# Patient Record
Sex: FEMALE | Race: WHITE | Employment: OTHER | URBAN - NONMETROPOLITAN AREA
[De-identification: names, ages, dates, MRNs, and addresses within clinical notes are randomized per-mention and may not be internally consistent; named-entity substitution may affect disease eponyms.]

---

## 2017-01-02 PROBLEM — G45.1 HEMISPHERIC CAROTID ARTERY SYNDROME: Status: ACTIVE | Noted: 2017-01-02

## 2017-01-02 PROBLEM — R47.01 APHASIA: Status: ACTIVE | Noted: 2017-01-02

## 2017-01-02 PROBLEM — G45.1 HEMISPHERIC CAROTID ARTERY SYNDROME: Status: RESOLVED | Noted: 2017-01-02 | Resolved: 2017-01-02

## 2017-01-03 PROBLEM — G45.9 TIA (TRANSIENT ISCHEMIC ATTACK): Status: ACTIVE | Noted: 2017-01-03

## 2017-01-03 PROBLEM — E11.9 TYPE 2 DIABETES MELLITUS (HCC): Status: ACTIVE | Noted: 2017-01-03

## 2017-01-09 RX ORDER — TEMAZEPAM 15 MG/1
15 CAPSULE ORAL NIGHTLY PRN
Qty: 30 CAPSULE | Refills: 0 | Status: ON HOLD | OUTPATIENT
Start: 2017-01-09 | End: 2017-02-06 | Stop reason: HOSPADM

## 2017-01-09 RX ORDER — BUSPIRONE HYDROCHLORIDE 15 MG/1
30 TABLET ORAL 2 TIMES DAILY
Qty: 120 TABLET | Refills: 0 | Status: SHIPPED | OUTPATIENT
Start: 2017-01-09 | End: 2017-02-28 | Stop reason: SDUPTHER

## 2017-01-24 ENCOUNTER — OFFICE VISIT (OUTPATIENT)
Dept: PSYCHIATRY | Age: 82
End: 2017-01-24

## 2017-01-24 DIAGNOSIS — F41.1 GAD (GENERALIZED ANXIETY DISORDER): ICD-10-CM

## 2017-01-24 DIAGNOSIS — F33.1 MODERATE EPISODE OF RECURRENT MAJOR DEPRESSIVE DISORDER (HCC): Primary | ICD-10-CM

## 2017-01-24 PROCEDURE — G8400 PT W/DXA NO RESULTS DOC: HCPCS | Performed by: NURSE PRACTITIONER

## 2017-01-24 PROCEDURE — 99215 OFFICE O/P EST HI 40 MIN: CPT | Performed by: NURSE PRACTITIONER

## 2017-01-24 PROCEDURE — G8420 CALC BMI NORM PARAMETERS: HCPCS | Performed by: NURSE PRACTITIONER

## 2017-01-24 PROCEDURE — 1036F TOBACCO NON-USER: CPT | Performed by: NURSE PRACTITIONER

## 2017-01-24 PROCEDURE — 1111F DSCHRG MED/CURRENT MED MERGE: CPT | Performed by: NURSE PRACTITIONER

## 2017-01-24 PROCEDURE — G8427 DOCREV CUR MEDS BY ELIG CLIN: HCPCS | Performed by: NURSE PRACTITIONER

## 2017-01-24 PROCEDURE — 1123F ACP DISCUSS/DSCN MKR DOCD: CPT | Performed by: NURSE PRACTITIONER

## 2017-01-24 PROCEDURE — 1090F PRES/ABSN URINE INCON ASSESS: CPT | Performed by: NURSE PRACTITIONER

## 2017-01-24 PROCEDURE — 4040F PNEUMOC VAC/ADMIN/RCVD: CPT | Performed by: NURSE PRACTITIONER

## 2017-01-24 PROCEDURE — G8484 FLU IMMUNIZE NO ADMIN: HCPCS | Performed by: NURSE PRACTITIONER

## 2017-01-24 RX ORDER — SERTRALINE HYDROCHLORIDE 25 MG/1
50 TABLET, FILM COATED ORAL DAILY
Qty: 30 TABLET | Refills: 0 | Status: SHIPPED | OUTPATIENT
Start: 2017-01-24 | End: 2017-01-24 | Stop reason: SDUPTHER

## 2017-01-24 RX ORDER — SERTRALINE HYDROCHLORIDE 25 MG/1
50 TABLET, FILM COATED ORAL DAILY
Qty: 30 TABLET | Refills: 0 | Status: ON HOLD | OUTPATIENT
Start: 2017-01-24 | End: 2017-02-06 | Stop reason: HOSPADM

## 2017-01-30 ENCOUNTER — TELEPHONE (OUTPATIENT)
Dept: PSYCHIATRY | Age: 82
End: 2017-01-30

## 2017-01-31 ENCOUNTER — OFFICE VISIT (OUTPATIENT)
Dept: PSYCHIATRY | Age: 82
End: 2017-01-31

## 2017-01-31 DIAGNOSIS — F33.2 SEVERE EPISODE OF RECURRENT MAJOR DEPRESSIVE DISORDER, WITHOUT PSYCHOTIC FEATURES (HCC): Primary | ICD-10-CM

## 2017-01-31 DIAGNOSIS — F41.1 GAD (GENERALIZED ANXIETY DISORDER): ICD-10-CM

## 2017-01-31 PROCEDURE — G8400 PT W/DXA NO RESULTS DOC: HCPCS | Performed by: NURSE PRACTITIONER

## 2017-01-31 PROCEDURE — 1036F TOBACCO NON-USER: CPT | Performed by: NURSE PRACTITIONER

## 2017-01-31 PROCEDURE — G8428 CUR MEDS NOT DOCUMENT: HCPCS | Performed by: NURSE PRACTITIONER

## 2017-01-31 PROCEDURE — G8420 CALC BMI NORM PARAMETERS: HCPCS | Performed by: NURSE PRACTITIONER

## 2017-01-31 PROCEDURE — 4040F PNEUMOC VAC/ADMIN/RCVD: CPT | Performed by: NURSE PRACTITIONER

## 2017-01-31 PROCEDURE — 1090F PRES/ABSN URINE INCON ASSESS: CPT | Performed by: NURSE PRACTITIONER

## 2017-01-31 PROCEDURE — 1111F DSCHRG MED/CURRENT MED MERGE: CPT | Performed by: NURSE PRACTITIONER

## 2017-01-31 PROCEDURE — G8484 FLU IMMUNIZE NO ADMIN: HCPCS | Performed by: NURSE PRACTITIONER

## 2017-01-31 PROCEDURE — 1123F ACP DISCUSS/DSCN MKR DOCD: CPT | Performed by: NURSE PRACTITIONER

## 2017-01-31 PROCEDURE — 99215 OFFICE O/P EST HI 40 MIN: CPT | Performed by: NURSE PRACTITIONER

## 2017-02-28 RX ORDER — MIRTAZAPINE 15 MG/1
15 TABLET, FILM COATED ORAL NIGHTLY
Qty: 90 TABLET | Refills: 0 | Status: SHIPPED | OUTPATIENT
Start: 2017-02-28 | End: 2017-04-18 | Stop reason: SDUPTHER

## 2017-02-28 RX ORDER — BUSPIRONE HYDROCHLORIDE 15 MG/1
30 TABLET ORAL 2 TIMES DAILY
Qty: 360 TABLET | Refills: 0 | Status: SHIPPED | OUTPATIENT
Start: 2017-02-28 | End: 2017-03-20 | Stop reason: SDUPTHER

## 2017-02-28 RX ORDER — LORAZEPAM 0.5 MG/1
0.5 TABLET ORAL NIGHTLY PRN
Qty: 90 TABLET | Refills: 0 | Status: SHIPPED | OUTPATIENT
Start: 2017-02-28 | End: 2017-04-18 | Stop reason: SDUPTHER

## 2017-02-28 RX ORDER — ARIPIPRAZOLE 5 MG/1
5 TABLET ORAL DAILY
Qty: 90 TABLET | Refills: 0 | Status: SHIPPED | OUTPATIENT
Start: 2017-02-28 | End: 2017-03-09 | Stop reason: SDUPTHER

## 2017-03-09 ENCOUNTER — OFFICE VISIT (OUTPATIENT)
Dept: PSYCHIATRY | Age: 82
End: 2017-03-09

## 2017-03-09 DIAGNOSIS — F33.1 MODERATE EPISODE OF RECURRENT MAJOR DEPRESSIVE DISORDER (HCC): Primary | ICD-10-CM

## 2017-03-09 PROCEDURE — 1123F ACP DISCUSS/DSCN MKR DOCD: CPT | Performed by: NURSE PRACTITIONER

## 2017-03-09 PROCEDURE — G8427 DOCREV CUR MEDS BY ELIG CLIN: HCPCS | Performed by: NURSE PRACTITIONER

## 2017-03-09 PROCEDURE — G8484 FLU IMMUNIZE NO ADMIN: HCPCS | Performed by: NURSE PRACTITIONER

## 2017-03-09 PROCEDURE — 99215 OFFICE O/P EST HI 40 MIN: CPT | Performed by: NURSE PRACTITIONER

## 2017-03-09 PROCEDURE — 1090F PRES/ABSN URINE INCON ASSESS: CPT | Performed by: NURSE PRACTITIONER

## 2017-03-09 PROCEDURE — 4040F PNEUMOC VAC/ADMIN/RCVD: CPT | Performed by: NURSE PRACTITIONER

## 2017-03-09 PROCEDURE — G8400 PT W/DXA NO RESULTS DOC: HCPCS | Performed by: NURSE PRACTITIONER

## 2017-03-09 PROCEDURE — G8420 CALC BMI NORM PARAMETERS: HCPCS | Performed by: NURSE PRACTITIONER

## 2017-03-09 PROCEDURE — 1036F TOBACCO NON-USER: CPT | Performed by: NURSE PRACTITIONER

## 2017-03-09 RX ORDER — ARIPIPRAZOLE 5 MG/1
10 TABLET ORAL DAILY
Qty: 60 TABLET | Refills: 0 | Status: SHIPPED | OUTPATIENT
Start: 2017-03-09 | End: 2017-04-18 | Stop reason: SDUPTHER

## 2017-03-09 RX ORDER — ARIPIPRAZOLE 5 MG/1
10 TABLET ORAL DAILY
Qty: 60 TABLET | Refills: 0 | Status: SHIPPED | OUTPATIENT
Start: 2017-03-09 | End: 2017-03-09 | Stop reason: SDUPTHER

## 2017-03-09 RX ORDER — DULOXETIN HYDROCHLORIDE 30 MG/1
30 CAPSULE, DELAYED RELEASE ORAL DAILY
Qty: 30 CAPSULE | Refills: 2 | Status: SHIPPED | OUTPATIENT
Start: 2017-03-09 | End: 2017-03-20 | Stop reason: DRUGHIGH

## 2017-03-09 RX ORDER — DULOXETIN HYDROCHLORIDE 30 MG/1
30 CAPSULE, DELAYED RELEASE ORAL DAILY
Qty: 30 CAPSULE | Refills: 3 | Status: SHIPPED | OUTPATIENT
Start: 2017-03-09 | End: 2017-03-09 | Stop reason: SDUPTHER

## 2017-03-14 ENCOUNTER — TELEPHONE (OUTPATIENT)
Dept: PSYCHIATRY | Age: 82
End: 2017-03-14

## 2017-03-20 RX ORDER — BUSPIRONE HYDROCHLORIDE 15 MG/1
15 TABLET ORAL 3 TIMES DAILY
Qty: 90 TABLET | Refills: 2 | Status: SHIPPED | OUTPATIENT
Start: 2017-03-20 | End: 2017-05-22 | Stop reason: SDUPTHER

## 2017-03-20 RX ORDER — DULOXETIN HYDROCHLORIDE 60 MG/1
60 CAPSULE, DELAYED RELEASE ORAL DAILY
Qty: 30 CAPSULE | Refills: 2 | Status: SHIPPED | OUTPATIENT
Start: 2017-03-20 | End: 2017-06-15 | Stop reason: SDUPTHER

## 2017-04-07 ENCOUNTER — TELEPHONE (OUTPATIENT)
Dept: PSYCHIATRY | Age: 82
End: 2017-04-07

## 2017-04-18 ENCOUNTER — OFFICE VISIT (OUTPATIENT)
Dept: PSYCHIATRY | Age: 82
End: 2017-04-18

## 2017-04-18 DIAGNOSIS — F41.1 GAD (GENERALIZED ANXIETY DISORDER): ICD-10-CM

## 2017-04-18 DIAGNOSIS — F33.1 MODERATE EPISODE OF RECURRENT MAJOR DEPRESSIVE DISORDER (HCC): Primary | ICD-10-CM

## 2017-04-18 PROCEDURE — 4040F PNEUMOC VAC/ADMIN/RCVD: CPT | Performed by: NURSE PRACTITIONER

## 2017-04-18 PROCEDURE — 1123F ACP DISCUSS/DSCN MKR DOCD: CPT | Performed by: NURSE PRACTITIONER

## 2017-04-18 PROCEDURE — 99214 OFFICE O/P EST MOD 30 MIN: CPT | Performed by: NURSE PRACTITIONER

## 2017-04-18 PROCEDURE — G8420 CALC BMI NORM PARAMETERS: HCPCS | Performed by: NURSE PRACTITIONER

## 2017-04-18 PROCEDURE — G8400 PT W/DXA NO RESULTS DOC: HCPCS | Performed by: NURSE PRACTITIONER

## 2017-04-18 PROCEDURE — G8427 DOCREV CUR MEDS BY ELIG CLIN: HCPCS | Performed by: NURSE PRACTITIONER

## 2017-04-18 PROCEDURE — 1036F TOBACCO NON-USER: CPT | Performed by: NURSE PRACTITIONER

## 2017-04-18 PROCEDURE — 1090F PRES/ABSN URINE INCON ASSESS: CPT | Performed by: NURSE PRACTITIONER

## 2017-04-18 RX ORDER — MIRTAZAPINE 15 MG/1
15 TABLET, FILM COATED ORAL NIGHTLY
Qty: 90 TABLET | Refills: 2 | Status: SHIPPED | OUTPATIENT
Start: 2017-04-18 | End: 2018-07-12

## 2017-04-18 RX ORDER — TRIHEXYPHENIDYL HYDROCHLORIDE 2 MG/1
2 TABLET ORAL 2 TIMES DAILY
Qty: 60 TABLET | Refills: 2 | Status: SHIPPED | OUTPATIENT
Start: 2017-04-18 | End: 2017-07-14 | Stop reason: SDUPTHER

## 2017-04-18 RX ORDER — ARIPIPRAZOLE 5 MG/1
10 TABLET ORAL DAILY
Qty: 60 TABLET | Refills: 3 | Status: SHIPPED | OUTPATIENT
Start: 2017-04-18 | End: 2017-07-14 | Stop reason: SDUPTHER

## 2017-04-18 RX ORDER — LORAZEPAM 0.5 MG/1
0.25 TABLET ORAL NIGHTLY PRN
Qty: 45 TABLET | Refills: 0 | Status: SHIPPED | OUTPATIENT
Start: 2017-04-18 | End: 2017-07-17

## 2017-05-22 RX ORDER — BUSPIRONE HYDROCHLORIDE 15 MG/1
15 TABLET ORAL 3 TIMES DAILY
Qty: 270 TABLET | Refills: 2 | Status: SHIPPED | OUTPATIENT
Start: 2017-05-22 | End: 2017-08-20

## 2017-06-15 ENCOUNTER — OFFICE VISIT (OUTPATIENT)
Dept: PSYCHIATRY | Age: 82
End: 2017-06-15

## 2017-06-15 VITALS — BODY MASS INDEX: 25.16 KG/M2 | WEIGHT: 151 LBS | HEIGHT: 65 IN

## 2017-06-15 DIAGNOSIS — F33.9 RECURRENT MAJOR DEPRESSIVE DISORDER, REMISSION STATUS UNSPECIFIED (HCC): Primary | ICD-10-CM

## 2017-06-15 DIAGNOSIS — F41.1 GAD (GENERALIZED ANXIETY DISORDER): ICD-10-CM

## 2017-06-15 PROCEDURE — 1036F TOBACCO NON-USER: CPT | Performed by: NURSE PRACTITIONER

## 2017-06-15 PROCEDURE — G8427 DOCREV CUR MEDS BY ELIG CLIN: HCPCS | Performed by: NURSE PRACTITIONER

## 2017-06-15 PROCEDURE — G8400 PT W/DXA NO RESULTS DOC: HCPCS | Performed by: NURSE PRACTITIONER

## 2017-06-15 PROCEDURE — 99215 OFFICE O/P EST HI 40 MIN: CPT | Performed by: NURSE PRACTITIONER

## 2017-06-15 PROCEDURE — 4040F PNEUMOC VAC/ADMIN/RCVD: CPT | Performed by: NURSE PRACTITIONER

## 2017-06-15 PROCEDURE — 1123F ACP DISCUSS/DSCN MKR DOCD: CPT | Performed by: NURSE PRACTITIONER

## 2017-06-15 PROCEDURE — G8419 CALC BMI OUT NRM PARAM NOF/U: HCPCS | Performed by: NURSE PRACTITIONER

## 2017-06-15 PROCEDURE — 1090F PRES/ABSN URINE INCON ASSESS: CPT | Performed by: NURSE PRACTITIONER

## 2017-06-15 RX ORDER — DULOXETIN HYDROCHLORIDE 60 MG/1
60 CAPSULE, DELAYED RELEASE ORAL DAILY
Qty: 30 CAPSULE | Refills: 3 | Status: SHIPPED | OUTPATIENT
Start: 2017-06-15 | End: 2017-10-10 | Stop reason: DRUGHIGH

## 2017-07-17 RX ORDER — TRIHEXYPHENIDYL HYDROCHLORIDE 2 MG/1
2 TABLET ORAL 2 TIMES DAILY
Qty: 60 TABLET | Refills: 2 | Status: SHIPPED | OUTPATIENT
Start: 2017-07-17 | End: 2017-10-02 | Stop reason: SDUPTHER

## 2017-07-17 RX ORDER — ARIPIPRAZOLE 5 MG/1
10 TABLET ORAL DAILY
Qty: 60 TABLET | Refills: 3 | Status: ON HOLD | OUTPATIENT
Start: 2017-07-17 | End: 2019-03-28 | Stop reason: HOSPADM

## 2017-08-15 ENCOUNTER — OFFICE VISIT (OUTPATIENT)
Dept: PSYCHIATRY | Age: 82
End: 2017-08-15
Payer: MEDICARE

## 2017-08-15 VITALS — WEIGHT: 156 LBS | BODY MASS INDEX: 25.96 KG/M2

## 2017-08-15 DIAGNOSIS — F33.9 RECURRENT MAJOR DEPRESSIVE DISORDER, REMISSION STATUS UNSPECIFIED (HCC): Primary | ICD-10-CM

## 2017-08-15 DIAGNOSIS — F41.1 GAD (GENERALIZED ANXIETY DISORDER): ICD-10-CM

## 2017-08-15 PROCEDURE — G8427 DOCREV CUR MEDS BY ELIG CLIN: HCPCS | Performed by: NURSE PRACTITIONER

## 2017-08-15 PROCEDURE — 1123F ACP DISCUSS/DSCN MKR DOCD: CPT | Performed by: NURSE PRACTITIONER

## 2017-08-15 PROCEDURE — 99213 OFFICE O/P EST LOW 20 MIN: CPT | Performed by: NURSE PRACTITIONER

## 2017-08-15 PROCEDURE — G8400 PT W/DXA NO RESULTS DOC: HCPCS | Performed by: NURSE PRACTITIONER

## 2017-08-15 PROCEDURE — 4040F PNEUMOC VAC/ADMIN/RCVD: CPT | Performed by: NURSE PRACTITIONER

## 2017-08-15 PROCEDURE — 1036F TOBACCO NON-USER: CPT | Performed by: NURSE PRACTITIONER

## 2017-08-15 PROCEDURE — 1090F PRES/ABSN URINE INCON ASSESS: CPT | Performed by: NURSE PRACTITIONER

## 2017-08-15 PROCEDURE — G8419 CALC BMI OUT NRM PARAM NOF/U: HCPCS | Performed by: NURSE PRACTITIONER

## 2017-09-19 ENCOUNTER — HOSPITAL ENCOUNTER (OUTPATIENT)
Age: 82
Discharge: HOME | End: 2017-09-19
Payer: MEDICARE

## 2017-09-19 ENCOUNTER — OFFICE VISIT (OUTPATIENT)
Dept: PSYCHIATRY | Age: 82
End: 2017-09-19
Payer: MEDICARE

## 2017-09-19 VITALS — WEIGHT: 150 LBS | BODY MASS INDEX: 24.96 KG/M2

## 2017-09-19 DIAGNOSIS — F33.9 RECURRENT MAJOR DEPRESSIVE DISORDER, REMISSION STATUS UNSPECIFIED (HCC): Primary | ICD-10-CM

## 2017-09-19 DIAGNOSIS — F41.1 GAD (GENERALIZED ANXIETY DISORDER): ICD-10-CM

## 2017-09-19 PROCEDURE — G8420 CALC BMI NORM PARAMETERS: HCPCS | Performed by: NURSE PRACTITIONER

## 2017-09-19 PROCEDURE — 36415 COLL VENOUS BLD VENIPUNCTURE: CPT

## 2017-09-19 PROCEDURE — G8427 DOCREV CUR MEDS BY ELIG CLIN: HCPCS | Performed by: NURSE PRACTITIONER

## 2017-09-19 PROCEDURE — 86038 ANTINUCLEAR ANTIBODIES: CPT

## 2017-09-19 PROCEDURE — 1123F ACP DISCUSS/DSCN MKR DOCD: CPT | Performed by: NURSE PRACTITIONER

## 2017-09-19 PROCEDURE — 1036F TOBACCO NON-USER: CPT | Performed by: NURSE PRACTITIONER

## 2017-09-19 PROCEDURE — 4040F PNEUMOC VAC/ADMIN/RCVD: CPT | Performed by: NURSE PRACTITIONER

## 2017-09-19 PROCEDURE — G8400 PT W/DXA NO RESULTS DOC: HCPCS | Performed by: NURSE PRACTITIONER

## 2017-09-19 PROCEDURE — 1090F PRES/ABSN URINE INCON ASSESS: CPT | Performed by: NURSE PRACTITIONER

## 2017-09-19 PROCEDURE — 99215 OFFICE O/P EST HI 40 MIN: CPT | Performed by: NURSE PRACTITIONER

## 2017-09-19 RX ORDER — DULOXETIN HYDROCHLORIDE 30 MG/1
30 CAPSULE, DELAYED RELEASE ORAL DAILY
Qty: 30 CAPSULE | Refills: 2 | Status: SHIPPED | OUTPATIENT
Start: 2017-09-19 | End: 2017-10-10 | Stop reason: SDUPTHER

## 2017-09-22 LAB — ANA SCREEN: NORMAL

## 2017-10-02 RX ORDER — TRIHEXYPHENIDYL HYDROCHLORIDE 2 MG/1
2 TABLET ORAL 2 TIMES DAILY
Qty: 60 TABLET | Refills: 2 | Status: SHIPPED | OUTPATIENT
Start: 2017-10-02 | End: 2019-04-29 | Stop reason: ALTCHOICE

## 2017-10-10 ENCOUNTER — OFFICE VISIT (OUTPATIENT)
Dept: PSYCHIATRY | Age: 82
End: 2017-10-10
Payer: MEDICARE

## 2017-10-10 VITALS — BODY MASS INDEX: 24.46 KG/M2 | WEIGHT: 147 LBS

## 2017-10-10 DIAGNOSIS — F33.1 MODERATE EPISODE OF RECURRENT MAJOR DEPRESSIVE DISORDER (HCC): ICD-10-CM

## 2017-10-10 DIAGNOSIS — F41.1 GAD (GENERALIZED ANXIETY DISORDER): ICD-10-CM

## 2017-10-10 DIAGNOSIS — F41.0 PANIC DISORDER WITHOUT AGORAPHOBIA: Primary | ICD-10-CM

## 2017-10-10 PROCEDURE — G8400 PT W/DXA NO RESULTS DOC: HCPCS | Performed by: NURSE PRACTITIONER

## 2017-10-10 PROCEDURE — G8420 CALC BMI NORM PARAMETERS: HCPCS | Performed by: NURSE PRACTITIONER

## 2017-10-10 PROCEDURE — 99215 OFFICE O/P EST HI 40 MIN: CPT | Performed by: NURSE PRACTITIONER

## 2017-10-10 PROCEDURE — 1123F ACP DISCUSS/DSCN MKR DOCD: CPT | Performed by: NURSE PRACTITIONER

## 2017-10-10 PROCEDURE — G8427 DOCREV CUR MEDS BY ELIG CLIN: HCPCS | Performed by: NURSE PRACTITIONER

## 2017-10-10 PROCEDURE — 4040F PNEUMOC VAC/ADMIN/RCVD: CPT | Performed by: NURSE PRACTITIONER

## 2017-10-10 PROCEDURE — G8484 FLU IMMUNIZE NO ADMIN: HCPCS | Performed by: NURSE PRACTITIONER

## 2017-10-10 PROCEDURE — 1036F TOBACCO NON-USER: CPT | Performed by: NURSE PRACTITIONER

## 2017-10-10 PROCEDURE — 1090F PRES/ABSN URINE INCON ASSESS: CPT | Performed by: NURSE PRACTITIONER

## 2017-10-10 RX ORDER — HYDROXYZINE PAMOATE 25 MG/1
25 CAPSULE ORAL 3 TIMES DAILY PRN
Qty: 90 CAPSULE | Refills: 0 | Status: SHIPPED | OUTPATIENT
Start: 2017-10-10 | End: 2017-10-31 | Stop reason: SDUPTHER

## 2017-10-10 RX ORDER — DULOXETIN HYDROCHLORIDE 60 MG/1
120 CAPSULE, DELAYED RELEASE ORAL DAILY
Qty: 60 CAPSULE | Refills: 0 | Status: SHIPPED | OUTPATIENT
Start: 2017-10-10

## 2017-10-10 NOTE — PROGRESS NOTES
[facility] PROFESSIONAL SERVS  [facility]  [address] [address] [address]  Dept: [phone]  Dept Fax: [phone]: [phone]      Visit Date: 10/10/2017      Pertinent History & Psychiatric Examination      Carrillo Camacho is a 80 y.o.  female returns today after 3 weeks since her last visit for follow up and medication management. Chief Complaint   Patient presents with    Depression    Anxiety         She reports with mild improvement in her mood. Says she is more depressed and very anxious recently. Says she is not able to drive any more because she is anxious and thinking that she will have an accident and will hurt self or other. Patient in with daughter who reports that she wants her to stop driving because it makes her very anxious. Wants her to give it up--one less thing to worry about. Patient's daughter also reports that she has problems at home with everything. Thinks and is worried that appliances at home are not working, even when they are working--thermostat and washing machine. She lives alone at home and is worried and stresses about every mundane things. Reports that recently she gets into a panic mode over the slightest things. Patient reports that when she is anxious she is not able to breath well, shakes  and has feeling of doom/something bad will happen. She is not able to process any thing when she is anxious. She says nothing in particular causes her anxiety to go up. She reports and says \" things weighs in on me when I am lonely and would want to cry but will not be able to cry\". Daughter says she stays alone in her condo like a \"hermit\". Not associating with any one. Her  lives in a nursing home but patient wants him to be returned home. He has dementia and she cannot take care of him by her self. She lived at [place] and she did well while she was there.  She is eating and sleeping well and denies any side effects to her medications. Patient has tremors--mild but is on Artane. She denies any thoughts to hurt self. Patient agrees to stop driving at this time. She denies any thoughts to hurt self. Will consider returning to [facility]. Past Surgical History:   Procedure Laterality Date    BREAST SURGERY      right lumpectomy    CHOLECYSTECTOMY  4-15-16    cholangiogram    COLONOSCOPY      EYE SURGERY      macular hole left eye    JOINT REPLACEMENT      left knee & right hip    SKIN BIOPSY      right arm     Family History   Problem Relation Age of Onset    Cancer Mother      pancreatic    Heart Disease Father 80    Other Sister      infant death    High Cholesterol Brother      Social History   Substance Use Topics    Smoking status: Never Smoker    Smokeless tobacco: Never Used    Alcohol use No     Current Outpatient Prescriptions   Medication Sig Dispense Refill    DULoxetine (CYMBALTA) 60 MG extended release capsule Take 2 capsules by mouth daily 60 capsule 0    hydrOXYzine (VISTARIL) 25 MG capsule Take 1 capsule by mouth 3 times daily as needed for Itching 90 capsule 0    trihexyphenidyl (ARTANE) 2 MG tablet Take 1 tablet by mouth 2 times daily For Tremors 60 tablet 2    ARIPiprazole (ABILIFY) 5 MG tablet Take 2 tablets by mouth daily 60 tablet 3    Multiple Vitamins-Minerals (SENIOR MULTIVITAMIN PLUS) TABS Take 1 tablet by mouth daily      metFORMIN (GLUCOPHAGE-XR) 750 MG extended release tablet Take 750 mg by mouth Daily with supper      glucose blood VI test strips (ONE TOUCH ULTRA TEST) strip Test daily or AD. Dx. 250.00 50 each 11    acetaminophen (TYLENOL) 325 MG tablet Take 650 mg by mouth every 6 hours as needed for Pain      metoprolol (LOPRESSOR) 25 MG tablet Take 1 tablet by mouth 2 times daily 60 tablet 3    Multiple Vitamins-Minerals (PRESERVISION AREDS 2) CAPS Take 2 capsules by mouth 2 times daily       losartan (COZAAR) 50 MG tablet Take 50 mg by mouth daily.       atorvastatin (LIPITOR) 20 MG tablet 20 mg 1 Tab Oral DAILY         aspirin 81 MG EC tablet 1 Tab Oral DAILY       mirtazapine (REMERON) 15 MG tablet Take 1 tablet by mouth nightly 90 tablet 2     No current facility-administered medications for this visit. Allergies   Allergen Reactions    Penicillins Swelling and Rash       Patient Vitals for the past 8 hrs:   Weight   10/10/17 1330 147 lb (66.7 kg)     Mental Status Evaluation:  Level of consciousness:  Within normal limits  Appearance: Street clothes, seated on couch, with good grooming  Behavior/Motor: No abnormalities noted  Attitude toward examiner:  Cooperative, attentive, good eye contact  Speech:  Minimal, normal volume and well articulated  Mood:  Looks sad  Affect:  Flat but brightens. Thought processes:  linear, goal directed and coherent  Thought content:  Homicidal ideation denies  Suicidal Ideation:  denies suicidal ideation  Delusions:  no evidence of delusions  Perceptual Disturbance:  denies any perceptual disturbance  Cognition:  In tact  Memory: age appropriate  Insight & Judgement: fair  Medication side effects:  See allergies     Clinical Assessment Medical Decision  Major depressive disorder, mild-moderate    FATOUMATA  Panic Disorder     Past Medical History:   Diagnosis Date    Anxiety     nervous breakdown 9/2016    [facility]     Diverticulitis     D[facility]) [facility]     Hyperlipidemia     Hypertension     Osteoarthritis     Pancreatic cancer [facility]     Family    Pulmonary embol[facility]     S/P knee replacement     Type 2 diabetes mellitus ([facility] 2009     Precautions with Justification:   None    Medication Review/Mgmt: begin Vistaril 25 mg PRN TID and increase Cymbalta to 120 mg daily     Medical Issues: See above    Assessment of Risk for Harm to Self/Others:  None indicated  PLAN  Counseling recommended  D/C Buspar  Start Vistaril 25 mg TID PRN   Increase Cymbalta to 120 mg daily.    Contiune with other medications as ordered  Considering returning [facility] to [facility]. Risks/SE's/benefits/alternate treatments discussed, patient stated understanding and is agreeable to treatment plan. Patient's Response to Treatment: Limited good for now    I spent a total of 57 minutes with the patient.      Electronically signed by Aurelio Jones CNP on 10/10/2017 at 1:44 PM

## 2017-10-26 ENCOUNTER — TELEPHONE (OUTPATIENT)
Dept: PSYCHIATRY | Age: 82
End: 2017-10-26

## 2017-10-26 ENCOUNTER — HOSPITAL ENCOUNTER (OUTPATIENT)
Dept: AUDIOLOGY | Age: 82
Discharge: HOME | End: 2017-10-26

## 2017-10-26 PROCEDURE — 9990000010 HC NO CHARGE VISIT: Performed by: AUDIOLOGIST

## 2017-11-01 RX ORDER — HYDROXYZINE PAMOATE 25 MG/1
25 CAPSULE ORAL 3 TIMES DAILY PRN
Qty: 90 CAPSULE | Refills: 2 | Status: SHIPPED | OUTPATIENT
Start: 2017-11-01 | End: 2017-11-15

## 2017-11-09 ENCOUNTER — HOSPITAL ENCOUNTER (OUTPATIENT)
Dept: AUDIOLOGY | Age: 82
Discharge: HOME | End: 2017-11-09
Payer: MEDICARE

## 2017-11-09 PROCEDURE — V5014 HEARING AID REPAIR/MODIFYING: HCPCS | Performed by: AUDIOLOGIST

## 2017-11-30 ENCOUNTER — OFFICE VISIT (OUTPATIENT)
Dept: PSYCHIATRY | Age: 82
End: 2017-11-30
Payer: MEDICARE

## 2017-11-30 VITALS — BODY MASS INDEX: 24.79 KG/M2 | WEIGHT: 149 LBS

## 2017-11-30 DIAGNOSIS — F33.9 RECURRENT MAJOR DEPRESSIVE DISORDER, REMISSION STATUS UNSPECIFIED (HCC): Primary | ICD-10-CM

## 2017-11-30 DIAGNOSIS — F41.0 PANIC DISORDER WITHOUT AGORAPHOBIA: ICD-10-CM

## 2017-11-30 PROCEDURE — G8484 FLU IMMUNIZE NO ADMIN: HCPCS | Performed by: NURSE PRACTITIONER

## 2017-11-30 PROCEDURE — 1090F PRES/ABSN URINE INCON ASSESS: CPT | Performed by: NURSE PRACTITIONER

## 2017-11-30 PROCEDURE — 99214 OFFICE O/P EST MOD 30 MIN: CPT | Performed by: NURSE PRACTITIONER

## 2017-11-30 PROCEDURE — G8400 PT W/DXA NO RESULTS DOC: HCPCS | Performed by: NURSE PRACTITIONER

## 2017-11-30 PROCEDURE — G8420 CALC BMI NORM PARAMETERS: HCPCS | Performed by: NURSE PRACTITIONER

## 2017-11-30 PROCEDURE — 4040F PNEUMOC VAC/ADMIN/RCVD: CPT | Performed by: NURSE PRACTITIONER

## 2017-11-30 PROCEDURE — 1123F ACP DISCUSS/DSCN MKR DOCD: CPT | Performed by: NURSE PRACTITIONER

## 2017-11-30 PROCEDURE — 1036F TOBACCO NON-USER: CPT | Performed by: NURSE PRACTITIONER

## 2017-11-30 PROCEDURE — G8427 DOCREV CUR MEDS BY ELIG CLIN: HCPCS | Performed by: NURSE PRACTITIONER

## 2017-11-30 NOTE — PROGRESS NOTES
times daily 60 tablet 3    Multiple Vitamins-Minerals (PRESERVISION AREDS 2) CAPS Take 2 capsules by mouth 2 times daily       losartan (COZAAR) 50 MG tablet Take 50 mg by mouth daily.  atorvastatin (LIPITOR) 20 MG tablet 20 mg 1 Tab Oral DAILY         aspirin 81 MG EC tablet 1 Tab Oral DAILY        No current facility-administered medications for this visit.       Allergies   Allergen Reactions    Penicillins Swelling and Rash       Patient Vitals for the past 8 hrs:   Weight   11/30/17 1150 149 lb (67.6 kg)       Mental Status Evaluation:  Level of consciousness:  Within normal limits  Appearance: Street clothes, seated on couch, with good grooming  Behavior/Motor: No abnormalities noted  Attitude toward examiner:  Cooperative, attentive, good eye contact  Speech:  spontaneous, normal rate, normal volume and well articulated  Mood:  euthymic  Affect:  mood congruent  Thought processes:  linear, goal directed and coherent  Thought content:  Homicidal ideation denies  Suicidal Ideation:  denies suicidal ideation  Delusions:  no evidence of delusions  Perceptual Disturbance:  denies any perceptual disturbance  Cognition:  In tact  Memory: age appropriate  Insight & Judgement: fair  Medication side effects:  See above      Clinical Assessment Medical Decision    Major depressive disorder, recurrent and currently in partial remission    FATOUMATA  Rule out panic disorder      Past Medical History:   Diagnosis Date    Anxiety     nervous breakdown 9/2016    [facility]     Diverticulitis     DVT [facility]deep venous thrombosis) [facility]     Hyperlipidemia     Hypertension     Osteoarthritis     Pancreatic cancer ([facility]     Family    Pulmonary embolism [facility]     S/P knee replacement     Type 2 diabetes mellitus ([facility] [facility] 2009       Precautions with Justification:  None    Medication Review/Mgmt: no change     Medical Issues: see above    Assessment of Risk for Harm to Self/Others:  none    Plan  Discontinue Vistaril  No further medication changes for now      Risks/SE's/benefits/alternate treatments discussed, patient stated understanding and is agreeable to treatment plan. Patient's Response to Treatment: positive    I spent a total of 25 minutes with the patient.      Electronically signed by Thomas Mak CNP on 11/30/2017 at 12:02 PM

## 2018-03-06 ENCOUNTER — OFFICE VISIT (OUTPATIENT)
Dept: PSYCHIATRY | Age: 83
End: 2018-03-06
Payer: MEDICARE

## 2018-03-06 VITALS
DIASTOLIC BLOOD PRESSURE: 66 MMHG | WEIGHT: 156 LBS | SYSTOLIC BLOOD PRESSURE: 117 MMHG | BODY MASS INDEX: 25.96 KG/M2 | HEART RATE: 97 BPM

## 2018-03-06 DIAGNOSIS — F33.9 RECURRENT MAJOR DEPRESSIVE DISORDER, REMISSION STATUS UNSPECIFIED (HCC): Primary | ICD-10-CM

## 2018-03-06 DIAGNOSIS — F41.1 GAD (GENERALIZED ANXIETY DISORDER): ICD-10-CM

## 2018-03-06 PROCEDURE — 99213 OFFICE O/P EST LOW 20 MIN: CPT | Performed by: NURSE PRACTITIONER

## 2018-05-30 ENCOUNTER — HOSPITAL ENCOUNTER (OUTPATIENT)
Dept: AUDIOLOGY | Age: 83
Discharge: HOME | End: 2018-05-30

## 2018-05-30 PROCEDURE — 9990000010 HC NO CHARGE VISIT: Performed by: AUDIOLOGIST

## 2018-06-29 ENCOUNTER — HOSPITAL ENCOUNTER (OUTPATIENT)
Age: 83
Discharge: HOME | End: 2018-06-29
Payer: MEDICARE

## 2018-06-29 LAB
BACTERIA: ABNORMAL /HPF
BILIRUBIN URINE: NEGATIVE
BLOOD, URINE: ABNORMAL
CASTS UA: ABNORMAL /LPF
CHARACTER, URINE: CLEAR
COLOR: YELLOW
CRYSTALS, UA: ABNORMAL
EPITHELIAL CELLS, UA: ABNORMAL /HPF
GLUCOSE URINE: NEGATIVE MG/DL
KETONES, URINE: NEGATIVE
LEUKOCYTE ESTERASE, URINE: ABNORMAL
NITRITE, URINE: POSITIVE
PH UA: 7
PROTEIN UA: ABNORMAL
RBC URINE: ABNORMAL /HPF
RENAL EPITHELIAL, UA: ABNORMAL
SPECIFIC GRAVITY, URINE: 1.01 (ref 1–1.03)
UROBILINOGEN, URINE: 0.2 EU/DL
WBC UA: ABNORMAL /HPF

## 2018-06-29 PROCEDURE — 87186 SC STD MICRODIL/AGAR DIL: CPT

## 2018-06-29 PROCEDURE — 87086 URINE CULTURE/COLONY COUNT: CPT

## 2018-06-29 PROCEDURE — 81001 URINALYSIS AUTO W/SCOPE: CPT

## 2018-06-29 PROCEDURE — 87077 CULTURE AEROBIC IDENTIFY: CPT

## 2018-06-29 PROCEDURE — 87184 SC STD DISK METHOD PER PLATE: CPT

## 2018-07-01 LAB
ORGANISM: ABNORMAL
URINE CULTURE REFLEX: ABNORMAL

## 2018-07-12 ENCOUNTER — OFFICE VISIT (OUTPATIENT)
Dept: PSYCHIATRY | Age: 83
End: 2018-07-12
Payer: MEDICARE

## 2018-07-12 VITALS — BODY MASS INDEX: 27.46 KG/M2 | HEART RATE: 95 BPM | WEIGHT: 165 LBS

## 2018-07-12 DIAGNOSIS — F33.9 RECURRENT MAJOR DEPRESSIVE DISORDER, REMISSION STATUS UNSPECIFIED (HCC): Primary | ICD-10-CM

## 2018-07-12 PROCEDURE — 99214 OFFICE O/P EST MOD 30 MIN: CPT | Performed by: NURSE PRACTITIONER

## 2018-07-12 NOTE — PROGRESS NOTES
[facility] PROFESSIONAL SERVS  [facility]  [address] [address] [address]  Dept: [phone]  Dept Fax: [phone]: [phone]      Visit Date: 7/12/2018      Pertinent History & Psychiatric Examination      Ernie Mijares is a 80 y.o.  female returns today after 4 months since her last visit for follow up and medication management. She reports with moderate improvement and says she is okay. Says she feels her age now because she is slower and takes a while to get up from the chair. Have been unsteady and have fallen in the past. Says she gets forgetful, not remembering things during conversation with her children. Says she is noted to be dreaming and sleep walking lately. Was found to be with a scissor when she was sleep walking. She also would think family is around when she is having episodes of sleep walking. Worried about that. Says she has been up and found her lamp moved from where it normally stayed and does not recall  when she moved it. Says it happens often--weekly sometimes. Reports diminished anxiety. She is eating and sleeping well. She denies any thoughts to hurt self. She is more pleasant today than in the past. Came in with her younger daughter. Requested that Remeron be discontinued because she thinks it might be causing the sleep walking. Less tremor noted.        Past Surgical History:   Procedure Laterality Date    BREAST SURGERY      right lumpectomy    CHOLECYSTECTOMY  4-15-16    cholangiogram    COLONOSCOPY      EYE SURGERY      macular hole left eye    JOINT REPLACEMENT      left knee & right hip    SKIN BIOPSY      right arm     Family History   Problem Relation Age of Onset    Cancer Mother         pancreatic    Heart Disease Father 80    Other Sister         infant death   Deirdre Rosales High Cholesterol Brother      Social History   Substance Use Topics    Smoking status: Never Smoker    Smokeless tobacco: Never Used   Deirdre Rosales

## 2018-12-18 ENCOUNTER — OFFICE VISIT (OUTPATIENT)
Dept: PSYCHIATRY | Age: 83
End: 2018-12-18
Payer: MEDICARE

## 2018-12-18 VITALS — WEIGHT: 172 LBS | HEART RATE: 106 BPM | BODY MASS INDEX: 28.62 KG/M2

## 2018-12-18 DIAGNOSIS — F33.9 RECURRENT MAJOR DEPRESSIVE DISORDER, REMISSION STATUS UNSPECIFIED (HCC): Primary | ICD-10-CM

## 2018-12-18 DIAGNOSIS — F41.1 GAD (GENERALIZED ANXIETY DISORDER): ICD-10-CM

## 2018-12-18 PROCEDURE — 99215 OFFICE O/P EST HI 40 MIN: CPT | Performed by: NURSE PRACTITIONER

## 2018-12-18 PROCEDURE — 4040F PNEUMOC VAC/ADMIN/RCVD: CPT | Performed by: NURSE PRACTITIONER

## 2018-12-18 PROCEDURE — 1036F TOBACCO NON-USER: CPT | Performed by: NURSE PRACTITIONER

## 2018-12-18 PROCEDURE — G8419 CALC BMI OUT NRM PARAM NOF/U: HCPCS | Performed by: NURSE PRACTITIONER

## 2018-12-18 PROCEDURE — G8427 DOCREV CUR MEDS BY ELIG CLIN: HCPCS | Performed by: NURSE PRACTITIONER

## 2018-12-18 PROCEDURE — G8484 FLU IMMUNIZE NO ADMIN: HCPCS | Performed by: NURSE PRACTITIONER

## 2018-12-18 PROCEDURE — 1123F ACP DISCUSS/DSCN MKR DOCD: CPT | Performed by: NURSE PRACTITIONER

## 2018-12-18 PROCEDURE — 1101F PT FALLS ASSESS-DOCD LE1/YR: CPT | Performed by: NURSE PRACTITIONER

## 2018-12-18 PROCEDURE — 1090F PRES/ABSN URINE INCON ASSESS: CPT | Performed by: NURSE PRACTITIONER

## 2018-12-18 PROCEDURE — G8400 PT W/DXA NO RESULTS DOC: HCPCS | Performed by: NURSE PRACTITIONER

## 2019-02-15 ENCOUNTER — INITIAL CONSULT (OUTPATIENT)
Dept: NEUROLOGY | Age: 84
End: 2019-02-15
Payer: MEDICARE

## 2019-02-15 VITALS
HEIGHT: 64 IN | HEART RATE: 68 BPM | WEIGHT: 177 LBS | BODY MASS INDEX: 30.22 KG/M2 | DIASTOLIC BLOOD PRESSURE: 70 MMHG | SYSTOLIC BLOOD PRESSURE: 132 MMHG

## 2019-02-15 DIAGNOSIS — R41.3 MEMORY PROBLEM: Primary | ICD-10-CM

## 2019-02-15 DIAGNOSIS — M83.2 ADULT OSTEOMALACIA DUE TO MALABSORPTION: ICD-10-CM

## 2019-02-15 PROCEDURE — 99204 OFFICE O/P NEW MOD 45 MIN: CPT | Performed by: PSYCHIATRY & NEUROLOGY

## 2019-03-15 ENCOUNTER — TELEPHONE (OUTPATIENT)
Dept: NEUROLOGY | Age: 84
End: 2019-03-15

## 2019-03-22 ENCOUNTER — APPOINTMENT (OUTPATIENT)
Dept: CT IMAGING | Age: 84
DRG: 291 | End: 2019-03-22
Payer: MEDICARE

## 2019-03-22 ENCOUNTER — HOSPITAL ENCOUNTER (INPATIENT)
Age: 84
LOS: 6 days | Discharge: NURSING, REHAB OR LONG-TERM CARE | DRG: 291 | End: 2019-03-28
Attending: INTERNAL MEDICINE | Admitting: INTERNAL MEDICINE
Payer: MEDICARE

## 2019-03-22 ENCOUNTER — APPOINTMENT (OUTPATIENT)
Dept: GENERAL RADIOLOGY | Age: 84
DRG: 291 | End: 2019-03-22
Payer: MEDICARE

## 2019-03-22 DIAGNOSIS — R06.02 SOB (SHORTNESS OF BREATH): Primary | ICD-10-CM

## 2019-03-22 PROBLEM — I50.40 CHF (CONGESTIVE HEART FAILURE), NYHA CLASS II, UNSPECIFIED FAILURE CHRONICITY, COMBINED (HCC): Status: ACTIVE | Noted: 2019-03-22

## 2019-03-22 PROBLEM — L30.9 DERMATITIS: Status: ACTIVE | Noted: 2019-03-22

## 2019-03-22 LAB
ALBUMIN SERPL-MCNC: 3.4 G/DL (ref 3.5–5.1)
ALP BLD-CCNC: 64 U/L (ref 38–126)
ALT SERPL-CCNC: 12 U/L (ref 11–66)
ANION GAP SERPL CALCULATED.3IONS-SCNC: 12 MEQ/L (ref 8–16)
AST SERPL-CCNC: 14 U/L (ref 5–40)
AVERAGE GLUCOSE: 117 MG/DL (ref 70–126)
BASOPHILS # BLD: 0.3 %
BASOPHILS ABSOLUTE: 0 THOU/MM3 (ref 0–0.1)
BILIRUB SERPL-MCNC: 0.2 MG/DL (ref 0.3–1.2)
BUN BLDV-MCNC: 23 MG/DL (ref 7–22)
CALCIUM SERPL-MCNC: 9.1 MG/DL (ref 8.5–10.5)
CHLORIDE BLD-SCNC: 103 MEQ/L (ref 98–111)
CO2: 26 MEQ/L (ref 23–33)
CREAT SERPL-MCNC: 1 MG/DL (ref 0.4–1.2)
EKG ATRIAL RATE: 87 BPM
EKG P AXIS: 60 DEGREES
EKG P-R INTERVAL: 146 MS
EKG Q-T INTERVAL: 358 MS
EKG QRS DURATION: 92 MS
EKG QTC CALCULATION (BAZETT): 430 MS
EKG R AXIS: 42 DEGREES
EKG T AXIS: 47 DEGREES
EKG VENTRICULAR RATE: 87 BPM
EOSINOPHIL # BLD: 4.2 %
EOSINOPHILS ABSOLUTE: 0.3 THOU/MM3 (ref 0–0.4)
ERYTHROCYTE [DISTWIDTH] IN BLOOD BY AUTOMATED COUNT: 14.2 % (ref 11.5–14.5)
ERYTHROCYTE [DISTWIDTH] IN BLOOD BY AUTOMATED COUNT: 52 FL (ref 35–45)
GFR SERPL CREATININE-BSD FRML MDRD: 53 ML/MIN/1.73M2
GLUCOSE BLD-MCNC: 104 MG/DL (ref 70–108)
HBA1C MFR BLD: 5.9 % (ref 4.4–6.4)
HCT VFR BLD CALC: 35.5 % (ref 37–47)
HEMOGLOBIN: 11.1 GM/DL (ref 12–16)
IMMATURE GRANS (ABS): 0.01 THOU/MM3 (ref 0–0.07)
IMMATURE GRANULOCYTES: 0.2 %
LYMPHOCYTES # BLD: 45.1 %
LYMPHOCYTES ABSOLUTE: 2.9 THOU/MM3 (ref 1–4.8)
MCH RBC QN AUTO: 31.4 PG (ref 26–33)
MCHC RBC AUTO-ENTMCNC: 31.3 GM/DL (ref 32.2–35.5)
MCV RBC AUTO: 100.3 FL (ref 81–99)
MONOCYTES # BLD: 11.5 %
MONOCYTES ABSOLUTE: 0.7 THOU/MM3 (ref 0.4–1.3)
NUCLEATED RED BLOOD CELLS: 0 /100 WBC
OSMOLALITY CALCULATION: 285.3 MOSMOL/KG (ref 275–300)
PLATELET # BLD: 214 THOU/MM3 (ref 130–400)
PMV BLD AUTO: 8.6 FL (ref 9.4–12.4)
POTASSIUM SERPL-SCNC: 4.7 MEQ/L (ref 3.5–5.2)
PRO-BNP: 939.3 PG/ML (ref 0–1800)
RBC # BLD: 3.54 MILL/MM3 (ref 4.2–5.4)
SEG NEUTROPHILS: 38.7 %
SEGMENTED NEUTROPHILS ABSOLUTE COUNT: 2.5 THOU/MM3 (ref 1.8–7.7)
SODIUM BLD-SCNC: 141 MEQ/L (ref 135–145)
TOTAL PROTEIN: 6.6 G/DL (ref 6.1–8)
TROPONIN T: < 0.01 NG/ML
TSH SERPL DL<=0.05 MIU/L-ACNC: 2.38 UIU/ML (ref 0.4–4.2)
WBC # BLD: 6.4 THOU/MM3 (ref 4.8–10.8)

## 2019-03-22 PROCEDURE — 71275 CT ANGIOGRAPHY CHEST: CPT

## 2019-03-22 PROCEDURE — 2580000003 HC RX 258: Performed by: PHYSICIAN ASSISTANT

## 2019-03-22 PROCEDURE — 93005 ELECTROCARDIOGRAM TRACING: CPT | Performed by: NURSE PRACTITIONER

## 2019-03-22 PROCEDURE — 99223 1ST HOSP IP/OBS HIGH 75: CPT | Performed by: PHYSICIAN ASSISTANT

## 2019-03-22 PROCEDURE — 84484 ASSAY OF TROPONIN QUANT: CPT

## 2019-03-22 PROCEDURE — 83880 ASSAY OF NATRIURETIC PEPTIDE: CPT

## 2019-03-22 PROCEDURE — 36415 COLL VENOUS BLD VENIPUNCTURE: CPT

## 2019-03-22 PROCEDURE — 83036 HEMOGLOBIN GLYCOSYLATED A1C: CPT

## 2019-03-22 PROCEDURE — 1200000003 HC TELEMETRY R&B

## 2019-03-22 PROCEDURE — 6360000004 HC RX CONTRAST MEDICATION: Performed by: NURSE PRACTITIONER

## 2019-03-22 PROCEDURE — 71046 X-RAY EXAM CHEST 2 VIEWS: CPT

## 2019-03-22 PROCEDURE — 6370000000 HC RX 637 (ALT 250 FOR IP): Performed by: PHYSICIAN ASSISTANT

## 2019-03-22 PROCEDURE — 84443 ASSAY THYROID STIM HORMONE: CPT

## 2019-03-22 PROCEDURE — 80053 COMPREHEN METABOLIC PANEL: CPT

## 2019-03-22 PROCEDURE — 99285 EMERGENCY DEPT VISIT HI MDM: CPT

## 2019-03-22 PROCEDURE — 85025 COMPLETE CBC W/AUTO DIFF WBC: CPT

## 2019-03-22 PROCEDURE — 93010 ELECTROCARDIOGRAM REPORT: CPT | Performed by: INTERNAL MEDICINE

## 2019-03-22 PROCEDURE — 6360000002 HC RX W HCPCS: Performed by: PHYSICIAN ASSISTANT

## 2019-03-22 RX ORDER — DEXTROSE MONOHYDRATE 50 MG/ML
100 INJECTION, SOLUTION INTRAVENOUS PRN
Status: DISCONTINUED | OUTPATIENT
Start: 2019-03-22 | End: 2019-03-28 | Stop reason: HOSPADM

## 2019-03-22 RX ORDER — FUROSEMIDE 10 MG/ML
40 INJECTION INTRAMUSCULAR; INTRAVENOUS 2 TIMES DAILY
Status: DISCONTINUED | OUTPATIENT
Start: 2019-03-22 | End: 2019-03-24

## 2019-03-22 RX ORDER — ANTIOX #8/OM3/DHA/EPA/LUT/ZEAX 250-2.5 MG
2 CAPSULE ORAL 2 TIMES DAILY
Status: DISCONTINUED | OUTPATIENT
Start: 2019-03-22 | End: 2019-03-22 | Stop reason: SDUPTHER

## 2019-03-22 RX ORDER — ONDANSETRON 2 MG/ML
4 INJECTION INTRAMUSCULAR; INTRAVENOUS EVERY 6 HOURS PRN
Status: DISCONTINUED | OUTPATIENT
Start: 2019-03-22 | End: 2019-03-28 | Stop reason: HOSPADM

## 2019-03-22 RX ORDER — ATORVASTATIN CALCIUM 20 MG/1
20 TABLET, FILM COATED ORAL DAILY
Status: DISCONTINUED | OUTPATIENT
Start: 2019-03-23 | End: 2019-03-28 | Stop reason: HOSPADM

## 2019-03-22 RX ORDER — DULOXETIN HYDROCHLORIDE 60 MG/1
120 CAPSULE, DELAYED RELEASE ORAL DAILY
Status: DISCONTINUED | OUTPATIENT
Start: 2019-03-23 | End: 2019-03-28 | Stop reason: HOSPADM

## 2019-03-22 RX ORDER — SODIUM CHLORIDE 0.9 % (FLUSH) 0.9 %
10 SYRINGE (ML) INJECTION PRN
Status: DISCONTINUED | OUTPATIENT
Start: 2019-03-22 | End: 2019-03-28 | Stop reason: HOSPADM

## 2019-03-22 RX ORDER — NICOTINE POLACRILEX 4 MG
15 LOZENGE BUCCAL PRN
Status: DISCONTINUED | OUTPATIENT
Start: 2019-03-22 | End: 2019-03-28 | Stop reason: HOSPADM

## 2019-03-22 RX ORDER — CETIRIZINE HYDROCHLORIDE 10 MG/1
5 TABLET ORAL DAILY
Status: DISCONTINUED | OUTPATIENT
Start: 2019-03-23 | End: 2019-03-28 | Stop reason: HOSPADM

## 2019-03-22 RX ORDER — LOSARTAN POTASSIUM 50 MG/1
50 TABLET ORAL DAILY
Status: DISCONTINUED | OUTPATIENT
Start: 2019-03-23 | End: 2019-03-28 | Stop reason: HOSPADM

## 2019-03-22 RX ORDER — DEXTROSE MONOHYDRATE 25 G/50ML
12.5 INJECTION, SOLUTION INTRAVENOUS PRN
Status: DISCONTINUED | OUTPATIENT
Start: 2019-03-22 | End: 2019-03-28 | Stop reason: HOSPADM

## 2019-03-22 RX ORDER — TRIHEXYPHENIDYL HYDROCHLORIDE 2 MG/1
2 TABLET ORAL 2 TIMES DAILY
Status: DISCONTINUED | OUTPATIENT
Start: 2019-03-22 | End: 2019-03-28 | Stop reason: HOSPADM

## 2019-03-22 RX ORDER — POTASSIUM CHLORIDE 7.45 MG/ML
10 INJECTION INTRAVENOUS PRN
Status: DISCONTINUED | OUTPATIENT
Start: 2019-03-22 | End: 2019-03-28 | Stop reason: HOSPADM

## 2019-03-22 RX ORDER — ACETAMINOPHEN 325 MG/1
650 TABLET ORAL EVERY 4 HOURS PRN
Status: DISCONTINUED | OUTPATIENT
Start: 2019-03-22 | End: 2019-03-28 | Stop reason: HOSPADM

## 2019-03-22 RX ORDER — ASPIRIN 81 MG/1
81 TABLET ORAL DAILY
Status: DISCONTINUED | OUTPATIENT
Start: 2019-03-23 | End: 2019-03-28 | Stop reason: HOSPADM

## 2019-03-22 RX ORDER — SODIUM CHLORIDE 0.9 % (FLUSH) 0.9 %
10 SYRINGE (ML) INJECTION EVERY 12 HOURS SCHEDULED
Status: DISCONTINUED | OUTPATIENT
Start: 2019-03-22 | End: 2019-03-28 | Stop reason: HOSPADM

## 2019-03-22 RX ORDER — M-VIT,TX,IRON,MINS/CALC/FOLIC 27MG-0.4MG
1 TABLET ORAL DAILY
Status: DISCONTINUED | OUTPATIENT
Start: 2019-03-23 | End: 2019-03-28 | Stop reason: HOSPADM

## 2019-03-22 RX ADMIN — METOPROLOL TARTRATE 25 MG: 25 TABLET ORAL at 22:56

## 2019-03-22 RX ADMIN — FUROSEMIDE 40 MG: 10 INJECTION, SOLUTION INTRAMUSCULAR; INTRAVENOUS at 22:57

## 2019-03-22 RX ADMIN — IOPAMIDOL 80 ML: 755 INJECTION, SOLUTION INTRAVENOUS at 18:32

## 2019-03-22 RX ADMIN — Medication 10 ML: at 22:56

## 2019-03-22 RX ADMIN — TRIHEXYPHENIDYL HYDROCHLORIDE 2 MG: 2 TABLET ORAL at 22:57

## 2019-03-22 ASSESSMENT — ENCOUNTER SYMPTOMS
SORE THROAT: 0
RHINORRHEA: 0
NAUSEA: 0
SINUS PRESSURE: 0
ABDOMINAL PAIN: 0
COUGH: 0
VOMITING: 0
CONSTIPATION: 0
CHEST TIGHTNESS: 0
WHEEZING: 0
ABDOMINAL DISTENTION: 0
EYE REDNESS: 0
VOICE CHANGE: 0
DIARRHEA: 0
SHORTNESS OF BREATH: 1
BACK PAIN: 0
PHOTOPHOBIA: 0
COLOR CHANGE: 0
BLOOD IN STOOL: 0

## 2019-03-22 ASSESSMENT — PAIN DESCRIPTION - LOCATION
LOCATION: LEG

## 2019-03-22 ASSESSMENT — PAIN SCALES - GENERAL
PAINLEVEL_OUTOF10: 3
PAINLEVEL_OUTOF10: 3
PAINLEVEL_OUTOF10: 2
PAINLEVEL_OUTOF10: 0
PAINLEVEL_OUTOF10: 0
PAINLEVEL_OUTOF10: 3
PAINLEVEL_OUTOF10: 3

## 2019-03-22 ASSESSMENT — PAIN DESCRIPTION - PAIN TYPE
TYPE: ACUTE PAIN

## 2019-03-22 ASSESSMENT — PAIN DESCRIPTION - FREQUENCY
FREQUENCY: CONTINUOUS

## 2019-03-22 ASSESSMENT — PAIN DESCRIPTION - ORIENTATION
ORIENTATION: RIGHT;LEFT

## 2019-03-22 ASSESSMENT — PAIN DESCRIPTION - DESCRIPTORS
DESCRIPTORS: ACHING

## 2019-03-22 NOTE — ED PROVIDER NOTES
[facility] Emergency Department    CHIEF COMPLAINT       Chief Complaint   Patient presents with    Leg Swelling       Nurses Notes reviewed and I agree except as noted in the HPI. HISTORY OF PRESENT ILLNESS    Reji Kong josué 80 y.o. female who presents to the ED for evaluation of bilateral leg swelling. She has pain in both legs with ambulation or pressure and notes that the pain in her left leg is worse than the right. Patient is also complaining of shortness of breath with exertion. She is also complaining of a rash on her chest and arms for 1 week. Patient notes that she has changed body wash recently and thinks this may be the cause of her rash. She has recently gained about 25lbs in a few months. Patient is currently in assisted living after her  passed away. She denies ever having an echocardiogram.  Patient denies a history of heart issues and denies having a pacemaker. She has a history of IBS, hypertension, breast cancer, PE, type 2 diabetes mellitus, and DVT. Patient denies further questions or complaints at this time. HPI was provided by the patient. REVIEW OF SYSTEMS     Review of Systems   Constitutional: Negative for appetite change, chills, diaphoresis, fatigue, fever and unexpected weight change. HENT: Negative for congestion, hearing loss, postnasal drip, rhinorrhea, sinus pressure, sore throat and voice change. Eyes: Negative for photophobia, redness and visual disturbance. Respiratory: Positive for shortness of breath (with exertion). Negative for cough, chest tightness and wheezing. Cardiovascular: Positive for leg swelling (bilateral). Negative for chest pain and palpitations. Gastrointestinal: Negative for abdominal distention, abdominal pain, blood in stool, constipation, diarrhea, nausea and vomiting. Endocrine: Negative for cold intolerance, heat intolerance, polydipsia, polyphagia and polyuria.    Genitourinary: Negative for difficulty urinating, dysuria, flank pain, frequency and vaginal pain. Musculoskeletal: Negative for arthralgias, back pain, gait problem, joint swelling, neck pain and neck stiffness. Skin: Positive for rash (chest and arms). Negative for color change. Allergic/Immunologic: Negative for immunocompromised state. Neurological: Negative for dizziness, tremors, weakness, light-headedness, numbness and headaches. Hematological: Does not bruise/bleed easily. Psychiatric/Behavioral: Negative for behavioral problems, confusion, decreased concentration, hallucinations, self-injury and suicidal ideas. The patient is not nervous/anxious. PAST MEDICAL HISTORY     Past Medical History:   Diagnosis Date    Anxiety     nervous breakdown 9/2016    [facility] [facility]     Diverticulitis     [facility]     Hyperlipidemia     Hypertension     Osteoarthritis     Pancreatic cancer ([facility] [address] [facility]     Family    [facility]     S/P knee replacement     Type 2 diabetes mellitus ([facility] [facility] 2009       SURGICALHISTORY      has a past surgical history that includes Breast surgery; Colonoscopy; eye surgery; skin biopsy; Cholecystectomy (4-15-16); and joint replacement. CURRENT MEDICATIONS       Previous Medications    ACETAMINOPHEN (TYLENOL) 325 MG TABLET    Take 650 mg by mouth every 6 hours as needed for Pain    ARIPIPRAZOLE (ABILIFY) 5 MG TABLET    Take 2 tablets by mouth daily    ASPIRIN 81 MG EC TABLET    1 Tab Oral DAILY     ATORVASTATIN (LIPITOR) 20 MG TABLET    20 mg 1 Tab Oral DAILY       DULOXETINE (CYMBALTA) 60 MG EXTENDED RELEASE CAPSULE    Take 2 capsules by mouth daily    GLUCOSE BLOOD VI TEST STRIPS (ONE TOUCH ULTRA TEST) STRIP    Test daily or AD. Dx. 250.00    LOSARTAN (COZAAR) 50 MG TABLET    Take 50 mg by mouth daily.     METFORMIN (GLUCOPHAGE-XR) 750 MG EXTENDED RELEASE TABLET    Take 750 mg by mouth Daily with supper    METOPROLOL (LOPRESSOR) 25 MG TABLET    Take 1 tablet by Not on file   Other Topics Concern    Not on file   Social History Narrative    Not on file       PHYSICAL EXAM     INITIAL VITALS:  weight is 150 lb (68 kg). Her oral temperature is 98.1 °F (36.7 °C). Her blood pressure is 155/76 (abnormal) and her pulse is 94. Her respiration is 18 and oxygen saturation is 95%. Physical Exam   Constitutional: She is oriented to person, place, and time. She appears well-developed and well-nourished. HENT:   Head: Normocephalic. Mouth/Throat: Uvula is midline. Eyes: Conjunctivae and EOM are normal.   Neck: Normal range of motion. Neck supple. JVD (right) present. Cardiovascular: Normal rate, regular rhythm, S1 normal, S2 normal, normal heart sounds and intact distal pulses. Pulmonary/Chest: Effort normal. No respiratory distress. She has rales in the right lower field and the left lower field. She exhibits no tenderness. Abdominal: Soft. Normal appearance and bowel sounds are normal. She exhibits no distension. There is no tenderness. Musculoskeletal: Normal range of motion. Right lower leg: She exhibits edema (pitting +3 ). Left lower leg: She exhibits edema (pitting +3). Lymphadenopathy:     She has no cervical adenopathy. Neurological: She is alert and oriented to person, place, and time. Skin: Skin is warm, dry and intact. There is erythema. Scattered macules to chest and arms   Psychiatric: She has a normal mood and affect. Her speech is normal and behavior is normal. Thought content normal.   Nursing note and vitals reviewed.       DIFFERENTIAL DIAGNOSIS:   CHF, aortic stenosis, COPD, PE, Dependant edema, ACS    DIAGNOSTIC RESULTS     EKG: All EKG's are interpreted by the Emergency Department Physician who eithersigns or Co-signs this chart in the absence of a cardiologist.    EKG read and interpreted by myself with comparison to January 2nd 2017 gives impression of normal sinus rhythm with heart rate of 87; interval 146; QRS 92;QTc 430; 20 17 18   Temp:       TempSrc:       SpO2: 98% 97% 95% 95%   Weight:           MDM    Patient was seen and evaluated in the emergency department, patient appear to be in no acute distress. Vital signs were reviewed, slight tachycardia was noted upon initial evaluation. Minimal tachypnea noted. Pulse ox when I was in the room was reading 89% with good pleth. Minimal crackles in the lower bases. 3+ pitting edema to lower extremities. Labs are obtained, no leukocytosis, no elevation proBNP her troponin, EKG shows no significant abnormality. X-ray of the chest shows no congestion of the pleural vasculature. CT of the chest was obtained and shows no PE, does show cardiomegaly. I discussed my findings my plan of care with the patient and her daughter, they don't feel comfortable with discharge with oral diuretics, they would like to be admitted for further evaluation and treatment. I do not think this is unreasonable as the patient lives in assisted living facility and has limited assistance. Will not be available to help the patient at this time. I discussed the case with Dr. Alicia Paris who graciously accepted the patient for admission. While here in the emergency per the patient making a stable course is appropriate for admission. Medications   iopamidol (ISOVUE-370) 76 % injection 80 mL (80 mLs Intravenous Given 3/22/19 1832)       Patient was seenindependently by myself. The patient's final impression and disposition and plan was determined by myself.      CRITICAL CARE:   None    CONSULTS:  Dr. Amelia Damian:  None    FINAL IMPRESSION     1. SOB (shortness of breath)          DISPOSITION/PLAN   Patient admitted to hospitalist service    PATIENT REFERREDTO:  Princess Hunt MD  [address]  [phone]            DISCHARGE MEDICATIONS:  New Prescriptions    No medications on file       (Please note that portions of this note were completed with a voice recognition program.  Efforts were made to edit the dictations but occasionally words are mis-transcribed.)    Scribe:  Kristi Hatch 3/22/19 2:57 PM Scribing for and in the presence of Denny New CNP. Signed by: Maikel Marcos, 03/22/19 7:34 PM    Provider:  I personally performed the services described in the documentation,reviewed and edited the documentation which was dictated to the scribe in my presence, and it accurately records my words and actions.     Denny New CNP 03/22/19 7:34 PM    [address] [place] [address] BAILEY New CNP  03/22/19 1946

## 2019-03-22 NOTE — ED NOTES
Talking with patient, she realized that she did change a body wash recently and believes this is the cause of her rash. Daughter at bedside.       Kirti Beatty RN  03/22/19 2560

## 2019-03-22 NOTE — ED TRIAGE NOTES
Pt to Ed with c/o bilateral leg swelling. PT stated legs feel achy. Pt stated she has increased SOB and fatigue with ambulation.

## 2019-03-22 NOTE — H&P
History & Physical        Patient:  Ailyn Liu  YOB: 1935    MRN: [id number]     Acct: [de-identified]    PCP: Gela Leon MD    Date of Admission: 3/22/2019    Date of Service: Pt seen/examined on 03/22/19  and Admitted to Inpatient with expected LOS greater than two midnights due to medical therapy. Chief Complaint:  CHF with unknown EF      History Of Present Illness:      80 y.o. female with PMH of DM II, HTN, and IBS who presented to [address] [place] [address] with complaints of bilateral leg swelling and SOB. Pt's daughter at bedside states that her mother's ankles have been swollen for at least a year, but she has never received tx for it. Pt states that just over the past week she has been noticing an increase in the swelling of her ankles and gradually worsening SOB. She also endorses a red, patchy rash that is present on her chest, the crooks of her arms, and down her legs that is itchy and flaking. She notes recently changing her body wash. Daughter notes that while speaking with pt she has noticed that she will have to stop and catch her breath mid-sentence and pt has had weight gain of 25lbs within the past few months. Denies fevers/chills/headaches/dizziness/CP/palpitations/n/v/d/dysuria/numbness/tingling.     Past Medical History:          Diagnosis Date    Anxiety     nervous breakdown 9/2016    Breast cancer ([address]     Diverticulitis     DVT (deep venous thrombosis) (HCC)     Hyperlipidemia     Hypertension     Osteoarthritis     Pancreatic cancer ([address]     Family    Pulmonary embolism ([address]     S/P knee replacement     Type 2 diabetes mellitus ([address] 2009       Past Surgical History:          Procedure Laterality Date    BREAST SURGERY      right lumpectomy    CHOLECYSTECTOMY  4-15-16    cholangiogram    COLONOSCOPY      EYE SURGERY      macular hole left eye    JOINT REPLACEMENT      left knee & right hip    SKIN BIOPSY      right arm       Medications Prior to Admission:      Prior to Admission medications    Medication Sig Start Date End Date Taking? Authorizing Provider   DULoxetine (CYMBALTA) 60 MG extended release capsule Take 2 capsules by mouth daily 10/10/17  Yes BAILEY Way CNP   trihexyphenidyl (ARTANE) 2 MG tablet Take 1 tablet by mouth 2 times daily For Tremors 10/2/17  Yes BAILEY Way CNP   Multiple Vitamins-Minerals (SENIOR MULTIVITAMIN PLUS) TABS Take 1 tablet by mouth daily   Yes Historical Provider, MD   metFORMIN (GLUCOPHAGE-XR) 750 MG extended release tablet Take 750 mg by mouth Daily with supper   Yes Historical Provider, MD   glucose blood VI test strips (ONE TOUCH ULTRA TEST) strip Test daily or AD. Dx. 250.00 12/6/16  Yes BAILEY Pablo CNP   metoprolol (LOPRESSOR) 25 MG tablet Take 1 tablet by mouth 2 times daily 4/17/16  Yes Sally Goodrich MD   Multiple Vitamins-Minerals (PRESERVISION AREDS 2) CAPS Take 2 capsules by mouth 2 times daily    Yes Historical Provider, MD   losartan (COZAAR) 50 MG tablet Take 50 mg by mouth daily. Yes Historical Provider, MD   atorvastatin (LIPITOR) 20 MG tablet 20 mg 1 Tab Oral DAILY      Yes Historical Provider, MD   aspirin 81 MG EC tablet 1 Tab Oral DAILY    Yes Historical Provider, MD   ARIPiprazole (ABILIFY) 5 MG tablet Take 2 tablets by mouth daily 7/17/17 12/18/18  BAILEY Way CNP   acetaminophen (TYLENOL) 325 MG tablet Take 650 mg by mouth every 6 hours as needed for Pain    Historical Provider, MD       Allergies:  Penicillins    Social History:      The patient currently lives at an assisted living home. TOBACCO:   reports that she has never smoked. She has never used smokeless tobacco.  ETOH:   reports that she does not drink alcohol. Family History:      Reviewed in detail and negative for DM, CAD, Cancer, CVA.  Positive as follows:        Problem Relation Age of Onset    Cancer Mother         pancreatic    Heart Disease Father 80  Other Sister         infant death    High Cholesterol Brother        Diet:  No diet orders on file    REVIEW OF SYSTEMS:   Pertinent positives as noted in the HPI. All other systems reviewed and negative. PHYSICAL EXAM:    BP (!) 155/76   Pulse 94   Temp 98.1 °F (36.7 °C) (Oral)   Resp 18   Wt 150 lb (68 kg)   SpO2 95%   BMI 25.75 kg/m²     General appearance:  No apparent distress, appears stated age and cooperative. HEENT:  Normal cephalic, atraumatic without obvious deformity. Pupils equal, round, and reactive to light. Extra ocular muscles intact. Conjunctivae/corneas clear. Neck: Supple, with full range of motion. JVD present. Trachea midline. Respiratory:  Normal respiratory effort. Bilateral Rales in bases, no rhonchi. Cardiovascular:  Regular rate and rhythm with normal S1/S2 without murmurs, rubs or gallops. Abdomen: Soft, non-tender, non-distended with normal bowel sounds. Musculoskeletal:  No clubbing, cyanosis. Bilateral 3+ pitting edema of LEs. Full range of motion without deformity. Skin: Skin color, texture, turgor normal.  Red, macular, itchy rash present on chest, arms, and legs. Neurologic:  Neurovascularly intact without any focal sensory/motor deficits. Cranial nerves: II-XII intact, grossly non-focal.  Psychiatric:  Alert and oriented, thought content appropriate, normal insight  Capillary Refill: Brisk,< 3 seconds   Peripheral Pulses: +2 palpable, equal bilaterally       Labs:     Recent Labs     03/22/19  1558   WBC 6.4   HGB 11.1*   HCT 35.5*        Recent Labs     03/22/19  1558      K 4.7      CO2 26   BUN 23*   CREATININE 1.0   CALCIUM 9.1     Recent Labs     03/22/19  1558   AST 14   ALT 12   BILITOT 0.2*   ALKPHOS 64     No results for input(s): INR in the last 72 hours. No results for input(s): Clarance Payne in the last 72 hours.     Urinalysis:      Lab Results   Component Value Date    NITRU POSITIVE 06/29/2018    WBCUA 25-50W/CLUMPS

## 2019-03-22 NOTE — ED NOTES
Patient ambulated to restroom by ED staff member, gait steady. Patient back to bed with no concerns.       Tyrone Beatty, CHAPARRO  03/22/19 0384

## 2019-03-23 LAB
ANION GAP SERPL CALCULATED.3IONS-SCNC: 13 MEQ/L (ref 8–16)
BUN BLDV-MCNC: 23 MG/DL (ref 7–22)
CALCIUM SERPL-MCNC: 9 MG/DL (ref 8.5–10.5)
CHLORIDE BLD-SCNC: 103 MEQ/L (ref 98–111)
CHOLESTEROL, TOTAL: 145 MG/DL (ref 100–199)
CO2: 25 MEQ/L (ref 23–33)
CREAT SERPL-MCNC: 0.9 MG/DL (ref 0.4–1.2)
ERYTHROCYTE [DISTWIDTH] IN BLOOD BY AUTOMATED COUNT: 14.4 % (ref 11.5–14.5)
ERYTHROCYTE [DISTWIDTH] IN BLOOD BY AUTOMATED COUNT: 51.2 FL (ref 35–45)
GFR SERPL CREATININE-BSD FRML MDRD: 60 ML/MIN/1.73M2
GLUCOSE BLD-MCNC: 105 MG/DL (ref 70–108)
GLUCOSE BLD-MCNC: 107 MG/DL (ref 70–108)
GLUCOSE BLD-MCNC: 166 MG/DL (ref 70–108)
GLUCOSE BLD-MCNC: 191 MG/DL (ref 70–108)
GLUCOSE BLD-MCNC: 272 MG/DL (ref 70–108)
HCT VFR BLD CALC: 35.6 % (ref 37–47)
HDLC SERPL-MCNC: 44 MG/DL
HEMOGLOBIN: 11.4 GM/DL (ref 12–16)
LDL CHOLESTEROL CALCULATED: 54 MG/DL
LV EF: 58 %
LVEF MODALITY: NORMAL
MAGNESIUM: 1.6 MG/DL (ref 1.6–2.4)
MCH RBC QN AUTO: 31 PG (ref 26–33)
MCHC RBC AUTO-ENTMCNC: 32 GM/DL (ref 32.2–35.5)
MCV RBC AUTO: 96.7 FL (ref 81–99)
PLATELET # BLD: 207 THOU/MM3 (ref 130–400)
PMV BLD AUTO: 8.5 FL (ref 9.4–12.4)
POTASSIUM SERPL-SCNC: 4 MEQ/L (ref 3.5–5.2)
RBC # BLD: 3.68 MILL/MM3 (ref 4.2–5.4)
SODIUM BLD-SCNC: 141 MEQ/L (ref 135–145)
TRIGL SERPL-MCNC: 236 MG/DL (ref 0–199)
WBC # BLD: 6.6 THOU/MM3 (ref 4.8–10.8)

## 2019-03-23 PROCEDURE — 36415 COLL VENOUS BLD VENIPUNCTURE: CPT

## 2019-03-23 PROCEDURE — 6360000002 HC RX W HCPCS: Performed by: PHYSICIAN ASSISTANT

## 2019-03-23 PROCEDURE — 93306 TTE W/DOPPLER COMPLETE: CPT

## 2019-03-23 PROCEDURE — 2580000003 HC RX 258: Performed by: PHYSICIAN ASSISTANT

## 2019-03-23 PROCEDURE — 80061 LIPID PANEL: CPT

## 2019-03-23 PROCEDURE — 82948 REAGENT STRIP/BLOOD GLUCOSE: CPT

## 2019-03-23 PROCEDURE — 1200000003 HC TELEMETRY R&B

## 2019-03-23 PROCEDURE — 83735 ASSAY OF MAGNESIUM: CPT

## 2019-03-23 PROCEDURE — 2709999900 HC NON-CHARGEABLE SUPPLY

## 2019-03-23 PROCEDURE — 80048 BASIC METABOLIC PNL TOTAL CA: CPT

## 2019-03-23 PROCEDURE — 99232 SBSQ HOSP IP/OBS MODERATE 35: CPT | Performed by: INTERNAL MEDICINE

## 2019-03-23 PROCEDURE — 6360000002 HC RX W HCPCS: Performed by: INTERNAL MEDICINE

## 2019-03-23 PROCEDURE — 6370000000 HC RX 637 (ALT 250 FOR IP): Performed by: PHYSICIAN ASSISTANT

## 2019-03-23 PROCEDURE — 85027 COMPLETE CBC AUTOMATED: CPT

## 2019-03-23 RX ORDER — METHYLPREDNISOLONE SODIUM SUCCINATE 125 MG/2ML
125 INJECTION, POWDER, LYOPHILIZED, FOR SOLUTION INTRAMUSCULAR; INTRAVENOUS DAILY
Status: DISCONTINUED | OUTPATIENT
Start: 2019-03-23 | End: 2019-03-24

## 2019-03-23 RX ADMIN — METOPROLOL TARTRATE 25 MG: 25 TABLET ORAL at 21:55

## 2019-03-23 RX ADMIN — INSULIN LISPRO 1 UNITS: 100 INJECTION, SOLUTION INTRAVENOUS; SUBCUTANEOUS at 14:30

## 2019-03-23 RX ADMIN — TRIHEXYPHENIDYL HYDROCHLORIDE 2 MG: 2 TABLET ORAL at 23:40

## 2019-03-23 RX ADMIN — Medication 10 ML: at 22:07

## 2019-03-23 RX ADMIN — FUROSEMIDE 40 MG: 10 INJECTION, SOLUTION INTRAMUSCULAR; INTRAVENOUS at 08:19

## 2019-03-23 RX ADMIN — ATORVASTATIN CALCIUM 20 MG: 20 TABLET, FILM COATED ORAL at 08:15

## 2019-03-23 RX ADMIN — TRIHEXYPHENIDYL HYDROCHLORIDE 2 MG: 2 TABLET ORAL at 10:26

## 2019-03-23 RX ADMIN — ASPIRIN 81 MG: 81 TABLET, COATED ORAL at 08:19

## 2019-03-23 RX ADMIN — CETIRIZINE HYDROCHLORIDE 5 MG: 10 TABLET, FILM COATED ORAL at 08:16

## 2019-03-23 RX ADMIN — MULTIPLE VITAMINS W/ MINERALS TAB 1 TABLET: TAB at 08:16

## 2019-03-23 RX ADMIN — INSULIN LISPRO 2 UNITS: 100 INJECTION, SOLUTION INTRAVENOUS; SUBCUTANEOUS at 21:57

## 2019-03-23 RX ADMIN — LOSARTAN POTASSIUM 50 MG: 50 TABLET, FILM COATED ORAL at 08:16

## 2019-03-23 RX ADMIN — METOPROLOL TARTRATE 25 MG: 25 TABLET ORAL at 08:16

## 2019-03-23 RX ADMIN — DULOXETINE HYDROCHLORIDE 120 MG: 60 CAPSULE, DELAYED RELEASE ORAL at 08:16

## 2019-03-23 RX ADMIN — METHYLPREDNISOLONE SODIUM SUCCINATE 125 MG: 125 INJECTION, POWDER, FOR SOLUTION INTRAMUSCULAR; INTRAVENOUS at 12:50

## 2019-03-23 RX ADMIN — FUROSEMIDE 40 MG: 10 INJECTION, SOLUTION INTRAMUSCULAR; INTRAVENOUS at 21:55

## 2019-03-23 RX ADMIN — INSULIN LISPRO 1 UNITS: 100 INJECTION, SOLUTION INTRAVENOUS; SUBCUTANEOUS at 18:18

## 2019-03-23 RX ADMIN — ENOXAPARIN SODIUM 40 MG: 40 INJECTION SUBCUTANEOUS at 08:19

## 2019-03-23 RX ADMIN — Medication 10 ML: at 08:16

## 2019-03-23 ASSESSMENT — PAIN SCALES - GENERAL
PAINLEVEL_OUTOF10: 0

## 2019-03-23 NOTE — PROGRESS NOTES
Nutrition Education    Type and Reason for Visit: Initial, Patient Education, Consult(heart failure education)    Patient stated that she doesn't cook like dietitians tell you to. Patient says that she makes her own breakfast and lunch but dinner is provided to her. Patient says that she tries to not add salt but sometimes you need a little flavor. Patient admits that Ms. Eliseo Funes is available for use but she has not used it yet. · Verbally reviewed following information with Patient: Slashing Sodium To Help Your Heart along with fluid restriction  · Written educational materials provided. · Contact name and number provided. · Refer to Patient Education activity for more details.     Electronically signed by Camille Yoo RD, LD on 3/23/19 at 9:42 AM    Contact Number: [phone]

## 2019-03-23 NOTE — PROGRESS NOTES
Hospitalist Progress Note    Patient:  Pamela Mckeon      Unit/Bed:8B-28/028-A    YOB: 1935    MRN: [id number]       Acct: [de-identified]     PCP: Adilia Mcclure MD    Date of Admission: 3/22/2019    Chief Complaint: leg swelling and sob     Hospital Course: 80 y.o. female with PMH of DM II, HTN, and IBS who presents from assisted living with leg swelling and increased sob. Over the past week pt has been having increased difficulty with catching her breath when talking at times. Pt has also had increase in 25 pounds over the past few months. No dizziness, lightheadedness, chest pain, falls, blood in stools, fevers, chills or burning with urination. While in the ED, pt placed on 3L NC and admitted for new onset CHF exacerbation. Subjective: no acute events overnight. Pt great improvement in breathing. No chest pain or sob. Medications:  Reviewed    Infusion Medications    dextrose       Scheduled Medications    methylPREDNISolone  125 mg Intravenous Daily    insulin lispro  0-6 Units Subcutaneous TID WC    insulin lispro  0-3 Units Subcutaneous Nightly    sodium chloride flush  10 mL Intravenous 2 times per day    enoxaparin  40 mg Subcutaneous Daily    furosemide  40 mg Intravenous BID    aspirin  81 mg Oral Daily    atorvastatin  20 mg Oral Daily    DULoxetine  120 mg Oral Daily    losartan  50 mg Oral Daily    metoprolol tartrate  25 mg Oral BID    therapeutic multivitamin-minerals  1 tablet Oral Daily    trihexyphenidyl  2 mg Oral BID    cetirizine  5 mg Oral Daily     PRN Meds: glucose, dextrose, glucagon (rDNA), dextrose, sodium chloride flush, magnesium hydroxide, ondansetron, magnesium sulfate, potassium chloride, acetaminophen      Intake/Output Summary (Last 24 hours) at 3/23/2019 1134  Last data filed at 3/23/2019 1027  Gross per 24 hour   Intake 120 ml   Output 550 ml   Net -430 ml       Diet:  DIET CARB CONTROL; Low Sodium (2 GM);  Daily Fluid Restriction: 1500 ml    Exam:  /62   Pulse 92   Temp 98.5 °F (36.9 °C) (Oral)   Resp 16   Ht 5' 5\" (1.651 m)   Wt 176 lb 6.4 oz (80 kg)   SpO2 94%   BMI 29.35 kg/m²     General appearance: No apparent distress, appears stated age and cooperative. HEENT: Pupils equal, round, and reactive to light. Conjunctivae/corneas clear. Neck: Supple, with full range of motion. No jugular venous distention. Trachea midline. Respiratory:  Normal respiratory effort. Clear to auscultation, bilaterally without Rales/Wheezes/Rhonchi. Cardiovascular: Regular rate and rhythm with normal S1/S2 without murmurs, rubs or gallops. Abdomen: Soft, non-tender, non-distended with normal bowel sounds. Musculoskeletal: +1 pitting edema   Skin: Skin color, texture, turgor normal.  No rashes or lesions. Neurologic:  Neurovascularly intact without any focal sensory/motor deficits. Cranial nerves: II-XII intact, grossly non-focal.  Psychiatric: Alert and oriented, thought content appropriate, normal insight  Capillary Refill: Brisk,< 3 seconds   Peripheral Pulses: +2 palpable, equal bilaterally       Labs:   Recent Labs     03/22/19  1558 03/23/19  0449   WBC 6.4 6.6   HGB 11.1* 11.4*   HCT 35.5* 35.6*    207     Recent Labs     03/22/19  1558 03/23/19  0449    141   K 4.7 4.0    103   CO2 26 25   BUN 23* 23*   CREATININE 1.0 0.9   CALCIUM 9.1 9.0     Recent Labs     03/22/19  1558   AST 14   ALT 12   BILITOT 0.2*   ALKPHOS 64     No results for input(s): INR in the last 72 hours. No results for input(s): Juancarlos Gini in the last 72 hours. Urinalysis:      Lab Results   Component Value Date    NITRU POSITIVE 06/29/2018    WBCUA 25-50W/CLUMPS 06/29/2018    BACTERIA MODERATE 06/29/2018    RBCUA 3-5 06/29/2018    BLOODU TRACE 06/29/2018    SPECGRAV <=1.005 01/02/2017    GLUCOSEU NEGATIVE 06/29/2018       Radiology:  CTA Chest W WO Contrast   Final Result   1.. Negative exam for acute pulmonary embolus. 2. Cardiomegaly. 3 early changes of COPD. 4. Small hiatal hernia         **This report has been created using voice recognition software. It may contain minor errors which are inherent in voice recognition technology. **      Final report electronically signed by Dr. Afai Monterroso on 3/22/2019 6:59 PM      XR CHEST STANDARD (2 VW)   Final Result   Stable radiographic appearance of the chest. No evidence of an acute process. **This report has been created using voice recognition software. It may contain minor errors which are inherent in voice recognition technology. **      Final report electronically signed by Dr. Doreen Couch MD on 3/22/2019 3:54 PM          Diet: DIET CARB CONTROL; Low Sodium (2 GM); Daily Fluid Restriction: 1500 ml    DVT prophylaxis: [x] Lovenox                                 [] SCDs                                 [] SQ Heparin                                 [] Encourage ambulation           [] Already on Anticoagulation     Disposition:    [] Home       [] TCU       [] Rehab       [] Psych       [] SNF       [x] [place]       [] Other-    Code Status: Full Code    PT/OT Eval Status: back to assisted living     Assessment/Plan:    Anticipated Discharge in : Monday     ROSARIO/Radha Butler 1106 Problems    Diagnosis Date Noted    CHF (congestive heart failure), NYHA class II, unspecified failure chronicity, combined ([facility] [I50.40] 03/22/2019    Dermatitis [L30.9] 03/22/2019    Hypertension [I10] 04/14/2016    Type 2 diabetes mellitus without complication ([facility] [S41.1] 12/17/2015       Assessment/Plan:    1. Acute Hypoxic Resp. Failure- secondary to new onset CHF exacerbation. Placed on 3L NC. Weaned back to RA with IV diuresis. Pending 2D Echo. Strict I/Os. Daily weights. 2. New onset CHF Exacerbation- symptoms are concerning for heart failure however normal BNP and CXR. Sob improving with IV diuresis. Pending 2D Echo. Strict I/Os. Daily weights.    3. Macular Pruritic Rash of chest and Extremities- likely medication induced. Give one dose IV Solumedrol. 4. Type 2 DM 2- insulin sliding scale. Glucose checks. Diabetic Diet. 5. Essential HTN- resume Cozaar. Monitor BP daily .         Electronically signed by Valentine Mcclelland MD on 3/23/2019 at 11:34 AM

## 2019-03-23 NOTE — PLAN OF CARE
Problem: Falls - Risk of:  Goal: Will remain free from falls  Description  Will remain free from falls  Outcome: Ongoing  Note:   Call light within reach, bed in lowest position, non skid footwear on, room door open, bed alarm on . Pt not using call light appropriately, so frequent monitoring and chair alarm in place. Problem: OXYGENATION/RESPIRATORY FUNCTION  Goal: Patient will maintain patent airway  Outcome: Ongoing  Note:   Patient on room air, denies shortness of breath. Lung sounds clear     Problem: HEMODYNAMIC STATUS  Goal: Patient has stable vital signs and fluid balance  Outcome: Ongoing  Note:   Vitals stable. Denies chest pain. NSR on telemetry. Problem: FLUID AND ELECTROLYTE IMBALANCE  Goal: Fluid and electrolyte balance are achieved/maintained  Outcome: Ongoing  Note:   Monitoring I&O. Edema noted in lower legs. Daily weights. Problem: Discharge Planning  Goal: Knowledge of discharge instructions  Description  Knowledge of discharge instructions     Outcome: Ongoing  Note:   Patient plans to return to [facility], denies home needs at this times    Care plan reviewed with patient. Patient verbalize understanding of the plan of care and contribute to goal setting.

## 2019-03-24 LAB
ANION GAP SERPL CALCULATED.3IONS-SCNC: 16 MEQ/L (ref 8–16)
BUN BLDV-MCNC: 34 MG/DL (ref 7–22)
CALCIUM SERPL-MCNC: 9.2 MG/DL (ref 8.5–10.5)
CHLORIDE BLD-SCNC: 94 MEQ/L (ref 98–111)
CO2: 25 MEQ/L (ref 23–33)
CREAT SERPL-MCNC: 1 MG/DL (ref 0.4–1.2)
GFR SERPL CREATININE-BSD FRML MDRD: 53 ML/MIN/1.73M2
GLUCOSE BLD-MCNC: 158 MG/DL (ref 70–108)
GLUCOSE BLD-MCNC: 180 MG/DL (ref 70–108)
GLUCOSE BLD-MCNC: 184 MG/DL (ref 70–108)
GLUCOSE BLD-MCNC: 212 MG/DL (ref 70–108)
GLUCOSE BLD-MCNC: 235 MG/DL (ref 70–108)
MAGNESIUM: 1.7 MG/DL (ref 1.6–2.4)
POTASSIUM SERPL-SCNC: 4 MEQ/L (ref 3.5–5.2)
SODIUM BLD-SCNC: 135 MEQ/L (ref 135–145)

## 2019-03-24 PROCEDURE — 83735 ASSAY OF MAGNESIUM: CPT

## 2019-03-24 PROCEDURE — 80048 BASIC METABOLIC PNL TOTAL CA: CPT

## 2019-03-24 PROCEDURE — 82948 REAGENT STRIP/BLOOD GLUCOSE: CPT

## 2019-03-24 PROCEDURE — 99232 SBSQ HOSP IP/OBS MODERATE 35: CPT | Performed by: INTERNAL MEDICINE

## 2019-03-24 PROCEDURE — 36415 COLL VENOUS BLD VENIPUNCTURE: CPT

## 2019-03-24 PROCEDURE — 97162 PT EVAL MOD COMPLEX 30 MIN: CPT

## 2019-03-24 PROCEDURE — 1200000003 HC TELEMETRY R&B

## 2019-03-24 PROCEDURE — 6360000002 HC RX W HCPCS: Performed by: PHYSICIAN ASSISTANT

## 2019-03-24 PROCEDURE — 6360000002 HC RX W HCPCS: Performed by: INTERNAL MEDICINE

## 2019-03-24 PROCEDURE — 2580000003 HC RX 258: Performed by: PHYSICIAN ASSISTANT

## 2019-03-24 PROCEDURE — 97110 THERAPEUTIC EXERCISES: CPT

## 2019-03-24 PROCEDURE — 2709999900 HC NON-CHARGEABLE SUPPLY

## 2019-03-24 PROCEDURE — 6370000000 HC RX 637 (ALT 250 FOR IP): Performed by: PHYSICIAN ASSISTANT

## 2019-03-24 RX ORDER — FUROSEMIDE 40 MG/1
40 TABLET ORAL DAILY
Status: DISCONTINUED | OUTPATIENT
Start: 2019-03-25 | End: 2019-03-28 | Stop reason: HOSPADM

## 2019-03-24 RX ADMIN — ASPIRIN 81 MG: 81 TABLET, COATED ORAL at 09:44

## 2019-03-24 RX ADMIN — METOPROLOL TARTRATE 25 MG: 25 TABLET ORAL at 09:44

## 2019-03-24 RX ADMIN — TRIHEXYPHENIDYL HYDROCHLORIDE 2 MG: 2 TABLET ORAL at 09:44

## 2019-03-24 RX ADMIN — INSULIN LISPRO 1 UNITS: 100 INJECTION, SOLUTION INTRAVENOUS; SUBCUTANEOUS at 20:17

## 2019-03-24 RX ADMIN — CETIRIZINE HYDROCHLORIDE 5 MG: 10 TABLET, FILM COATED ORAL at 09:44

## 2019-03-24 RX ADMIN — Medication 10 ML: at 20:08

## 2019-03-24 RX ADMIN — INSULIN LISPRO 1 UNITS: 100 INJECTION, SOLUTION INTRAVENOUS; SUBCUTANEOUS at 18:05

## 2019-03-24 RX ADMIN — FUROSEMIDE 40 MG: 10 INJECTION, SOLUTION INTRAMUSCULAR; INTRAVENOUS at 09:45

## 2019-03-24 RX ADMIN — ENOXAPARIN SODIUM 40 MG: 40 INJECTION SUBCUTANEOUS at 10:56

## 2019-03-24 RX ADMIN — ATORVASTATIN CALCIUM 20 MG: 20 TABLET, FILM COATED ORAL at 09:44

## 2019-03-24 RX ADMIN — MULTIPLE VITAMINS W/ MINERALS TAB 1 TABLET: TAB at 09:44

## 2019-03-24 RX ADMIN — METOPROLOL TARTRATE 25 MG: 25 TABLET ORAL at 20:07

## 2019-03-24 RX ADMIN — Medication 10 ML: at 09:43

## 2019-03-24 RX ADMIN — INSULIN LISPRO 2 UNITS: 100 INJECTION, SOLUTION INTRAVENOUS; SUBCUTANEOUS at 14:01

## 2019-03-24 RX ADMIN — METHYLPREDNISOLONE SODIUM SUCCINATE 125 MG: 125 INJECTION, POWDER, FOR SOLUTION INTRAMUSCULAR; INTRAVENOUS at 09:45

## 2019-03-24 RX ADMIN — LOSARTAN POTASSIUM 50 MG: 50 TABLET, FILM COATED ORAL at 09:44

## 2019-03-24 RX ADMIN — DULOXETINE HYDROCHLORIDE 120 MG: 60 CAPSULE, DELAYED RELEASE ORAL at 09:44

## 2019-03-24 RX ADMIN — TRIHEXYPHENIDYL HYDROCHLORIDE 2 MG: 2 TABLET ORAL at 20:07

## 2019-03-24 RX ADMIN — INSULIN LISPRO 1 UNITS: 100 INJECTION, SOLUTION INTRAVENOUS; SUBCUTANEOUS at 09:39

## 2019-03-24 ASSESSMENT — PAIN SCALES - GENERAL
PAINLEVEL_OUTOF10: 0

## 2019-03-24 NOTE — PROGRESS NOTES
[facility]  INPATIENT PHYSICAL THERAPY  EVALUATION  STR MED SURG 8B - 8B-28/028-A    Time In: 9235  Time Out: 1127  Minutes: 24          Date: 3/24/2019  Patient Name: Lizette Soulier,  Gender:  female        MRN: [id number]  : 1935  (80 y.o.)  Referral Date : 19   Referring Practitioner: Ranjana Downs PA-C  Diagnosis: CHF  Additional Pertinent Hx: 80 y.o. female with PMH of DM II, HTN, and IBS who presents from assisted living with leg swelling and increased sob. Over the past week pt has been having increased difficulty with catching her breath when talking at times. Pt has also had increase in 25 pounds over the past few months. No dizziness, lightheadedness, chest pain, falls, blood in stools, fevers, chills or burning with urination     Past Medical History:   Diagnosis Date    Anxiety     nervous breakdown 2016    Breast [facility] ([facility]     Diverticulitis     DVT (deep venous thrombosis) (HCC)     Hyperlipidemia     Hypertension     Osteoarthritis     Pancreatic cancer ([facility]     Family    Pulmonary embolism ([facility]     S/P knee replacement     Type 2 diabetes mellitus ([facility]      Past Surgical History:   Procedure Laterality Date    BREAST SURGERY      right lumpectomy    CHOLECYSTECTOMY  4-15-16    cholangiogram    COLONOSCOPY      EYE SURGERY      macular hole left eye    JOINT REPLACEMENT      left knee & right hip    SKIN BIOPSY      right arm       Restrictions/Precautions:  Fall Risk, Up as Tolerated                            Subjective:  Chart Reviewed: Yes  Patient assessed for rehabilitation services?: Yes  Subjective: RN approved PT session. Patient sitting up in a chair upon PT arrival, pleasant and agreeable to therapy.     General:  Overall Orientation Status: Within Normal Limits  Follows Commands: Within Functional Limits    Vision: Impaired  Vision Exceptions: Wears glasses at all times    Hearing: Within functional limits to ambulate. Short term goal 3: Patient will ambulate >80' with a cane and supervision for community distances. Short term goal 4: Patient will score at least a 24/28 on the Tinnetti, which indicates a low falls risk. Long term goals  Time Frame for Long term goals : no LTGs due to short ELOS    Evaluation Complexity: Based on the findings of patient history, examination, clinical presentation, and decision making during this evaluation, the evaluation of Hank Small  is of medium complexity.             AM-PAC Inpatient Mobility without Stair Climbing Raw Score : 15  AM-PAC Inpatient without Stair Climbing T-Scale Score : 43.03  Mobility Inpatient CMS 0-100% Score: 47.43  Mobility Inpatient without Stair CMS G-Code Modifier : RIGOBERTO Stone PT, DPT

## 2019-03-24 NOTE — PROGRESS NOTES
Hospitalist Progress Note    Patient:  Reji Kong      Unit/Bed:8B-28/028-A    YOB: 1935    MRN: [id number]       Acct: [de-identified]     PCP: Racquel Damon MD    Date of Admission: 3/22/2019    Chief Complaint: leg swelling and sob     Hospital Course:  80 y. o. female with PMH of DM II, HTN, and IBS who presents from assisted living with leg swelling and increased sob. Over the past week pt has been having increased difficulty with catching her breath when talking at times. Pt has also had increase in 25 pounds over the past few months. No dizziness, lightheadedness, chest pain, falls, blood in stools, fevers, chills or burning with urination.      While in the ED, pt placed on 3L NC and admitted for new onset CHF exacerbation. Subjective: no acute events overnight. Pt great improvement in breathing. No chest pain or sob. Medications:  Reviewed    Infusion Medications    dextrose       Scheduled Medications    furosemide  40 mg Oral Daily    insulin lispro  0-6 Units Subcutaneous TID WC    insulin lispro  0-3 Units Subcutaneous Nightly    sodium chloride flush  10 mL Intravenous 2 times per day    enoxaparin  40 mg Subcutaneous Daily    aspirin  81 mg Oral Daily    atorvastatin  20 mg Oral Daily    DULoxetine  120 mg Oral Daily    losartan  50 mg Oral Daily    metoprolol tartrate  25 mg Oral BID    therapeutic multivitamin-minerals  1 tablet Oral Daily    trihexyphenidyl  2 mg Oral BID    cetirizine  5 mg Oral Daily     PRN Meds: glucose, dextrose, glucagon (rDNA), dextrose, sodium chloride flush, magnesium hydroxide, ondansetron, magnesium sulfate, potassium chloride, acetaminophen      Intake/Output Summary (Last 24 hours) at 3/24/2019 1134  Last data filed at 3/23/2019 2027  Gross per 24 hour   Intake 690 ml   Output 400 ml   Net 290 ml       Diet:  DIET CARB CONTROL; Low Sodium (2 GM);  Daily Fluid Restriction: 1500 ml    Exam:  /67   Pulse 92   Temp 98

## 2019-03-24 NOTE — PLAN OF CARE
Problem: Falls - Risk of:  Goal: Will remain free from falls  Description  Will remain free from falls  Outcome: Ongoing  Note:   Call light within reach, bed in lowest position, non skid footwear on, room door open, bed alarm on . Pt re-educated frequently to utilize call-light, but pt not using appropriately, hourly rounding and monitoring and bed alarm in place     Problem: OXYGENATION/RESPIRATORY FUNCTION  Goal: Patient will maintain patent airway  Outcome: Ongoing  Note:   Patient on room air this shift. No complaints of shortness of breath at this time, however pt has some dyspnea with exertion at times. Lung sounds clear, but diminished. Problem: HEMODYNAMIC STATUS  Goal: Patient has stable vital signs and fluid balance  Outcome: Ongoing  Note:   Vitals stable. Denies chest pain at this time. NSR on telemetry with occasional ectopy. .      Problem: FLUID AND ELECTROLYTE IMBALANCE  Goal: Fluid and electrolyte balance are achieved/maintained  Outcome: Ongoing  Note:   Monitoring pt daily weights and strict I&O. Edema noted in bilateral lower legs. Problem: Discharge Planning  Goal: Knowledge of discharge instructions  Description  Knowledge of discharge instructions     Outcome: Ongoing  Note:   Patient plans to return to [facility] living and, denies home needs at this time. Care plan reviewed with patient. Pt verbalized understanding and acknowledgment of plan of care and contributed to goal setting this shift.

## 2019-03-25 LAB
BACTERIA: ABNORMAL
BILIRUBIN URINE: NEGATIVE
BLOOD, URINE: NEGATIVE
CASTS: ABNORMAL /LPF
CASTS: ABNORMAL /LPF
CHARACTER, URINE: ABNORMAL
COLOR: YELLOW
CRYSTALS: ABNORMAL
EPITHELIAL CELLS, UA: ABNORMAL /HPF
GLUCOSE BLD-MCNC: 100 MG/DL (ref 70–108)
GLUCOSE BLD-MCNC: 129 MG/DL (ref 70–108)
GLUCOSE BLD-MCNC: 138 MG/DL (ref 70–108)
GLUCOSE BLD-MCNC: 246 MG/DL (ref 70–108)
GLUCOSE, URINE: NEGATIVE MG/DL
KETONES, URINE: NEGATIVE
LEUKOCYTE EST, POC: ABNORMAL
MISCELLANEOUS LAB TEST RESULT: ABNORMAL
NITRITE, URINE: NEGATIVE
PH UA: 5.5 (ref 5–9)
PROTEIN UA: NEGATIVE MG/DL
RBC URINE: ABNORMAL /HPF
RENAL EPITHELIAL, UA: ABNORMAL
SPECIFIC GRAVITY UA: 1.01 (ref 1–1.03)
UROBILINOGEN, URINE: 0.2 EU/DL (ref 0–1)
WBC UA: > 100 /HPF
YEAST: ABNORMAL

## 2019-03-25 PROCEDURE — 2580000003 HC RX 258: Performed by: PHYSICIAN ASSISTANT

## 2019-03-25 PROCEDURE — 81001 URINALYSIS AUTO W/SCOPE: CPT

## 2019-03-25 PROCEDURE — 97110 THERAPEUTIC EXERCISES: CPT

## 2019-03-25 PROCEDURE — 6370000000 HC RX 637 (ALT 250 FOR IP): Performed by: PHYSICIAN ASSISTANT

## 2019-03-25 PROCEDURE — 6360000002 HC RX W HCPCS: Performed by: PHYSICIAN ASSISTANT

## 2019-03-25 PROCEDURE — 6370000000 HC RX 637 (ALT 250 FOR IP): Performed by: INTERNAL MEDICINE

## 2019-03-25 PROCEDURE — 2709999900 HC NON-CHARGEABLE SUPPLY

## 2019-03-25 PROCEDURE — 97165 OT EVAL LOW COMPLEX 30 MIN: CPT

## 2019-03-25 PROCEDURE — 99232 SBSQ HOSP IP/OBS MODERATE 35: CPT | Performed by: INTERNAL MEDICINE

## 2019-03-25 PROCEDURE — 82948 REAGENT STRIP/BLOOD GLUCOSE: CPT

## 2019-03-25 PROCEDURE — 97535 SELF CARE MNGMENT TRAINING: CPT

## 2019-03-25 PROCEDURE — 97116 GAIT TRAINING THERAPY: CPT

## 2019-03-25 PROCEDURE — 1200000000 HC SEMI PRIVATE

## 2019-03-25 RX ADMIN — TRIHEXYPHENIDYL HYDROCHLORIDE 2 MG: 2 TABLET ORAL at 08:11

## 2019-03-25 RX ADMIN — ASPIRIN 81 MG: 81 TABLET, COATED ORAL at 08:11

## 2019-03-25 RX ADMIN — DULOXETINE HYDROCHLORIDE 120 MG: 60 CAPSULE, DELAYED RELEASE ORAL at 08:11

## 2019-03-25 RX ADMIN — INSULIN LISPRO 2 UNITS: 100 INJECTION, SOLUTION INTRAVENOUS; SUBCUTANEOUS at 14:15

## 2019-03-25 RX ADMIN — ATORVASTATIN CALCIUM 20 MG: 20 TABLET, FILM COATED ORAL at 08:11

## 2019-03-25 RX ADMIN — LOSARTAN POTASSIUM 50 MG: 50 TABLET, FILM COATED ORAL at 08:11

## 2019-03-25 RX ADMIN — METOPROLOL TARTRATE 25 MG: 25 TABLET ORAL at 08:11

## 2019-03-25 RX ADMIN — Medication 10 ML: at 08:13

## 2019-03-25 RX ADMIN — ENOXAPARIN SODIUM 40 MG: 40 INJECTION SUBCUTANEOUS at 08:11

## 2019-03-25 RX ADMIN — FUROSEMIDE 40 MG: 40 TABLET ORAL at 08:11

## 2019-03-25 RX ADMIN — Medication 10 ML: at 20:30

## 2019-03-25 RX ADMIN — TRIHEXYPHENIDYL HYDROCHLORIDE 2 MG: 2 TABLET ORAL at 20:29

## 2019-03-25 RX ADMIN — MULTIPLE VITAMINS W/ MINERALS TAB 1 TABLET: TAB at 08:11

## 2019-03-25 RX ADMIN — CETIRIZINE HYDROCHLORIDE 5 MG: 10 TABLET, FILM COATED ORAL at 08:11

## 2019-03-25 ASSESSMENT — PAIN SCALES - GENERAL
PAINLEVEL_OUTOF10: 0

## 2019-03-25 NOTE — PROGRESS NOTES
97.6 °F (36.4 °C) (Oral)   Resp 16   Ht 5' 5\" (1.651 m)   Wt 167 lb 1.6 oz (75.8 kg)   SpO2 95%   BMI 27.81 kg/m²     General appearance: No apparent distress, appears stated age and cooperative. HEENT: Pupils equal, round, and reactive to light. Conjunctivae/corneas clear. Neck: Supple, with full range of motion. No jugular venous distention. Trachea midline. Respiratory:  Normal respiratory effort. Clear to auscultation, bilaterally without Rales/Wheezes/Rhonchi. Cardiovascular: Regular rate and rhythm with normal S1/S2 without murmurs, rubs or gallops. Abdomen: Soft, non-tender, non-distended with normal bowel sounds. Musculoskeletal: passive and active ROM x 4 extremities. Skin: Skin color, texture, turgor normal.  No rashes or lesions. Neurologic:  Neurovascularly intact without any focal sensory/motor deficits. Cranial nerves: II-XII intact, grossly non-focal.  Psychiatric: Alert and oriented, thought content appropriate, normal insight  Capillary Refill: Brisk,< 3 seconds   Peripheral Pulses: +2 palpable, equal bilaterally       Labs:   Recent Labs     03/22/19  1558 03/23/19  0449   WBC 6.4 6.6   HGB 11.1* 11.4*   HCT 35.5* 35.6*    207     Recent Labs     03/22/19  1558 03/23/19  0449 03/24/19  0447    141 135   K 4.7 4.0 4.0    103 94*   CO2 26 25 25   BUN 23* 23* 34*   CREATININE 1.0 0.9 1.0   CALCIUM 9.1 9.0 9.2     Recent Labs     03/22/19  1558   AST 14   ALT 12   BILITOT 0.2*   ALKPHOS 64     No results for input(s): INR in the last 72 hours. No results for input(s): Jayshree Halon in the last 72 hours.     Urinalysis:      Lab Results   Component Value Date    NITRU POSITIVE 06/29/2018    WBCUA 25-50W/CLUMPS 06/29/2018    BACTERIA MODERATE 06/29/2018    RBCUA 3-5 06/29/2018    BLOODU TRACE 06/29/2018    SPECGRAV <=1.005 01/02/2017    GLUCOSEU NEGATIVE 06/29/2018       Radiology:  CTA Chest W WO Contrast   Final Result   1.. Negative exam for acute pulmonary embolus. 2. Cardiomegaly. 3 early changes of COPD. 4. Small hiatal hernia         **This report has been created using voice recognition software. It may contain minor errors which are inherent in voice recognition technology. **      Final report electronically signed by Dr. Sue Light on 3/22/2019 6:59 PM      XR CHEST STANDARD (2 VW)   Final Result   Stable radiographic appearance of the chest. No evidence of an acute process. **This report has been created using voice recognition software. It may contain minor errors which are inherent in voice recognition technology. **      Final report electronically signed by Dr. Ramsey Ames MD on 3/22/2019 3:54 PM          Diet: DIET CARB CONTROL; Low Sodium (2 GM); Daily Fluid Restriction: 1500 ml    DVT prophylaxis: [x] Lovenox                                 [] SCDs                                 [] SQ Heparin                                 [] Encourage ambulation           [] Already on Anticoagulation     Disposition:    [] Home       [] TCU       [] Rehab       [] Psych       [x] SNF       [] [place]       [] Other-    Code Status: Full Code    PT/OT Eval Status: ECF     Assessment/Plan:    Anticipated Discharge in : pending pre-cert    Active Hospital Problems    Diagnosis Date Noted    CHF (congestive heart failure), NYHA class II, unspecified failure chronicity, combined ([facility] [I50.40] 03/22/2019    Dermatitis [L30.9] 03/22/2019    Hypertension [I10] 04/14/2016    Type 2 diabetes mellitus without complication ([facility] [P79.9] 12/17/2015          Assessment/Plan:     1. Acute Hypoxic Resp. Failure- secondary to new onset CHF exacerbation. Placed on 3L NC. Weaned back to RA with IV diuresis. 2D Echo showing normal EF. Strict I/Os. Daily weights. 2. New onset CHF Exacerbation- symptoms are concerning for heart failure however normal BNP and CXR. Sob improving with IV diuresis. 2D Echo showing normal EF. Transitioned to PO Lasix 40. Strict I/Os. Daily weights. 3. Macular Pruritic Rash of chest and Extremities- likely medication induced. Give one dose IV Solumedrol. Resolving. 4. Type 2 DM 2- insulin sliding scale. Glucose checks. Diabetic Diet. 5. Essential HTN- resume Cozaar. Monitor BP daily .         Dispo: pending pre-cert to [facility].              Electronically signed by Ludin Herrera MD on 3/25/2019 at 10:45 AM

## 2019-03-25 NOTE — PROGRESS NOTES
[address] [address]  INPATIENT OCCUPATIONAL THERAPY  [facility] SURGICAL 5E  EVALUATION    Time:  Time In: 1107  Time Out: 1133  Timed Code Treatment Minutes: 18 Minutes  Minutes: 26          Date: 3/25/2019  Patient Name: Gertrudis Thompson,   Gender: female      MRN: [id number]  : 1935  (80 y.o.)  Referring Practitioner: [facility], PANAIMA  Diagnosis: CHF  Additional Pertinent Hx: 80 y.o. female with PMH of DM II, HTN, and IBS who presents from assisted living with leg swelling and increased sob. Over the past week pt has been having increased difficulty with catching her breath when talking at times. Pt has also had increase in 25 pounds over the past few months. No dizziness, lightheadedness, chest pain, falls, blood in stools, fevers, chills or burning with urination     Restrictions/Precautions:  Fall Risk, Up as Tolerated                            Past Medical History:   Diagnosis Date    Anxiety     nervous breakdown 2016    Breast cancer ([address]     Diverticulitis     DVT (deep venous thrombosis) (HCC)     Hyperlipidemia     Hypertension     Osteoarthritis     Pancreatic cancer ([address]     Family    Pulmonary embolism ([address]     S/P knee replacement     Type 2 diabetes mellitus ([address]      Past Surgical History:   Procedure Laterality Date    BREAST SURGERY      right lumpectomy    CHOLECYSTECTOMY  4-15-16    cholangiogram    COLONOSCOPY      EYE SURGERY      macular hole left eye    JOINT REPLACEMENT      left knee & right hip    SKIN BIOPSY      right arm           Subjective  Chart Reviewed: Yes  Patient assessed for rehabilitation services?: Yes    Subjective: RN okayed session.  Pt in chair and agreeable to OT    General:  Overall Orientation Status: Within Functional Limits    Vision: Impaired  Vision Exceptions: Wears glasses at all times    Hearing: Within functional limits         Pain:  Pain Assessment  Patient Currently in Pain: Denies       Social/Functional History:  Lives With: Alone  Type of Home: Assisted living  Home Layout: One level  Home Equipment: Cane(no AD PTA< cane for community mobility )     Bathroom Shower/Tub: Walk-in shower    Receives Help From: (AL staff)  ADL Assistance: Independent  Homemaking Assistance: Needs assistance       Ambulation Assistance: Independent(cane outside)  Transfer Assistance: Independent    Active : No  Leisure & Hobbies: watch and attend sporting events  Additional Comments: walk to dining herrera    Objective        Cognition Comment: min decreased problem solving and safety awareness                                      LUE AROM (degrees)  LUE AROM : WFL          RUE AROM (degrees)  RUE AROM : WFL       LUE Strength  LUE Strength Comment: BUE general deconditioning noted                                       RUE Tone: Normotonic  LUE Tone: Normotonic    Movements Are Fluid And Coordinated: Yes               ADL  Grooming: Contact guard assistance  Toileting: Contact guard assistance  Additional Comments: very SOB once in bathroom, cues for breathing technique           Transfers  Sit to stand: Contact guard assistance  Stand to sit: Contact guard assistance  Toilet Transfers  Equipment Used: Grab bars  Toilet Transfer: Contact guard assistance    Balance  Sitting Balance: Supervision  Standing Balance: Contact guard assistance     Time: 5 minutes, 3 minutes  Activity: ADLS and talking with therapist      Functional Mobility  Functional - Mobility Device: No device  Activity: Other; To/from bathroom  Assist Level: Contact guard assistance  Functional Mobility Comments: into hallway, pt with 2 small LOB needing CGA to correct. encouraged use of cane for longer distances.  1 standing rest break, seated rest break needed after             Activity Tolerance:  Activity Tolerance: Patient Tolerated treatment well  Activity Tolerance: min SOB    Treatment Initiated:  OT jorial completed, see above for more details with ADLs, transfers, balance and mobility    Assessment:  Assessment: Pt with above deficits and will continue to benefit from OT services toa ddress deficits and incresae safety and indep wtih self care tasks for return to [facility] PLOF  Performance deficits / Impairments: Decreased functional mobility , Decreased ADL status, Decreased endurance, Decreased balance    Clinical Decision Making: Clinical Decision making was of Low Complexity as the result of analysis of data from a problem focused assessment, a consideration of a limited number of treatment options, no significant comorbidities affecting the plan of care and no modification or assistance required to complete the evaluation. Discharge Recommendations:  Discharge Recommendations: Continue to assess pending progress, Patient would benefit from continued therapy after discharge    Patient Education:  Patient Education: OT POC, role of OT,     Equipment Recommendations:  Equipment Needed: No    Safety:  Safety Devices in place: Yes  Type of devices:  All fall risk precautions in place, Left in chair, Call light within reach, Chair alarm in place, Nurse notified, Gait belt    Plan:  Times per week: 3-5X  Current Treatment Recommendations: Strengthening, Balance Training, Functional Mobility Training, Endurance Training, Safety Education & Training, Patient/Caregiver Education & Training, Self-Care / ADL    Goals:  Patient goals : to go [place]    Short term goals  Time Frame for Short term goals: by discharge  Short term goal 1: Pt will complete BADL routine with S and no cues for safety for improved ease with bathing/dressing   Short term goal 2: Pt will compelte dynamic standing task with S for > 3 minutes with 2 hand release and no LOB for improved ease with simple IADLs  Short term goal 3: Pt will complete functional mobility with S and least restrictive device with no cues for breathing technique to increase ease accessing home environment   Short term goal 4: Pt

## 2019-03-25 NOTE — PLAN OF CARE
Problem: Falls - Risk of:  Goal: Will remain free from falls  Description  Will remain free from falls  Outcome: Ongoing     Problem: OXYGENATION/RESPIRATORY FUNCTION  Goal: Patient will maintain patent airway  Outcome: Ongoing  Note:   Pt states shortness of breath is \"better\". On room air. Problem: FLUID AND ELECTROLYTE IMBALANCE  Goal: Fluid and electrolyte balance are achieved/maintained  Outcome: Ongoing  Note:   Pt had weight loss since admission, swelling in lower legs \"better\" per patient. Patient denies shortness of breath. Problem: Discharge Planning  Goal: Knowledge of discharge instructions  Description  Knowledge of discharge instructions     Outcome: Ongoing  Note:   Patient plans to return to [facility] at discharge, family would like some rehab before returning. S.W consult put in. Care plan reviewed with patient. Patient verbalize understanding of the plan of care and contribute to goal setting.

## 2019-03-25 NOTE — PLAN OF CARE
Problem: Falls - Risk of:  Goal: Will remain free from falls  Description  Will remain free from falls  Outcome: Ongoing  Note:   Pt free from falls. Pt up with 1 assist and cane. Bed /chair alarm in use . Tele sitter in use. Problem: Discharge Planning  Goal: Knowledge of discharge instructions  Description  Knowledge of discharge instructions     Outcome: Ongoing  Note:   Pt plans on going lock [facility]. Problem: Musculor/Skeletal Functional Status  Goal: Highest potential functional level  Outcome: Ongoing  Note:   Pt working with pt/ot     Care plan reviewed with patient. Patient verbalize understanding of the plan of care and contribute to goal setting.

## 2019-03-25 NOTE — PROGRESS NOTES
[address] [address]  INPATIENT PHYSICAL THERAPY  DAILY NOTE  STR[facility] MED SURG 8B - 8B-28/028-A    Time In: 2760  Time Out: 2237  Timed Code Treatment Minutes: 28 Minutes  Minutes: 32          Date: 3/25/2019  Patient Name: Ailyn Liu,  Gender:  female        MRN: [id number]  : 1935  (80 y.o.)  Referral Date : 19  Referring Practitioner: Florence Arreaga PA-C  Diagnosis: CHF  Additional Pertinent Hx: 80 y.o. female with PMH of DM II, HTN, and IBS who presents from assisted living with leg swelling and increased sob. Over the past week pt has been having increased difficulty with catching her breath when talking at times. Pt has also had increase in 25 pounds over the past few months. No dizziness, lightheadedness, chest pain, falls, blood in stools, fevers, chills or burning with urination     Past Medical History:   Diagnosis Date    Anxiety     nervous breakdown 2016    [facility]     Diverticulitis     DVT (deep venous thrombosis) (HCC)     Hyperlipidemia     Hypertension     Osteoarthritis     Pancreatic cancer ([facility]     Family    Pulmonary embolism ([facility]     S/P knee replacement     Type 2 diabetes mellitus ([facility]      Past Surgical History:   Procedure Laterality Date    BREAST SURGERY      right lumpectomy    CHOLECYSTECTOMY  4-15-16    cholangiogram    COLONOSCOPY      EYE SURGERY      macular hole left eye    JOINT REPLACEMENT      left knee & right hip    SKIN BIOPSY      right arm       Restrictions/Precautions:  Fall Risk, Up as Tolerated          Prior Level of Function:  ADL Assistance: Independent  Homemaking Assistance: Needs assistance  Ambulation Assistance: Independent(cane outside)  Transfer Assistance: Independent  Additional Comments: walk to dining herrera    Subjective:     Subjective: RN approved session. Patient sitting in bedside chair upon arrival finished with breakfast. Patient agreeable to therapy.  Patient requested to go to bathroom at beginning of session. Patient pleasantly confused at times. Pain:  Denies. Social/Functional:  Lives With: Alone  Type of Home: Assisted living  Home Layout: One level  Home Equipment: Cane(no AD PTA< cane for community mobility )     Objective:       Transfers  Sit to Stand: Stand by assistance(good hand placement, took x3 attempts to stand from chair)  Stand to sit: Stand by assistance       Ambulation 1  Surface: level tile  Device: Single point cane  Assistance: Contact guard assistance;Stand by assistance(light CGA to close SBA)  Quality of Gait: unsteady gait, decreased step length, decreased eric, mild path deviations, distracted easily, cues for path finding  Distance: 120ft x1, 250ft x1      Exercises:  Exercises  Comments: Performed BLE exercises seated heel/toe raises, marches, long arc quads, hip add pillow squeezes x10 reps to increase strength. Activity Tolerance:  Activity Tolerance: Patient Tolerated treatment well;Patient limited by endurance; Patient limited by fatigue    Assessment: Body structures, Functions, Activity limitations: Decreased functional mobility , Decreased endurance, Decreased strength, Decreased balance  Assessment: Patient tolerated session fairly well. Patient easily distracted during ambulation, unsteady gait. Patient would benefit from continued skilled physical therapy to improve functional mobility. Prognosis: Good     REQUIRES PT FOLLOW UP: Yes    Discharge Recommendations:  Discharge Recommendations: Continue to assess pending progress, Patient would benefit from continued therapy after discharge    Patient Education:  Patient Education: transfers, ambulation, therex, safety    Equipment Recommendations:  Equipment Needed: No    Safety:  Type of devices:  All fall risk precautions in place, Call light within reach, Chair alarm in place, Gait belt, Patient at risk for falls, Left in chair, Nurse notified    Plan:  Times per week: 3-5x GM  Times per day: Daily  Current Treatment Recommendations: Strengthening, Gait Training, Patient/Caregiver Education & Training, Equipment Evaluation, Education, & procurement, Balance Training, Endurance Training, Functional Mobility Training, Transfer Training, Safety Education & Training    Goals:  Patient goals : Return to [facility] term goals  Time Frame for Short term goals: 2 weeks  Short term goal 1: Patient will transfer supine <--> sit with mod I in order to get into/out of bed. Short term goal 2: Patient will transfer sit <--> stand with mod I in order to get up to ambulate. Short term goal 3: Patient will ambulate >80' with a cane and supervision for community distances. Short term goal 4: Patient will score at least a 24/28 on the Tinnetti, which indicates a low falls risk. Long term goals  Time Frame for Long term goals : no LTGs due to short ELOS  If patient is discharged prior to progress note completion, this note is to serve as the discharge note with all goals being unmet unless indicated otherwise.             AM-PAC Inpatient Mobility without Stair Climbing Raw Score : 15  AM-PAC Inpatient without Stair Climbing T-Scale Score : 43.03  Mobility Inpatient CMS 0-100% Score: 47.43  Mobility Inpatient without Stair CMS G-Code Modifier : CK

## 2019-03-26 LAB
GLUCOSE BLD-MCNC: 112 MG/DL (ref 70–108)
GLUCOSE BLD-MCNC: 155 MG/DL (ref 70–108)
GLUCOSE BLD-MCNC: 96 MG/DL (ref 70–108)

## 2019-03-26 PROCEDURE — 97110 THERAPEUTIC EXERCISES: CPT

## 2019-03-26 PROCEDURE — 97116 GAIT TRAINING THERAPY: CPT

## 2019-03-26 PROCEDURE — 99232 SBSQ HOSP IP/OBS MODERATE 35: CPT | Performed by: INTERNAL MEDICINE

## 2019-03-26 PROCEDURE — 6370000000 HC RX 637 (ALT 250 FOR IP): Performed by: INTERNAL MEDICINE

## 2019-03-26 PROCEDURE — 82948 REAGENT STRIP/BLOOD GLUCOSE: CPT

## 2019-03-26 PROCEDURE — 1200000000 HC SEMI PRIVATE

## 2019-03-26 PROCEDURE — 6360000002 HC RX W HCPCS: Performed by: PHYSICIAN ASSISTANT

## 2019-03-26 PROCEDURE — 2580000003 HC RX 258: Performed by: PHYSICIAN ASSISTANT

## 2019-03-26 PROCEDURE — 6370000000 HC RX 637 (ALT 250 FOR IP): Performed by: PHYSICIAN ASSISTANT

## 2019-03-26 RX ADMIN — TRIHEXYPHENIDYL HYDROCHLORIDE 2 MG: 2 TABLET ORAL at 20:54

## 2019-03-26 RX ADMIN — TRIHEXYPHENIDYL HYDROCHLORIDE 2 MG: 2 TABLET ORAL at 09:02

## 2019-03-26 RX ADMIN — FUROSEMIDE 40 MG: 40 TABLET ORAL at 09:02

## 2019-03-26 RX ADMIN — ASPIRIN 81 MG: 81 TABLET, COATED ORAL at 09:02

## 2019-03-26 RX ADMIN — INSULIN LISPRO 1 UNITS: 100 INJECTION, SOLUTION INTRAVENOUS; SUBCUTANEOUS at 20:55

## 2019-03-26 RX ADMIN — MULTIPLE VITAMINS W/ MINERALS TAB 1 TABLET: TAB at 09:01

## 2019-03-26 RX ADMIN — LOSARTAN POTASSIUM 50 MG: 50 TABLET, FILM COATED ORAL at 09:02

## 2019-03-26 RX ADMIN — CETIRIZINE HYDROCHLORIDE 5 MG: 10 TABLET, FILM COATED ORAL at 09:02

## 2019-03-26 RX ADMIN — DULOXETINE HYDROCHLORIDE 120 MG: 60 CAPSULE, DELAYED RELEASE ORAL at 09:02

## 2019-03-26 RX ADMIN — Medication 10 ML: at 20:54

## 2019-03-26 RX ADMIN — METOPROLOL TARTRATE 25 MG: 25 TABLET ORAL at 20:54

## 2019-03-26 RX ADMIN — Medication 10 ML: at 09:00

## 2019-03-26 RX ADMIN — METOPROLOL TARTRATE 25 MG: 25 TABLET ORAL at 09:01

## 2019-03-26 RX ADMIN — ATORVASTATIN CALCIUM 20 MG: 20 TABLET, FILM COATED ORAL at 09:02

## 2019-03-26 RX ADMIN — ENOXAPARIN SODIUM 40 MG: 40 INJECTION SUBCUTANEOUS at 09:03

## 2019-03-26 ASSESSMENT — PAIN SCALES - GENERAL
PAINLEVEL_OUTOF10: 0

## 2019-03-26 NOTE — DISCHARGE INSTR - COC
Continuity of Care Form    Patient Name: Burton Hahn   :  1935  MRN:  [id number]    [facility] date:  3/22/2019  Discharge date:  2019    Code Status Order: Full Code   Advance Directives:   [facility] Documentation     Date/Time Healthcare Directive Type of Healthcare Directive Copy in [address] Agent's Name Healthcare Agent's Phone Number    19  Yes, patient has an advance directive for healthcare treatment  Durable power of  for health care;Living will  Yes, copy in chart  Healthcare power of   Cat Perez   [phone]          Admitting Physician:  Ramona Camacho DO  PCP: Ammy Madrigal MD    Discharging Nurse: [facility] Unit/Room#: [address]  Discharging Unit Phone Number: [phone]    Emergency Contact:   Extended Emergency Contact Information  Primary Emergency Contact: Colette Bass of [address] [place] [address] Phone: [phone]  Relation: Child  Secondary Emergency Contact: Edward Barraza of [facility] Phone: [phone]  Relation: Child    Past Surgical History:  Past Surgical History:   Procedure Laterality Date    BREAST SURGERY      right lumpectomy    CHOLECYSTECTOMY  4-15-16    cholangiogram    COLONOSCOPY      EYE SURGERY      macular hole left eye    JOINT REPLACEMENT      left knee & right hip    SKIN BIOPSY      right arm       Immunization History:   Immunization History   Administered Date(s) Administered    Influenza Virus Vaccine 10/02/2013, 10/13/2015, 10/22/2018    Influenza, High Dose (Fluzone 65 yrs and older) 10/01/2014    Influenza, Dimas Pretty, 3 Years and older, IM (Fluzone 3 yrs and older or Afluria 5 yrs and older) 2016    Pneumococcal Conjugate 7-valent 10/01/2007    Pneumococcal Polysaccharide ([id number]) 2015    Zoster Live (Zostavax) 10/21/2012       Active Problems:  Patient Active Problem List   Diagnosis Code    Diabetes type 2, uncontrolled E11.65    Type 2 diabetes mellitus without complication ([facility]) S52.5    Acute cholecystitis K81.0    Lactic acidosis E87.2    Type 2 diabetes mellitus with hyperosmolarity without coma, without long-term current use of insulin ([facility]) E11.00    Hypertension I10    Choledocholithiasis K80.50    Essential hypertension I10    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    Encounter for fitting or adjustment of hearing aid Z46.1    FATOUMATA (generalized anxiety disorder) F41.1    Transient cerebral ischemia G45.9    Left hip pain M25.552    Aphasia R47.01    TIA (transient ischemic attack) G45.9    Type 2 diabetes mellitus ([facility]) E11.9    Major depression, recurrent, chronic ([facility]) F33.9    CHF (congestive heart failure), NYHA class II, unspecified failure chronicity, combined ([facility]) I50.40    Dermatitis L30.9    SOB (shortness of breath) R06.02       Isolation/Infection:   Isolation          No Isolation            Nurse Assessment:  Last Vital Signs: /60   Pulse 93   Temp 97.8 °F (36.6 °C) (Oral)   Resp 16   Ht 5' 5\" (1.651 m)   Wt 167 lb 1.6 oz (75.8 kg)   SpO2 91%   BMI 27.81 kg/m²     Last documented pain score (0-10 scale): Pain Level: 0  Last Weight:   Wt Readings from Last 1 Encounters:   03/24/19 167 lb 1.6 oz (75.8 kg)     Mental Status:  disoriented and alert    IV Access:  - None    Nursing Mobility/ADLs:  Walking   Assisted  Transfer  Assisted  Bathing  Assisted  Dressing  Assisted  Toileting  Assisted  Feeding  Independent  Med Admin  Assisted  Med Delivery   whole    Wound Care Documentation and Therapy:        Elimination:  Continence:   · Bowel: Yes  · Bladder: Yes  Urinary Catheter: None   Colostomy/Ileostomy/Ileal Conduit: No       Date of Last BM: 03/27/2019    Intake/Output Summary (Last 24 hours) at 3/26/2019 0718  Last data filed at 3/26/2019 0319  Gross per 24 hour   Intake 510 ml   Output 700 ml   Net -190 ml     I/O last 3 completed shifts:   In: 18 [P.O.:500; H&P    PHYSICIAN SIGNATURE:  Electronically signed by Chase Rivero MD on 3/28/19 at 1:27 PM

## 2019-03-26 NOTE — CARE COORDINATION
3/26/19, 2:14 PM      Po Granado day: 4  Location: [address] [place] [address]- Reason for admit: CHF (congestive heart failure), NYHA class II, unspecified failure chronicity, combined ([facility] [I50.40]   Procedure:   3/22 Echo 55-60%  Treatment Plan of Care: Hospitalist following. PT/OT. Dietitian. PCP: Kerry Brooks MD  Readmission Risk Score: 15%  Discharge Plan: Awaiting precert for Aria Marshall, started 3/25.

## 2019-03-26 NOTE — PROGRESS NOTES
Patient alert and oriented x3. Speech clear and appropriate. Upper extremities are tan, warm, and dry, sensation present, capillary refills <3sec. Skin turgor <3 sec. Hand grasp strong and equil bilaterally. Chest movement is symmetrical, lung sound are present and clear throughout. Heart sounds are regular. Abdomen is flat nondistended. Bowel sounds present x4, soft without tenderness or masses. Lower extremities tan warm and dry, no edema present. Pedal pulses present bilaterally. Pedal push and pull strong and equal bilaterally. Skin is warm and intact. Moves freely in bed.

## 2019-03-26 NOTE — PROGRESS NOTES
Hospitalist Progress Note    Patient:  Burton Hahn      Unit/Bed:5E-69/069-A    YOB: 1935    MRN: [id number]       Acct: [de-identified]     PCP: Ammy Madrigal MD    Date of Admission: 3/22/2019    Chief Complaint: leg swelling and sob    Hospital Course: 80 y. o. female with PMH of DM II, HTN, and IBS who presents from assisted living with leg swelling and increased sob. Over the past week pt has been having increased difficulty with catching her breath when talking at times. Pt has also had increase in 25 pounds over the past few months. No dizziness, lightheadedness, chest pain, falls, blood in stools, fevers, chills or burning with urination.      While in the ED, pt placed on 3L NC and admitted for new onset CHF exacerbation.     Subjective: no acute events overnight. Pt great improvement in breathing. No chest pain or sob.       Medications:  Reviewed    Infusion Medications    dextrose       Scheduled Medications    furosemide  40 mg Oral Daily    insulin lispro  0-6 Units Subcutaneous TID WC    insulin lispro  0-3 Units Subcutaneous Nightly    sodium chloride flush  10 mL Intravenous 2 times per day    enoxaparin  40 mg Subcutaneous Daily    aspirin  81 mg Oral Daily    atorvastatin  20 mg Oral Daily    DULoxetine  120 mg Oral Daily    losartan  50 mg Oral Daily    metoprolol tartrate  25 mg Oral BID    therapeutic multivitamin-minerals  1 tablet Oral Daily    trihexyphenidyl  2 mg Oral BID    cetirizine  5 mg Oral Daily     PRN Meds: glucose, dextrose, glucagon (rDNA), dextrose, sodium chloride flush, magnesium hydroxide, ondansetron, magnesium sulfate, potassium chloride, acetaminophen      Intake/Output Summary (Last 24 hours) at 3/26/2019 1407  Last data filed at 3/26/2019 0856  Gross per 24 hour   Intake 860 ml   Output 700 ml   Net 160 ml       Diet:  DIET CARB CONTROL; Low Sodium (2 GM);  Daily Fluid Restriction: 1500 ml    Exam:  BP (!) 114/59   Pulse 71   Temp 97.7 °F (36.5 °C) (Axillary)   Resp 16   Ht 5' 5\" (1.651 m)   Wt 167 lb 1.6 oz (75.8 kg)   SpO2 97%   BMI 27.81 kg/m²     General appearance: No apparent distress, appears stated age and cooperative. HEENT: Pupils equal, round, and reactive to light. Conjunctivae/corneas clear. Neck: Supple, with full range of motion. No jugular venous distention. Trachea midline. Respiratory:  Normal respiratory effort. Clear to auscultation, bilaterally without Rales/Wheezes/Rhonchi. Cardiovascular: Regular rate and rhythm with normal S1/S2 without murmurs, rubs or gallops. Abdomen: Soft, non-tender, non-distended with normal bowel sounds. Musculoskeletal: passive and active ROM x 4 extremities. Skin: Skin color, texture, turgor normal.  No rashes or lesions. Neurologic:  Neurovascularly intact without any focal sensory/motor deficits. Cranial nerves: II-XII intact, grossly non-focal.  Psychiatric: Alert and oriented, thought content appropriate, normal insight  Capillary Refill: Brisk,< 3 seconds   Peripheral Pulses: +2 palpable, equal bilaterally       Labs:   No results for input(s): WBC, HGB, HCT, PLT in the last 72 hours. Recent Labs     03/24/19  0447      K 4.0   CL 94*   CO2 25   BUN 34*   CREATININE 1.0   CALCIUM 9.2     No results for input(s): AST, ALT, BILIDIR, BILITOT, ALKPHOS in the last 72 hours. No results for input(s): INR in the last 72 hours. No results for input(s): Marc Aver in the last 72 hours. Urinalysis:      Lab Results   Component Value Date    NITRU NEGATIVE 03/25/2019    WBCUA > 100 03/25/2019    BACTERIA MODERATE 03/25/2019    RBCUA 0-2 03/25/2019    BLOODU NEGATIVE 03/25/2019    SPECGRAV 1.013 03/25/2019    GLUCOSEU NEGATIVE 06/29/2018       Radiology:  CTA Chest W WO Contrast   Final Result   1.. Negative exam for acute pulmonary embolus. 2. Cardiomegaly. 3 early changes of COPD.    4. Small hiatal hernia         **This report has been created using voice recognition software. It may contain minor errors which are inherent in voice recognition technology. **      Final report electronically signed by Dr. Levi Alexander on 3/22/2019 6:59 PM      XR CHEST STANDARD (2 VW)   Final Result   Stable radiographic appearance of the chest. No evidence of an acute process. **This report has been created using voice recognition software. It may contain minor errors which are inherent in voice recognition technology. **      Final report electronically signed by Dr. Luis Kramer MD on 3/22/2019 3:54 PM          Diet: DIET CARB CONTROL; Low Sodium (2 GM); Daily Fluid Restriction: 1500 ml    DVT prophylaxis: [x] Lovenox                                 [] SCDs                                 [] SQ Heparin                                 [] Encourage ambulation           [] Already on Anticoagulation     Disposition:    [] Home       [] TCU       [] Rehab       [] Psych       [x] SNF       [] [place]       [] Other-    Code Status: Full Code    PT/OT Eval Status: ECF     Assessment/Plan:    Anticipated Discharge in : pending pre-cert     Active Hospital Problems    Diagnosis Date Noted    CHF (congestive heart failure), NYHA class II, unspecified failure chronicity, combined ([facility] [I50.40] 03/22/2019    Dermatitis [L30.9] 03/22/2019    Hypertension [I10] 04/14/2016    Type 2 diabetes mellitus without complication ([facility] [K90.8] 12/17/2015                Assessment/Plan:     1. Acute Hypoxic Resp. Failure- secondary to new onset CHF exacerbation. Placed on 3L NC. Weaned back to RA with IV diuresis. 2D Echo showing normal EF. Strict I/Os. Daily weights. 2. Acute Diastolic CHF-  symptoms are concerning for heart failure however normal BNP and CXR. Sob improving with IV diuresis. 2D Echo showing normal EF. Transitioned to PO Lasix 40. Strict I/Os. Daily weights. 3. Macular Pruritic Rash of chest and Extremities- likely medication induced.  Give one dose IV Solumedrol. Resolving. 4. Type 2 DM 2- insulin sliding scale. Glucose checks. Diabetic Diet. 5. Essential HTN- resume Cozaar. Monitor BP daily .         Dispo: pending pre-cert to F.               Electronically signed by Azar Zazueta MD on 3/26/2019 at 2:07 PM

## 2019-03-26 NOTE — PROGRESS NOTES
[address] [address]  INPATIENT PHYSICAL THERAPY  DAILY NOTE  [facility] 5E - 5E-69/069-A    Time In: 4482  Time Out: 7272  Timed Code Treatment Minutes: 24 Minutes  Minutes: 24          Date: 3/26/2019  Patient Name: Kvng Clemente,  Gender:  female        MRN: [id number]  : 1935  (80 y.o.)  Referral Date : 19  Referring Practitioner: [facility]  Diagnosis: CHF  Additional Pertinent Hx: 80 y.o. female with PMH of DM II, HTN, and IBS who presents from assisted living with leg swelling and increased sob. Over the past week pt has been having increased difficulty with catching her breath when talking at times. Pt has also had increase in 25 pounds over the past few months. No dizziness, lightheadedness, chest pain, falls, blood in stools, fevers, chills or burning with urination     Past Medical History:   Diagnosis Date    Anxiety     nervous breakdown 2016    Breast cancer ([address]     Diverticulitis     DVT (deep venous thrombosis) (HCC)     Hyperlipidemia     Hypertension     Osteoarthritis     Pancreatic cancer ([address]     Family    Pulmonary embolism ([facility]     S/P knee replacement     Type 2 diabetes mellitus ([facility]      Past Surgical History:   Procedure Laterality Date    BREAST SURGERY      right lumpectomy    CHOLECYSTECTOMY  4-15-16    cholangiogram    COLONOSCOPY      EYE SURGERY      macular hole left eye    JOINT REPLACEMENT      left knee & right hip    SKIN BIOPSY      right arm       Restrictions/Precautions:  Fall Risk, Up as Tolerated                            Prior Level of Function:  ADL Assistance: Independent  Homemaking Assistance: Needs assistance  Ambulation Assistance: Independent(cane outside)  Transfer Assistance: Independent  Additional Comments: walk to dining herrera    Subjective:     Subjective: RN approved session.  pt in bed upon arrival and agrees to therapy with min enouragement, pt somewhat confused/agitated but cooperative    Pain:  Denies. Social/Functional:  Lives With: Alone  Type of Home: Assisted living  Home Layout: One level  Home Equipment: Cane(no AD PTA< cane for community mobility )     Objective:  Supine to Sit: Stand by assistance  Sit to Supine: Stand by assistance  Scooting: Stand by assistance(forward and back in seated position)    Transfers  Sit to Stand: Stand by assistance(cue for hand placement)  Stand to sit: Stand by assistance(ccue for hand placement)       Ambulation 1  Surface: level tile  Device: Single point cane  Assistance: Contact guard assistance  Quality of Gait: slowed cadecne and velocity, short step lengths, min trunk sway B, min path deviations, short shuffled steps, cues to place cane further in front of herself  Distance: 150'x1     Exercises:  Exercises  Comments: supine BLE AROM to improve strength and independence with functional mobility x10 reps ankle pumps, quad sets, glute sets, heel slides, hip ab/add, SLR, cues for tech and full range     Balance Score: 10  Gait Score: 9  Tinetti Total Score: 19     Activity Tolerance:  Activity Tolerance: Patient Tolerated treatment well;Patient limited by endurance; Patient limited by fatigue    Assessment: Body structures, Functions, Activity limitations: Decreased functional mobility , Decreased endurance, Decreased strength, Decreased balance  Assessment: pt tolerated session well. pt demos decreased strength, balance, edurance and independence with mobility. pt will benefit from cont PT at this time  Prognosis: Good     REQUIRES PT FOLLOW UP: Yes    Discharge Recommendations:  Discharge Recommendations: 24 hour supervision or assist, Patient would benefit from continued therapy after discharge    Patient Education:  Patient Education: POC therex gait, importance of mobility/PT    Equipment Recommendations:  Equipment Needed: No    Safety:  Type of devices:  All fall risk precautions in place, Call light within reach, Chair alarm in place, Gait belt, Patient at risk for falls, Left in chair, Nurse notified    Plan:  Times per week: 3-5x GM  Times per day: Daily  Current Treatment Recommendations: Strengthening, Gait Training, Patient/Caregiver Education & Training, Equipment Evaluation, Education, & procurement, Balance Training, Endurance Training, Functional Mobility Training, Transfer Training, Safety Education & Training    Goals:  Patient goals : Return to [facility] term goals  Time Frame for Short term goals: 2 weeks  Short term goal 1: Patient will transfer supine <--> sit with mod I in order to get into/out of bed. Short term goal 2: Patient will transfer sit <--> stand with mod I in order to get up to ambulate. Short term goal 3: Patient will ambulate >80' with a cane and supervision for community distances. Short term goal 4: Patient will score at least a 24/28 on the Tinnetti, which indicates a low falls risk. Long term goals  Time Frame for Long term goals : no LTGs due to short ELOS  If patient is discharged prior to progress note completion, this note is to serve as the discharge note with all goals being unmet unless indicated otherwise.

## 2019-03-26 NOTE — PLAN OF CARE
Problem: Falls - Risk of:  Goal: Will remain free from falls  Description  Will remain free from falls  3/25/2019 2306 by Radha Woo RN  Outcome: Ongoing  Note:   Patient free from falls this shift. Gait steady with 1 assist. Alert and oriented at times, disoriented to situation at times. Telesitter and bed alarm in use. Problem: Falls - Risk of:  Goal: Absence of physical injury  Description  Absence of physical injury  Outcome: Ongoing     Problem: OXYGENATION/RESPIRATORY FUNCTION  Goal: Patient will maintain patent airway  Outcome: Ongoing     Problem: HEMODYNAMIC STATUS  Goal: Patient has stable vital signs and fluid balance  Outcome: Ongoing     Problem: FLUID AND ELECTROLYTE IMBALANCE  Goal: Fluid and electrolyte balance are achieved/maintained  Outcome: Ongoing  Note:   Lungs clear/diminished, dyspnea with exertion. 02 sats 92% on room air. Problem: Discharge Planning  Goal: Knowledge of discharge instructions  Description  Knowledge of discharge instructions     3/25/2019 2306 by Radha Woo RN  Outcome: Ongoing  Note:   Patient going to [address] [place] [address] instead of back to assisted living, social work seeing patient. Problem: Musculor/Skeletal Functional Status  Goal: Highest potential functional level  3/25/2019 2306 by Radha Woo RN  Outcome: Ongoing     Problem: DISCHARGE BARRIERS  Goal: Patient's continuum of care needs are met  3/25/2019 2306 by Radha Woo RN  Outcome: Ongoing   Care plan reviewed with patient. Patient verbalized understanding of the plan of care and contribute to goal setting.

## 2019-03-27 LAB
GLUCOSE BLD-MCNC: 106 MG/DL (ref 70–108)
GLUCOSE BLD-MCNC: 125 MG/DL (ref 70–108)
GLUCOSE BLD-MCNC: 135 MG/DL (ref 70–108)
GLUCOSE BLD-MCNC: 169 MG/DL (ref 70–108)

## 2019-03-27 PROCEDURE — 2580000003 HC RX 258: Performed by: PHYSICIAN ASSISTANT

## 2019-03-27 PROCEDURE — 1200000000 HC SEMI PRIVATE

## 2019-03-27 PROCEDURE — 6370000000 HC RX 637 (ALT 250 FOR IP): Performed by: INTERNAL MEDICINE

## 2019-03-27 PROCEDURE — 6360000002 HC RX W HCPCS: Performed by: PHYSICIAN ASSISTANT

## 2019-03-27 PROCEDURE — 99232 SBSQ HOSP IP/OBS MODERATE 35: CPT | Performed by: INTERNAL MEDICINE

## 2019-03-27 PROCEDURE — 82948 REAGENT STRIP/BLOOD GLUCOSE: CPT

## 2019-03-27 PROCEDURE — 6370000000 HC RX 637 (ALT 250 FOR IP): Performed by: PHYSICIAN ASSISTANT

## 2019-03-27 RX ADMIN — ATORVASTATIN CALCIUM 20 MG: 20 TABLET, FILM COATED ORAL at 07:52

## 2019-03-27 RX ADMIN — INSULIN LISPRO 1 UNITS: 100 INJECTION, SOLUTION INTRAVENOUS; SUBCUTANEOUS at 12:13

## 2019-03-27 RX ADMIN — CETIRIZINE HYDROCHLORIDE 5 MG: 10 TABLET, FILM COATED ORAL at 07:52

## 2019-03-27 RX ADMIN — MULTIPLE VITAMINS W/ MINERALS TAB 1 TABLET: TAB at 07:52

## 2019-03-27 RX ADMIN — ENOXAPARIN SODIUM 40 MG: 40 INJECTION SUBCUTANEOUS at 07:52

## 2019-03-27 RX ADMIN — LOSARTAN POTASSIUM 50 MG: 50 TABLET, FILM COATED ORAL at 07:52

## 2019-03-27 RX ADMIN — TRIHEXYPHENIDYL HYDROCHLORIDE 2 MG: 2 TABLET ORAL at 20:05

## 2019-03-27 RX ADMIN — Medication 10 ML: at 20:06

## 2019-03-27 RX ADMIN — ASPIRIN 81 MG: 81 TABLET, COATED ORAL at 07:52

## 2019-03-27 RX ADMIN — DULOXETINE HYDROCHLORIDE 120 MG: 60 CAPSULE, DELAYED RELEASE ORAL at 07:52

## 2019-03-27 RX ADMIN — Medication 10 ML: at 07:56

## 2019-03-27 RX ADMIN — FUROSEMIDE 40 MG: 40 TABLET ORAL at 07:52

## 2019-03-27 RX ADMIN — TRIHEXYPHENIDYL HYDROCHLORIDE 2 MG: 2 TABLET ORAL at 07:51

## 2019-03-27 RX ADMIN — METOPROLOL TARTRATE 25 MG: 25 TABLET ORAL at 07:52

## 2019-03-27 RX ADMIN — METOPROLOL TARTRATE 25 MG: 25 TABLET ORAL at 20:05

## 2019-03-27 ASSESSMENT — PAIN SCALES - GENERAL
PAINLEVEL_OUTOF10: 0

## 2019-03-27 NOTE — PLAN OF CARE
Care plan reviewed with patient and daughter who verbalize understanding of the plan of care and contribute to goal setting.

## 2019-03-27 NOTE — PROGRESS NOTES
Pt alert and oriented x4 with slight confusion. Able to follow commands with clear language. Hand grasp is moderately strong and equal bilaterally. Capillary refill less than 3 seconds, skin turgor less than 3 seconds. No numbness or tingling present in upper extremities, moves bilateral upper extremities freely. Rash noted on chest and scattered bruising present. Heart sounds normal with a regular rhythm of 92. Lung sounds clear and diminished on right side, left side diminished and crackles noted. Respirations are 16, unlabored, with normal depth. Bowel sounds active x4. Pedal push and pull moderately strong and equal bilaterally. No numbness or tingling present in bilateral lower extremities, able to move bilateral lower extremities freely. Trace edema noted in bilateral lower extremities. Pedal pulses strong and equal bilaterally. Pt resting in bed comfortably with call light in reach.   Vladimir TEJADA/SN

## 2019-03-27 NOTE — PLAN OF CARE
Problem: Falls - Risk of:  Goal: Will remain free from falls  Description  Will remain free from falls  3/27/2019 0101 by John Robledo RN  Outcome: Ongoing  Note:   Patient free from falls this shift. Gait steady with 1 assist. Alert to name, situation at times. Impulsive-telesitter and bed alarm in use. Problem: Falls - Risk of:  Goal: Absence of physical injury  Description  Absence of physical injury  3/27/2019 0101 by John Robledo RN  Outcome: Ongoing     Problem: OXYGENATION/RESPIRATORY FUNCTION  Goal: Patient will maintain patent airway  3/27/2019 0101 by John Robledo RN  Outcome: Ongoing  Note:   Lungs clear/crackles at times. 02 sats mid 90's on room air. Problem: OXYGENATION/RESPIRATORY FUNCTION  Goal: Patient will maintain patent airway  3/27/2019 0101 by John Robledo RN  Outcome: Ongoing  Note:   Lungs clear/crackles at times. 02 sats mid 90's on room air. Problem: HEMODYNAMIC STATUS  Goal: Patient has stable vital signs and fluid balance  3/27/2019 0101 by John Robledo RN  Outcome: Ongoing  Note:   Vitals stable, patient voiding at least 30 ml/hr     Problem: FLUID AND ELECTROLYTE IMBALANCE  Goal: Fluid and electrolyte balance are achieved/maintained  3/27/2019 0101 by John Robledo RN  Outcome: Ongoing     Problem: Discharge Planning  Goal: Knowledge of discharge instructions  Description  Knowledge of discharge instructions     3/27/2019 0101 by John Robledo RN  Outcome: Ongoing  Note:   Patient from [facility] to [place] but in rehab side. Social work seeing patient. Care plan reviewed with patient. Patient verbalized understanding of the plan of care and contribute to goal setting.

## 2019-03-27 NOTE — PLAN OF CARE
Patient is alert to name and time. Telesitter is being used and utilizing bed and chair alarms. No falls noted.  Awaiting precert to go to [facility] to skilled nursing facility for rehab patient normally resides at [facility] assisted living

## 2019-03-28 ENCOUNTER — APPOINTMENT (OUTPATIENT)
Dept: CT IMAGING | Age: 84
End: 2019-03-28
Payer: MEDICARE

## 2019-03-28 ENCOUNTER — APPOINTMENT (OUTPATIENT)
Dept: GENERAL RADIOLOGY | Age: 84
End: 2019-03-28
Payer: MEDICARE

## 2019-03-28 ENCOUNTER — HOSPITAL ENCOUNTER (EMERGENCY)
Age: 84
Discharge: NURSING, REHAB OR LONG-TERM CARE | End: 2019-03-29
Attending: EMERGENCY MEDICINE
Payer: MEDICARE

## 2019-03-28 VITALS
HEIGHT: 65 IN | SYSTOLIC BLOOD PRESSURE: 110 MMHG | TEMPERATURE: 98 F | WEIGHT: 166 LBS | OXYGEN SATURATION: 95 % | BODY MASS INDEX: 27.66 KG/M2 | RESPIRATION RATE: 22 BRPM | HEART RATE: 101 BPM | DIASTOLIC BLOOD PRESSURE: 47 MMHG

## 2019-03-28 VITALS
SYSTOLIC BLOOD PRESSURE: 108 MMHG | TEMPERATURE: 95.2 F | OXYGEN SATURATION: 96 % | RESPIRATION RATE: 18 BRPM | BODY MASS INDEX: 27.72 KG/M2 | HEIGHT: 65 IN | HEART RATE: 96 BPM | DIASTOLIC BLOOD PRESSURE: 60 MMHG | WEIGHT: 166.4 LBS
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
## 2019-03-28 DIAGNOSIS — S70.01XA CONTUSION OF RIGHT HIP, INITIAL ENCOUNTER: ICD-10-CM

## 2019-03-28 DIAGNOSIS — S01.01XA LACERATION OF SCALP, INITIAL ENCOUNTER: ICD-10-CM

## 2019-03-28 DIAGNOSIS — S00.03XA HEMATOMA OF SCALP, INITIAL ENCOUNTER: ICD-10-CM

## 2019-03-28 LAB
ALBUMIN SERPL-MCNC: 3.6 G/DL (ref 3.5–5.1)
ALP BLD-CCNC: 71 U/L (ref 38–126)
ALT SERPL-CCNC: 21 U/L (ref 11–66)
ANION GAP SERPL CALCULATED.3IONS-SCNC: 17 MEQ/L (ref 8–16)
APTT: 25.6 SECONDS (ref 22–38)
AST SERPL-CCNC: 17 U/L (ref 5–40)
BASOPHILS # BLD: 0.3 %
BASOPHILS ABSOLUTE: 0 THOU/MM3 (ref 0–0.1)
BILIRUB SERPL-MCNC: 0.2 MG/DL (ref 0.3–1.2)
BUN BLDV-MCNC: 46 MG/DL (ref 7–22)
CALCIUM SERPL-MCNC: 9.1 MG/DL (ref 8.5–10.5)
CHLORIDE BLD-SCNC: 95 MEQ/L (ref 98–111)
CO2: 24 MEQ/L (ref 23–33)
CREAT SERPL-MCNC: 1.2 MG/DL (ref 0.4–1.2)
EKG ATRIAL RATE: 100 BPM
EKG P AXIS: 58 DEGREES
EKG P-R INTERVAL: 144 MS
EKG Q-T INTERVAL: 332 MS
EKG QRS DURATION: 92 MS
EKG QTC CALCULATION (BAZETT): 428 MS
EKG R AXIS: 34 DEGREES
EKG T AXIS: 49 DEGREES
EKG VENTRICULAR RATE: 100 BPM
EOSINOPHIL # BLD: 1.6 %
EOSINOPHILS ABSOLUTE: 0.2 THOU/MM3 (ref 0–0.4)
ERYTHROCYTE [DISTWIDTH] IN BLOOD BY AUTOMATED COUNT: 14.3 % (ref 11.5–14.5)
ERYTHROCYTE [DISTWIDTH] IN BLOOD BY AUTOMATED COUNT: 51 FL (ref 35–45)
GFR SERPL CREATININE-BSD FRML MDRD: 43 ML/MIN/1.73M2
GLUCOSE BLD-MCNC: 119 MG/DL (ref 70–108)
GLUCOSE BLD-MCNC: 136 MG/DL (ref 70–108)
GLUCOSE BLD-MCNC: 139 MG/DL (ref 70–108)
HCT VFR BLD CALC: 40.7 % (ref 37–47)
HEMOGLOBIN: 13 GM/DL (ref 12–16)
IMMATURE GRANS (ABS): 0.06 THOU/MM3 (ref 0–0.07)
IMMATURE GRANULOCYTES: 0.5 %
INR BLD: 0.94 (ref 0.85–1.13)
LYMPHOCYTES # BLD: 34.4 %
LYMPHOCYTES ABSOLUTE: 3.7 THOU/MM3 (ref 1–4.8)
MCH RBC QN AUTO: 31.2 PG (ref 26–33)
MCHC RBC AUTO-ENTMCNC: 31.9 GM/DL (ref 32.2–35.5)
MCV RBC AUTO: 97.6 FL (ref 81–99)
MONOCYTES # BLD: 11.4 %
MONOCYTES ABSOLUTE: 1.2 THOU/MM3 (ref 0.4–1.3)
NUCLEATED RED BLOOD CELLS: 0 /100 WBC
OSMOLALITY CALCULATION: 286.1 MOSMOL/KG (ref 275–300)
PLATELET # BLD: 278 THOU/MM3 (ref 130–400)
PMV BLD AUTO: 8.7 FL (ref 9.4–12.4)
POTASSIUM SERPL-SCNC: 4.5 MEQ/L (ref 3.5–5.2)
RBC # BLD: 4.17 MILL/MM3 (ref 4.2–5.4)
SEG NEUTROPHILS: 51.8 %
SEGMENTED NEUTROPHILS ABSOLUTE COUNT: 5.6 THOU/MM3 (ref 1.8–7.7)
SODIUM BLD-SCNC: 136 MEQ/L (ref 135–145)
TOTAL PROTEIN: 7.2 G/DL (ref 6.1–8)
WBC # BLD: 10.9 THOU/MM3 (ref 4.8–10.8)

## 2019-03-28 PROCEDURE — 6360000002 HC RX W HCPCS: Performed by: PHYSICIAN ASSISTANT

## 2019-03-28 PROCEDURE — 71045 X-RAY EXAM CHEST 1 VIEW: CPT

## 2019-03-28 PROCEDURE — 99285 EMERGENCY DEPT VISIT HI MDM: CPT

## 2019-03-28 PROCEDURE — 70450 CT HEAD/BRAIN W/O DYE: CPT

## 2019-03-28 PROCEDURE — 85610 PROTHROMBIN TIME: CPT

## 2019-03-28 PROCEDURE — 85730 THROMBOPLASTIN TIME PARTIAL: CPT

## 2019-03-28 PROCEDURE — 6370000000 HC RX 637 (ALT 250 FOR IP): Performed by: INTERNAL MEDICINE

## 2019-03-28 PROCEDURE — 72170 X-RAY EXAM OF PELVIS: CPT

## 2019-03-28 PROCEDURE — 80053 COMPREHEN METABOLIC PANEL: CPT

## 2019-03-28 PROCEDURE — 2580000003 HC RX 258: Performed by: PHYSICIAN ASSISTANT

## 2019-03-28 PROCEDURE — 99283 EMERGENCY DEPT VISIT LOW MDM: CPT | Performed by: SURGERY

## 2019-03-28 PROCEDURE — 85025 COMPLETE CBC W/AUTO DIFF WBC: CPT

## 2019-03-28 PROCEDURE — 82948 REAGENT STRIP/BLOOD GLUCOSE: CPT

## 2019-03-28 PROCEDURE — 99238 HOSP IP/OBS DSCHRG MGMT 30/<: CPT | Performed by: INTERNAL MEDICINE

## 2019-03-28 PROCEDURE — 97116 GAIT TRAINING THERAPY: CPT

## 2019-03-28 PROCEDURE — APPSS180 APP SPLIT SHARED TIME > 60 MINUTES: Performed by: PHYSICIAN ASSISTANT

## 2019-03-28 PROCEDURE — 6370000000 HC RX 637 (ALT 250 FOR IP): Performed by: PHYSICIAN ASSISTANT

## 2019-03-28 PROCEDURE — 6820000002 HC L2 INJURY CALL ACTIVATION

## 2019-03-28 PROCEDURE — 12001 RPR S/N/AX/GEN/TRNK 2.5CM/<: CPT

## 2019-03-28 PROCEDURE — 72125 CT NECK SPINE W/O DYE: CPT

## 2019-03-28 PROCEDURE — 36415 COLL VENOUS BLD VENIPUNCTURE: CPT

## 2019-03-28 PROCEDURE — 93005 ELECTROCARDIOGRAM TRACING: CPT | Performed by: EMERGENCY MEDICINE

## 2019-03-28 RX ORDER — FUROSEMIDE 40 MG/1
40 TABLET ORAL DAILY
Qty: 60 TABLET | Refills: 3 | DISCHARGE
Start: 2019-03-29 | End: 2019-04-29 | Stop reason: ALTCHOICE

## 2019-03-28 RX ADMIN — METOPROLOL TARTRATE 25 MG: 25 TABLET ORAL at 08:49

## 2019-03-28 RX ADMIN — TRIHEXYPHENIDYL HYDROCHLORIDE 2 MG: 2 TABLET ORAL at 08:49

## 2019-03-28 RX ADMIN — DULOXETINE HYDROCHLORIDE 120 MG: 60 CAPSULE, DELAYED RELEASE ORAL at 08:49

## 2019-03-28 RX ADMIN — FUROSEMIDE 40 MG: 40 TABLET ORAL at 08:49

## 2019-03-28 RX ADMIN — Medication 10 ML: at 08:51

## 2019-03-28 RX ADMIN — MULTIPLE VITAMINS W/ MINERALS TAB 1 TABLET: TAB at 08:49

## 2019-03-28 RX ADMIN — ASPIRIN 81 MG: 81 TABLET, COATED ORAL at 08:49

## 2019-03-28 RX ADMIN — ATORVASTATIN CALCIUM 20 MG: 20 TABLET, FILM COATED ORAL at 08:49

## 2019-03-28 RX ADMIN — ENOXAPARIN SODIUM 40 MG: 40 INJECTION SUBCUTANEOUS at 08:51

## 2019-03-28 RX ADMIN — LOSARTAN POTASSIUM 50 MG: 50 TABLET, FILM COATED ORAL at 08:49

## 2019-03-28 RX ADMIN — CETIRIZINE HYDROCHLORIDE 5 MG: 10 TABLET, FILM COATED ORAL at 08:49

## 2019-03-28 ASSESSMENT — ENCOUNTER SYMPTOMS
ABDOMINAL PAIN: 0
VOMITING: 0
COLOR CHANGE: 0
SHORTNESS OF BREATH: 0
WHEEZING: 0
CHEST TIGHTNESS: 0
COUGH: 0
SINUS PAIN: 0
NAUSEA: 0

## 2019-03-28 NOTE — PROGRESS NOTES
[address] [address]  INPATIENT PHYSICAL THERAPY  DAILYNOTE  [facility] SUR[facility] 5E - 7N-14/845-L    Time In: 1430  Time Out: 5620  Timed Code Treatment Minutes: 15 Minutes  Minutes: 15          Date: 3/28/2019  Patient Name: Janet Wills,  Gender:  female        MRN: [id number]  : 1935  (80 y.o.)  Referral Date : 19  Referring Practitioner: Artem Richardson PA-C  Diagnosis: CHF  Additional Pertinent Hx: 80 y.o. female with PMH of DM II, HTN, and IBS who presents from assisted living with leg swelling and increased sob. Over the past week pt has been having increased difficulty with catching her breath when talking at times. Pt has also had increase in 25 pounds over the past few months. No dizziness, lightheadedness, chest pain, falls, blood in stools, fevers, chills or burning with urination     Past Medical History:   Diagnosis Date    Anxiety     nervous breakdown 2016    [facility]     Diverticulitis     DVT (deep venous thrombosis) (HCC)     Hyperlipidemia     Hypertension     Osteoarthritis     Pancreatic cancer ([facility]     Family    Pulmonary embolism ([facility]     S/P knee replacement     Type 2 diabetes mellitus ([facility]      Past Surgical History:   Procedure Laterality Date    BREAST SURGERY      right lumpectomy    CHOLECYSTECTOMY  4-15-16    cholangiogram    COLONOSCOPY      EYE SURGERY      macular hole left eye    JOINT REPLACEMENT      left knee & right hip    SKIN BIOPSY      right arm       Restrictions/Precautions:  Fall Risk, Up as Tolerated                            Prior Level of Function:  ADL Assistance: Independent  Homemaking Assistance: Needs assistance  Ambulation Assistance: Independent(cane outside)  Transfer Assistance: Independent  Additional Comments: walk to dining herrera    Subjective:     Subjective: Pt Pleasantly confused but follows simple commands well and easilly.   Pt was unsafe with first few steps displayed with SPC were very unsafe with pt groping for second handhold , therefore PTA switched pt to RW for safety. Pt did very well with RW     Pain:   .      none displayed     Social/Functional:  Lives With: Alone  Type of Home: Assisted living  Home Layout: One level  Home Equipment: Cane(no AD PTA< cane for community mobility )     Objective:       Transfers  Sit to Stand: Contact guard assistance  Stand to sit: Contact guard assistance       Ambulation 1  Surface: level tile  Device: Rolling Walker  Assistance: Contact guard assistance  Quality of Gait: pt easilly distracted ; needs frequent directional cues for pathfinding; stops and starts with distractions   Distance: approx 250 feet x 1   Comments: pt took about 4-5 steps with SPC in room and was Jermaine with pt reaching out with left hand for wall; therefore PTA changed pt to RW                                                                Activity Tolerance:  Activity Tolerance: Patient Tolerated treatment well    Assessment: Body structures, Functions, Activity limitations: Decreased functional mobility , Decreased endurance, Decreased strength, Decreased balance  Assessment: rec cont skilled therapy for imrpoved functional mobility strength and endurance   Prognosis: Good     REQUIRES PT FOLLOW UP: Yes    Discharge Recommendations:  Discharge Recommendations: 24 hour supervision or assist, Patient would benefit from continued therapy after discharge    Patient Education:  Patient Education: posture     Equipment Recommendations:  Equipment Needed: No    Safety:  Type of devices:  All fall risk precautions in place, Call light within reach, Chair alarm in place, Nurse notified, Left in chair    Plan:  Times per week: 3-5x GM  Times per day: Daily  Current Treatment Recommendations: Strengthening, Gait Training, Patient/Caregiver Education & Training, Equipment Evaluation, Education, & procurement, Balance Training, Endurance Training, Functional Mobility Training, Transfer Training,

## 2019-03-28 NOTE — PROGRESS NOTES
Report called to [facility] at [facility] PATRICK AMADOR II.[facility]

## 2019-03-28 NOTE — PROGRESS NOTES
Patient discharged to [facility] with her daughter Neha Coyle per wheelchair by private car.

## 2019-03-28 NOTE — PLAN OF CARE
Problem: Falls - Risk of:  Goal: Will remain free from falls  Description  Will remain free from falls  3/27/2019 2302 by Lavonne Esparza RN  Outcome: Met This Shift  Note:   Patient free from falls this shift. Patient fall risk r/t. Continues on falling star program, siderails upx2-3, bed alarm on, call light in reach, bed in low and locked position. Hourly checks preformed, potty, position, pain, pathway and possessions assessed. Continuing to monitor. 3/27/2019 1933 by Yohana Davison RN  Outcome: Met This Shift     Problem: OXYGENATION/RESPIRATORY FUNCTION  Goal: Patient will maintain patent airway  Outcome: Met This Shift  Note:   Pt is off oxygen and has 02 sats above 94% on room air     Problem: HEMODYNAMIC STATUS  Goal: Patient has stable vital signs and fluid balance  Outcome: Met This Shift  Note:   Pt's vitals are wnl     Problem: Discharge Planning  Goal: Knowledge of discharge instructions  Description  Knowledge of discharge instructions     3/27/2019 2302 by Lavonne Esparza RN  Outcome: Met This Shift  Note:   Pt will be discharged to prior assisted living center before admission  3/27/2019 1933 by Yohana Davison RN  Outcome: Ongoing     Problem: Musculor/Skeletal Functional Status  Goal: Highest potential functional level  Outcome: Met This Shift  Note:   Pt is up and around room with use of cane     Problem: DISCHARGE BARRIERS  Goal: Patient's continuum of care needs are met  Outcome: Met This Shift  Note:   Pt is returning to skilled unit at her prior [facility]   Care plan reviewed with patient . Patient does not verbalize understanding of the plan of care and contribute to goal setting.

## 2019-03-28 NOTE — PROGRESS NOTES
Hospitalist Progress Note    Patient:  Michael Price      Unit/Bed:5E-69/069-A    YOB: 1935    MRN: [id number]       Acct: [de-identified]     PCP: Kathie Huffman MD    Date of Admission: 3/22/2019    Chief Complaint: leg swelling and sob    Hospital Course:  80 y. o. female with PMH of DM II, HTN, and IBS who presents from assisted living with leg swelling and increased sob. Over the past week pt has been having increased difficulty with catching her breath when talking at times. Pt has also had increase in 25 pounds over the past few months. No dizziness, lightheadedness, chest pain, falls, blood in stools, fevers, chills or burning with urination.      While in the ED, pt placed on 3L NC and admitted for new onset CHF exacerbation.        Subjective: no acute events overnight. No sob, chest pain or dizziness. Tolerating PO intake. Medications:  Reviewed    Infusion Medications    dextrose       Scheduled Medications    furosemide  40 mg Oral Daily    insulin lispro  0-6 Units Subcutaneous TID WC    insulin lispro  0-3 Units Subcutaneous Nightly    sodium chloride flush  10 mL Intravenous 2 times per day    enoxaparin  40 mg Subcutaneous Daily    aspirin  81 mg Oral Daily    atorvastatin  20 mg Oral Daily    DULoxetine  120 mg Oral Daily    losartan  50 mg Oral Daily    metoprolol tartrate  25 mg Oral BID    therapeutic multivitamin-minerals  1 tablet Oral Daily    trihexyphenidyl  2 mg Oral BID    cetirizine  5 mg Oral Daily     PRN Meds: glucose, dextrose, glucagon (rDNA), dextrose, sodium chloride flush, magnesium hydroxide, ondansetron, magnesium sulfate, potassium chloride, acetaminophen      Intake/Output Summary (Last 24 hours) at 3/27/2019 2024  Last data filed at 3/27/2019 1841  Gross per 24 hour   Intake 1590 ml   Output 500 ml   Net 1090 ml       Diet:  DIET CARB CONTROL; Low Sodium (2 GM);  Daily Fluid Restriction: 1500 ml    Exam:  BP (!) 108/52   Pulse 95 Temp 96.6 °F (35.9 °C) (Oral)   Resp 18   Ht 5' 5\" (1.651 m)   Wt 166 lb 6.4 oz (75.5 kg)   SpO2 94%   BMI 27.69 kg/m²     General appearance: No apparent distress, appears stated age and cooperative. HEENT: Pupils equal, round, and reactive to light. Conjunctivae/corneas clear. Neck: Supple, with full range of motion. No jugular venous distention. Trachea midline. Respiratory:  Normal respiratory effort. Clear to auscultation, bilaterally without Rales/Wheezes/Rhonchi. Cardiovascular: Regular rate and rhythm with normal S1/S2 without murmurs, rubs or gallops. Abdomen: Soft, non-tender, non-distended with normal bowel sounds. Musculoskeletal: passive and active ROM x 4 extremities. Skin: Skin color, texture, turgor normal.  No rashes or lesions. Neurologic:  Neurovascularly intact without any focal sensory/motor deficits. Cranial nerves: II-XII intact, grossly non-focal.  Psychiatric: Alert and oriented, thought content appropriate, normal insight  Capillary Refill: Brisk,< 3 seconds   Peripheral Pulses: +2 palpable, equal bilaterally       Labs:   No results for input(s): WBC, HGB, HCT, PLT in the last 72 hours. No results for input(s): NA, K, CL, CO2, BUN, CREATININE, CALCIUM, PHOS in the last 72 hours. Invalid input(s): MAGNES  No results for input(s): AST, ALT, BILIDIR, BILITOT, ALKPHOS in the last 72 hours. No results for input(s): INR in the last 72 hours. No results for input(s): William Relic in the last 72 hours. Urinalysis:      Lab Results   Component Value Date    NITRU NEGATIVE 03/25/2019    WBCUA > 100 03/25/2019    BACTERIA MODERATE 03/25/2019    RBCUA 0-2 03/25/2019    BLOODU NEGATIVE 03/25/2019    SPECGRAV 1.013 03/25/2019    GLUCOSEU NEGATIVE 06/29/2018       Radiology:  CTA Chest W WO Contrast   Final Result   1.. Negative exam for acute pulmonary embolus. 2. Cardiomegaly. 3 early changes of COPD.    4. Small hiatal hernia         **This report has been created likely medication induced. Give one dose IV Solumedrol. Resolved. 4. Type 2 DM 2- insulin sliding scale. Glucose checks. Diabetic Diet. 5. Essential HTN- resume Cozaar.  Monitor BP daily .         Dispo: pending pre-cert to [facility]            Electronically signed by Amira Alexis MD on 3/27/2019 at 8:24 PM

## 2019-03-28 NOTE — CARE COORDINATION
3/28/19, 2:20 PM    Discharge plan discussed by  and . Discharge plan reviewed with patient/ family. Patient/ family verbalize understanding of discharge plan and are in agreement with plan. Understanding was demonstrated using the teach back method. IMM Letter  IMM Letter given to Patient/Family/Significant other/Guardian/POA/by[de-identified] CM  IMM Letter date given[de-identified] 03/28/19  IMM Letter time given[de-identified] 0009     [facility]. Discharge instructions are on the chart.

## 2019-03-28 NOTE — DISCHARGE SUMMARY
Hospital Medicine Discharge Summary      Patient Identification:   Tomasz Alex   : 1935  MRN: [id number]   Account: [de-identified]      Patient's PCP: Jason Capone MD    Admit Date: 3/22/2019     Discharge Date:   19    Admitting Physician: Gustavo Rodrigues DO     Discharge Physician: Yessica Muñoz MD     Discharge Diagnoses: Acute Diastolic CHF     Active Hospital Problems    Diagnosis Date Noted    CHF (congestive heart failure), NYHA class II, unspecified failure chronicity, combined ([facility] [I50.40] 2019    Dermatitis [L30.9] 2019    Hypertension [I10] 2016    Type 2 diabetes mellitus without complication ([facility] [B06.4] 2015       The patient was seen and examined on day of discharge and this discharge summary is in conjunction with any daily progress note from day of discharge. Hospital Course:   80 y. o. female with PMH of DM II, HTN, and IBS who presents from assisted living with leg swelling and increased sob. Over the past week pt has been having increased difficulty with catching her breath when talking at times. Pt has also had increase in 25 pounds over the past few months. Pt diuresed and weaned off of supplemental O2 to RA. Transitioned to PO Lasix 40 qd. Pt tolerating PO intake. Working with physical therapy. Discharge to ECF.        Assessment/Plan:     1. Acute Hypoxic Resp. Failure- secondary to new onset CHF exacerbation. Placed on 3L NC. Weaned back to RA with IV diuresis. 2D Echo showing normal EF. Strict I/Os. Daily weights. Resolved.     2. Acute Diastolic CHF-  symptoms are concerning for heart failure however normal BNP and CXR. Sob improving with IV diuresis. 2D Echo showing normal EF. Transitioned to PO Lasix 40. Resolved. 3. Macular Pruritic Rash of chest and Extremities- likely medication induced. Give one dose IV Solumedrol. Resolved.     4. Type 2 DM 2- insulin sliding scale. Glucose checks.  Diabetic Diet.     5. Essential HTN- resume Cozaar. Monitor BP daily . Exam:     Vitals:  Vitals:    03/27/19 1954 03/27/19 2323 03/28/19 0828 03/28/19 1221   BP: (!) 108/52 (!) 124/58 134/61 (!) 99/54   Pulse: 95 89 86 96   Resp: 18 18 16 18   Temp: 96.6 °F (35.9 °C) 97.4 °F (36.3 °C) 96.4 °F (35.8 °C) 95.2 °F (35.1 °C)   TempSrc: Oral Oral Oral Axillary   SpO2: 94% 94% 94% 96%   Weight:       Height:         Weight: Weight: 166 lb 6.4 oz (75.5 kg)     24 hour intake/output:    Intake/Output Summary (Last 24 hours) at 3/28/2019 1327  Last data filed at 3/28/2019 1029  Gross per 24 hour   Intake 1140 ml   Output 1800 ml   Net -660 ml         General appearance:  No apparent distress, appears stated age and cooperative. HEENT:  Normal cephalic, atraumatic without obvious deformity. Pupils equal, round, and reactive to light. Extra ocular muscles intact. Conjunctivae/corneas clear. Neck: Supple, with full range of motion. No jugular venous distention. Trachea midline. Respiratory:  Normal respiratory effort. Clear to auscultation, bilaterally without Rales/Wheezes/Rhonchi. Cardiovascular:  Regular rate and rhythm with normal S1/S2 without murmurs, rubs or gallops. Abdomen: Soft, non-tender, non-distended with normal bowel sounds. Musculoskeletal:  No clubbing, cyanosis or edema bilaterally. Full range of motion without deformity. Skin: Skin color, texture, turgor normal.  No rashes or lesions. Neurologic:  Neurovascularly intact without any focal sensory/motor deficits. Cranial nerves: II-XII intact, grossly non-focal.  Psychiatric:  Alert and oriented, thought content appropriate, normal insight  Capillary Refill: Brisk,< 3 seconds   Peripheral Pulses: +2 palpable, equal bilaterally       Labs:  For convenience and continuity at follow-up the following most recent labs are provided:      CBC:    Lab Results   Component Value Date    WBC 6.6 03/23/2019    HGB 11.4 03/23/2019    HCT 35.6 03/23/2019     03/23/2019       Renal: aspirin 81 MG EC tablet  1 Tab Oral DAILY              atorvastatin (LIPITOR) 20 MG tablet  20 mg 1 Tab Oral DAILY                DULoxetine (CYMBALTA) 60 MG extended release capsule  Take 2 capsules by mouth daily             furosemide (LASIX) 40 MG tablet  Take 1 tablet by mouth daily             glucose blood VI test strips (ONE TOUCH ULTRA TEST) strip  Test daily or AD. Dx. 250.00             losartan (COZAAR) 50 MG tablet  Take 50 mg by mouth daily. metFORMIN (GLUCOPHAGE-XR) 750 MG extended release tablet  Take 750 mg by mouth Daily with supper             metoprolol (LOPRESSOR) 25 MG tablet  Take 1 tablet by mouth 2 times daily             Multiple Vitamins-Minerals (SENIOR MULTIVITAMIN PLUS) TABS  Take 1 tablet by mouth daily             trihexyphenidyl (ARTANE) 2 MG tablet  Take 1 tablet by mouth 2 times daily For Tremors                 Time Spent on discharge is more than 20 minutes in the examination, evaluation, counseling and review of medications and discharge plan. Signed: Thank you Carina Poole MD for the opportunity to be involved in this patient's care.     Electronically signed by Smitha Grewal MD on 3/28/2019 at 1:27 PM

## 2019-03-29 NOTE — ED PROVIDER NOTES
[facility] COMPLAINT       Chief Complaint   Patient presents with    Fall       Nurses Notes reviewed and I agree except as noted in the HPI. HISTORY OF PRESENT ILLNESS    Sohail Anthony is a 80 y.o. female with a history of CHF who presents to the Emergency Department for evaluation following a fall. The patient was reportedly discharged from the hospital earlier today to the skilled nursing facility at [facility]. Per caregivers the patient got up because she heard an alarm and then fell. Per nursing facility the patient struck her head but did not loose consciousness. The patient does not remember the incident well but has underlying dementia and her daughter states that the patient appears to be at her baseline. The patient admits a headache. She denies any loss of consciousness, neck pain, chest pain, or any other signs or symptoms at this time. The patient has been treated with Lovenox in the past but per her daughter she has not had Lovenox for the past several days. Nursing home states that the patient was last treated with in the hospital at 0800 this morning. The HPI was provided by the patient's caregivers and her daughter at bedside. REVIEW OF SYSTEMS     Review of Systems   Unable to perform ROS: Dementia   Cardiovascular: Negative for chest pain. Musculoskeletal: Negative for neck pain. Neurological: Positive for headaches. Negative for syncope. PAST MEDICAL HISTORY    has a past medical history of Anxiety, Breast cancer ([address] Diverticulitis, DVT (deep venous thrombosis) ([address] Hyperlipidemia, Hypertension, Osteoarthritis, Pancreatic cancer ([address] Pulmonary embolism ([address] S/P knee replacement, and Type 2 diabetes mellitus ([address] SURGICAL HISTORY      has a past surgical history that includes Breast surgery; Colonoscopy; eye surgery; skin biopsy;  Cholecystectomy (4-15-16); and meniscus and no MCL laxity. No tenderness found. Thoracic back: She exhibits normal range of motion, no tenderness, no bony tenderness, no swelling, no edema, no deformity, no laceration, no pain and no spasm. Lumbar back: She exhibits normal range of motion, no tenderness, no bony tenderness, no swelling, no edema, no deformity, no laceration, no pain and no spasm. Neurological: She is alert and oriented to person, place, and time. She exhibits normal muscle tone. Coordination normal.   Skin: Skin is warm and dry. No rash noted. She is not diaphoretic. Nursing note and vitals reviewed. DIFFERENTIALDIAGNOSIS:   Including but not limited to: closed head injury, intracranial bleeding, skull fracture, rib fracture, hip fracture, pelvis fracture, contusion    DIAGNOSTIC RESULTS     EKG: All EKG's are interpreted by the Emergency Department Physician who either signs or Co-signs this chart in the absence of a cardiologist.  EKG interpreted by Andi De Santiago MD:    Vent. Rate: 100 bpm  KS interval: 144 ms  QRS duration: 92 ms  QTc: 428 ms  P-R-T axes: 58, 34, 49  Normal sinus rhythm  Possible left atrial enlargement  Cannot rule out anterior infarct, age undetermined  Compared to old EKG on 03/22/2019    RADIOLOGY: non-plain film images(s) such as CT, Ultrasound and MRI are read by the radiologist.    CT HEAD WO CONTRAST   Final Result   1. Posterior scalp soft tissue changes correlate with local trauma. 2. Atrophy related changes of the brain. Negative study for acute pathology. **This report has been created using voice recognition software. It may contain minor errors which are inherent in voice recognition technology. **      Final report electronically signed by Dr. Timoteo Hicks on 3/28/2019 11:11 PM      CT Cervical Spine WO Contrast   Final Result   1. Negative exam for acute cervical fracture. 2. Degenerative changes discussed above at individual levels. 3 atherosclerotic changes. **This report has been created using voice recognition software. It may contain minor errors which are inherent in voice recognition technology. **      Final report electronically signed by Dr. Michael Murphy on 3/28/2019 11:16 PM      XR CHEST PORTABLE   Final Result   1. Stable chest findings when compared to prior study. . No other active pathology of the chest suspected. **This report has been created using voice recognition software. It may contain minor errors which are inherent in voice recognition technology. **      Final report electronically signed by Dr. Michael Murphy on 3/28/2019 10:24 PM      XR PELVIS (1-2 VIEWS)   Final Result   1. Stable postsurgical changes of right hip arthroplasty. 2. Degenerative skeletal spondylosis and arthropathy visualized similar to prior study      **This report has been created using voice recognition software. It may contain minor errors which are inherent in voice recognition technology. **      Final report electronically signed by Dr. Michael Murphy on 3/28/2019 10:23 PM          LABS:   Results for orders placed or performed during the hospital encounter of 03/28/19   CBC Auto Differential   Result Value Ref Range    WBC 10.9 (H) 4.8 - 10.8 thou/mm3    RBC 4.17 (L) 4.20 - 5.40 mill/mm3    Hemoglobin 13.0 12.0 - 16.0 gm/dl    Hematocrit 40.7 37.0 - 47.0 %    MCV 97.6 81.0 - 99.0 fL    MCH 31.2 26.0 - 33.0 pg    MCHC 31.9 (L) 32.2 - 35.5 gm/dl    RDW-CV 14.3 11.5 - 14.5 %    RDW-SD 51.0 (H) 35.0 - 45.0 fL    Platelets 882 516 - 533 thou/mm3    MPV 8.7 (L) 9.4 - 12.4 fL    Seg Neutrophils 51.8 %    Lymphocytes 34.4 %    Monocytes 11.4 %    Eosinophils 1.6 %    Basophils 0.3 %    Immature Granulocytes 0.5 %    Segs Absolute 5.6 1.8 - 7.7 thou/mm3    Lymphocytes # 3.7 1.0 - 4.8 thou/mm3    Monocytes # 1.2 0.4 - 1.3 thou/mm3    Eosinophils # 0.2 0.0 - 0.4 thou/mm3    Basophils # 0.0 0.0 - 0.1 thou/mm3    Immature Grans (Abs) 0.06 0.00 - 0.07 thou/mm3 nRBC 0 /100 wbc   Comprehensive Metabolic Panel   Result Value Ref Range    Glucose 139 (H) 70 - 108 mg/dL    CREATININE 1.2 0.4 - 1.2 mg/dL    BUN 46 (H) 7 - 22 mg/dL    Sodium 136 135 - 145 meq/L    Potassium 4.5 3.5 - 5.2 meq/L    Chloride 95 (L) 98 - 111 meq/L    CO2 24 23 - 33 meq/L    Calcium 9.1 8.5 - 10.5 mg/dL    AST 17 5 - 40 U/L    Alkaline Phosphatase 71 38 - 126 U/L    Total Protein 7.2 6.1 - 8.0 g/dL    Alb 3.6 3.5 - 5.1 g/dL    Total Bilirubin 0.2 (L) 0.3 - 1.2 mg/dL    ALT 21 11 - 66 U/L   APTT   Result Value Ref Range    aPTT 25.6 22.0 - 38.0 seconds   Protime-INR   Result Value Ref Range    INR 0.94 0.85 - 1.13   Anion Gap   Result Value Ref Range    Anion Gap 17.0 (H) 8.0 - 16.0 meq/L   Glomerular Filtration Rate, Estimated   Result Value Ref Range    Est, Glom Filt Rate 43 (A) ml/min/1.73m2   Osmolality   Result Value Ref Range    Osmolality Calc 286.1 275.0 - 300 mOsmol/kg        EMERGENCY DEPARTMENT COURSE:   Vitals:    Vitals:    03/28/19 2152 03/28/19 2211 03/28/19 2238 03/28/19 2347   BP: (!) 126/59 (!) 111/45 118/62 (!) 110/47   Pulse: 105 102 97 101   Resp: 18 16 21 22   Temp: 98 °F (36.7 °C)      TempSrc: Oral      SpO2: 96% 94% 95% 95%   Weight: 166 lb (75.3 kg)      Height: 5' 5\" (1.651 m)          10:03 PM: The patient was seen and evaluated. MDM:     Given that the patient's age and with Lovenox this morning, patient arrived at  level II trauma. Primary survey is unremarkable. Portable chest and powder x-ray did not show acute osseous abnormality. No pneumothorax. ED FAST is not indicated based on injury mechanism, the patient does not have any chest or abdominal pain. Secondary survey: see above physical exam.     CTs and cervical spine have no acute findings. Labs are reassuring. Scalp laceration is repaired by me by using staples. Her tetanus is up-to-date per family and patient. Discharged back to SNF. Staples to be removed in five days.        CRITICAL CARE:   None CONSULTS:  Wade MORFIN and Dr. Digna Diaz:  Laceration Repair Procedure Note    Indication: Laceration    Procedure: The patient was placed in the appropriate position and anesthesia around the laceration was not performed at the patient's request. The area was then cleansed with Shur-Clens and draped in a sterile fashion. The laceration was closed with staples. There were no additional lacerations requiring repair. The wound area was then dressed with a sterile dressing. Total repaired wound length: 2 cm. Other Items: None    The patient tolerated the procedure well. Complications: None         FINAL IMPRESSION      1. Fall from ground level    2. Laceration of scalp, initial encounter    3. Hematoma of scalp, initial encounter    4. Contusion of right hip, initial encounter          DISPOSITION/PLAN   Back to [facility]    PATIENT REFERRED TO:  Joseph Hendrickson MD  [address] [place] [address]  [phone]    In 5 days        DISCHARGE MEDICATIONS:  New Prescriptions    No medications on file       (Please note that portions of this note were completed with a voice recognition program.  Efforts weremade to edit the dictations but occasionally words are mis-transcribed.)    Scribe:  Mary Beth Flood 3/28/19 10:03 PM Scribing for and in thepresence of Marcos Hernandes MD.    Signed by: Maikel Carbajal, 03/28/19 11:56 PM    Provider:  I personally performed the services described in the documentation, reviewed and edited the documentation which was dictated to the scribe in my presence, and it accurately records my words andactions.     Marcos Hernandes MD 3/28/19 11:56 PM             Marcos Hernandes MD  03/28/19 8662

## 2019-03-29 NOTE — ED NOTES
Pt. Discharged from [facility] today and admitted to [facility]. Per nursing home, pt. Got up because she heard an alarm and fell. Pt. Jonathan Ridges and hit her head but did not loose consciousness. Pt. Has a hematoma and small laceration on the back of her head. Pt. Is alert and oriented to person, place but not time. Miguel Angel Garza trauma PA and Dr. Feliciano Joseph at bedside for evaluation.      Octavio Bowden RN  03/28/19 5977

## 2019-03-29 NOTE — H&P
Trauma H&P   Dr. Corrine Handy    Patient:  Efraín Basurto  Admit date: 3/28/2019   YOB: 1935 Date of Evaluation: 3/28/2019  MRN: [id number]  Acct: [de-identified]    Injury Date: 03/28/19  Injury time: ~21:00  PCP: Romain Brenner MD   Referring physician: Cayetano Peacock MD, Phd    Time of Trauma Surgeon Notification:  21:48 March 28, 2019  Time of JORDIN Arrival: 21:55 March 28, 2019  Time of Trauma Surgeon Arrival: 23:25 March 28, 2019    Assessment:    Fall, initial encounter  Scalp laceration  Plan:      CT imaging of the head and cervical spine were obtained and found to be negative for any acute processes. Plain films of the chest and pelvis were also obtained, also negative for any acute processes. Scalp laceration was closed with staple application with hemostasis achieved. Patient remained hemodynamically stable throughout and can be discharged per ED provider.     Activation: []Level I (Trauma Alert) [x]Level II (Injury Call) []Level III (Trauma Consult)  Mode of Arrival: EMS transportation  Referring Facility:   Loss of Consciousness [x]No []Yes[]Unknown  Duration(min)  Mechanism of Injury:  []Motor Vehicle crash   []Single Vehicle [] []Passenger []Scene Fatality []Front Seat  []Restrained   []Air Bag Deployed   []Ejected []Rollover []Pedestrian []Trapped   Type of vehicle:   Protective Devices:   []Motorcycle  Wearing Helmet []Yes []No  []Bicycle  Wearing Helmet []Yes []No  [x]Fall   Distance -Ground level, from standing    []Assault    Abuse Reported []Yes []No  []Gunshot  []Stabbing  []Work Related  []Burn: []Flame []Scald []Electrical []Chemical []Contact []Inhalation []House Fire  []Other:   Patient Active Problem List   Diagnosis    Diabetes type 2, uncontrolled    Type 2 diabetes mellitus without complication (HCC)    Acute cholecystitis    Lactic acidosis    Type 2 diabetes mellitus with hyperosmolarity without coma, without long-term current use of insulin ([address]    Hypertension    Choledocholithiasis    Essential hypertension    Mirizzi's syndrome    Encounter for fitting or adjustment of hearing aid    FATOUMATA (generalized anxiety disorder)    Transient cerebral ischemia    Left hip pain    Aphasia    TIA (transient ischemic attack)    Type 2 diabetes mellitus (HCC)    Major depression, recurrent, chronic (HCC)    CHF (congestive heart failure), NYHA class II, unspecified failure chronicity, combined (HCC)    Dermatitis    SOB (shortness of breath)     Subjective   Chief Complaint: No complaints offered    History of Present Illness:      80year-old femalepresents to the emergency department as a level II trauma activation for evaluation status post mechanical fall. Was just discharged to [facility] today after admission for increased shortness of breath/CHF. Patient currently on Lovenox for DVT prophylaxis, with last dose this morning. Per patient she states that she heard the alarm and attempted to get up when she incidentally lost her balance and fell backwards striking the right posterior head. Patient denies loss of consciousness. On exam she is alert and oriented, speaking fluently, with no focal neurological deficits appreciated. She is able to move all extremities and denies any complaints on exam.  There is a small laceration and associated hematoma of the right occiput. Minimal amount of bleeding noted. Portable chest x-ray and pelvis films were obtained. Negative for any acute processes. CT imaging of the head and cervical spine was also obtained, noted to be negative for any acute finding. Patient remained hemodynamically stable throughout ED course without need for any pain medication. She'll be discharged back to [facility] per ED provider and was advised to withhold Lovenox administration for 1 day. Review of Systems:   Review of Systems   Constitutional: Negative for diaphoresis and fatigue.    HENT: Negative for congestion, dental problem, nosebleeds and sinus pain. Eyes: Negative for visual disturbance. Respiratory: Negative for cough, chest tightness, shortness of breath and wheezing. Cardiovascular: Negative for chest pain, palpitations and leg swelling. Gastrointestinal: Negative for abdominal pain, nausea and vomiting. Genitourinary: Negative for dysuria, flank pain and pelvic pain. Musculoskeletal: Negative for arthralgias, gait problem, myalgias, neck pain and neck stiffness. Skin: Positive for wound (Scalp laceration, right occiput). Negative for color change. Neurological: Negative for dizziness, syncope, weakness, light-headedness, numbness and headaches. Psychiatric/Behavioral: Negative for confusion and decreased concentration. The patient is not nervous/anxious.       P[place]illins  Past Surgical History:   Procedure Laterality Date    BREAST SURGERY      right lumpectomy    CHOLECYSTECTOMY  4-15-16    cholangiogram    COLONOSCOPY      EYE SURGERY      macular hole left eye    JOINT REPLACEMENT      left knee & right hip    SKIN BIOPSY      right arm     Past Medical History:   Diagnosis Date    Anxiety     nervous breakdown 9/2016    Breast cancer ([address]     Diverticulitis     DVT (deep venous thrombosis) (HCC)     Hyperlipidemia     Hypertension     Osteoarthritis     Pancreatic cancer ([address]     Family    Pulmonary embolism ([address]     S/P knee replacement     Type 2 diabetes mellitus ([address] 2009     Past Surgical History:   Procedure Laterality Date    BREAST SURGERY      right lumpectomy    CHOLECYSTECTOMY  4-15-16    cholangiogram    COLONOSCOPY      EYE SURGERY      macular hole left eye    JOINT REPLACEMENT      left knee & right hip    SKIN BIOPSY      right arm     Social History     Socioeconomic History    Marital status:      Spouse name: Yuri Dumont Number of children: 5    Years of education: 12    Highest education level: Not on file   Occupational History    Occupation: retired   Social Needs    Financial resource strain: Not on file    Food insecurity:     Worry: Not on file     Inability: Not on file   Meet[employer]oi needs:     Medical: Not on file     Non-medical: Not on file   Tobacco Use    Smoking status: Never Smoker    Smokeless tobacco: Never Used   Substance and Sexual Activity    Alcohol use: No    Drug use: No    Sexual activity: Not Currently   Lifestyle    Physical activity:     Days per week: Not on file     Minutes per session: Not on file    Stress: Not on file   Relationships    Social connections:     Talks on phone: Not on file     Gets together: Not on file     Attends Spiritism service: Not on file     Active member of club or organization: Not on file     Attends meetings of clubs or organizations: Not on file     Relationship status: Not on file    Intimate partner violence:     Fear of current or ex partner: Not on file     Emotionally abused: Not on file     Physically abused: Not on file     Forced sexual activity: Not on file   Other Topics Concern    Not on file   Social History Narrative    Not on file     Family History   Problem Relation Age of Onset    Cancer Mother         pancreatic    Heart Disease Father 80    Other Sister         infant death    High Cholesterol Brother        Home medications:    Previous Medications    ACETAMINOPHEN (TYLENOL) 325 MG TABLET    Take 650 mg by mouth every 6 hours as needed for Pain    ASPIRIN 81 MG EC TABLET    1 Tab Oral DAILY     ATORVASTATIN (LIPITOR) 20 MG TABLET    20 mg 1 Tab Oral DAILY       DULOXETINE (CYMBALTA) 60 MG EXTENDED RELEASE CAPSULE    Take 2 capsules by mouth daily    FUROSEMIDE (LASIX) 40 MG TABLET    Take 1 tablet by mouth daily    GLUCOSE BLOOD VI TEST STRIPS (ONE TOUCH ULTRA TEST) STRIP    Test daily or AD. Dx. 250.00    LOSARTAN (COZAAR) 50 MG TABLET    Take 50 mg by mouth daily.     METFORMIN (GLUCOPHAGE-XR) 750 MG EXTENDED RELEASE TABLET Take 750 mg by mouth Daily with supper    METOPROLOL (LOPRESSOR) 25 MG TABLET    Take 1 tablet by mouth 2 times daily    MULTIPLE VITAMINS-MINERALS (SENIOR MULTIVITAMIN PLUS) TABS    Take 1 tablet by mouth daily    TRIHEXYPHENIDYL (ARTANE) 2 MG TABLET    Take 1 tablet by mouth 2 times daily For Tremors       Hospital medications:  Scheduled Meds:  Continuous Infusions:  PRN Meds:  Objective   ED TRIAGE VITALS  BP: (!) 126/59, Temp: 98 °F (36.7 °C), Pulse: 105, Resp: 18, SpO2: 96 %  [place] Coma Scale  Eye Opening: Spontaneous  Best Verbal Response: Oriented  Best Motor Response: Obeys commands  [place] Coma Scale Score: 15  No results found for this visit on 03/28/19. Physical Exam:  Patient Vitals for the past 24 hrs:   BP Temp Temp src Pulse Resp SpO2 Height Weight   03/28/19 2152 (!) 126/59 98 °F (36.7 °C) Oral 105 18 96 % 5' 5\" (1.651 m) 166 lb (75.3 kg)   03/28/19 2152 -- 98 °F (36.7 °C) -- -- -- -- -- --     Primary Assessment:  Airway: Patent, trachea midline  Breathing: Breath sounds present and equal bilaterally, spontaneous, and unlabored  Circulation: Hemodynamically stable, 2+ central and peripheral pulses. Disability: TELLO x 4, following commands. GCS =15    Secondary Assessment:  General: Alert, NAD. Head: Small linear laceration and associated hematoma of the right occiput, mid face stable, Tympanic membranes intact, Nares patent bilaterally, no epistaxis. Mouth clear of foreign bodies, no lacerations or abrasions. Eyes: PERRLA, EOMI, Nontraumatic  Neurologic: A & O x3. Following commands. CN 2-12 intact  Neck: Immobilized in cervical collar, trachea midline. Cervical spines NTTP midline, without step-offs, crepitus or deformity. Back:TL spines are NTTP midline, without step-offs, crepitus or deformity. No abrasions, contusions, or ecchymosis noted. Lungs: Clear to auscultation bilaterally. Chest Wall: Chest rise symmetrical.  Chest wall without tenderness to palpation.   No crepitus, deformities, lacerations, or abrasions. Heart: RRR. Normal S1/S2. No obvious M/G/R. Abdomen:  Soft, NTTP. No guarding. Non-peritoneal.  Pelvis:  NTTP, stable to compression. Femoral pulses 2+. GI/: No blood at the urinary meatus. No gross hematuria. Extremities: No gross deformities. PMS intact. Radial /DP/PT pulses 2+ bilaterally. Skin: Skin warm and dry. Normal for ethnicity. Radiology:     CT HEAD WO CONTRAST   Final Result   1. Posterior scalp soft tissue changes correlate with local trauma. 2. Atrophy related changes of the brain. Negative study for acute pathology. **This report has been created using voice recognition software. It may contain minor errors which are inherent in voice recognition technology. **      Final report electronically signed by Dr. Regan Shepard on 3/28/2019 11:11 PM      CT Cervical Spine WO Contrast   Final Result   1. Negative exam for acute cervical fracture. 2. Degenerative changes discussed above at individual levels. 3 atherosclerotic changes. **This report has been created using voice recognition software. It may contain minor errors which are inherent in voice recognition technology. **      Final report electronically signed by Dr. Regan Shepard on 3/28/2019 11:16 PM      XR CHEST PORTABLE   Final Result   1. Stable chest findings when compared to prior study. . No other active pathology of the chest suspected. **This report has been created using voice recognition software. It may contain minor errors which are inherent in voice recognition technology. **      Final report electronically signed by Dr. Regan Shepard on 3/28/2019 10:24 PM      XR PELVIS (1-2 VIEWS)   Final Result   1. Stable postsurgical changes of right hip arthroplasty. 2. Degenerative skeletal spondylosis and arthropathy visualized similar to prior study      **This report has been created using voice recognition software.  It may contain minor errors which are inherent in voice recognition technology. **      Final report electronically signed by Dr. Mali Palmer on 3/28/2019 10:23 PM        Fast Exam: No      Electronically signed by Delmus Closs, PA-C on 3/28/2019 at 10:02 PM  Patient seen and evaluated independently in the emergency department. All data and imaging reviewed. Patient just discharged today from Hospital to [facility]. On Plavix fell small posterior scalp laceration. No intracranial injury or skull fracture. She is alert and oriented and stable for discharge. We'll recommend holding Plavix over the next 24 hours or suspect she can quickly resume it as there is no expanding hematoma. Care coordinated directly with jonathan Crockett PA-C. Agree with examination assessment and plan as documented above.

## 2019-03-30 PROCEDURE — 93010 ELECTROCARDIOGRAM REPORT: CPT | Performed by: INTERNAL MEDICINE

## 2019-04-02 ENCOUNTER — TELEPHONE (OUTPATIENT)
Dept: CARDIOLOGY CLINIC | Age: 84
End: 2019-04-02

## 2019-04-02 NOTE — TELEPHONE ENCOUNTER
Patient discharged from hospital needs CHF clinic appointment   Called to [facility] [place] spoke with nurse Ana Quinones she will check and see if patient already has established cardiologist or if cardiology consult is needed

## 2019-04-03 NOTE — TELEPHONE ENCOUNTER
Spoke with daughter Benjie Cherrynatalia  She talked to nursing home yesterday and would like patient to see Dr. Oleg Ying.   Appointment scheduled with Dr. Oleg Ying 4-15  Appointment in CHF 4/29  Nurse Dalila Delaney notified and daughter Benjie Moses notified also

## 2019-04-10 ENCOUNTER — OFFICE VISIT (OUTPATIENT)
Dept: NEUROLOGY | Age: 84
End: 2019-04-10
Payer: MEDICARE

## 2019-04-10 VITALS
HEART RATE: 92 BPM | HEIGHT: 63 IN | DIASTOLIC BLOOD PRESSURE: 60 MMHG | BODY MASS INDEX: 30.94 KG/M2 | WEIGHT: 174.6 LBS | SYSTOLIC BLOOD PRESSURE: 124 MMHG

## 2019-04-10 DIAGNOSIS — R41.3 MEMORY PROBLEM: Primary | ICD-10-CM

## 2019-04-10 PROCEDURE — 99213 OFFICE O/P EST LOW 20 MIN: CPT | Performed by: NURSE PRACTITIONER

## 2019-04-10 RX ORDER — ALPRAZOLAM 0.25 MG/1
0.25 TABLET ORAL 2 TIMES DAILY PRN
COMMUNITY
End: 2019-06-10 | Stop reason: ALTCHOICE

## 2019-04-10 NOTE — PROGRESS NOTES
the periphery of the cerebellum bilaterally. There is a 6 mm focus of abnormal signal in the medial aspect of the right cerebellum. This represents volume averaging with the CSF of the fourth   ventricle based on sagittal and coronal images. There are no intra-or extra-axial collections. There is no hydrocephalus, midline shift or mass effect. There is mineralization in the basal ganglia bilaterally. No other areas of susceptibility artifact are present. The major intracranial vascular flow voids are present. The midline craniocervical junction structures are normal.  The pituitary gland and brainstem are normal.             Impression    1. No evidence of an acute process. 2. 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 3. Mild severity chronic small vessel ischemic changes. **This report has been created using voice recognition software. It may contain minor errors which are inherent in voice recognition technology. **    Final report electronically signed by Dr. Calista Zhang on 1/3/2017 9:38 AM         Results for orders placed during the hospital encounter of 03/28/19   CT HEAD WO CONTRAST    Narrative PROCEDURE: CT HEAD WO CONTRAST    CLINICAL INFORMATION: fall. COMPARISON: January 2, 2017    TECHNIQUE: Noncontrast 5 mm axial images were obtained through the brain. All CT scans at this facility use dose modulation, iterative reconstruction, and/or weight-based dosing when appropriate to reduce radiation dose to as low as reasonably achievable. FINDINGS:    There are skin staples in the posterior parietal and occipital region with mild scalp soft tissue swelling. There are no fracture changes. There is no hemorrhage, midline shift, mass effect, extra-axial fluid, hydrocephalus or acute large vessel infarct   change. Moderate cerebral atrophy is stable. The paranasal sinuses are clear      Impression 1. Posterior scalp soft tissue changes correlate with local trauma.   2. Atrophy related changes of the brain. Negative study for acute pathology. **This report has been created using voice recognition software. It may contain minor errors which are inherent in voice recognition technology. **    Final report electronically signed by Dr. Marysol Cabezas on 3/28/2019 11:11 PM          Assessment:     Diagnosis Orders   1. Memory problem          She is here for memory trouble. Her memory trouble is the same since last evaluation. She can have good days and bad days. She was recently hospitalized with congestive heart failure. She also had a fall on 3/28/19. CT head done showed posterior scalp soft tissue changes correlate with local trauma. Atrophy related changes of the brain. Negative study for acute pathology. Daughter reports patient can have visual hallucination at night where she can see her  and son who are both . She was recently treated for UTI, but per her daughter she has been having the hallucination for the past 1 year. She recently had her urine checked and was told that there was no infection. She follows with psychiatry for depression and anxiety. On exam her calculation is intact, she has good abstraction of thought, she is able to follow 2 step commands. I shared with the patient that her vitamin B12 was low she is taking vitamin B12 sublingual supplements daily. She did not have her vitamin D level done. After detailed discussion with patient we agreed on the following plan. Plan:  1. Vitamin D level  2. Follow up with psychiatry for  medication review. 3. Continue taking vitamin B12 sublingual supplements at least 3000 mcg daily  4. Consider neuropsychology evaluation as next step  5. Follow up in 2-3 months  6. Call with any new symptoms or concerns. Please call if any questions.      Kenia Dumont CNP

## 2019-04-12 ENCOUNTER — OFFICE VISIT (OUTPATIENT)
Dept: PSYCHIATRY | Age: 84
End: 2019-04-12
Payer: MEDICARE

## 2019-04-12 VITALS
BODY MASS INDEX: 30.12 KG/M2 | WEIGHT: 170 LBS | DIASTOLIC BLOOD PRESSURE: 66 MMHG | HEART RATE: 106 BPM | SYSTOLIC BLOOD PRESSURE: 114 MMHG

## 2019-04-12 DIAGNOSIS — F41.1 GAD (GENERALIZED ANXIETY DISORDER): Primary | ICD-10-CM

## 2019-04-12 DIAGNOSIS — F33.1 MODERATE EPISODE OF RECURRENT MAJOR DEPRESSIVE DISORDER (HCC): ICD-10-CM

## 2019-04-12 PROCEDURE — 99215 OFFICE O/P EST HI 40 MIN: CPT | Performed by: NURSE PRACTITIONER

## 2019-04-12 NOTE — PROGRESS NOTES
[address] [facility] [address] [place]  Dept: [phone]  Dept Fax: [id number]: [phone]      Visit Date: 2019      Pertinent History & Psychiatric Examination    Nicol Tee is a 80 y.o.  female returns today after almost 4 months since her last visit for follow up and medication management. Chief Complaint   Patient presents with    Depression     MDD, moderate, Rule out mild cognitive disorder    Anxiety     FATOUMATA       She reports with improvement in her mood until a couple of weeks ago, when she was moved from a skilled unit at a NH to an unskilled unit. She was at [email or web address] but was recently moved to [facility] unit and she does not like that. She is very upset and says she feels sad about that. She came in with her daughter, who indicated that Renee Stevenson had symptoms of CAD  (swollen legs) and had to be admitted at [facility] for 1 week. She was discharged to the skilled unit of [facility] for PT and OT and after 4 hours of being brought back from the hospital, fell and hit her head. She cracked her skull and had 2 stitches. Patient reports that her legs gave up on her leading to the fall. She was taken to the ED again, where she was assessed and released after several hours. She was returned to the skilled unit for 1 week but then noted to be wandering off/exit seeking from  the unit and had to be moved to a more restricted unit--locked down memory unit. Patient has a wrist safety band on. Patient says she is \"pissed\" for being sent to that unit, more so for not told that she was to be moved there. Her daughter reported that she has gotten more confused lately with hallucinations and intermittent confusion, prompting the move. She says that her confusion has escalated in the past 2 weeks.  Patient says and her daughter confirms that she hears and sees her  son and . She had cooked breakfast for them in the past. She says she misses them and knows that they are gone but then thinks about them. Was  for 63 years. I spoke to her daughter who is an RN, over the phone who also reported that her mom has been having irrational thoughts with flights of ideas along with hallucinations. She stated that she has had multiple  TIA's in the past and may have had a recent one. Has had \"slurred speech, gabbled sometimes with mouth drop\". She has been making \"off the wall comments with off the wall thoughts\" and sees dead people. Sometimes, her conversation does not make sense and that \"words don't belong\". Daughter says there is \"some misfiring going on\". Patient denies any thought of harm but says she wants to be out of the unit where she is currently because it makes her sad and she is restricted. She states that she is sleeping and eating okay. She knew the date and the year but mixed with the month. Thought it is August-July. Knows the  president and where she lives--[facility] NH. Knows the names of her children but a little mixed in their 's. Was referred to neurology--daughter reports that they were told that she has \"concentration problems\" and needed to be seen by a neuropsychologist for a more comprehensive neurocognitive assessment--to rule out early dementia. Patient referred today to a psychologist in [facility]. Not much help in [place] because of her insurance. Patient came in on a wheel chair.        Past Surgical History:   Procedure Laterality Date    BREAST SURGERY      right lumpectomy    CHOLECYSTECTOMY  4-15-16    cholangiogram    COLONOSCOPY      EYE SURGERY      macular hole left eye    JOINT REPLACEMENT      left knee & right hip    SKIN BIOPSY      right arm     Family History   Problem Relation Age of Onset    Cancer Mother         pancreatic    Heart Disease Father 80    Other Sister         infant death   [facility] High Cholesterol Brother Social History     Tobacco Use    Smoking status: Never Smoker    Smokeless tobacco: Never Used   Substance Use Topics    Alcohol use: No     Current Outpatient Medications   Medication Sig Dispense Refill    ALPRAZolam (XANAX) 0.25 MG tablet Take 0.25 mg by mouth 2 times daily as needed for Sleep.  furosemide (LASIX) 40 MG tablet Take 1 tablet by mouth daily 60 tablet 3    DULoxetine (CYMBALTA) 60 MG extended release capsule Take 2 capsules by mouth daily 60 capsule 0    trihexyphenidyl (ARTANE) 2 MG tablet Take 1 tablet by mouth 2 times daily For Tremors 60 tablet 2    Multiple Vitamins-Minerals (SENIOR MULTIVITAMIN PLUS) TABS Take 1 tablet by mouth daily      metFORMIN (GLUCOPHAGE-XR) 750 MG extended release tablet Take 750 mg by mouth Daily with supper      glucose blood VI test strips (ONE TOUCH ULTRA TEST) strip Test daily or AD. Dx. 250.00 50 each 11    acetaminophen (TYLENOL) 325 MG tablet Take 650 mg by mouth every 6 hours as needed for Pain      metoprolol (LOPRESSOR) 25 MG tablet Take 1 tablet by mouth 2 times daily 60 tablet 3    losartan (COZAAR) 50 MG tablet Take 50 mg by mouth daily.  aspirin 81 MG EC tablet 1 Tab Oral DAILY       enoxaparin (LOVENOX) 40 MG/0.4ML injection Inject 40 mg into the skin daily      atorvastatin (LIPITOR) 20 MG tablet 20 mg 1 Tab Oral DAILY          No current facility-administered medications for this visit. Allergies   Allergen Reactions    Penicillins Swelling and Rash       Mental Status Evaluation:  Orientation: alertness: alert, orientation: date, person, place, city, president   Mood:. decreased range, irritable and sad      Affect:  flat and labile      Appearance:  age appropriate, casually dressed and overweight   Activity:  Psychomotor Agitation   Gait/Posture:  On a wheel chair   Speech:  soft and minimal   Thought Process:  circumstantial   Thought Content:  denies   Sensorium:  person, place, situation and year   Cognition:

## 2019-04-15 ENCOUNTER — OFFICE VISIT (OUTPATIENT)
Dept: CARDIOLOGY CLINIC | Age: 84
End: 2019-04-15
Payer: MEDICARE

## 2019-04-15 VITALS — HEART RATE: 110 BPM | SYSTOLIC BLOOD PRESSURE: 128 MMHG | DIASTOLIC BLOOD PRESSURE: 64 MMHG

## 2019-04-15 DIAGNOSIS — I73.9 PVD (PERIPHERAL VASCULAR DISEASE) (HCC): ICD-10-CM

## 2019-04-15 DIAGNOSIS — R06.09 DYSPNEA ON EXERTION: ICD-10-CM

## 2019-04-15 DIAGNOSIS — R00.0 TACHYCARDIA: Primary | ICD-10-CM

## 2019-04-15 DIAGNOSIS — I50.32 CHRONIC DIASTOLIC CONGESTIVE HEART FAILURE (HCC): ICD-10-CM

## 2019-04-15 DIAGNOSIS — I42.8 VALVULAR CARDIOMYOPATHY (HCC): ICD-10-CM

## 2019-04-15 PROCEDURE — 93000 ELECTROCARDIOGRAM COMPLETE: CPT | Performed by: NUCLEAR MEDICINE

## 2019-04-15 PROCEDURE — 99204 OFFICE O/P NEW MOD 45 MIN: CPT | Performed by: NUCLEAR MEDICINE

## 2019-04-15 ASSESSMENT — ENCOUNTER SYMPTOMS
RECTAL PAIN: 0
COLOR CHANGE: 0
VOMITING: 0
DIARRHEA: 0
NAUSEA: 0
BACK PAIN: 1
CHEST TIGHTNESS: 1
BLOOD IN STOOL: 0
CONSTIPATION: 0
ABDOMINAL DISTENTION: 0
PHOTOPHOBIA: 0
ANAL BLEEDING: 0
SHORTNESS OF BREATH: 1
ABDOMINAL PAIN: 0

## 2019-04-28 NOTE — PROGRESS NOTES
Heart Failure Clinic       Visit Date: 4/29/2019  Cardiologist:   [facility]  Primary Care Physician: Dr. Janeen Gruber MD    Nicol Tee is a 80 y.o. female who presents today for:  Chief Complaint   Patient presents with    Congestive Heart Failure     new       HPI:   Nicol Tee is a 80 y.o. female who presents to the office for a new patient visit in the heart failure clinic. Lives at THE [facility] AT [place]. Accompanied by dtr  HX: Diastolic (EF 77-20%), Mitral stenosis, HTN, HLD, DM, PVD    Last hospital admission r/t Heart Failure:  3/22-3/28  = went in c/o leg swelling and SOB. Weight gain 23# over past few months. , CXR negative for congestion. Lasix IV w/ improvement. Celsa Hernandez saw in office 4/15 - ? Underlying CAD, ordered Stress - DONE today. Concerns today: None, feeling good. Dtr does note Mervat Mcknight stopped Lasix last week. Pt denies any increased SOB, swelling, bloating, PND/orthopnea. Activity: Ambulates w/ walker, denies SOB d/t going slow. Diet: Per NH - Low sodium diet    Patient has:  Chest Pain: No  Worsening SOB: No   Difficulty sleeping: No  Orthopnea/PND: No  Edema: Yes - significant improved. Fatigue: same - naps once  Abdominal bloating: No  Appetite: Good  Cough:  Yes   Any extra diuretic use: No - lasix stopped on Friday by Mervat Mcknight  Weight gain: No  Compliant checking home weight: Yes  Compliant checking blood pressure: Yes - not sure  Any refills on CHF medications needed at this time: No    Past Medical History:   Diagnosis Date    Anxiety     nervous breakdown 9/2016    [facility]     Diverticulitis     DVT (deep venous thrombosis) (HCC)     Hyperlipidemia     Hypertension     Osteoarthritis     Pancreatic cancer ([facility]     Family    Pulmonary embolism ([facility]     S/P knee replacement     Type 2 diabetes mellitus ([facility] 2009     Past Surgical History:   Procedure Laterality Date    BREAST SURGERY      right lumpectomy    CHOLECYSTECTOMY  4-15-16    cholangiogram    COLONOSCOPY      EYE SURGERY      macular hole left eye    JOINT REPLACEMENT      left knee & right hip    SKIN BIOPSY      right arm     Family History   Problem Relation Age of Onset    Cancer Mother         pancreatic    Heart Disease Father 80    Other Sister         infant death    High Cholesterol Brother      Social History     Tobacco Use    Smoking status: Never Smoker    Smokeless tobacco: Never Used   Substance Use Topics    Alcohol use: No     Current Outpatient Medications   Medication Sig Dispense Refill    DULoxetine (CYMBALTA) 60 MG extended release capsule Take 2 capsules by mouth daily 60 capsule 0    Multiple Vitamins-Minerals (SENIOR MULTIVITAMIN PLUS) TABS Take 1 tablet by mouth daily      metFORMIN (GLUCOPHAGE-XR) 750 MG extended release tablet Take 750 mg by mouth Daily with supper      glucose blood VI test strips (ONE TOUCH ULTRA TEST) strip Test daily or AD. Dx. 250.00 50 each 11    acetaminophen (TYLENOL) 325 MG tablet Take 650 mg by mouth every 6 hours as needed for Pain      metoprolol (LOPRESSOR) 25 MG tablet Take 1 tablet by mouth 2 times daily 60 tablet 3    aspirin 81 MG EC tablet 1 Tab Oral DAILY       ALPRAZolam (XANAX) 0.25 MG tablet Take 0.25 mg by mouth 2 times daily as needed for Sleep. No current facility-administered medications for this visit. Allergies   Allergen Reactions    Penicillins Swelling and Rash       SUBJECTIVE:   Review of Systems   Constitutional: Negative for activity change, appetite change and fatigue. Respiratory: Positive for shortness of breath (w/ extensive activity). Negative for cough. Cardiovascular: Positive for leg swelling (improved). Negative for chest pain and palpitations. Gastrointestinal: Negative for abdominal distention. Neurological: Negative for weakness, light-headedness and headaches. Hematological: Negative for adenopathy.    Psychiatric/Behavioral: Concerned for recurrence w/ no diuretic - pt and dtr voice will keep close eye and call if weight changes. NH orders sent - NH to call w/ weight increase, call next Monday w/ BP/HR readings and weights. · Daily weights  · Fluid restriction of 2 Liters per day  · Limit sodium in diet to around 6308-0183 mg/day  · Monitor BP  · Activity as tolerated     Patient was instructed to call the [facility] for any changes in symptoms as noted in AVS.      Return in about 6 weeks (around 6/10/2019). or sooner if needed     Patient given educational materials - see patient instructions. We discussed the importance of weighing oneself and recording daily. We also discussed the importance of a low sodium diet, higher sodium foods to avoid and better low sodium food options. Patient verbalizes understanding of plan of care using teach back method, and is agreeable to the treatment plan.        Electronically signed by BAILEY Slade CNP on 4/29/2019 at 4:18 PM

## 2019-04-29 ENCOUNTER — HOSPITAL ENCOUNTER (OUTPATIENT)
Dept: NON INVASIVE DIAGNOSTICS | Age: 84
Discharge: HOME | End: 2019-04-29
Payer: MEDICARE

## 2019-04-29 ENCOUNTER — OFFICE VISIT (OUTPATIENT)
Dept: CARDIOLOGY CLINIC | Age: 84
End: 2019-04-29
Payer: MEDICARE

## 2019-04-29 VITALS
SYSTOLIC BLOOD PRESSURE: 124 MMHG | DIASTOLIC BLOOD PRESSURE: 80 MMHG | WEIGHT: 168 LBS | HEIGHT: 64 IN | HEART RATE: 110 BPM | OXYGEN SATURATION: 98 % | BODY MASS INDEX: 28.68 KG/M2

## 2019-04-29 DIAGNOSIS — R06.09 DYSPNEA ON EXERTION: ICD-10-CM

## 2019-04-29 DIAGNOSIS — I50.32 CHF (CONGESTIVE HEART FAILURE), NYHA CLASS III, CHRONIC, DIASTOLIC (HCC): Primary | ICD-10-CM

## 2019-04-29 DIAGNOSIS — R00.0 TACHYCARDIA: ICD-10-CM

## 2019-04-29 PROCEDURE — 99213 OFFICE O/P EST LOW 20 MIN: CPT | Performed by: NURSE PRACTITIONER

## 2019-04-29 PROCEDURE — 6360000002 HC RX W HCPCS

## 2019-04-29 PROCEDURE — A9500 TC99M SESTAMIBI: HCPCS | Performed by: NUCLEAR MEDICINE

## 2019-04-29 PROCEDURE — 78452 HT MUSCLE IMAGE SPECT MULT: CPT

## 2019-04-29 PROCEDURE — 93017 CV STRESS TEST TRACING ONLY: CPT | Performed by: NUCLEAR MEDICINE

## 2019-04-29 PROCEDURE — 3430000000 HC RX DIAGNOSTIC RADIOPHARMACEUTICAL: Performed by: NUCLEAR MEDICINE

## 2019-04-29 PROCEDURE — 2709999900 HC NON-CHARGEABLE SUPPLY

## 2019-04-29 RX ADMIN — Medication 32.9 MILLICURIE: at 14:16

## 2019-04-29 RX ADMIN — Medication 9.7 MILLICURIE: at 13:08

## 2019-04-29 ASSESSMENT — ENCOUNTER SYMPTOMS
SHORTNESS OF BREATH: 1
ABDOMINAL DISTENTION: 0
COUGH: 0

## 2019-05-06 ENCOUNTER — TELEPHONE (OUTPATIENT)
Dept: CARDIOLOGY CLINIC | Age: 84
End: 2019-05-06

## 2019-05-14 ENCOUNTER — TELEPHONE (OUTPATIENT)
Dept: CARDIOLOGY CLINIC | Age: 84
End: 2019-05-14

## 2019-06-10 ENCOUNTER — OFFICE VISIT (OUTPATIENT)
Dept: CARDIOLOGY CLINIC | Age: 84
End: 2019-06-10
Payer: MEDICARE

## 2019-06-10 VITALS
BODY MASS INDEX: 30.21 KG/M2 | OXYGEN SATURATION: 93 % | HEART RATE: 88 BPM | DIASTOLIC BLOOD PRESSURE: 62 MMHG | SYSTOLIC BLOOD PRESSURE: 110 MMHG | WEIGHT: 176 LBS

## 2019-06-10 DIAGNOSIS — I50.32 CHF (CONGESTIVE HEART FAILURE), NYHA CLASS III, CHRONIC, DIASTOLIC (HCC): Primary | ICD-10-CM

## 2019-06-10 LAB
ANION GAP SERPL CALCULATED.3IONS-SCNC: 13 MEQ/L (ref 8–16)
BUN BLDV-MCNC: 22 MG/DL (ref 7–22)
CALCIUM SERPL-MCNC: 9.7 MG/DL (ref 8.5–10.5)
CHLORIDE BLD-SCNC: 101 MEQ/L (ref 98–111)
CO2: 27 MEQ/L (ref 23–33)
CREAT SERPL-MCNC: 1 MG/DL (ref 0.4–1.2)
GFR SERPL CREATININE-BSD FRML MDRD: 53 ML/MIN/1.73M2
GLUCOSE BLD-MCNC: 126 MG/DL (ref 70–108)
POTASSIUM SERPL-SCNC: 4.6 MEQ/L (ref 3.5–5.2)
SODIUM BLD-SCNC: 141 MEQ/L (ref 135–145)

## 2019-06-10 PROCEDURE — 36415 COLL VENOUS BLD VENIPUNCTURE: CPT | Performed by: NURSE PRACTITIONER

## 2019-06-10 PROCEDURE — 99213 OFFICE O/P EST LOW 20 MIN: CPT | Performed by: NURSE PRACTITIONER

## 2019-06-10 RX ORDER — FUROSEMIDE 20 MG/1
TABLET ORAL
Qty: 90 TABLET | Refills: 0
Start: 2019-06-10 | End: 2019-09-10

## 2019-06-10 RX ORDER — POTASSIUM CHLORIDE 750 MG/1
TABLET, FILM COATED, EXTENDED RELEASE ORAL
Qty: 60 TABLET | Refills: 0
Start: 2019-06-10 | End: 2019-09-10

## 2019-06-10 ASSESSMENT — ENCOUNTER SYMPTOMS
SHORTNESS OF BREATH: 1
COUGH: 0
ABDOMINAL DISTENTION: 0

## 2019-06-10 NOTE — PROGRESS NOTES
Venipuncture obtained from [address] [place] [address]. Patient tolerated procedure without complications or complaints.

## 2019-06-10 NOTE — PROGRESS NOTES
Heart Failure Clinic       Visit Date: 6/10/2019  Cardiologist:  Dr. Yarelis Lee  Primary Care Physician: Dr. Lilliana Gonzalez MD    Sinan March is a 80 y.o. female who presents today for:  Chief Complaint   Patient presents with    Congestive Heart Failure       HPI:   Sinan March is a 80 y.o. female who presents to the office for a follow up patient visit in the heart failure clinic. Lives at [place] [address] by Dtr  HX:  Diastolic (EF 95-75%), Mitral stenosis, HTN, HLD, DM, PVD    Hospitalization r/t Heart Failure:   3/22-3/28  = went in c/o leg swelling and SOB. Weight gain 23# over past few months. , CXR negative for congestion. Lasix IV w/ improvement. Josesito Zayas saw in office 4/15 - ? Underlying CAD, ordered Stress  STRESS -Negative  Concerns today: Overall feeling about the same, no worse/no better. Is currently doing rehab at [facility], extensive walking. Walked in from parking lot, did ok. Mild ankle swelling, doesn't like JUANITA hose. No diuretic. Discussed adding low dose water pill, pt states \"I wish you would\". Diet: per facility - states \"regular\" diet    Patient has:  Chest Pain: No  SOB: w/ significant activity  Difficulty sleeping: No  Orthopnea/PND: No  Edema: Mild ankle  Fatigue: Same  Abdominal bloating: No  Cough: No  Appetite: Good  Any extra diuretic use: No  Weight gain: Up 8# in 6 weeks - she eats well  Home weight: Yes - facility did not send  Home blood pressure:  Facility did not send    Past Medical History:   Diagnosis Date    Anxiety     nervous breakdown 9/2016    [facility]     Diverticulitis     DVT (deep venous thrombosis) (HCC)     Hyperlipidemia     Hypertension     Osteoarthritis     Pancreatic cancer ([facility]     Family    Pulmonary embolism ([facility]     S/P knee replacement     Type 2 diabetes mellitus ([facility] 2009     Past Surgical History:   Procedure Laterality Date    BREAST SURGERY      right lumpectomy    adenopathy. Psychiatric/Behavioral: Negative for sleep disturbance. OBJECTIVE:   Today's Vitals:  /62 (Site: Left Upper Arm)   Pulse 88   Wt 176 lb (79.8 kg)   SpO2 93%   BMI 30.21 kg/m²     Physical Exam   Constitutional: She is oriented to person, place, and time. She appears well-developed and well-nourished. No distress. HENT:   Head: Normocephalic and atraumatic. Eyes: Conjunctivae are normal.   Neck: Normal range of motion. Neck supple. No JVD   Cardiovascular: Normal rate, regular rhythm and normal heart sounds. No murmur heard. Pulmonary/Chest: Effort normal and breath sounds normal. No respiratory distress. She has no wheezes. She has no rales. Abdominal: Soft. Bowel sounds are normal. She exhibits no distension. There is no tenderness. Musculoskeletal: Normal range of motion. She exhibits edema (mild nonpitting ankle). Neurological: She is alert and oriented to person, place, and time. Coordination normal.   Skin: Skin is warm and dry. She is not diaphoretic. Psychiatric: She has a normal mood and affect. Her behavior is normal.   Vitals reviewed. Wt Readings from Last 3 Encounters:   06/10/19 176 lb (79.8 kg)   04/29/19 168 lb (76.2 kg)   04/12/19 170 lb (77.1 kg)     BP Readings from Last 3 Encounters:   06/10/19 110/62   04/29/19 124/80   04/15/19 128/64     Pulse Readings from Last 3 Encounters:   06/10/19 88   04/29/19 110   04/15/19 110     Body mass index is 30.21 kg/m². ECHO:  Summary   Left ventricular size and systolic function is normal. Ejection fraction   was estimated at 55-60%.  LV wall thickness is within normal limits and   there are no obvious wall motion abnormalities.   Myxomatotic degeneration of mitral valve.   Decreased mitral valve mobility noted.   Calcification of the mitral valve noted.   Mild mitral stenosis with mean gradient of 4 mm Hg, P1/2t of 81 ms and MVA   of 2.72 sq cm   Aortic valve leaflets are somewhat thickened.      Signature      ----------------------------------------------------------------   Electronically signed by Daylin Felix MD (Interpreting   [id number]) on 03/23/2019 at 03:38 PM      STRESS (4/2019)  Summary   This Nuclear Medicine study was negative for ischemia .   normal EF      Recommendation   Medical management.      Signatures      ----------------------------------------------------------------   Electronically signed by Harjinder Wesley MD (Interpreting   Cardiologist) on 05/01/2019 at 17:54   ----------------------------------------------------------------    Results reviewed:  BNP: No results found for: BNP  CBC:   Lab Results   Component Value Date    WBC 10.9 03/28/2019    RBC 4.17 03/28/2019    HGB 13.0 03/28/2019    HCT 40.7 03/28/2019     03/28/2019     CMP:    Lab Results   Component Value Date     03/28/2019    K 4.5 03/28/2019    CL 95 03/28/2019    CO2 24 03/28/2019    BUN 46 03/28/2019    CREATININE 1.2 03/28/2019    LABGLOM 43 03/28/2019    GLUCOSE 139 03/28/2019    CALCIUM 9.1 03/28/2019     Hepatic Function Panel:    Lab Results   Component Value Date    ALKPHOS 71 03/28/2019    ALT 21 03/28/2019    AST 17 03/28/2019    PROT 7.2 03/28/2019    BILITOT 0.2 03/28/2019    BILIDIR <0.2 09/20/2016    LABALBU 3.6 03/28/2019     Magnesium:    Lab Results   Component Value Date    MG 1.7 03/24/2019     PT/INR:    Lab Results   Component Value Date    INR 0.94 03/28/2019     Lipids:    Lab Results   Component Value Date    TRIG 236 03/23/2019    HDL 44 03/23/2019    LDLCALC 54 03/23/2019       ASSESSMENT AND PLAN:   The patient's condition/symptoms are Stable: No clinical evidence of fluid overload today. Continue current medical regimen without changes at present time. Diagnosis Orders   1.  CHF (congestive heart failure), NYHA class III, chronic, diastolic (HCC)  Basic Metabolic Panel       Continue:  GDMT: Diastolic   ACE/ARB/ARNI - None   BB - Metoprolol 25

## 2019-06-10 NOTE — PATIENT INSTRUCTIONS
Continue:  · Continue current medications  · Daily weights and record  · Fluid restriction of 2 Liters per day  · Limit sodium in diet to around 2923-5820 mg/day  · Monitor BP  · Activity as tolerated     Call the Heart Failure Clinic for any of the following symptoms: [phone]  Weight gain of 2-3 pounds in 1 day or 5 pounds in 1 week  Increased shortness of breath  Shortness of breath while laying down  Cough  Chest pain  Swelling in feet, ankles or legs  Tenderness or bloating in the abdomen  Fatigue   Decreased appetite or nausea   Confusion

## 2019-07-01 LAB
BUN BLDV-MCNC: 20 MG/DL
CALCIUM SERPL-MCNC: 9 MG/DL
CHLORIDE BLD-SCNC: 104 MMOL/L
CO2: 29 MMOL/L
CREAT SERPL-MCNC: 1 MG/DL
GFR CALCULATED: 53
GLUCOSE BLD-MCNC: 113 MG/DL
POTASSIUM SERPL-SCNC: 4.8 MMOL/L
SODIUM BLD-SCNC: 141 MMOL/L

## 2019-07-03 ENCOUNTER — TELEPHONE (OUTPATIENT)
Dept: CARDIOLOGY CLINIC | Age: 84
End: 2019-07-03

## 2019-07-18 ENCOUNTER — HOSPITAL ENCOUNTER (OUTPATIENT)
Dept: AUDIOLOGY | Age: 84
Discharge: HOME | End: 2019-07-18

## 2019-07-18 PROCEDURE — 9990000010 HC NO CHARGE VISIT: Performed by: AUDIOLOGIST

## 2019-08-06 ENCOUNTER — HOSPITAL ENCOUNTER (OUTPATIENT)
Age: 84
Discharge: HOME | End: 2019-08-06
Payer: MEDICARE

## 2019-08-06 ENCOUNTER — HOSPITAL ENCOUNTER (OUTPATIENT)
Dept: INTERVENTIONAL RADIOLOGY/VASCULAR | Age: 84
Discharge: HOME | End: 2019-08-06
Payer: MEDICARE

## 2019-08-06 DIAGNOSIS — M79.661 RIGHT CALF PAIN: ICD-10-CM

## 2019-08-06 LAB
ALBUMIN SERPL-MCNC: 3.8 G/DL (ref 3.5–5.1)
ALP BLD-CCNC: 61 U/L (ref 38–126)
ALT SERPL-CCNC: 14 U/L (ref 11–66)
ANION GAP SERPL CALCULATED.3IONS-SCNC: 16 MEQ/L (ref 8–16)
AST SERPL-CCNC: 17 U/L (ref 5–40)
BASOPHILS # BLD: 0.4 %
BASOPHILS ABSOLUTE: 0 THOU/MM3 (ref 0–0.1)
BILIRUB SERPL-MCNC: 0.2 MG/DL (ref 0.3–1.2)
BUN BLDV-MCNC: 24 MG/DL (ref 7–22)
C-REACTIVE PROTEIN: 0.32 MG/DL (ref 0–1)
CALCIUM SERPL-MCNC: 9.8 MG/DL (ref 8.5–10.5)
CHLORIDE BLD-SCNC: 101 MEQ/L (ref 98–111)
CO2: 24 MEQ/L (ref 23–33)
CREAT SERPL-MCNC: 1 MG/DL (ref 0.4–1.2)
EOSINOPHIL # BLD: 2.1 %
EOSINOPHILS ABSOLUTE: 0.2 THOU/MM3 (ref 0–0.4)
ERYTHROCYTE [DISTWIDTH] IN BLOOD BY AUTOMATED COUNT: 14.2 % (ref 11.5–14.5)
ERYTHROCYTE [DISTWIDTH] IN BLOOD BY AUTOMATED COUNT: 51.8 FL (ref 35–45)
GFR SERPL CREATININE-BSD FRML MDRD: 53 ML/MIN/1.73M2
GLUCOSE BLD-MCNC: 122 MG/DL (ref 70–108)
HCT VFR BLD CALC: 38.9 % (ref 37–47)
HEMOGLOBIN: 12.3 GM/DL (ref 12–16)
IMMATURE GRANS (ABS): 0.03 THOU/MM3 (ref 0–0.07)
IMMATURE GRANULOCYTES: 0.4 %
LYMPHOCYTES # BLD: 18 %
LYMPHOCYTES ABSOLUTE: 1.4 THOU/MM3 (ref 1–4.8)
MCH RBC QN AUTO: 31.1 PG (ref 26–33)
MCHC RBC AUTO-ENTMCNC: 31.6 GM/DL (ref 32.2–35.5)
MCV RBC AUTO: 98.5 FL (ref 81–99)
MONOCYTES # BLD: 9.6 %
MONOCYTES ABSOLUTE: 0.7 THOU/MM3 (ref 0.4–1.3)
NUCLEATED RED BLOOD CELLS: 0 /100 WBC
PLATELET # BLD: 256 THOU/MM3 (ref 130–400)
PMV BLD AUTO: 8.9 FL (ref 9.4–12.4)
POTASSIUM SERPL-SCNC: 5.3 MEQ/L (ref 3.5–5.2)
RBC # BLD: 3.95 MILL/MM3 (ref 4.2–5.4)
SEDIMENTATION RATE, ERYTHROCYTE: 44 MM/HR (ref 0–20)
SEG NEUTROPHILS: 69.5 %
SEGMENTED NEUTROPHILS ABSOLUTE COUNT: 5.4 THOU/MM3 (ref 1.8–7.7)
SODIUM BLD-SCNC: 141 MEQ/L (ref 135–145)
TOTAL PROTEIN: 7.1 G/DL (ref 6.1–8)
WBC # BLD: 7.8 THOU/MM3 (ref 4.8–10.8)

## 2019-08-06 PROCEDURE — 85651 RBC SED RATE NONAUTOMATED: CPT

## 2019-08-06 PROCEDURE — 93971 EXTREMITY STUDY: CPT

## 2019-08-06 PROCEDURE — 80053 COMPREHEN METABOLIC PANEL: CPT

## 2019-08-06 PROCEDURE — 85025 COMPLETE CBC W/AUTO DIFF WBC: CPT

## 2019-08-06 PROCEDURE — 36415 COLL VENOUS BLD VENIPUNCTURE: CPT

## 2019-08-06 PROCEDURE — 86140 C-REACTIVE PROTEIN: CPT

## 2019-08-08 ENCOUNTER — APPOINTMENT (OUTPATIENT)
Dept: CT IMAGING | Age: 84
End: 2019-08-08
Payer: MEDICARE

## 2019-08-08 ENCOUNTER — HOSPITAL ENCOUNTER (OUTPATIENT)
Age: 84
Setting detail: OBSERVATION
Discharge: OTHER INSTITUTION | End: 2019-08-09
Attending: EMERGENCY MEDICINE | Admitting: INTERNAL MEDICINE
Payer: MEDICARE

## 2019-08-08 DIAGNOSIS — R00.0 TACHYCARDIA: ICD-10-CM

## 2019-08-08 DIAGNOSIS — J18.9 PNEUMONIA DUE TO ORGANISM: Primary | ICD-10-CM

## 2019-08-08 LAB
ALBUMIN SERPL-MCNC: 3.2 G/DL (ref 3.5–5.1)
ALP BLD-CCNC: 64 U/L (ref 38–126)
ALT SERPL-CCNC: 13 U/L (ref 11–66)
ANION GAP SERPL CALCULATED.3IONS-SCNC: 16 MEQ/L (ref 8–16)
AST SERPL-CCNC: 16 U/L (ref 5–40)
BASOPHILS # BLD: 0.4 %
BASOPHILS ABSOLUTE: 0 THOU/MM3 (ref 0–0.1)
BILIRUB SERPL-MCNC: 0.2 MG/DL (ref 0.3–1.2)
BUN BLDV-MCNC: 24 MG/DL (ref 7–22)
CALCIUM SERPL-MCNC: 9.3 MG/DL (ref 8.5–10.5)
CHLORIDE BLD-SCNC: 102 MEQ/L (ref 98–111)
CO2: 23 MEQ/L (ref 23–33)
CREAT SERPL-MCNC: 1.3 MG/DL (ref 0.4–1.2)
EKG ATRIAL RATE: 127 BPM
EKG P AXIS: 63 DEGREES
EKG P-R INTERVAL: 146 MS
EKG Q-T INTERVAL: 306 MS
EKG QRS DURATION: 78 MS
EKG QTC CALCULATION (BAZETT): 444 MS
EKG R AXIS: 56 DEGREES
EKG T AXIS: 84 DEGREES
EKG VENTRICULAR RATE: 127 BPM
EOSINOPHIL # BLD: 3.7 %
EOSINOPHILS ABSOLUTE: 0.3 THOU/MM3 (ref 0–0.4)
ERYTHROCYTE [DISTWIDTH] IN BLOOD BY AUTOMATED COUNT: 14.3 % (ref 11.5–14.5)
ERYTHROCYTE [DISTWIDTH] IN BLOOD BY AUTOMATED COUNT: 50.9 FL (ref 35–45)
GFR SERPL CREATININE-BSD FRML MDRD: 39 ML/MIN/1.73M2
GLUCOSE BLD-MCNC: 148 MG/DL (ref 70–108)
HCT VFR BLD CALC: 42 % (ref 37–47)
HEMOGLOBIN: 13.4 GM/DL (ref 12–16)
IMMATURE GRANS (ABS): 0.02 THOU/MM3 (ref 0–0.07)
IMMATURE GRANULOCYTES: 0.3 %
LYMPHOCYTES # BLD: 44.9 %
LYMPHOCYTES ABSOLUTE: 3.2 THOU/MM3 (ref 1–4.8)
MCH RBC QN AUTO: 30.8 PG (ref 26–33)
MCHC RBC AUTO-ENTMCNC: 31.9 GM/DL (ref 32.2–35.5)
MCV RBC AUTO: 96.6 FL (ref 81–99)
MONOCYTES # BLD: 12.1 %
MONOCYTES ABSOLUTE: 0.9 THOU/MM3 (ref 0.4–1.3)
NUCLEATED RED BLOOD CELLS: 0 /100 WBC
OSMOLALITY CALCULATION: 288.1 MOSMOL/KG (ref 275–300)
PLATELET # BLD: 269 THOU/MM3 (ref 130–400)
PMV BLD AUTO: 8.6 FL (ref 9.4–12.4)
POTASSIUM REFLEX MAGNESIUM: 4.3 MEQ/L (ref 3.5–5.2)
PRO-BNP: 486.6 PG/ML (ref 0–1800)
PROCALCITONIN: 0.08 NG/ML (ref 0.01–0.09)
RBC # BLD: 4.35 MILL/MM3 (ref 4.2–5.4)
REASON FOR REJECTION: NORMAL
REJECTED TEST: NORMAL
SEG NEUTROPHILS: 38.6 %
SEGMENTED NEUTROPHILS ABSOLUTE COUNT: 2.7 THOU/MM3 (ref 1.8–7.7)
SODIUM BLD-SCNC: 141 MEQ/L (ref 135–145)
TOTAL PROTEIN: 6 G/DL (ref 6.1–8)
TROPONIN T: < 0.01 NG/ML
WBC # BLD: 7.1 THOU/MM3 (ref 4.8–10.8)

## 2019-08-08 PROCEDURE — 84145 PROCALCITONIN (PCT): CPT

## 2019-08-08 PROCEDURE — 96365 THER/PROPH/DIAG IV INF INIT: CPT

## 2019-08-08 PROCEDURE — 2580000003 HC RX 258: Performed by: INTERNAL MEDICINE

## 2019-08-08 PROCEDURE — 2709999900 HC NON-CHARGEABLE SUPPLY

## 2019-08-08 PROCEDURE — 6360000004 HC RX CONTRAST MEDICATION: Performed by: EMERGENCY MEDICINE

## 2019-08-08 PROCEDURE — 71275 CT ANGIOGRAPHY CHEST: CPT

## 2019-08-08 PROCEDURE — 99285 EMERGENCY DEPT VISIT HI MDM: CPT

## 2019-08-08 PROCEDURE — G0378 HOSPITAL OBSERVATION PER HR: HCPCS

## 2019-08-08 PROCEDURE — 84484 ASSAY OF TROPONIN QUANT: CPT

## 2019-08-08 PROCEDURE — 99219 PR INITIAL OBSERVATION CARE/DAY 50 MINUTES: CPT | Performed by: INTERNAL MEDICINE

## 2019-08-08 PROCEDURE — 85025 COMPLETE CBC W/AUTO DIFF WBC: CPT

## 2019-08-08 PROCEDURE — 6360000002 HC RX W HCPCS: Performed by: EMERGENCY MEDICINE

## 2019-08-08 PROCEDURE — 83880 ASSAY OF NATRIURETIC PEPTIDE: CPT

## 2019-08-08 PROCEDURE — 80053 COMPREHEN METABOLIC PANEL: CPT

## 2019-08-08 PROCEDURE — 96361 HYDRATE IV INFUSION ADD-ON: CPT

## 2019-08-08 PROCEDURE — 93005 ELECTROCARDIOGRAM TRACING: CPT | Performed by: EMERGENCY MEDICINE

## 2019-08-08 PROCEDURE — 93010 ELECTROCARDIOGRAM REPORT: CPT | Performed by: INTERNAL MEDICINE

## 2019-08-08 PROCEDURE — 36415 COLL VENOUS BLD VENIPUNCTURE: CPT

## 2019-08-08 RX ORDER — SODIUM CHLORIDE 0.9 % (FLUSH) 0.9 %
10 SYRINGE (ML) INJECTION PRN
Status: DISCONTINUED | OUTPATIENT
Start: 2019-08-08 | End: 2019-08-09 | Stop reason: HOSPADM

## 2019-08-08 RX ORDER — SODIUM CHLORIDE 0.9 % (FLUSH) 0.9 %
10 SYRINGE (ML) INJECTION EVERY 12 HOURS SCHEDULED
Status: DISCONTINUED | OUTPATIENT
Start: 2019-08-08 | End: 2019-08-09 | Stop reason: HOSPADM

## 2019-08-08 RX ORDER — ASPIRIN 81 MG/1
81 TABLET ORAL DAILY
Status: DISCONTINUED | OUTPATIENT
Start: 2019-08-09 | End: 2019-08-09 | Stop reason: HOSPADM

## 2019-08-08 RX ORDER — TRAMADOL HYDROCHLORIDE 50 MG/1
50 TABLET ORAL EVERY 6 HOURS PRN
Status: DISCONTINUED | OUTPATIENT
Start: 2019-08-08 | End: 2019-08-09 | Stop reason: HOSPADM

## 2019-08-08 RX ORDER — SODIUM CHLORIDE 9 MG/ML
INJECTION, SOLUTION INTRAVENOUS CONTINUOUS
Status: DISCONTINUED | OUTPATIENT
Start: 2019-08-08 | End: 2019-08-08

## 2019-08-08 RX ORDER — SODIUM CHLORIDE 9 MG/ML
INJECTION, SOLUTION INTRAVENOUS CONTINUOUS
Status: ACTIVE | OUTPATIENT
Start: 2019-08-08 | End: 2019-08-09

## 2019-08-08 RX ORDER — ONDANSETRON 2 MG/ML
4 INJECTION INTRAMUSCULAR; INTRAVENOUS EVERY 6 HOURS PRN
Status: DISCONTINUED | OUTPATIENT
Start: 2019-08-08 | End: 2019-08-09 | Stop reason: HOSPADM

## 2019-08-08 RX ORDER — LEVOFLOXACIN 5 MG/ML
500 INJECTION, SOLUTION INTRAVENOUS ONCE
Status: DISCONTINUED | OUTPATIENT
Start: 2019-08-08 | End: 2019-08-08

## 2019-08-08 RX ORDER — POTASSIUM CHLORIDE 7.45 MG/ML
10 INJECTION INTRAVENOUS PRN
Status: DISCONTINUED | OUTPATIENT
Start: 2019-08-08 | End: 2019-08-09 | Stop reason: HOSPADM

## 2019-08-08 RX ORDER — 0.9 % SODIUM CHLORIDE 0.9 %
1000 INTRAVENOUS SOLUTION INTRAVENOUS ONCE
Status: DISCONTINUED | OUTPATIENT
Start: 2019-08-08 | End: 2019-08-09 | Stop reason: HOSPADM

## 2019-08-08 RX ORDER — ACETAMINOPHEN 325 MG/1
650 TABLET ORAL EVERY 4 HOURS PRN
Status: DISCONTINUED | OUTPATIENT
Start: 2019-08-08 | End: 2019-08-09 | Stop reason: HOSPADM

## 2019-08-08 RX ORDER — POTASSIUM CHLORIDE 20 MEQ/1
40 TABLET, EXTENDED RELEASE ORAL PRN
Status: DISCONTINUED | OUTPATIENT
Start: 2019-08-08 | End: 2019-08-09 | Stop reason: HOSPADM

## 2019-08-08 RX ORDER — M-VIT,TX,IRON,MINS/CALC/FOLIC 27MG-0.4MG
1 TABLET ORAL DAILY
Status: DISCONTINUED | OUTPATIENT
Start: 2019-08-09 | End: 2019-08-09 | Stop reason: HOSPADM

## 2019-08-08 RX ORDER — NITROFURANTOIN MACROCRYSTALS 100 MG/1
100 CAPSULE ORAL 2 TIMES DAILY
Status: ON HOLD | COMMUNITY
Start: 2019-08-05 | End: 2019-08-09 | Stop reason: HOSPADM

## 2019-08-08 RX ORDER — LIDOCAINE HCL 4 %
2 CREAM (GRAM) TOPICAL DAILY
Status: DISCONTINUED | OUTPATIENT
Start: 2019-08-09 | End: 2019-08-08 | Stop reason: CLARIF

## 2019-08-08 RX ORDER — DULOXETIN HYDROCHLORIDE 60 MG/1
120 CAPSULE, DELAYED RELEASE ORAL DAILY
Status: DISCONTINUED | OUTPATIENT
Start: 2019-08-09 | End: 2019-08-09 | Stop reason: HOSPADM

## 2019-08-08 RX ADMIN — Medication 10 ML: at 23:30

## 2019-08-08 RX ADMIN — LEVOFLOXACIN 500 MG: 5 INJECTION, SOLUTION INTRAVENOUS at 22:13

## 2019-08-08 RX ADMIN — IOPAMIDOL 80 ML: 755 INJECTION, SOLUTION INTRAVENOUS at 20:41

## 2019-08-08 RX ADMIN — SODIUM CHLORIDE: 9 INJECTION, SOLUTION INTRAVENOUS at 23:28

## 2019-08-08 ASSESSMENT — PAIN DESCRIPTION - LOCATION: LOCATION: LEG

## 2019-08-08 ASSESSMENT — PAIN DESCRIPTION - PAIN TYPE: TYPE: ACUTE PAIN

## 2019-08-08 ASSESSMENT — PAIN DESCRIPTION - DESCRIPTORS: DESCRIPTORS: ACHING

## 2019-08-08 ASSESSMENT — PAIN SCALES - GENERAL
PAINLEVEL_OUTOF10: 5
PAINLEVEL_OUTOF10: 0

## 2019-08-08 ASSESSMENT — PAIN DESCRIPTION - ORIENTATION: ORIENTATION: RIGHT

## 2019-08-08 NOTE — ED TRIAGE NOTES
PT to ED with c/o right leg pain and SOB X 1 week. Pt had doppler yesterday which resulted in negative. PT has history of CHF. Prior to arrival pt finished dinner and am,bulated to restroom. EKG completed. PT placed on cardiac monitor.

## 2019-08-09 ENCOUNTER — APPOINTMENT (OUTPATIENT)
Dept: GENERAL RADIOLOGY | Age: 84
End: 2019-08-09
Payer: MEDICARE

## 2019-08-09 VITALS
BODY MASS INDEX: 29.7 KG/M2 | WEIGHT: 178.25 LBS | HEIGHT: 65 IN | RESPIRATION RATE: 18 BRPM | DIASTOLIC BLOOD PRESSURE: 56 MMHG | OXYGEN SATURATION: 92 % | SYSTOLIC BLOOD PRESSURE: 111 MMHG | TEMPERATURE: 98.6 F | HEART RATE: 95 BPM

## 2019-08-09 LAB
ANION GAP SERPL CALCULATED.3IONS-SCNC: 12 MEQ/L (ref 8–16)
BUN BLDV-MCNC: 24 MG/DL (ref 7–22)
CALCIUM SERPL-MCNC: 9 MG/DL (ref 8.5–10.5)
CHLORIDE BLD-SCNC: 103 MEQ/L (ref 98–111)
CO2: 22 MEQ/L (ref 23–33)
CREAT SERPL-MCNC: 1.2 MG/DL (ref 0.4–1.2)
ERYTHROCYTE [DISTWIDTH] IN BLOOD BY AUTOMATED COUNT: 14.6 % (ref 11.5–14.5)
ERYTHROCYTE [DISTWIDTH] IN BLOOD BY AUTOMATED COUNT: 52.3 FL (ref 35–45)
FOLATE: > 20 NG/ML (ref 4.8–24.2)
GFR SERPL CREATININE-BSD FRML MDRD: 43 ML/MIN/1.73M2
GLUCOSE BLD-MCNC: 112 MG/DL (ref 70–108)
HCT VFR BLD CALC: 38.8 % (ref 37–47)
HEMOGLOBIN: 12 GM/DL (ref 12–16)
MCH RBC QN AUTO: 30.6 PG (ref 26–33)
MCHC RBC AUTO-ENTMCNC: 30.9 GM/DL (ref 32.2–35.5)
MCV RBC AUTO: 99 FL (ref 81–99)
PLATELET # BLD: 214 THOU/MM3 (ref 130–400)
PMV BLD AUTO: 8.6 FL (ref 9.4–12.4)
POTASSIUM REFLEX MAGNESIUM: 4 MEQ/L (ref 3.5–5.2)
RBC # BLD: 3.92 MILL/MM3 (ref 4.2–5.4)
SODIUM BLD-SCNC: 137 MEQ/L (ref 135–145)
TSH SERPL DL<=0.05 MIU/L-ACNC: 3.47 UIU/ML (ref 0.4–4.2)
VITAMIN B-12: 583 PG/ML (ref 211–911)
VITAMIN D 25-HYDROXY: 33 NG/ML (ref 30–100)
WBC # BLD: 6 THOU/MM3 (ref 4.8–10.8)

## 2019-08-09 PROCEDURE — G0378 HOSPITAL OBSERVATION PER HR: HCPCS

## 2019-08-09 PROCEDURE — 73502 X-RAY EXAM HIP UNI 2-3 VIEWS: CPT

## 2019-08-09 PROCEDURE — 36415 COLL VENOUS BLD VENIPUNCTURE: CPT

## 2019-08-09 PROCEDURE — 96361 HYDRATE IV INFUSION ADD-ON: CPT

## 2019-08-09 PROCEDURE — 97110 THERAPEUTIC EXERCISES: CPT

## 2019-08-09 PROCEDURE — 80048 BASIC METABOLIC PNL TOTAL CA: CPT

## 2019-08-09 PROCEDURE — 97116 GAIT TRAINING THERAPY: CPT

## 2019-08-09 PROCEDURE — 82306 VITAMIN D 25 HYDROXY: CPT

## 2019-08-09 PROCEDURE — 6370000000 HC RX 637 (ALT 250 FOR IP): Performed by: INTERNAL MEDICINE

## 2019-08-09 PROCEDURE — 82607 VITAMIN B-12: CPT

## 2019-08-09 PROCEDURE — 85027 COMPLETE CBC AUTOMATED: CPT

## 2019-08-09 PROCEDURE — 82746 ASSAY OF FOLIC ACID SERUM: CPT

## 2019-08-09 PROCEDURE — 99217 PR OBSERVATION CARE DISCHARGE MANAGEMENT: CPT | Performed by: INTERNAL MEDICINE

## 2019-08-09 PROCEDURE — 94760 N-INVAS EAR/PLS OXIMETRY 1: CPT

## 2019-08-09 PROCEDURE — 6360000002 HC RX W HCPCS: Performed by: INTERNAL MEDICINE

## 2019-08-09 PROCEDURE — 97162 PT EVAL MOD COMPLEX 30 MIN: CPT

## 2019-08-09 PROCEDURE — 84443 ASSAY THYROID STIM HORMONE: CPT

## 2019-08-09 PROCEDURE — 2580000003 HC RX 258: Performed by: INTERNAL MEDICINE

## 2019-08-09 PROCEDURE — 2709999900 HC NON-CHARGEABLE SUPPLY

## 2019-08-09 RX ORDER — METFORMIN HYDROCHLORIDE 750 MG/1
750 TABLET, EXTENDED RELEASE ORAL
Qty: 30 TABLET | Refills: 3 | Status: ON HOLD | OUTPATIENT
Start: 2019-08-09 | End: 2020-01-14 | Stop reason: SDUPTHER

## 2019-08-09 RX ADMIN — ACETAMINOPHEN 650 MG: 325 TABLET ORAL at 01:53

## 2019-08-09 RX ADMIN — ENOXAPARIN SODIUM 40 MG: 40 INJECTION SUBCUTANEOUS at 10:27

## 2019-08-09 RX ADMIN — ASPIRIN 81 MG: 81 TABLET ORAL at 10:30

## 2019-08-09 RX ADMIN — METOPROLOL TARTRATE 25 MG: 25 TABLET ORAL at 10:30

## 2019-08-09 RX ADMIN — METOPROLOL TARTRATE 25 MG: 25 TABLET ORAL at 00:03

## 2019-08-09 RX ADMIN — Medication 10 ML: at 10:33

## 2019-08-09 RX ADMIN — MULTIPLE VITAMINS W/ MINERALS TAB 1 TABLET: TAB at 10:29

## 2019-08-09 RX ADMIN — ACETAMINOPHEN 650 MG: 325 TABLET ORAL at 10:28

## 2019-08-09 RX ADMIN — DULOXETINE HYDROCHLORIDE 120 MG: 60 CAPSULE, DELAYED RELEASE ORAL at 10:29

## 2019-08-09 ASSESSMENT — PAIN DESCRIPTION - DESCRIPTORS
DESCRIPTORS: PATIENT UNABLE TO DESCRIBE
DESCRIPTORS: ACHING

## 2019-08-09 ASSESSMENT — PAIN DESCRIPTION - FREQUENCY: FREQUENCY: INTERMITTENT

## 2019-08-09 ASSESSMENT — PAIN DESCRIPTION - LOCATION
LOCATION: LEG
LOCATION: HIP

## 2019-08-09 ASSESSMENT — PAIN DESCRIPTION - ORIENTATION
ORIENTATION: RIGHT
ORIENTATION: RIGHT;LEFT

## 2019-08-09 ASSESSMENT — PAIN SCALES - GENERAL
PAINLEVEL_OUTOF10: 5
PAINLEVEL_OUTOF10: 7
PAINLEVEL_OUTOF10: 0
PAINLEVEL_OUTOF10: 5

## 2019-08-09 ASSESSMENT — PAIN DESCRIPTION - ONSET: ONSET: ON-GOING

## 2019-08-09 ASSESSMENT — PAIN DESCRIPTION - PAIN TYPE
TYPE: ACUTE PAIN
TYPE: ACUTE PAIN

## 2019-08-09 NOTE — DISCHARGE SUMMARY
Hospital Medicine Discharge Summary      Patient Identification:   Terrell Jernigan   : 1935  MRN: [id number]   Account: [de-identified]      Patient's PCP: Roxana Carmona MD    Admit Date: 2019     Discharge Date:   2019     Admitting Physician: Marino Mejia DO     Discharge Physician: Marylene Else, MD     Discharge Diagnoses:    Physical deconditioning  Dehydration   Venous insufficiency of lower legs   Essential hypertension  Diet-controlled diabetes mellitus type 2, with chronic kidney disease stage III  Hyperlipidemia  Mild mitral stenosis  Anxiety disorder  History of diverticulitis  History of DVT and PE  History of breast cancer? Staging not sure, status post lumpectomy  History of knee replacement left  History of right hip replacement    The patient was seen and examined on day of discharge and this discharge summary is in conjunction with any daily progress note from day of discharge. Hospital Course: Terrell Jernigan is a 80 y.o. female admitted to [address] [place] [address] on 2019 for Patient presented to the emergency room with chief complaint of pain in the legs mostly pointing towards the shins for the last 1 week and finally wanted to get checked and came to the emergency room. 2 days prior she had a venous Doppler done and was negative for DVT and showed venous insufficiency. .     Patient was little tachycardic in the emergency room and there was some confusion of the patient took her beta-blocker are not as today. Patient did complain of some cough occasionally and on exertion sometimes she does feel little short of breath but not all the time and currently denied any. Because of tachycardia she did have a CT scan of the chest done and was negative for PE and there were some nonspecific infiltrates at the bases. There was concern for pneumonia by the emergency room physician but clinically patient does not have any symptoms of pneumonia.   There is no inherent in voice recognition technology. **      Final report electronically signed by Dr. Annabelle Cortez on 8/8/2019 9:08 PM             Consults:     IP CONSULT TO SOCIAL WORK  IP CONSULT TO HOME CARE NEEDS    Disposition: Home  Condition at Discharge: Stable    Code Status:  DNR-CCA     Patient Instructions:    Discharge lab work: none  Activity: activity as tolerated  Diet: DIET GENERAL;      Follow-up visits:   Pam Simpson MD  [facility]  [address] [facility]  [phone]    On 8/14/2019  @ 9:45 a.m. Please take all discharge paperwork with you to your follow up appointments. Discharge Medications:      Camille Tobias   Home Medication Instructions GVQ:[id number]    Printed on:08/09/19 5786   Medication Information                      acetaminophen (TYLENOL) 325 MG tablet  Take 650 mg by mouth every 6 hours as needed for Pain             aspirin 81 MG EC tablet  1 Tab Oral DAILY              CRANBERRY-VITAMIN C PO  Take 2 capsules by mouth daily              D-Mannose 500 MG CAPS  Take 1 capsule by mouth daily              DULoxetine (CYMBALTA) 60 MG extended release capsule  Take 2 capsules by mouth daily             furosemide (LASIX) 20 MG tablet  1 tab MWF             glucose blood VI test strips (ONE TOUCH ULTRA TEST) strip  Test daily or AD. Dx. 250.00             metFORMIN (GLUCOPHAGE-XR) 750 MG extended release tablet  Take 1 tablet by mouth Daily with supper             metoprolol (LOPRESSOR) 25 MG tablet  Take 1 tablet by mouth 2 times daily             Multiple Vitamins-Minerals (SENIOR MULTIVITAMIN PLUS) TABS  Take 1 tablet by mouth daily             potassium chloride (KLOR-CON) 10 MEQ extended release tablet  1 tab MWF                 Time Spent on discharge is more than 27 min in the examination, evaluation, counseling and review of medications and discharge plan. Signed:     Thank you Pam Simpson MD for the opportunity to be involved in this patient's care.    Electronically signed by Iris Gibbons MD on 8/9/2019 at 4:45 PM

## 2019-08-09 NOTE — CARE COORDINATION
8/9/19, 2:29 PM    Discharge plan discussed by  and . Discharge plan reviewed with patient/ family. Patient/ family verbalize understanding of discharge plan and are in agreement with plan. Understanding was demonstrated using the teach back method. IMM Letter  Observation Status Letter date given[de-identified] 08/08/19  Observation Status Letter time given[de-identified] 2216  Observation Status Letter given to Patient/Family/Significant other/Guardian/POA/by[de-identified] Pt Access  Return to [facility] today with new orders for [facility] PT and OT. Spoke with the assisted living and they are able to accept back to the facility tonight. Order for home health PT and OT sent in blue packet for facility to set up with agency of their/ patients preference.

## 2019-08-09 NOTE — PROGRESS NOTES
[address] [address]  INPATIENT PHYSICAL THERAPY  EVALUATION  STRZ U-STEPDOWN 3B - 3B-31/031-A    Time In: 09  Time Out: 7022  Timed Code Treatment Minutes: 24 Minutes  Minutes: 40          Date: 2019  Patient Name: Toshia Muir,  Gender:  female        MRN: [id number]  : 1935  (80 y.o.)  Referral Date : 19   Referring Practitioner: Jean Marie Manzo DO  Diagnosis: Shortness of breath  Additional Pertinent Hx: Per H&P: Pt is an 79 y/o woman with histroy of DM, HTN, anxiety, hip replacement, and history of falls presenting with leg pain that started bilaterally a week ago and has progressed to only the right leg. Pt said she has a history of DVT and though that it might be that but it doesn't feel quite like the same pain and her doppler was negative. She said that the pain is more localized to her upper leg/hip region now. She was using her cane to get around but since the pain worsened she now relies on her walker. Pain is worse in the morning when she first wakes up to go to the bathroom but gets better throughout the day. Without Tylenol she rates the pain as a 10/10 but with Tylenol 6/10. She said it is difficult to describe the pain but it is more of a sudden sharp pain when it does occur. She also reports shortness of breath with exertion when she is up and about. Patient denies light headedness, dizziness, weakness, numbness, and tingling. Restrictions/Precautions:  Restrictions/Precautions: General Precautions, Fall Risk    Subjective:  Chart Reviewed: Yes  Patient assessed for rehabilitation services?: Yes  Family / Caregiver Present: No  Subjective: RN okay with PT session. Pt agreeable to PT. Pt pleasant and cooperative throughout.     General:  Overall Orientation Status: Within Functional Limits  Follows Commands: Within Functional Limits    Vision: Impaired  Vision Exceptions: Wears glasses at all times    Hearing: Exceptions to [facility]  Hearing Exceptions: Hard of hearing/hearing concerns(pt notes scheduled for hearing consult)         Pain:  Yes. Pain Assessment  Pain Assessment: 0-10  Pain Level: 5(right posterior thigh/low back with first bout of gait training then 0/10 second bout after stretches. Pt noted a slight pain in left hip initially which resolved after a few steps)       Social/Functional History:    Lives With: Alone  Type of Home: Assisted living  Home Equipment: Cane, Rolling walker     Bathroom Toilet: Handicap height  Bathroom Equipment: Shower chair, Grab bars in shower       ADL Assistance: Independent     Ambulation Assistance: Independent  Transfer Assistance: Independent    Active : No          OBJECTIVE:  Range of Motion:  Right Lower Extremity: WNL  Left Lower Extremity: WNL    Strength:  Right Lower Extremity: Impaired - hip and knee 4-/5, ankle 4/5  Left Lower Extremity: Impaired - hip and knee 4-/5, ankle 4/5    Balance:  Static Sitting Balance:  Modified Independent  Static Standing Balance: Stand By Assistance, Contact Guard Assistance  Dynamic Standing Balance: Stand By Assistance, Contact Guard Assistance    Bed Mobility:  Supine to Sit: Supervision  Sit to Supine: Supervision    Verbal cues for positioning     Transfers:  Sit to Stand: Stand By Assistance  Stand to Sit:Stand By Assistance    Ambulation:  Stand By Assistance, [employer] Assistance  Distance: 100', 50'  Surface: Level Tile  Device:Rolling Walker  Gait Deviations:  Slow Brittny, Decreased Step Length Bilaterally and Decreased Heel Strike Bilaterally  Verbal cues to relax shoulders with gait training. Recommend use of RW at this time. Exercise:  Patient was guided in 1 set(s) 10 reps of exercise to both lower extremities. Supine: ankle pumps x 20, heel slides x 10, hip abduction x 10. Supine passive right straight leg raise hamstring stretch 30\" x 3, gentle single knee to chest 30\" x 4, gentle modified piriformis stretch 15\" x 4 all on the right.   Exercises were completed for increased independence with functional mobility. Palpation: tenderness noted bilateral IT band mid-lower region above knee, tenderness right piriformis region, tenderness anterior lower extremity below knee     Functional Outcome Measures: Completed  AM-PAC Inpatient Mobility without Stair Climbing Raw Score : 16  AM-PAC Inpatient without Stair Climbing T-Scale Score : 45.54    ASSESSMENT:  Activity Tolerance:  Patient tolerance of  treatment: good. Decreased pain end of session. Pt reports decreased activity over the last week so overall deconditioned. Treatment Initiated: Treatment and education initiated within context of evaluation. Evaluation time included review of current medical information, gathering information related to past medical, social and functional history, completion of standardized testing, formal and informal observation of tasks, assessment of data and development of plan of care and goals. Treatment time included skilled education and facilitation of tasks to increase safety and independence with functional mobility for improved independence and quality of life. Assessment: Body structures, Functions, Activity limitations: Decreased functional mobility , Decreased balance, Decreased endurance, Increased Pain, Decreased strength  Assessment: The evaluation and examination of Ms. Maggie Smith indicates a decline in functional mobiltiy compared to baseline. Patient initially reporting pain with gait this date and then after stretches no pain. Pt reporting pain posterior thigh and buttock and with palpaton pain in right piriformis region and lateral thigh alone IT band. Patient does demonstrate weakness in lower extremities and gait distance limited by fatigue. Pt to benefit from continued skilled PT services to improve patient's gait, balance, strength and transfers [facility]   Prognosis: Good   Other co-morbidities includes HTN, DM, CHF,

## 2019-08-09 NOTE — CARE COORDINATION
CASE MANAGEMENT OBSERVATION NOTE       8/9/19, 7:33 AM    Fawn Henry       Admitted from: ED 8/8/2019/ 1823   Location: [address] [place] [address]A Reason for admit: SOB (shortness of breath) [R06.02]   Admit order signed?: yes    Procedure: None    Pertinent Info/Orders/Treatment Plan:  Admitted through ED for complaints of hop and leg pain. Troponin negative. BUN 24 and 24. Creatinine 1.3 and 1.2. IVF started. Lovenox. Venous doppler done on 8/6 was negative for DVT. CTA chest showed no PE, questionable infiltrates bilaterally. PCP: Dewey Harrington MD    Discharge Plan: [facility] lives at [facility]. May need OP therapy at the [facility]  following.      Electronically signed by Raissa Holt RN on 8/9/2019 at 12:10 PM

## 2019-08-09 NOTE — CARE COORDINATION
DISCHARGE BARRIERS  8/9/19, 2:24 PM    Reason for Referral: From [address] [place] [address]  Mental Status: Answered all questions appropriately. Decision Making: independent with decision making. Family/Social/Home Environment: Lives at [address] [place] [address]. She has a very supportive family. Current Services: Assisted living  Current Equipment:cane, rolling walker, shower chair, grab bars tub and shower  [facility]  Concerns or Barriers to Discharge: None  Collabrative List of ECF/HH were provided:NA    Teach Back Method used with Kenana Alecia regarding care plan and discharge planning. Patient verbalize understanding of the plan of care and contribute to goal setting. Anticipated Needs/Discharge Plan: Return to [address] [place] [address] at discharge with new orders for [facility] PT and OT. Electronically signed by DAVIAN Yousif on 8/9/2019 at 2:24 PM

## 2019-09-04 ENCOUNTER — HOSPITAL ENCOUNTER (OUTPATIENT)
Dept: AUDIOLOGY | Age: 84
Discharge: HOME | End: 2019-09-04
Payer: MEDICARE

## 2019-09-04 PROCEDURE — 92557 COMPREHENSIVE HEARING TEST: CPT | Performed by: AUDIOLOGIST

## 2019-09-10 ENCOUNTER — OFFICE VISIT (OUTPATIENT)
Dept: CARDIOLOGY CLINIC | Age: 84
End: 2019-09-10
Payer: MEDICARE

## 2019-09-10 VITALS
SYSTOLIC BLOOD PRESSURE: 126 MMHG | BODY MASS INDEX: 30.55 KG/M2 | HEART RATE: 88 BPM | DIASTOLIC BLOOD PRESSURE: 68 MMHG | OXYGEN SATURATION: 91 % | WEIGHT: 183.6 LBS

## 2019-09-10 DIAGNOSIS — I50.32 CHF (CONGESTIVE HEART FAILURE), NYHA CLASS III, CHRONIC, DIASTOLIC (HCC): Primary | ICD-10-CM

## 2019-09-10 PROBLEM — R00.0 TACHYCARDIA: Status: ACTIVE | Noted: 2019-09-10

## 2019-09-10 PROCEDURE — 99213 OFFICE O/P EST LOW 20 MIN: CPT | Performed by: NURSE PRACTITIONER

## 2019-09-10 RX ORDER — FUROSEMIDE 20 MG/1
20 TABLET ORAL DAILY
Qty: 90 TABLET | Refills: 0
Start: 2019-09-10 | End: 2020-01-24 | Stop reason: ALTCHOICE

## 2019-09-10 RX ORDER — POTASSIUM CHLORIDE 750 MG/1
10 TABLET, FILM COATED, EXTENDED RELEASE ORAL DAILY
Qty: 30 TABLET | Refills: 0 | Status: ON HOLD
Start: 2019-09-10 | End: 2021-01-01 | Stop reason: SDUPTHER

## 2019-09-10 ASSESSMENT — ENCOUNTER SYMPTOMS
SHORTNESS OF BREATH: 1
COUGH: 0
ABDOMINAL DISTENTION: 0

## 2019-09-10 NOTE — PROGRESS NOTES
Heart Failure Clinic       Visit Date: 9/10/2019  Cardiologist:  Dr. Eddie Medina  Primary Care Physician: Dr. Forrest Tucker MD    Jonn Gupta is a 80 y.o. female who presents today for:  Chief Complaint   Patient presents with    Congestive Heart Failure       HPI:   Jonn Gupta is a 80 y.o. female who presents to the office for a follow up patient visit in the heart failure clinic. Accompanied by dtr  Lives at [facility]: Diastolic (EF 96-76%), Mitral stenosis, HTN, HLD, DM, PVD    Hospitalization r/t Heart Failure:  March 2019  = went in c/o leg swelling and SOB.  Weight gain 23# over past few months.  , CXR negative for congestion. Lasix IV w/ improvement. Alisha Herrera saw in office 4/15 - ? Underlying CAD, ordered Stress  STRESS -Negative  OV June - Added Lasix MWF  August in for Pneumonia/hip pain - BNP not elevated    Concerns today: Wt up 7# since June - states continued GALLO, mild LE edema. Labs 8/9 stable  Biggest C/o today is hip pain - was in hospital in August - x-rays negative  Activity: Can walk to dining room, approx 100 ft any further she is SOB  Diet: Not best compliance w/ diet - likes BLT (4 joya slices) and lunch meat.   Not watch how much fluid - 3+ cups coffee    Patient has:  Chest Pain: No  SOB: GALLO  Difficulty sleeping: No  Orthopnea/PND: No  Edema: Mild  Fatigue: Same  Abdominal bloating: on/off  Cough: No  Appetite: Good  Any extra diuretic use: No  Weight gain: Slow increase  Home weight: none sent  Home blood pressure: None sent    Past Medical History:   Diagnosis Date    Anxiety     nervous breakdown 9/2016    Breast cancer ([address]     Diverticulitis     DVT (deep venous thrombosis) (HCC)     Hyperlipidemia     Hypertension     Osteoarthritis     Pancreatic cancer ([address]     Family    Pulmonary embolism ([address]     S/P knee replacement     Type 2 diabetes mellitus ([facility] 2009     Past Surgical History:   Procedure Laterality Date    BREAST

## 2019-09-24 LAB
BUN BLDV-MCNC: 21 MG/DL
CALCIUM SERPL-MCNC: 9.8 MG/DL
CHLORIDE BLD-SCNC: 102 MMOL/L
CO2: 26 MMOL/L
CREAT SERPL-MCNC: 1 MG/DL
GFR CALCULATED: 53
GLUCOSE BLD-MCNC: 138 MG/DL
POTASSIUM SERPL-SCNC: 4.7 MMOL/L
SODIUM BLD-SCNC: 137 MMOL/L

## 2019-11-13 ENCOUNTER — OFFICE VISIT (OUTPATIENT)
Dept: CARDIOLOGY CLINIC | Age: 84
End: 2019-11-13
Payer: MEDICARE

## 2019-11-13 VITALS
HEIGHT: 64 IN | BODY MASS INDEX: 32.1 KG/M2 | HEART RATE: 68 BPM | DIASTOLIC BLOOD PRESSURE: 78 MMHG | SYSTOLIC BLOOD PRESSURE: 132 MMHG | WEIGHT: 188 LBS

## 2019-11-13 DIAGNOSIS — R06.09 DYSPNEA ON EXERTION: ICD-10-CM

## 2019-11-13 DIAGNOSIS — R06.02 SHORTNESS OF BREATH: ICD-10-CM

## 2019-11-13 DIAGNOSIS — I10 ESSENTIAL HYPERTENSION: Primary | ICD-10-CM

## 2019-11-13 PROCEDURE — 99214 OFFICE O/P EST MOD 30 MIN: CPT | Performed by: NUCLEAR MEDICINE

## 2019-11-13 RX ORDER — CLOTRIMAZOLE 1 %
CREAM (GRAM) TOPICAL 2 TIMES DAILY
COMMUNITY
End: 2020-02-13

## 2019-11-29 ENCOUNTER — PREP FOR PROCEDURE (OUTPATIENT)
Dept: CARDIOLOGY | Age: 84
End: 2019-11-29

## 2019-11-29 RX ORDER — SODIUM CHLORIDE 0.9 % (FLUSH) 0.9 %
10 SYRINGE (ML) INJECTION EVERY 12 HOURS SCHEDULED
Status: CANCELLED | OUTPATIENT
Start: 2019-11-29

## 2019-11-29 RX ORDER — ASPIRIN 325 MG
325 TABLET ORAL ONCE
Status: CANCELLED | OUTPATIENT
Start: 2019-11-29 | End: 2019-11-29

## 2019-11-29 RX ORDER — SODIUM CHLORIDE 0.9 % (FLUSH) 0.9 %
10 SYRINGE (ML) INJECTION PRN
Status: CANCELLED | OUTPATIENT
Start: 2019-11-29

## 2019-11-29 RX ORDER — SODIUM CHLORIDE 9 MG/ML
INJECTION, SOLUTION INTRAVENOUS CONTINUOUS
Status: CANCELLED | OUTPATIENT
Start: 2019-11-29

## 2019-11-29 RX ORDER — NITROGLYCERIN 0.4 MG/1
0.4 TABLET SUBLINGUAL EVERY 5 MIN PRN
Status: CANCELLED | OUTPATIENT
Start: 2019-11-29

## 2019-12-02 ENCOUNTER — HOSPITAL ENCOUNTER (OUTPATIENT)
Dept: INPATIENT UNIT | Age: 84
Discharge: NURSING, REHAB OR LONG-TERM CARE | End: 2019-12-02
Attending: NUCLEAR MEDICINE | Admitting: NUCLEAR MEDICINE
Payer: MEDICARE

## 2019-12-02 ENCOUNTER — APPOINTMENT (OUTPATIENT)
Dept: CT IMAGING | Age: 84
End: 2019-12-02
Attending: NUCLEAR MEDICINE
Payer: MEDICARE

## 2019-12-02 ENCOUNTER — HOSPITAL ENCOUNTER (OUTPATIENT)
Dept: INPATIENT UNIT | Age: 84
Discharge: HOME | End: 2019-12-02

## 2019-12-02 VITALS
TEMPERATURE: 98.1 F | OXYGEN SATURATION: 95 % | SYSTOLIC BLOOD PRESSURE: 101 MMHG | BODY MASS INDEX: 31.76 KG/M2 | RESPIRATION RATE: 17 BRPM | WEIGHT: 186 LBS | HEART RATE: 99 BPM | DIASTOLIC BLOOD PRESSURE: 40 MMHG | HEIGHT: 64 IN

## 2019-12-02 LAB
ABO: NORMAL
ANION GAP SERPL CALCULATED.3IONS-SCNC: 15 MEQ/L (ref 8–16)
ANTIBODY SCREEN: NORMAL
APTT: 31.8 SECONDS (ref 22–38)
BUN BLDV-MCNC: 24 MG/DL (ref 7–22)
CALCIUM SERPL-MCNC: 9.8 MG/DL (ref 8.5–10.5)
CHLORIDE BLD-SCNC: 99 MEQ/L (ref 98–111)
CO2: 25 MEQ/L (ref 23–33)
CREAT SERPL-MCNC: 1 MG/DL (ref 0.4–1.2)
EKG ATRIAL RATE: 104 BPM
EKG P AXIS: 66 DEGREES
EKG P-R INTERVAL: 150 MS
EKG Q-T INTERVAL: 344 MS
EKG QRS DURATION: 84 MS
EKG QTC CALCULATION (BAZETT): 452 MS
EKG R AXIS: 58 DEGREES
EKG T AXIS: 64 DEGREES
EKG VENTRICULAR RATE: 104 BPM
ERYTHROCYTE [DISTWIDTH] IN BLOOD BY AUTOMATED COUNT: 14.5 % (ref 11.5–14.5)
ERYTHROCYTE [DISTWIDTH] IN BLOOD BY AUTOMATED COUNT: 53.5 FL (ref 35–45)
GFR SERPL CREATININE-BSD FRML MDRD: 53 ML/MIN/1.73M2
GLUCOSE BLD-MCNC: 176 MG/DL (ref 70–108)
HCT VFR BLD CALC: 40.4 % (ref 37–47)
HEMOGLOBIN: 12.7 GM/DL (ref 12–16)
INR BLD: 0.91 (ref 0.85–1.13)
MCH RBC QN AUTO: 31.8 PG (ref 26–33)
MCHC RBC AUTO-ENTMCNC: 31.4 GM/DL (ref 32.2–35.5)
MCV RBC AUTO: 101.3 FL (ref 81–99)
PLATELET # BLD: 274 THOU/MM3 (ref 130–400)
PMV BLD AUTO: 8.7 FL (ref 9.4–12.4)
POTASSIUM REFLEX MAGNESIUM: 4.6 MEQ/L (ref 3.5–5.2)
RBC # BLD: 3.99 MILL/MM3 (ref 4.2–5.4)
RH FACTOR: NORMAL
SODIUM BLD-SCNC: 139 MEQ/L (ref 135–145)
WBC # BLD: 7.3 THOU/MM3 (ref 4.8–10.8)

## 2019-12-02 PROCEDURE — 2500000003 HC RX 250 WO HCPCS

## 2019-12-02 PROCEDURE — 85027 COMPLETE CBC AUTOMATED: CPT

## 2019-12-02 PROCEDURE — 86850 RBC ANTIBODY SCREEN: CPT

## 2019-12-02 PROCEDURE — 93458 L HRT ARTERY/VENTRICLE ANGIO: CPT | Performed by: NUCLEAR MEDICINE

## 2019-12-02 PROCEDURE — 93005 ELECTROCARDIOGRAM TRACING: CPT | Performed by: PHYSICIAN ASSISTANT

## 2019-12-02 PROCEDURE — 86901 BLOOD TYPING SEROLOGIC RH(D): CPT

## 2019-12-02 PROCEDURE — 2709999900 HC NON-CHARGEABLE SUPPLY

## 2019-12-02 PROCEDURE — 2580000003 HC RX 258: Performed by: PHYSICIAN ASSISTANT

## 2019-12-02 PROCEDURE — 85610 PROTHROMBIN TIME: CPT

## 2019-12-02 PROCEDURE — 86900 BLOOD TYPING SEROLOGIC ABO: CPT

## 2019-12-02 PROCEDURE — 80048 BASIC METABOLIC PNL TOTAL CA: CPT

## 2019-12-02 PROCEDURE — 36415 COLL VENOUS BLD VENIPUNCTURE: CPT

## 2019-12-02 PROCEDURE — 93010 ELECTROCARDIOGRAM REPORT: CPT | Performed by: INTERNAL MEDICINE

## 2019-12-02 PROCEDURE — C1769 GUIDE WIRE: HCPCS

## 2019-12-02 PROCEDURE — 70450 CT HEAD/BRAIN W/O DYE: CPT

## 2019-12-02 PROCEDURE — 85730 THROMBOPLASTIN TIME PARTIAL: CPT

## 2019-12-02 PROCEDURE — C1894 INTRO/SHEATH, NON-LASER: HCPCS

## 2019-12-02 PROCEDURE — 6360000002 HC RX W HCPCS

## 2019-12-02 PROCEDURE — 6360000004 HC RX CONTRAST MEDICATION: Performed by: NUCLEAR MEDICINE

## 2019-12-02 PROCEDURE — 94760 N-INVAS EAR/PLS OXIMETRY 1: CPT

## 2019-12-02 RX ORDER — NITROGLYCERIN 0.4 MG/1
0.4 TABLET SUBLINGUAL EVERY 5 MIN PRN
Status: DISCONTINUED | OUTPATIENT
Start: 2019-12-02 | End: 2019-12-03 | Stop reason: HOSPADM

## 2019-12-02 RX ORDER — SODIUM CHLORIDE 9 MG/ML
INJECTION, SOLUTION INTRAVENOUS CONTINUOUS
Status: DISCONTINUED | OUTPATIENT
Start: 2019-12-02 | End: 2019-12-03 | Stop reason: HOSPADM

## 2019-12-02 RX ORDER — ATROPINE SULFATE 0.4 MG/ML
0.5 AMPUL (ML) INJECTION
Status: DISCONTINUED | OUTPATIENT
Start: 2019-12-02 | End: 2019-12-02 | Stop reason: HOSPADM

## 2019-12-02 RX ORDER — SODIUM CHLORIDE 0.9 % (FLUSH) 0.9 %
10 SYRINGE (ML) INJECTION EVERY 12 HOURS SCHEDULED
Status: DISCONTINUED | OUTPATIENT
Start: 2019-12-02 | End: 2019-12-03 | Stop reason: HOSPADM

## 2019-12-02 RX ORDER — SODIUM CHLORIDE 0.9 % (FLUSH) 0.9 %
10 SYRINGE (ML) INJECTION PRN
Status: DISCONTINUED | OUTPATIENT
Start: 2019-12-02 | End: 2019-12-03 | Stop reason: HOSPADM

## 2019-12-02 RX ORDER — ATORVASTATIN CALCIUM 20 MG/1
20 TABLET, FILM COATED ORAL DAILY
Qty: 30 TABLET | Refills: 3 | Status: SHIPPED | OUTPATIENT
Start: 2019-12-02 | End: 2020-01-03

## 2019-12-02 RX ORDER — ASPIRIN 325 MG
325 TABLET ORAL ONCE
Status: DISCONTINUED | OUTPATIENT
Start: 2019-12-02 | End: 2019-12-03 | Stop reason: HOSPADM

## 2019-12-02 RX ORDER — ISOSORBIDE MONONITRATE 30 MG/1
30 TABLET, EXTENDED RELEASE ORAL DAILY
Qty: 30 TABLET | Refills: 3 | Status: SHIPPED | OUTPATIENT
Start: 2019-12-02 | End: 2020-01-03

## 2019-12-02 RX ORDER — ACETAMINOPHEN 325 MG/1
650 TABLET ORAL EVERY 4 HOURS PRN
Status: DISCONTINUED | OUTPATIENT
Start: 2019-12-02 | End: 2019-12-03 | Stop reason: HOSPADM

## 2019-12-02 RX ADMIN — IOPAMIDOL 75 ML: 755 INJECTION, SOLUTION INTRAVENOUS at 16:46

## 2019-12-02 RX ADMIN — SODIUM CHLORIDE: 9 INJECTION, SOLUTION INTRAVENOUS at 07:50

## 2019-12-02 ASSESSMENT — PAIN SCALES - GENERAL: PAINLEVEL_OUTOF10: 0

## 2019-12-04 ENCOUNTER — TELEPHONE (OUTPATIENT)
Dept: CARDIOLOGY CLINIC | Age: 84
End: 2019-12-04

## 2019-12-04 DIAGNOSIS — R93.1 ABNORMAL FINDINGS ON CARDIAC CATHETERIZATION: Primary | ICD-10-CM

## 2019-12-04 DIAGNOSIS — I25.10 CORONARY ARTERY DISEASE INVOLVING NATIVE CORONARY ARTERY OF NATIVE HEART WITHOUT ANGINA PECTORIS: ICD-10-CM

## 2020-01-12 RX ORDER — ASPIRIN 81 MG/1
324 TABLET, CHEWABLE ORAL ONCE
Status: CANCELLED | OUTPATIENT
Start: 2020-01-12 | End: 2020-01-12

## 2020-01-12 RX ORDER — SODIUM CHLORIDE 0.9 % (FLUSH) 0.9 %
10 SYRINGE (ML) INJECTION PRN
Status: CANCELLED | OUTPATIENT
Start: 2020-01-12

## 2020-01-12 RX ORDER — SODIUM CHLORIDE 0.9 % (FLUSH) 0.9 %
10 SYRINGE (ML) INJECTION EVERY 12 HOURS SCHEDULED
Status: CANCELLED | OUTPATIENT
Start: 2020-01-12

## 2020-01-12 RX ORDER — SODIUM CHLORIDE 9 MG/ML
INJECTION, SOLUTION INTRAVENOUS CONTINUOUS
Status: CANCELLED | OUTPATIENT
Start: 2020-01-12

## 2020-01-12 RX ORDER — NITROGLYCERIN 0.4 MG/1
0.4 TABLET SUBLINGUAL EVERY 5 MIN PRN
Status: CANCELLED | OUTPATIENT
Start: 2020-01-12

## 2020-01-12 RX ORDER — DIPHENHYDRAMINE HYDROCHLORIDE 50 MG/ML
25 INJECTION INTRAMUSCULAR; INTRAVENOUS ONCE
Status: CANCELLED | OUTPATIENT
Start: 2020-01-12 | End: 2020-01-12

## 2020-01-13 ENCOUNTER — HOSPITAL ENCOUNTER (OUTPATIENT)
Dept: INPATIENT UNIT | Age: 85
Discharge: HOME | End: 2020-01-14
Attending: INTERNAL MEDICINE | Admitting: INTERNAL MEDICINE
Payer: MEDICARE

## 2020-01-13 PROBLEM — Z98.62 STATUS POST ANGIOPLASTY: Status: ACTIVE | Noted: 2020-01-13

## 2020-01-13 LAB
ABO: NORMAL
ACTIVATED CLOTTING TIME: 307 SECONDS (ref 1–150)
ANION GAP SERPL CALCULATED.3IONS-SCNC: 17 MEQ/L (ref 8–16)
ANTIBODY SCREEN: NORMAL
BUN BLDV-MCNC: 22 MG/DL (ref 7–22)
CALCIUM SERPL-MCNC: 9.5 MG/DL (ref 8.5–10.5)
CHLORIDE BLD-SCNC: 101 MEQ/L (ref 98–111)
CHOLESTEROL, TOTAL: 173 MG/DL (ref 100–199)
CO2: 20 MEQ/L (ref 23–33)
CREAT SERPL-MCNC: 0.9 MG/DL (ref 0.4–1.2)
EKG ATRIAL RATE: 81 BPM
EKG P AXIS: 60 DEGREES
EKG P-R INTERVAL: 140 MS
EKG Q-T INTERVAL: 384 MS
EKG QRS DURATION: 84 MS
EKG QTC CALCULATION (BAZETT): 446 MS
EKG R AXIS: 34 DEGREES
EKG T AXIS: 32 DEGREES
EKG VENTRICULAR RATE: 81 BPM
ERYTHROCYTE [DISTWIDTH] IN BLOOD BY AUTOMATED COUNT: 14.6 % (ref 11.5–14.5)
ERYTHROCYTE [DISTWIDTH] IN BLOOD BY AUTOMATED COUNT: 53.3 FL (ref 35–45)
GFR SERPL CREATININE-BSD FRML MDRD: 60 ML/MIN/1.73M2
GLUCOSE BLD-MCNC: 185 MG/DL (ref 70–108)
HCT VFR BLD CALC: 38.2 % (ref 37–47)
HDLC SERPL-MCNC: 38 MG/DL
HEMOGLOBIN: 12.2 GM/DL (ref 12–16)
INR BLD: 0.96 (ref 0.85–1.13)
LDL CHOLESTEROL CALCULATED: 63 MG/DL
MCH RBC QN AUTO: 32.1 PG (ref 26–33)
MCHC RBC AUTO-ENTMCNC: 31.9 GM/DL (ref 32.2–35.5)
MCV RBC AUTO: 100.5 FL (ref 81–99)
PLATELET # BLD: 270 THOU/MM3 (ref 130–400)
PMV BLD AUTO: 8.9 FL (ref 9.4–12.4)
POTASSIUM REFLEX MAGNESIUM: 4.4 MEQ/L (ref 3.5–5.2)
RBC # BLD: 3.8 MILL/MM3 (ref 4.2–5.4)
RH FACTOR: NORMAL
SODIUM BLD-SCNC: 138 MEQ/L (ref 135–145)
TRIGL SERPL-MCNC: 359 MG/DL (ref 0–199)
WBC # BLD: 10.5 THOU/MM3 (ref 4.8–10.8)

## 2020-01-13 PROCEDURE — 36415 COLL VENOUS BLD VENIPUNCTURE: CPT

## 2020-01-13 PROCEDURE — 80048 BASIC METABOLIC PNL TOTAL CA: CPT

## 2020-01-13 PROCEDURE — 2709999900 HC NON-CHARGEABLE SUPPLY

## 2020-01-13 PROCEDURE — 6360000002 HC RX W HCPCS

## 2020-01-13 PROCEDURE — 85027 COMPLETE CBC AUTOMATED: CPT

## 2020-01-13 PROCEDURE — 80061 LIPID PANEL: CPT

## 2020-01-13 PROCEDURE — 2580000003 HC RX 258: Performed by: PHYSICIAN ASSISTANT

## 2020-01-13 PROCEDURE — C1894 INTRO/SHEATH, NON-LASER: HCPCS

## 2020-01-13 PROCEDURE — C1887 CATHETER, GUIDING: HCPCS

## 2020-01-13 PROCEDURE — 6360000004 HC RX CONTRAST MEDICATION: Performed by: INTERNAL MEDICINE

## 2020-01-13 PROCEDURE — 86850 RBC ANTIBODY SCREEN: CPT

## 2020-01-13 PROCEDURE — 2580000003 HC RX 258: Performed by: INTERNAL MEDICINE

## 2020-01-13 PROCEDURE — 86901 BLOOD TYPING SEROLOGIC RH(D): CPT

## 2020-01-13 PROCEDURE — C9600 PERC DRUG-EL COR STENT SING: HCPCS | Performed by: INTERNAL MEDICINE

## 2020-01-13 PROCEDURE — 92928 PRQ TCAT PLMT NTRAC ST 1 LES: CPT | Performed by: INTERNAL MEDICINE

## 2020-01-13 PROCEDURE — 93010 ELECTROCARDIOGRAM REPORT: CPT | Performed by: NUCLEAR MEDICINE

## 2020-01-13 PROCEDURE — 6370000000 HC RX 637 (ALT 250 FOR IP): Performed by: PHYSICIAN ASSISTANT

## 2020-01-13 PROCEDURE — C1725 CATH, TRANSLUMIN NON-LASER: HCPCS

## 2020-01-13 PROCEDURE — 93005 ELECTROCARDIOGRAM TRACING: CPT | Performed by: PHYSICIAN ASSISTANT

## 2020-01-13 PROCEDURE — C1874 STENT, COATED/COV W/DEL SYS: HCPCS

## 2020-01-13 PROCEDURE — 2500000003 HC RX 250 WO HCPCS

## 2020-01-13 PROCEDURE — 85610 PROTHROMBIN TIME: CPT

## 2020-01-13 PROCEDURE — 85347 COAGULATION TIME ACTIVATED: CPT

## 2020-01-13 PROCEDURE — 6370000000 HC RX 637 (ALT 250 FOR IP): Performed by: INTERNAL MEDICINE

## 2020-01-13 PROCEDURE — 6370000000 HC RX 637 (ALT 250 FOR IP)

## 2020-01-13 PROCEDURE — C1769 GUIDE WIRE: HCPCS

## 2020-01-13 PROCEDURE — 86900 BLOOD TYPING SEROLOGIC ABO: CPT

## 2020-01-13 RX ORDER — ACETAMINOPHEN 325 MG/1
650 TABLET ORAL EVERY 4 HOURS PRN
Status: DISCONTINUED | OUTPATIENT
Start: 2020-01-13 | End: 2020-01-14 | Stop reason: HOSPADM

## 2020-01-13 RX ORDER — ACETAMINOPHEN 325 MG/1
650 TABLET ORAL EVERY 6 HOURS PRN
Status: DISCONTINUED | OUTPATIENT
Start: 2020-01-13 | End: 2020-01-13 | Stop reason: SDUPTHER

## 2020-01-13 RX ORDER — METFORMIN HYDROCHLORIDE 750 MG/1
750 TABLET, EXTENDED RELEASE ORAL
Status: DISCONTINUED | OUTPATIENT
Start: 2020-01-13 | End: 2020-01-14 | Stop reason: HOSPADM

## 2020-01-13 RX ORDER — DULOXETIN HYDROCHLORIDE 60 MG/1
120 CAPSULE, DELAYED RELEASE ORAL DAILY
Status: DISCONTINUED | OUTPATIENT
Start: 2020-01-14 | End: 2020-01-14 | Stop reason: HOSPADM

## 2020-01-13 RX ORDER — ISOSORBIDE MONONITRATE 30 MG/1
30 TABLET, EXTENDED RELEASE ORAL DAILY
Status: DISCONTINUED | OUTPATIENT
Start: 2020-01-14 | End: 2020-01-14

## 2020-01-13 RX ORDER — ASPIRIN 81 MG/1
324 TABLET, CHEWABLE ORAL ONCE
Status: COMPLETED | OUTPATIENT
Start: 2020-01-13 | End: 2020-01-13

## 2020-01-13 RX ORDER — ASPIRIN 81 MG/1
81 TABLET ORAL DAILY
Status: DISCONTINUED | OUTPATIENT
Start: 2020-01-13 | End: 2020-01-13 | Stop reason: ALTCHOICE

## 2020-01-13 RX ORDER — SODIUM CHLORIDE 0.9 % (FLUSH) 0.9 %
10 SYRINGE (ML) INJECTION PRN
Status: DISCONTINUED | OUTPATIENT
Start: 2020-01-13 | End: 2020-01-14 | Stop reason: HOSPADM

## 2020-01-13 RX ORDER — SODIUM CHLORIDE 9 MG/ML
INJECTION, SOLUTION INTRAVENOUS CONTINUOUS
Status: DISCONTINUED | OUTPATIENT
Start: 2020-01-13 | End: 2020-01-14 | Stop reason: HOSPADM

## 2020-01-13 RX ORDER — POTASSIUM CHLORIDE 750 MG/1
10 TABLET, FILM COATED, EXTENDED RELEASE ORAL DAILY
Status: DISCONTINUED | OUTPATIENT
Start: 2020-01-14 | End: 2020-01-14 | Stop reason: HOSPADM

## 2020-01-13 RX ORDER — ATORVASTATIN CALCIUM 20 MG/1
20 TABLET, FILM COATED ORAL DAILY
Status: DISCONTINUED | OUTPATIENT
Start: 2020-01-14 | End: 2020-01-14

## 2020-01-13 RX ORDER — SODIUM CHLORIDE 0.9 % (FLUSH) 0.9 %
10 SYRINGE (ML) INJECTION PRN
Status: DISCONTINUED | OUTPATIENT
Start: 2020-01-13 | End: 2020-01-13 | Stop reason: SDUPTHER

## 2020-01-13 RX ORDER — SODIUM CHLORIDE 0.9 % (FLUSH) 0.9 %
10 SYRINGE (ML) INJECTION EVERY 12 HOURS SCHEDULED
Status: DISCONTINUED | OUTPATIENT
Start: 2020-01-13 | End: 2020-01-14 | Stop reason: HOSPADM

## 2020-01-13 RX ORDER — ISOSORBIDE MONONITRATE 30 MG/1
30 TABLET, EXTENDED RELEASE ORAL DAILY
Status: ON HOLD | COMMUNITY
End: 2020-01-14 | Stop reason: HOSPADM

## 2020-01-13 RX ORDER — ASPIRIN 81 MG/1
81 TABLET, CHEWABLE ORAL DAILY
Status: DISCONTINUED | OUTPATIENT
Start: 2020-01-14 | End: 2020-01-14 | Stop reason: HOSPADM

## 2020-01-13 RX ORDER — NITROGLYCERIN 0.4 MG/1
0.4 TABLET SUBLINGUAL EVERY 5 MIN PRN
Status: DISCONTINUED | OUTPATIENT
Start: 2020-01-13 | End: 2020-01-14 | Stop reason: HOSPADM

## 2020-01-13 RX ORDER — M-VIT,TX,IRON,MINS/CALC/FOLIC 27MG-0.4MG
1 TABLET ORAL DAILY
Status: DISCONTINUED | OUTPATIENT
Start: 2020-01-14 | End: 2020-01-14 | Stop reason: HOSPADM

## 2020-01-13 RX ORDER — DIPHENHYDRAMINE HYDROCHLORIDE 50 MG/ML
25 INJECTION INTRAMUSCULAR; INTRAVENOUS ONCE
Status: DISCONTINUED | OUTPATIENT
Start: 2020-01-13 | End: 2020-01-14 | Stop reason: ALTCHOICE

## 2020-01-13 RX ORDER — SODIUM CHLORIDE 0.9 % (FLUSH) 0.9 %
10 SYRINGE (ML) INJECTION EVERY 12 HOURS SCHEDULED
Status: DISCONTINUED | OUTPATIENT
Start: 2020-01-13 | End: 2020-01-13 | Stop reason: SDUPTHER

## 2020-01-13 RX ORDER — FUROSEMIDE 20 MG/1
20 TABLET ORAL DAILY
Status: DISCONTINUED | OUTPATIENT
Start: 2020-01-13 | End: 2020-01-14 | Stop reason: HOSPADM

## 2020-01-13 RX ORDER — ATORVASTATIN CALCIUM 20 MG/1
20 TABLET, FILM COATED ORAL DAILY
Status: ON HOLD | COMMUNITY
End: 2020-01-14 | Stop reason: HOSPADM

## 2020-01-13 RX ADMIN — SODIUM CHLORIDE: 9 INJECTION, SOLUTION INTRAVENOUS at 07:37

## 2020-01-13 RX ADMIN — IOPAMIDOL 50 ML: 755 INJECTION, SOLUTION INTRAVENOUS at 09:30

## 2020-01-13 RX ADMIN — SODIUM CHLORIDE, PRESERVATIVE FREE 10 ML: 5 INJECTION INTRAVENOUS at 21:20

## 2020-01-13 RX ADMIN — TICAGRELOR 90 MG: 90 TABLET ORAL at 21:23

## 2020-01-13 RX ADMIN — METOPROLOL TARTRATE 25 MG: 25 TABLET ORAL at 21:23

## 2020-01-13 RX ADMIN — ASPIRIN 81 MG 324 MG: 81 TABLET ORAL at 08:35

## 2020-01-13 ASSESSMENT — PAIN SCALES - GENERAL
PAINLEVEL_OUTOF10: 0

## 2020-01-13 NOTE — PRE SEDATION
TREVOR    sodium chloride flush 0.9 % injection 10 mL, 10 mL, Intravenous, PRN, Shira Magaña PA-C  Prior to Admission medications    Medication Sig Start Date End Date Taking? Authorizing Provider   clotrimazole (LOTRIMIN) 1 % cream Apply topically 2 times daily Apply topically 2 times daily. Yes Historical Provider, MD   Multiple Vitamins-Minerals (PRESERVISION AREDS PO) Take by mouth 2 times daily   Yes Historical Provider, MD   furosemide (LASIX) 20 MG tablet Take 1 tablet by mouth daily 9/10/19  Yes BAILEY Sandhu CNP   potassium chloride (KLOR-CON) 10 MEQ extended release tablet Take 1 tablet by mouth daily 9/10/19  Yes BAILEY Sandhu CNP   metFORMIN (GLUCOPHAGE-XR) 750 MG extended release tablet Take 1 tablet by mouth Daily with supper 8/9/19  Yes René Valdez MD   CRANBERRY-VITAMIN C PO Take 2 capsules by mouth daily    Yes Historical Provider, MD   D-Mannose 500 MG CAPS Take 1 capsule by mouth daily    Yes Historical Provider, MD   DULoxetine (CYMBALTA) 60 MG extended release capsule Take 2 capsules by mouth daily 10/10/17  Yes BAILEY Davidson CNP   Multiple Vitamins-Minerals (SENIOR MULTIVITAMIN PLUS) TABS Take 1 tablet by mouth daily   Yes Historical Provider, MD   metoprolol (LOPRESSOR) 25 MG tablet Take 1 tablet by mouth 2 times daily 4/17/16  Yes René Valdez MD   aspirin 81 MG EC tablet 1 Tab Oral DAILY    Yes Historical Provider, MD   glucose blood VI test strips (ONE TOUCH ULTRA TEST) strip Test daily or AD. Dx. 250.00 12/6/16   BAILEY Graham CNP   acetaminophen (TYLENOL) 325 MG tablet Take 650 mg by mouth every 6 hours as needed for Pain    Historical Provider, MD     Additional information:       VITAL SIGNS   There were no vitals filed for this visit.     PHYSICAL:   General: No acute distress  HEENT:  Unremarkable for age  Neck: without increased JVD, carotid pulses 2+ bilaterally without bruits  Heart: RRR, S1 & S2 WNL, S4 gallop, without murmurs or

## 2020-01-14 VITALS
HEART RATE: 97 BPM | OXYGEN SATURATION: 93 % | SYSTOLIC BLOOD PRESSURE: 125 MMHG | HEIGHT: 65 IN | BODY MASS INDEX: 31.59 KG/M2 | DIASTOLIC BLOOD PRESSURE: 60 MMHG | RESPIRATION RATE: 16 BRPM | TEMPERATURE: 98.5 F | WEIGHT: 189.6 LBS

## 2020-01-14 PROCEDURE — 6370000000 HC RX 637 (ALT 250 FOR IP): Performed by: INTERNAL MEDICINE

## 2020-01-14 PROCEDURE — 2709999900 HC NON-CHARGEABLE SUPPLY

## 2020-01-14 PROCEDURE — 6370000000 HC RX 637 (ALT 250 FOR IP): Performed by: NURSE PRACTITIONER

## 2020-01-14 PROCEDURE — 6370000000 HC RX 637 (ALT 250 FOR IP): Performed by: PHYSICIAN ASSISTANT

## 2020-01-14 PROCEDURE — 99214 OFFICE O/P EST MOD 30 MIN: CPT | Performed by: NURSE PRACTITIONER

## 2020-01-14 RX ORDER — DIPHENHYDRAMINE HCL 25 MG
25 TABLET ORAL ONCE
Status: COMPLETED | OUTPATIENT
Start: 2020-01-14 | End: 2020-01-14

## 2020-01-14 RX ORDER — METOPROLOL TARTRATE 50 MG/1
50 TABLET, FILM COATED ORAL 2 TIMES DAILY
Qty: 60 TABLET | Refills: 3 | Status: SHIPPED | OUTPATIENT
Start: 2020-01-14 | End: 2020-01-24 | Stop reason: SDUPTHER

## 2020-01-14 RX ORDER — ATORVASTATIN CALCIUM 40 MG/1
40 TABLET, FILM COATED ORAL DAILY
Qty: 30 TABLET | Refills: 3 | Status: SHIPPED | OUTPATIENT
Start: 2020-01-14 | End: 2020-01-14

## 2020-01-14 RX ORDER — ATORVASTATIN CALCIUM 40 MG/1
40 TABLET, FILM COATED ORAL DAILY
Status: DISCONTINUED | OUTPATIENT
Start: 2020-01-14 | End: 2020-01-14 | Stop reason: HOSPADM

## 2020-01-14 RX ORDER — METFORMIN HYDROCHLORIDE 750 MG/1
750 TABLET, EXTENDED RELEASE ORAL
Qty: 30 TABLET | Refills: 3 | Status: SHIPPED
Start: 2020-01-14

## 2020-01-14 RX ORDER — METOPROLOL TARTRATE 50 MG/1
50 TABLET, FILM COATED ORAL 2 TIMES DAILY
Qty: 60 TABLET | Refills: 3 | Status: SHIPPED | OUTPATIENT
Start: 2020-01-14 | End: 2020-01-14

## 2020-01-14 RX ORDER — ATORVASTATIN CALCIUM 40 MG/1
40 TABLET, FILM COATED ORAL DAILY
Qty: 30 TABLET | Refills: 3 | Status: SHIPPED | OUTPATIENT
Start: 2020-01-14

## 2020-01-14 RX ORDER — NITROGLYCERIN 0.4 MG/1
TABLET SUBLINGUAL
Qty: 25 TABLET | Refills: 1 | Status: SHIPPED | OUTPATIENT
Start: 2020-01-14 | End: 2020-01-14

## 2020-01-14 RX ORDER — NITROGLYCERIN 0.4 MG/1
TABLET SUBLINGUAL
Qty: 25 TABLET | Refills: 1 | Status: ON HOLD | OUTPATIENT
Start: 2020-01-14 | End: 2021-01-01 | Stop reason: HOSPADM

## 2020-01-14 RX ORDER — METOPROLOL TARTRATE 50 MG/1
50 TABLET, FILM COATED ORAL 2 TIMES DAILY
Status: DISCONTINUED | OUTPATIENT
Start: 2020-01-14 | End: 2020-01-14 | Stop reason: HOSPADM

## 2020-01-14 RX ADMIN — POTASSIUM CHLORIDE 10 MEQ: 750 TABLET, FILM COATED, EXTENDED RELEASE ORAL at 09:41

## 2020-01-14 RX ADMIN — TICAGRELOR 90 MG: 90 TABLET ORAL at 09:11

## 2020-01-14 RX ADMIN — METOPROLOL TARTRATE 50 MG: 50 TABLET, FILM COATED ORAL at 09:10

## 2020-01-14 RX ADMIN — DIPHENHYDRAMINE HCL 25 MG: 25 TABLET ORAL at 00:11

## 2020-01-14 RX ADMIN — DULOXETINE HYDROCHLORIDE 120 MG: 60 CAPSULE, DELAYED RELEASE ORAL at 09:10

## 2020-01-14 RX ADMIN — ACETAMINOPHEN 650 MG: 325 TABLET ORAL at 00:11

## 2020-01-14 RX ADMIN — FUROSEMIDE 20 MG: 20 TABLET ORAL at 09:10

## 2020-01-14 RX ADMIN — ATORVASTATIN CALCIUM 40 MG: 40 TABLET, FILM COATED ORAL at 09:09

## 2020-01-14 RX ADMIN — ASPIRIN 81 MG 81 MG: 81 TABLET ORAL at 09:13

## 2020-01-14 ASSESSMENT — PAIN SCALES - GENERAL: PAINLEVEL_OUTOF10: 0

## 2020-01-14 NOTE — PROGRESS NOTES
atorvastatin  20 mg Oral Daily    isosorbide mononitrate  30 mg Oral Daily      sodium chloride Stopped (01/13/20 2486)     nitroGLYCERIN, 0.4 mg, Q5 Min PRN  sodium chloride flush, 10 mL, PRN  acetaminophen, 650 mg, Q4H PRN  magnesium hydroxide, 30 mL, Daily PRN        Diagnostics:      Left Heart Cath:  HEMODYNAMIC RESULTS AND LEFT VENTRICULOGRAM:  Left ventricular  end-diastolic pressure was 12 mmHg. No significant change before and  after contrast injection. No significant gradient across the aortic  valve to signify aortic stenosis. Left ventricular function was within  normal limits. EF 60%.     CORONARY ANGIOGRAM RESULTS:  1. Left main is patent, gives rise to LAD and left circ. 2.  LAD has ostial disease which seems to be quite hazy and possibly  significant stenosis. There was difficult to evaluate due to eccentric  nature of the plaque. The rest of the LAD has nonobstructive mild  disease. 3.  Left circumflex artery is nondominant and patent. 4.  Right coronary artery is large, dominant, has nonobstructive luminal  irregularity.     CONCLUSION:  1. Disease in the LAD towards the ostium as described above that seems  to be significant. 2.  Normal LV function.     RECOMMENDATIONS:  At this point, case was discussed with Dr. Maru Rivera. It  was felt that due to the patient!s trauma yesterday and fall, hitting  her head, it was safer for her to be considered for intervention in the  next several days. She did have a CT today which ruled out a bleed;  however, she still runs a high risk.   The patient definitely does not  want any consideration for open heart surgery at any cost.  We will  discuss further with the family.           We will treat medically in the meantime and go from there.     Carlota Vick M.D.  D: 12/02/2019         Post-procedure Diagnosis/Findings:           PCI 3.5 x 8 KANDIS of the ostial LAD Sandi Carter  Electronically signed 1/13/2020 at 9:28 AM  Interventional Cardiology    Lab Data:    Cardiac Enzymes:  No results for input(s): CKTOTAL, CKMB, CKMBINDEX, TROPONINI in the last 72 hours. CBC:   Lab Results   Component Value Date    WBC 10.5 01/13/2020    RBC 3.80 01/13/2020    HGB 12.2 01/13/2020    HCT 38.2 01/13/2020     01/13/2020       CMP:    Lab Results   Component Value Date     01/13/2020    K 4.4 01/13/2020     01/13/2020    CO2 20 01/13/2020    BUN 22 01/13/2020    CREATININE 0.9 01/13/2020    LABGLOM 60 01/13/2020    GLUCOSE 185 01/13/2020    CALCIUM 9.5 01/13/2020       Hepatic Function Panel:    Lab Results   Component Value Date    ALKPHOS 64 08/08/2019    ALT 13 08/08/2019    AST 16 08/08/2019    PROT 6.0 08/08/2019    BILITOT 0.2 08/08/2019    BILIDIR <0.2 09/20/2016    LABALBU 3.2 08/08/2019       Magnesium:    Lab Results   Component Value Date    MG 1.7 03/24/2019       PT/INR:    Lab Results   Component Value Date    INR 0.96 01/13/2020       HgBA1c:    Lab Results   Component Value Date    LABA1C 5.9 03/22/2019       FLP:    Lab Results   Component Value Date    TRIG 359 01/13/2020    HDL 38 01/13/2020    LDLCALC 63 01/13/2020       TSH:    Lab Results   Component Value Date    TSH 3.470 08/09/2019         Assessment:    S\p cardiac cath 1/13/2020: PCI 3.5 x 8 KANDIS of the ostial LAD      HLP  HTN  DM II      Plan:  · Ok to DC   · Follow 3-4 weeks with Maia Rios et all. ..      Cardiac Rehab: Yes      Discharge condition: stable  Disposition: Home  Time spent on discharge: less than 30 minutes      Discharge Medications for PCI/MI (performed or attempted):   · ASA: yes  · Statin: yes  · P2Y12 Inhibitor: yes  · Beta Blocker: yes  · Nitro SL: yes      Discharge Medications for ICD, Cardiomyopathy, CHF:  · Beta Blocker: yes  · ACE Inhibitor/ARB: no  Normal EF           Electronically signed by BAILEY Frazier CNP on 1/14/2020 at 07-Jul-2018 16:38 7:55 AM

## 2020-01-14 NOTE — CARE COORDINATION
1/14/20, 7:37 AM    Patient goals/plan/ treatment preferences discussed by  and . Patient goals/plan/ treatment preferences reviewed with patient/ family. Patient/ family verbalize understanding of discharge plan and are in agreement with goal/plan/treatment preferences. Understanding was demonstrated using the teach back method. AVS provided by RN at time of discharge, which includes all necessary medical information pertaining to the patients current course of illness, treatment, post-discharge goals of care, and treatment preferences. Cardiac Cath with PCI yesterday with Dr. Oskar Jerome. Anticipate discharge today. Met with pt today. She is from home at [facility]. Her basic needs are met. She has transportation,she uses a cane, she has a PCP, no difficulty getting medications and she denies home going needs.

## 2020-01-14 NOTE — PROGRESS NOTES
Patient received pamphlet about cardiac intervention, how to take care of the incision site, mended hearts program, cardiac rehab and the hours of operations, and Nutritional information regarding cardiac diet.

## 2020-01-14 NOTE — CARE COORDINATION
1/14/20, 9:59 AM    DISCHARGE PLANNING EVALUATION      Spoke with patient, she is from [place] [address] and will be returning to [place]. Patient is discharged, waiting for her daughter to pick her up. Spoke with Bret Rose at [place], informed that patient is returning this morning. Will fax AVS and MARS. 1/14/20, 10:00 AM    Patient goals/plan/ treatment preferences discussed by  and . Patient goals/plan/ treatment preferences reviewed with patient/ family. Patient/ family verbalize understanding of discharge plan and are in agreement with goal/plan/treatment preferences. Understanding was demonstrated using the teach back method. AVS provided by RN at time of discharge, which includes all necessary medical information pertaining to the patients current course of illness, treatment, post-discharge goals of care, and treatment preferences. Services After Discharge  Services At/After Discharge: Aide Services, Nursing Services(Maria Victoria FARIA)       Patient discharge to [place] [address]. Lanette at [place] notified of discharged, faxed AVS and MARS. Patients daughter transporting patient.

## 2020-01-14 NOTE — PROCEDURES
[address] [place] [address]                            CARDIAC CATHETERIZATION    PATIENT NAME: Beltran Allred                :        1935  MED REC NO:   [id number]                           ROOM:       0024  ACCOUNT NO:   [de-identified]                           ADMIT DATE: 2020  PROVIDER:     Lawanda Mckeon MD    DATE OF PROCEDURE:  2020    INDICATION:  CCS class III anginal equivalent, on optimal medical  therapy, with known severe ostial LAD stenosis, presenting for staged  PCI. SURGEON:  Lawanda Mckeon MD    DESCRIPTION OF PROCEDURE:  After informed was obtained from the patient,  she was brought to the cardiac catheterization laboratory and prepped in  a sterile fashion. Right radial artery was chosen as the primary point  of access. Preprocedure timeout was completed. After infiltration of  the right wrist with 2% lidocaine using micropuncture, modified  Seldinger technique, and ultrasound guidance, I was able to insert a  6-Armenian Slender sheath into the right radial artery. Standard  antispasmodic/antithrombotic cocktail was given. Preprocedure  angiography was performed by Dr. Jose Gutierrez. Please see his note for  further details. In brief, she had severe ostial LAD stenosis  necessitating intervention given her symptoms and medical therapy. INTERVENTION:  I cannulated the left main using a 6-Armenian EBU 3.5  guiding catheter without complication. Heparin IV was given. ACT was  confirmed to be above 250 seconds. I wired the lesion by using a  Runthrough wire. I also wired the second Runthrough wire to protect the  circumflex given that the lesion was ostial.  I then predilated the  lesion using 3.5 x 8 Trek balloon at 8 atmospheres. I then passed a 3.5  x 8 Xience [address] [place] [address] and deployed this at 11 atmospheres.   There was no  significant jailing of the circumflex, and there was significant flow  into the LAD. I then postdilated the stent with a 3.5 x 8 up to 18  atmospheres. Given that there was ALMAS-3 flow in all vessels, no  further intervention was taken. All equipment were removed from the  patient. The patient tolerated the procedure well. No significant guide or guidewire-induced dissection. IMMEDIATE COMPLICATIONS:  None. MEDICATIONS:  See MAR. ACCESS:  Vasc Band used for hemostasis. ESTIMATED BLOOD LOSS:  Less than 50 mL. SUMMARY:  Successful PCI of the ostium of the LAD. PLAN:  1. Bedrest.  2.  Optimal medical therapy. 3.  Risk factor management. 4.  Routine access site care. 5.  Overnight observation. 6.  IV fluids. 7. DAPT. 8.  Follow up with Dr. Claudean Blew in one to two weeks postprocedure. All the above was explained to the patient and the patient's family. They are agreeable and amenable to the above plan.         Laura Carter MD    D: 01/14/2020 6:28:08       T: 01/14/2020 8:14:59     [email or web address]  Job#: [id number]     Doc#: [id number]    CC:

## 2020-01-24 ENCOUNTER — OFFICE VISIT (OUTPATIENT)
Dept: CARDIOLOGY CLINIC | Age: 85
End: 2020-01-24
Payer: MEDICARE

## 2020-01-24 VITALS
SYSTOLIC BLOOD PRESSURE: 133 MMHG | DIASTOLIC BLOOD PRESSURE: 69 MMHG | BODY MASS INDEX: 34.48 KG/M2 | WEIGHT: 187.4 LBS | HEART RATE: 105 BPM | HEIGHT: 62 IN

## 2020-01-24 PROCEDURE — 99214 OFFICE O/P EST MOD 30 MIN: CPT | Performed by: NUCLEAR MEDICINE

## 2020-01-24 PROCEDURE — 93000 ELECTROCARDIOGRAM COMPLETE: CPT | Performed by: NUCLEAR MEDICINE

## 2020-01-24 RX ORDER — BUMETANIDE 2 MG/1
2 TABLET ORAL DAILY
COMMUNITY
End: 2020-01-24 | Stop reason: SDUPTHER

## 2020-01-24 RX ORDER — METOPROLOL TARTRATE 50 MG/1
75 TABLET, FILM COATED ORAL 2 TIMES DAILY
Qty: 90 TABLET | Refills: 3 | Status: SHIPPED | OUTPATIENT
Start: 2020-01-24 | End: 2020-02-25 | Stop reason: DRUGHIGH

## 2020-01-24 RX ORDER — BUMETANIDE 2 MG/1
2 TABLET ORAL DAILY
Qty: 30 TABLET | Refills: 3 | Status: ON HOLD | OUTPATIENT
Start: 2020-01-24 | End: 2021-01-01 | Stop reason: HOSPADM

## 2020-01-24 NOTE — PROGRESS NOTES
Follow up after stent placement. C/o sob, lightheaded, extremely tired and nauseated all since starting Brilinta last week. Denies cp, palpitation, dizziness and MJ. EKG done today.

## 2020-01-24 NOTE — PROGRESS NOTES
with supper Restart this medication Friday am 30 tablet 3    nitroGLYCERIN (NITROSTAT) 0.4 MG SL tablet up to max of 3 total doses. If no relief after 1 dose, call 911. 25 tablet 1    atorvastatin (LIPITOR) 40 MG tablet Take 1 tablet by mouth daily 30 tablet 3    metoprolol tartrate (LOPRESSOR) 50 MG tablet Take 1 tablet by mouth 2 times daily 60 tablet 3    ticagrelor (BRILINTA) 90 MG TABS tablet Take 1 tablet by mouth 2 times daily 60 tablet 3    clotrimazole (LOTRIMIN) 1 % cream Apply topically 2 times daily Apply topically 2 times daily.  Multiple Vitamins-Minerals (PRESERVISION AREDS PO) Take by mouth 2 times daily      furosemide (LASIX) 20 MG tablet Take 1 tablet by mouth daily 90 tablet 0    potassium chloride (KLOR-CON) 10 MEQ extended release tablet Take 1 tablet by mouth daily 30 tablet 0    CRANBERRY-VITAMIN C PO Take 2 capsules by mouth daily       D-Mannose 500 MG CAPS Take 1 capsule by mouth daily       DULoxetine (CYMBALTA) 60 MG extended release capsule Take 2 capsules by mouth daily 60 capsule 0    Multiple Vitamins-Minerals (SENIOR MULTIVITAMIN PLUS) TABS Take 1 tablet by mouth daily      glucose blood VI test strips (ONE TOUCH ULTRA TEST) strip Test daily or AD. Dx. 250.00 50 each 11    acetaminophen (TYLENOL) 325 MG tablet Take 650 mg by mouth every 6 hours as needed for Pain      aspirin 81 MG EC tablet 1 Tab Oral DAILY        No current facility-administered medications for this visit.       Allergies   Allergen Reactions    Ciprofloxacin Hcl     Food Rash     strawberries    Penicillins Swelling and Rash     Health Maintenance   Topic Date Due    DTaP/Tdap/Td vaccine (1 - Tdap) 08/11/1946    DEXA (modify frequency per FRAX score)  08/11/2000    Shingles Vaccine (2 of 3) 12/16/2012    Diabetic foot exam  08/25/2015    Annual Wellness Visit (AWV)  06/23/2019    Flu vaccine (1) 09/01/2019    Lipid screen  01/13/2021    Potassium monitoring  01/13/2021    Creatinine monitoring  01/13/2021    Pneumococcal 65+ years Vaccine  Completed       Subjective:  Review of Systems  General:   No fever, no chills, some fatigue or weight loss  Pulmonary:    some more dyspnea, no wheezing  Cardiac:    Denies recent chest pain,   GI:     No nausea or vomiting, no abdominal pain  Neuro:    No dizziness or light headedness,   Musculoskeletal:  No recent active issues  Extremities:   No edema, no obvious claudication       Objective:  Physical Exam  /69   Pulse 105   Ht 5' 2\" (1.575 m)   Wt 187 lb 6.4 oz (85 kg)   BMI 34.28 kg/m²   General:   Well developed, well nourished  Lungs:   Clear to auscultation  Heart:    Normal S1 S2, Slight murmur. no rubs, no gallops  Abdomen:   Soft, non tender, no organomegalies, positive bowel sounds  Extremities:   No edema, no cyanosis, good peripheral pulses  Neurological:   Awake, alert, oriented. No obvious focal deficits  Musculoskelatal:  No obvious deformities    Assessment:      Diagnosis Orders   1. SOB (shortness of breath)  EKG 12 Lead   2. Coronary artery disease involving coronary bypass graft of native heart without angina pectoris     3. Familial hypercholesterolemia     ? dyspnea  ? brilenta  Vs other causes  ? CHF   ECG in office was done today. I reviewed the ECG. No acute findings, tachycardia     Plan:  No follow-ups on file. I have spent 25 minutes face to face with the patient. More than 50% of this time was spent counseling and coordinating care. Complicated patient   Change metoprolol 75 bid  Change lasix to bumex 2 mg   Change brilenta to plavix as last option  Load 300 mg   Continue risk factor modification and medical management  Thank you for allowing me to participate in the care of your patient.  Please don't hesitate to contact me regarding any further issues related to the patient care    Orders Placed:  Orders Placed This Encounter   Procedures    EKG 12 Lead     Order Specific Question:   Reason for Exam? Answer: Other       Medications Prescribed:  No orders of the defined types were placed in this encounter. Discussed use, benefit, and side effects of prescribed medications. All patient questions answered. Pt voicedunderstanding. Instructed to continue current medications, diet and exercise. Continue risk factor modification and medical management. Patient agreed with treatment plan. Follow up as directed.     Electronically signedby Sarina Oliver MD on 1/24/2020 at 10:46 AM

## 2020-02-03 ENCOUNTER — OFFICE VISIT (OUTPATIENT)
Dept: CARDIOLOGY CLINIC | Age: 85
End: 2020-02-03
Payer: MEDICARE

## 2020-02-03 VITALS
BODY MASS INDEX: 31.16 KG/M2 | HEIGHT: 65 IN | HEART RATE: 121 BPM | DIASTOLIC BLOOD PRESSURE: 68 MMHG | WEIGHT: 187 LBS | SYSTOLIC BLOOD PRESSURE: 122 MMHG

## 2020-02-03 PROCEDURE — 99213 OFFICE O/P EST LOW 20 MIN: CPT | Performed by: PHYSICIAN ASSISTANT

## 2020-02-03 RX ORDER — CLOPIDOGREL BISULFATE 75 MG/1
75 TABLET ORAL DAILY
COMMUNITY
End: 2020-02-03 | Stop reason: SDUPTHER

## 2020-02-03 RX ORDER — CLOPIDOGREL 300 MG/1
300 TABLET, FILM COATED ORAL ONCE
COMMUNITY
End: 2020-02-07 | Stop reason: ALTCHOICE

## 2020-02-03 RX ORDER — CLOPIDOGREL BISULFATE 75 MG/1
TABLET ORAL
Qty: 34 TABLET | Refills: 0 | Status: SHIPPED | OUTPATIENT
Start: 2020-02-03

## 2020-02-03 RX ORDER — CLOPIDOGREL 300 MG/1
300 TABLET, FILM COATED ORAL ONCE
Qty: 1 TABLET | Refills: 0 | Status: CANCELLED | OUTPATIENT
Start: 2020-02-03 | End: 2020-02-03

## 2020-02-03 NOTE — PROGRESS NOTES
[facility] PROFESSIONAL SERVICES  [facility] [address]   [address] [address]   Dept: [phone]   Dept Fax: [phone]   Loc: [phone]      Chief Complaint   Patient presents with    Follow-up     1 week med changes     Patient presents for follow appointment after recent medication changes. Patient recently saw Dr. Aron Ferrer and her metoprolol was increased to 75 twice a day and her diuretics were changed to Bumex. There was discussion about changing her from Brilinta to Plavix but she remains on Brilinta at this time. She complains of intermittent shortness of breath. She states that her edema has improved somewhat with the Bumex. She is fatigued. She denies any chest pain or palpitations.   Cardiologist:  Dr. Alok Velasco:   No fever, no chills, No fatigue or weight loss  Pulmonary:   Intermittent shortness of breath   cardiac:    Denies recent chest pain   GI:     No nausea or vomiting, no abdominal pain  Neuro:    No dizziness or light headedness  Musculoskeletal:  No recent active issues  Extremities:  +edema, good peripheral pulses      Past Medical History:   Diagnosis Date    Anxiety     nervous breakdown 9/2016    [facility] ([facility]     CAD (coronary artery disease)     cardiomegaly    [facility]     Diverticulitis     D[facility] venous [facility]) [facility]     GERD (gastroesophageal reflux disease)     irritable bowel disease    Hyperlipidemia     Hypertension     Osteoarthritis     Pancreatic cancer ([facility]     Family    Psychiatric problem     alzheimers start    Pulmonary embolism ([facility]     S/P knee replacement     Type 2 diabetes mellitus ([facility] 2009       Allergies   Allergen Reactions    Ciprofloxacin Hcl     Food Rash     strawberries    Penicillins Swelling and Rash       Current Outpatient Medications   Medication Sig Dispense Refill    clopidogrel (PLAVIX) 75 MG tablet Pt takes 300 mg tonight then 75 mg every day starting on 2/4/2020 34 tablet 0    bumetanide (BUMEX) 2 MG tablet Take 1 tablet by mouth daily 30 tablet 3    metFORMIN (GLUCOPHAGE-XR) 750 MG extended release tablet Take 1 tablet by mouth Daily with supper Restart this medication Friday am 30 tablet 3    nitroGLYCERIN (NITROSTAT) 0.4 MG SL tablet up to max of 3 total doses. If no relief after 1 dose, call 911. 25 tablet 1    atorvastatin (LIPITOR) 40 MG tablet Take 1 tablet by mouth daily 30 tablet 3    clotrimazole (LOTRIMIN) 1 % cream Apply topically 2 times daily Apply topically 2 times daily.  Multiple Vitamins-Minerals (PRESERVISION AREDS PO) Take by mouth 2 times daily      potassium chloride (KLOR-CON) 10 MEQ extended release tablet Take 1 tablet by mouth daily 30 tablet 0    CRANBERRY-VITAMIN C PO Take 2 capsules by mouth daily       D-Mannose 500 MG CAPS Take 1 capsule by mouth daily       DULoxetine (CYMBALTA) 60 MG extended release capsule Take 2 capsules by mouth daily 60 capsule 0    Multiple Vitamins-Minerals (SENIOR MULTIVITAMIN PLUS) TABS Take 1 tablet by mouth daily      glucose blood VI test strips (ONE TOUCH ULTRA TEST) strip Test daily or AD. Dx. 250.00 50 each 11    acetaminophen (TYLENOL) 325 MG tablet Take 650 mg by mouth every 6 hours as needed for Pain      aspirin 81 MG EC tablet 1 Tab Oral DAILY       metoprolol tartrate (LOPRESSOR) 50 MG tablet Take 1.5 tablets by mouth 2 times daily 90 tablet 3     No current facility-administered medications for this visit. Social History     Socioeconomic History    Marital status:       Spouse name: Morales Jaime Number of children: 11    Years of education: 15    Highest education level: None   Occupational History    Occupation: retired   Social Needs    Financial resource strain: None    Food insecurity:     Worry: None     Inability: None    Transportation needs:     Medical: None     Non-medical: None   Tobacco Use   

## 2020-02-03 NOTE — PROGRESS NOTES
Pt C/O SOB , dizziness if getting up too fast, slight swelling but is better since going on bumex, fatigued      Pt denies CP, Headache, heart palpitation

## 2020-02-07 ENCOUNTER — TELEPHONE (OUTPATIENT)
Dept: CARDIOLOGY CLINIC | Age: 85
End: 2020-02-07

## 2020-02-07 NOTE — TELEPHONE ENCOUNTER
Spoke with Radha Franz at [place] and spoke with husam and they verbally understood and order given to scheduling.

## 2020-02-13 ENCOUNTER — OFFICE VISIT (OUTPATIENT)
Dept: CARDIOLOGY CLINIC | Age: 85
End: 2020-02-13
Payer: MEDICARE

## 2020-02-13 VITALS
HEART RATE: 122 BPM | BODY MASS INDEX: 31.95 KG/M2 | SYSTOLIC BLOOD PRESSURE: 104 MMHG | WEIGHT: 192 LBS | DIASTOLIC BLOOD PRESSURE: 60 MMHG | OXYGEN SATURATION: 95 %

## 2020-02-13 LAB
ANION GAP SERPL CALCULATED.3IONS-SCNC: 14 MEQ/L (ref 8–16)
BUN BLDV-MCNC: 26 MG/DL (ref 7–22)
CALCIUM SERPL-MCNC: 9.3 MG/DL (ref 8.5–10.5)
CHLORIDE BLD-SCNC: 99 MEQ/L (ref 98–111)
CO2: 28 MEQ/L (ref 23–33)
CREAT SERPL-MCNC: 1.2 MG/DL (ref 0.4–1.2)
ERYTHROCYTE [DISTWIDTH] IN BLOOD BY AUTOMATED COUNT: 14.9 % (ref 11.5–14.5)
ERYTHROCYTE [DISTWIDTH] IN BLOOD BY AUTOMATED COUNT: 52.7 FL (ref 35–45)
GFR SERPL CREATININE-BSD FRML MDRD: 43 ML/MIN/1.73M2
GLUCOSE BLD-MCNC: 222 MG/DL (ref 70–108)
HCT VFR BLD CALC: 37.9 % (ref 37–47)
HEMOGLOBIN: 12.1 GM/DL (ref 12–16)
MCH RBC QN AUTO: 31.4 PG (ref 26–33)
MCHC RBC AUTO-ENTMCNC: 31.9 GM/DL (ref 32.2–35.5)
MCV RBC AUTO: 98.4 FL (ref 81–99)
PLATELET # BLD: 228 THOU/MM3 (ref 130–400)
PMV BLD AUTO: 8.8 FL (ref 9.4–12.4)
POTASSIUM SERPL-SCNC: 4.6 MEQ/L (ref 3.5–5.2)
PRO-BNP: 1077 PG/ML (ref 0–1800)
RBC # BLD: 3.85 MILL/MM3 (ref 4.2–5.4)
SODIUM BLD-SCNC: 141 MEQ/L (ref 135–145)
WBC # BLD: 8.5 THOU/MM3 (ref 4.8–10.8)

## 2020-02-13 PROCEDURE — 99214 OFFICE O/P EST MOD 30 MIN: CPT | Performed by: NURSE PRACTITIONER

## 2020-02-13 PROCEDURE — 93000 ELECTROCARDIOGRAM COMPLETE: CPT | Performed by: NURSE PRACTITIONER

## 2020-02-13 ASSESSMENT — ENCOUNTER SYMPTOMS
COUGH: 0
SHORTNESS OF BREATH: 1
ABDOMINAL DISTENTION: 1

## 2020-02-13 NOTE — PROGRESS NOTES
Heart Failure Clinic       Visit Date: 2/13/2020  Cardiologist:   [facility]  Primary Care Physician: Dr. Driss Berman MD    Ashleigh Graves is a 80 y.o. female who presents today for:  Chief Complaint   Patient presents with    Congestive Heart Failure       HPI:   Ashleigh Graves is a 80 y.o. female who presents to the office for a follow up patient visit in the heart failure clinic. Accompanied by no one  Lives at [facility]  HX: Diastolic (EF 19-76%), Mitral stenosis, HTN, HLD, DM, PVD    Hospitalization r/t Heart Failure:    March 2019  = went in c/o leg swelling and SOB.  Weight gain 23# over past few months.  , CXR negative for congestion. Lasix IV w/ improvement. Blossom rAredondo saw in office 4/15 - ? Underlying CAD, ordered Stress  STRESS -Negative  OV June - Added Lasix MWF  August in for Pneumonia/hip pain - BNP not elevated  OV Baki Dec - worsening SOB. Ordered LHC  1/13 [facility] - PCI to ostium LAD  1/24 -OV pt feeling no better - changed meds - Lasix to Bumex, increased Metoprolol. TODAY - up 5# in 10 days (9# since Sept).     Concerns today: No better, no worse since med changes 2 weeks ago. Urinating more since change to Bumex. Wt is slowing going up - states she eating more  Has noticed some palpitations recently  Less energy - increased \"breathlessness\" - no improvement since med  Increased sweats lately, w/ exertion  Activity: Stopped several times walking across corridor today. Occasional winded w/ ADLs  Diet: Eating a lot     Patient has:  Chest Pain: No  SOB: Yes  Orthopnea/PND: No  YASMIN: No  Edema: Mild  Fatigue: Yes  Abdominal bloating: Yes  Cough:  Yes  Appetite: TOO good  Any extra diuretic use: No  Home weight: slow gain  Home blood pressure: \"low\"     Past Medical History:   Diagnosis Date    Anxiety     nervous breakdown 9/2016    [facility] ([facility] [address]     CAD (coronary artery disease)     cardiomegaly    COPD (chronic obstructive pulmonary disease) ([facility] [address]     mouth daily       DULoxetine (CYMBALTA) 60 MG extended release capsule Take 2 capsules by mouth daily 60 capsule 0    Multiple Vitamins-Minerals (SENIOR MULTIVITAMIN PLUS) TABS Take 1 tablet by mouth daily      glucose blood VI test strips (ONE TOUCH ULTRA TEST) strip Test daily or AD. Dx. 250.00 50 each 11    acetaminophen (TYLENOL) 325 MG tablet Take 650 mg by mouth every 6 hours as needed for Pain      aspirin 81 MG EC tablet 1 Tab Oral DAILY        No current facility-administered medications for this visit. Allergies   Allergen Reactions    Ciprofloxacin Hcl     Food Rash     strawberries    Penicillins Swelling and Rash       SUBJECTIVE:   Review of Systems   Constitutional: Positive for fatigue. Negative for activity change and appetite change. Respiratory: Positive for shortness of breath. Negative for cough. Cardiovascular: Negative for chest pain, palpitations and leg swelling. Gastrointestinal: Positive for abdominal distention. Neurological: Negative for weakness, light-headedness and headaches. Hematological: Negative for adenopathy. Psychiatric/Behavioral: Negative for sleep disturbance. OBJECTIVE:   Today's Vitals:  /60   Pulse 122   Wt 192 lb (87.1 kg)   SpO2 95%   BMI 31.95 kg/m²     Physical Exam  Vitals signs reviewed. Constitutional:       General: She is not in acute distress. Appearance: Normal appearance. She is well-developed. She is not diaphoretic. HENT:      Head: Normocephalic and atraumatic. Eyes:      Conjunctiva/sclera: Conjunctivae normal.   Neck:      Musculoskeletal: Normal range of motion and neck supple. Comments: No JVD  Cardiovascular:      Rate and Rhythm: Regular rhythm. Tachycardia present. Heart sounds: No murmur. Pulmonary:      Effort: Pulmonary effort is normal. No respiratory distress. Breath sounds: Rales (joselyn bases L>R) present. No wheezing.    Abdominal:      General: Bowel sounds are normal. There is no distension. Palpations: Abdomen is soft. Tenderness: There is no abdominal tenderness. Musculoskeletal: Normal range of motion. Right lower leg: No edema. Left lower leg: No edema. Skin:     General: Skin is warm and dry. Capillary Refill: Capillary refill takes less than 2 seconds. Neurological:      Mental Status: She is alert and oriented to person, place, and time. Coordination: Coordination normal.   Psychiatric:         Behavior: Behavior normal.       Wt Readings from Last 3 Encounters:   02/13/20 192 lb (87.1 kg)   02/03/20 187 lb (84.8 kg)   01/24/20 187 lb 6.4 oz (85 kg)     BP Readings from Last 3 Encounters:   02/13/20 104/60   02/03/20 122/68   01/24/20 133/69     Pulse Readings from Last 3 Encounters:   02/13/20 122   02/03/20 121   01/24/20 105     Body mass index is 31.95 kg/m². ECHO:    Summary   Left ventricular size and systolic function is normal. Ejection fraction   was estimated at 55-60%. LV wall thickness is within normal limits and   there are no obvious wall motion abnormalities. Myxomatotic degeneration of mitral valve. Decreased mitral valve mobility noted. Calcification of the mitral valve noted. Mild mitral stenosis with mean gradient of 4 mm Hg, P1/2t of 81 ms and MVA   of 2.72 sq cm   Aortic valve leaflets are somewhat thickened. Signature      ----------------------------------------------------------------   Electronically signed by Donald Bahena MD (Interpreting   physician) on 03/23/2019 at 03:38 PM   ----------------------------------------------------------------    CATH/STRESS:   SUMMARY:  Successful PCI of the ostium of the LAD.     PLAN:  1. Bedrest.  2.  Optimal medical therapy. 3.  Risk factor management. 4.  Routine access site care. 5.  Overnight observation. 6.  IV fluids. 7. DAPT.   8.  Follow up with Dr. Martha Hurt in one to two weeks postprocedure.     All the above was explained to the patient and the effusion. CXR today. Holter as scheduled. Increase Lopressor 100 BID  Also ordered BMP, CBC, and BNP today. F/U pending results. · Daily weights  · Fluid restriction of 2 Liters per day  · Limit sodium in diet to around 3516-5251 mg/day  · Monitor BP  · Activity as tolerated     Patient was instructed to call the [address] for any changes in symptoms as noted in AVS.      No follow-ups on file. or sooner if needed     Patient given educational materials - see patient instructions. We discussed the importance of weighing oneself and recording daily. We also discussed the importance of a low sodium diet, higher sodium foods to avoid and better low sodium food options. Patient verbalizes understanding of plan of care using teach back method, and is agreeable to the treatment plan.        Electronically signed by BAILEY Brown CNP on 2/13/2020 at 4:33 PM

## 2020-02-14 ENCOUNTER — HOSPITAL ENCOUNTER (OUTPATIENT)
Dept: NON INVASIVE DIAGNOSTICS | Age: 85
Discharge: HOME | DRG: 884 | End: 2020-02-14
Payer: MEDICARE

## 2020-02-14 ENCOUNTER — TELEPHONE (OUTPATIENT)
Dept: CARDIOLOGY CLINIC | Age: 85
End: 2020-02-14

## 2020-02-14 ENCOUNTER — HOSPITAL ENCOUNTER (OUTPATIENT)
Dept: GENERAL RADIOLOGY | Age: 85
Discharge: HOME | DRG: 884 | End: 2020-02-14
Payer: MEDICARE

## 2020-02-14 ENCOUNTER — HOSPITAL ENCOUNTER (OUTPATIENT)
Age: 85
Discharge: HOME | DRG: 884 | End: 2020-02-14
Payer: MEDICARE

## 2020-02-14 PROCEDURE — 93226 XTRNL ECG REC<48 HR SCAN A/R: CPT

## 2020-02-14 PROCEDURE — 71046 X-RAY EXAM CHEST 2 VIEWS: CPT

## 2020-02-14 PROCEDURE — 93225 XTRNL ECG REC<48 HRS REC: CPT

## 2020-02-14 PROCEDURE — 93307 TTE W/O DOPPLER COMPLETE: CPT

## 2020-02-14 RX ORDER — METOLAZONE 2.5 MG/1
2.5 TABLET ORAL DAILY PRN
Qty: 15 TABLET | Refills: 0 | Status: SHIPPED | OUTPATIENT
Start: 2020-02-14 | End: 2020-06-03

## 2020-02-14 NOTE — TELEPHONE ENCOUNTER
Verbal order Carla Sreedhar  Take metolazone 5 mg today, 2.5 mg Sat and Sun 1 hour prior to bumex dose  Take potassium 40 mEq today, Sat and Sun  Take extra 1 mg Bumex tomorrow afternoon  Repeat bmp Tues 2/18      Nurse Nidhi Duran notified and verbalized understanding

## 2020-02-17 ENCOUNTER — HOSPITAL ENCOUNTER (INPATIENT)
Age: 85
LOS: 4 days | Discharge: NURSING, REHAB OR LONG-TERM CARE | DRG: 884 | End: 2020-02-21
Attending: EMERGENCY MEDICINE | Admitting: FAMILY MEDICINE
Payer: MEDICARE

## 2020-02-17 ENCOUNTER — APPOINTMENT (OUTPATIENT)
Dept: GENERAL RADIOLOGY | Age: 85
DRG: 884 | End: 2020-02-17
Payer: MEDICARE

## 2020-02-17 ENCOUNTER — APPOINTMENT (OUTPATIENT)
Dept: INTERVENTIONAL RADIOLOGY/VASCULAR | Age: 85
DRG: 884 | End: 2020-02-17
Payer: MEDICARE

## 2020-02-17 ENCOUNTER — APPOINTMENT (OUTPATIENT)
Dept: CT IMAGING | Age: 85
DRG: 884 | End: 2020-02-17
Payer: MEDICARE

## 2020-02-17 PROBLEM — R55 SYNCOPE AND COLLAPSE: Status: ACTIVE | Noted: 2020-02-17

## 2020-02-17 LAB
ABO: NORMAL
ALBUMIN SERPL-MCNC: 3.8 G/DL (ref 3.5–5.1)
ALP BLD-CCNC: 70 U/L (ref 38–126)
ALT SERPL-CCNC: 24 U/L (ref 11–66)
AMPHETAMINE+METHAMPHETAMINE URINE SCREEN: NEGATIVE
ANION GAP SERPL CALCULATED.3IONS-SCNC: 20 MEQ/L (ref 8–16)
ANTIBODY SCREEN: NORMAL
AST SERPL-CCNC: 26 U/L (ref 5–40)
BACTERIA: ABNORMAL /HPF
BARBITURATE QUANTITATIVE URINE: NEGATIVE
BENZODIAZEPINE QUANTITATIVE URINE: NEGATIVE
BILIRUB SERPL-MCNC: 0.4 MG/DL (ref 0.3–1.2)
BILIRUBIN DIRECT: < 0.2 MG/DL (ref 0–0.3)
BILIRUBIN URINE: NEGATIVE
BLOOD, URINE: NEGATIVE
BUN BLDV-MCNC: 32 MG/DL
BUN BLDV-MCNC: 34 MG/DL (ref 7–22)
CALCIUM SERPL-MCNC: 9.6 MG/DL
CANNABINOID QUANTITATIVE URINE: NEGATIVE
CASTS 2: ABNORMAL /LPF
CASTS UA: ABNORMAL /LPF
CHARACTER, URINE: ABNORMAL
CHLORIDE BLD-SCNC: 88 MMOL/L
CHLORIDE BLD-SCNC: 90 MEQ/L (ref 98–111)
CO2: 27 MEQ/L (ref 23–33)
CO2: 28 MMOL/L
COCAINE METABOLITE QUANTITATIVE URINE: NEGATIVE
COLOR: YELLOW
CREAT SERPL-MCNC: 1.3 MG/DL
CREAT SERPL-MCNC: 1.4 MG/DL (ref 0.4–1.2)
CRYSTALS, UA: ABNORMAL
EKG ATRIAL RATE: 94 BPM
EKG P AXIS: 40 DEGREES
EKG P-R INTERVAL: 132 MS
EKG Q-T INTERVAL: 390 MS
EKG QRS DURATION: 84 MS
EKG QTC CALCULATION (BAZETT): 487 MS
EKG R AXIS: 19 DEGREES
EKG T AXIS: 44 DEGREES
EKG VENTRICULAR RATE: 94 BPM
EPITHELIAL CELLS, UA: ABNORMAL /HPF
ERYTHROCYTE [DISTWIDTH] IN BLOOD BY AUTOMATED COUNT: 14.7 % (ref 11.5–14.5)
ERYTHROCYTE [DISTWIDTH] IN BLOOD BY AUTOMATED COUNT: 51.7 FL (ref 35–45)
ETHYL ALCOHOL, SERUM: < 0.01 %
GFR CALCULATED: 39
GFR SERPL CREATININE-BSD FRML MDRD: 36 ML/MIN/1.73M2
GLUCOSE BLD-MCNC: 200 MG/DL (ref 70–108)
GLUCOSE BLD-MCNC: 264 MG/DL
GLUCOSE URINE: NEGATIVE MG/DL
HCT VFR BLD CALC: 38.6 % (ref 37–47)
HEMOGLOBIN: 12.5 GM/DL (ref 12–16)
KETONES, URINE: NEGATIVE
LEUKOCYTE ESTERASE, URINE: ABNORMAL
MCH RBC QN AUTO: 31.1 PG (ref 26–33)
MCHC RBC AUTO-ENTMCNC: 32.4 GM/DL (ref 32.2–35.5)
MCV RBC AUTO: 96 FL (ref 81–99)
MISCELLANEOUS 2: ABNORMAL
NITRITE, URINE: NEGATIVE
OPIATES, URINE: NEGATIVE
OSMOLALITY CALCULATION: 287.1 MOSMOL/KG (ref 275–300)
OXYCODONE: NEGATIVE
PH UA: 7 (ref 5–9)
PHENCYCLIDINE QUANTITATIVE URINE: NEGATIVE
PLATELET # BLD: 270 THOU/MM3 (ref 130–400)
PLATELET FUNCTION INTERPRETATION: NORMAL SECONDS
PLT FUNCTION, EPI: 94 SECONDS (ref 68–175)
PMV BLD AUTO: 9 FL (ref 9.4–12.4)
POTASSIUM SERPL-SCNC: 3.7 MEQ/L (ref 3.5–5.2)
POTASSIUM SERPL-SCNC: 3.9 MMOL/L
PRO-BNP: 440.4 PG/ML (ref 0–1800)
PROTEIN UA: NEGATIVE
RBC # BLD: 4.02 MILL/MM3 (ref 4.2–5.4)
RBC URINE: ABNORMAL /HPF
REASON FOR REJECTION: NORMAL
REJECTED TEST: NORMAL
RENAL EPITHELIAL, UA: ABNORMAL
RH FACTOR: NORMAL
SODIUM BLD-SCNC: 134 MMOL/L
SODIUM BLD-SCNC: 137 MEQ/L (ref 135–145)
SPECIFIC GRAVITY, URINE: 1.01 (ref 1–1.03)
TOTAL PROTEIN: 8 G/DL (ref 6.1–8)
TROPONIN T: < 0.01 NG/ML
TROPONIN T: < 0.01 NG/ML
UROBILINOGEN, URINE: 0.2 EU/DL (ref 0–1)
WBC # BLD: 12.6 THOU/MM3 (ref 4.8–10.8)
WBC UA: ABNORMAL /HPF
YEAST: ABNORMAL

## 2020-02-17 PROCEDURE — 80076 HEPATIC FUNCTION PANEL: CPT

## 2020-02-17 PROCEDURE — 1200000003 HC TELEMETRY R&B

## 2020-02-17 PROCEDURE — 93970 EXTREMITY STUDY: CPT

## 2020-02-17 PROCEDURE — 0CQ1XZZ REPAIR LOWER LIP, EXTERNAL APPROACH: ICD-10-PCS | Performed by: SURGERY

## 2020-02-17 PROCEDURE — 85027 COMPLETE CBC AUTOMATED: CPT

## 2020-02-17 PROCEDURE — 36415 COLL VENOUS BLD VENIPUNCTURE: CPT

## 2020-02-17 PROCEDURE — 12013 RPR F/E/E/N/L/M 2.6-5.0 CM: CPT | Performed by: PHYSICIAN ASSISTANT

## 2020-02-17 PROCEDURE — 70486 CT MAXILLOFACIAL W/O DYE: CPT

## 2020-02-17 PROCEDURE — 93005 ELECTROCARDIOGRAM TRACING: CPT | Performed by: EMERGENCY MEDICINE

## 2020-02-17 PROCEDURE — 82565 ASSAY OF CREATININE: CPT

## 2020-02-17 PROCEDURE — 87186 SC STD MICRODIL/AGAR DIL: CPT

## 2020-02-17 PROCEDURE — 84520 ASSAY OF UREA NITROGEN: CPT

## 2020-02-17 PROCEDURE — 2500000003 HC RX 250 WO HCPCS

## 2020-02-17 PROCEDURE — 12011 RPR F/E/E/N/L/M 2.5 CM/<: CPT

## 2020-02-17 PROCEDURE — 86901 BLOOD TYPING SEROLOGIC RH(D): CPT

## 2020-02-17 PROCEDURE — 80307 DRUG TEST PRSMV CHEM ANLYZR: CPT

## 2020-02-17 PROCEDURE — 71045 X-RAY EXAM CHEST 1 VIEW: CPT

## 2020-02-17 PROCEDURE — 70450 CT HEAD/BRAIN W/O DYE: CPT

## 2020-02-17 PROCEDURE — 87086 URINE CULTURE/COLONY COUNT: CPT

## 2020-02-17 PROCEDURE — 6370000000 HC RX 637 (ALT 250 FOR IP): Performed by: PHYSICIAN ASSISTANT

## 2020-02-17 PROCEDURE — 82947 ASSAY GLUCOSE BLOOD QUANT: CPT

## 2020-02-17 PROCEDURE — 80051 ELECTROLYTE PANEL: CPT

## 2020-02-17 PROCEDURE — 87077 CULTURE AEROBIC IDENTIFY: CPT

## 2020-02-17 PROCEDURE — 99285 EMERGENCY DEPT VISIT HI MDM: CPT

## 2020-02-17 PROCEDURE — 83880 ASSAY OF NATRIURETIC PEPTIDE: CPT

## 2020-02-17 PROCEDURE — 86900 BLOOD TYPING SEROLOGIC ABO: CPT

## 2020-02-17 PROCEDURE — 86850 RBC ANTIBODY SCREEN: CPT

## 2020-02-17 PROCEDURE — 81001 URINALYSIS AUTO W/SCOPE: CPT

## 2020-02-17 PROCEDURE — 6820000001 HC L2 TRAUMA SURGERY EVALUATION

## 2020-02-17 PROCEDURE — 72125 CT NECK SPINE W/O DYE: CPT

## 2020-02-17 PROCEDURE — 84484 ASSAY OF TROPONIN QUANT: CPT

## 2020-02-17 PROCEDURE — 09QKXZZ REPAIR NASAL MUCOSA AND SOFT TISSUE, EXTERNAL APPROACH: ICD-10-PCS | Performed by: EMERGENCY MEDICINE

## 2020-02-17 PROCEDURE — 85576 BLOOD PLATELET AGGREGATION: CPT

## 2020-02-17 PROCEDURE — APPSS180 APP SPLIT SHARED TIME > 60 MINUTES: Performed by: PHYSICIAN ASSISTANT

## 2020-02-17 PROCEDURE — 94761 N-INVAS EAR/PLS OXIMETRY MLT: CPT

## 2020-02-17 PROCEDURE — G0480 DRUG TEST DEF 1-7 CLASSES: HCPCS

## 2020-02-17 RX ORDER — SODIUM CHLORIDE 9 MG/ML
INJECTION, SOLUTION INTRAVENOUS CONTINUOUS
Status: DISCONTINUED | OUTPATIENT
Start: 2020-02-17 | End: 2020-02-21 | Stop reason: HOSPADM

## 2020-02-17 RX ORDER — ONDANSETRON 2 MG/ML
4 INJECTION INTRAMUSCULAR; INTRAVENOUS EVERY 6 HOURS PRN
Status: DISCONTINUED | OUTPATIENT
Start: 2020-02-17 | End: 2020-02-21 | Stop reason: HOSPADM

## 2020-02-17 RX ORDER — METOPROLOL TARTRATE 50 MG/1
75 TABLET, FILM COATED ORAL 2 TIMES DAILY
Status: DISCONTINUED | OUTPATIENT
Start: 2020-02-18 | End: 2020-02-21 | Stop reason: HOSPADM

## 2020-02-17 RX ORDER — SODIUM CHLORIDE 0.9 % (FLUSH) 0.9 %
10 SYRINGE (ML) INJECTION PRN
Status: DISCONTINUED | OUTPATIENT
Start: 2020-02-17 | End: 2020-02-21 | Stop reason: HOSPADM

## 2020-02-17 RX ORDER — CLOPIDOGREL BISULFATE 75 MG/1
75 TABLET ORAL DAILY
Status: DISCONTINUED | OUTPATIENT
Start: 2020-02-18 | End: 2020-02-21 | Stop reason: HOSPADM

## 2020-02-17 RX ORDER — ASPIRIN 81 MG/1
81 TABLET ORAL ONCE
Status: COMPLETED | OUTPATIENT
Start: 2020-02-18 | End: 2020-02-18

## 2020-02-17 RX ORDER — LIDOCAINE HYDROCHLORIDE 10 MG/ML
INJECTION, SOLUTION INFILTRATION; PERINEURAL
Status: DISPENSED
Start: 2020-02-17 | End: 2020-02-18

## 2020-02-17 RX ORDER — BUMETANIDE 1 MG/1
2 TABLET ORAL DAILY
Status: DISCONTINUED | OUTPATIENT
Start: 2020-02-17 | End: 2020-02-21 | Stop reason: HOSPADM

## 2020-02-17 RX ORDER — SODIUM CHLORIDE 0.9 % (FLUSH) 0.9 %
10 SYRINGE (ML) INJECTION EVERY 12 HOURS SCHEDULED
Status: DISCONTINUED | OUTPATIENT
Start: 2020-02-17 | End: 2020-02-21 | Stop reason: HOSPADM

## 2020-02-17 RX ORDER — ACETAMINOPHEN 325 MG/1
650 TABLET ORAL EVERY 4 HOURS PRN
Status: DISCONTINUED | OUTPATIENT
Start: 2020-02-17 | End: 2020-02-21 | Stop reason: HOSPADM

## 2020-02-17 RX ORDER — FENTANYL CITRATE 50 UG/ML
75 INJECTION, SOLUTION INTRAMUSCULAR; INTRAVENOUS ONCE
Status: COMPLETED | OUTPATIENT
Start: 2020-02-17 | End: 2020-02-18

## 2020-02-17 RX ORDER — ATORVASTATIN CALCIUM 40 MG/1
40 TABLET, FILM COATED ORAL DAILY
Status: DISCONTINUED | OUTPATIENT
Start: 2020-02-18 | End: 2020-02-21 | Stop reason: HOSPADM

## 2020-02-17 RX ORDER — DULOXETIN HYDROCHLORIDE 60 MG/1
120 CAPSULE, DELAYED RELEASE ORAL DAILY
Status: DISCONTINUED | OUTPATIENT
Start: 2020-02-18 | End: 2020-02-21 | Stop reason: HOSPADM

## 2020-02-17 RX ORDER — LIDOCAINE HYDROCHLORIDE AND EPINEPHRINE 10; 10 MG/ML; UG/ML
INJECTION, SOLUTION INFILTRATION; PERINEURAL
Status: COMPLETED
Start: 2020-02-17 | End: 2020-02-17

## 2020-02-17 RX ADMIN — LIDOCAINE HYDROCHLORIDE AND EPINEPHRINE: 10; 10 INJECTION, SOLUTION INFILTRATION; PERINEURAL at 16:19

## 2020-02-17 RX ADMIN — ACETAMINOPHEN 650 MG: 325 TABLET ORAL at 20:54

## 2020-02-17 ASSESSMENT — ENCOUNTER SYMPTOMS
BLOOD IN STOOL: 0
SINUS PRESSURE: 0
COUGH: 1
SINUS PAIN: 0
COUGH: 0
EYE PAIN: 0
DIARRHEA: 0
CHEST TIGHTNESS: 0
SHORTNESS OF BREATH: 0
SHORTNESS OF BREATH: 1
FACIAL SWELLING: 1
CONSTIPATION: 0
BACK PAIN: 0
NAUSEA: 0
RHINORRHEA: 0
EYE REDNESS: 0
ABDOMINAL PAIN: 0
RECTAL PAIN: 0

## 2020-02-17 ASSESSMENT — PAIN SCALES - GENERAL
PAINLEVEL_OUTOF10: 5
PAINLEVEL_OUTOF10: 8

## 2020-02-17 NOTE — TELEPHONE ENCOUNTER
Spoke with nurse Mamie  Patient \"just doesn't feel right\" and is SOB  120/65  (normal for her)  Wt down 4 lbs    They will have BMP drawn stat   And will have patient go to ER if feeling worse

## 2020-02-17 NOTE — ED NOTES
Dr. Mo Hutchins at bedside to apply sutures and steri strips to nose. Pt tolerated well. Waiting on new orders.       Nevin Mckeon RN  02/17/20 1640

## 2020-02-17 NOTE — TELEPHONE ENCOUNTER
Called to check on Xochilt - she apparently fell this afternoon, questionable broken nose. ? ?LOC, not remember what happened. She is currently in our ED. Will f/u.

## 2020-02-17 NOTE — ED PROVIDER NOTES
[facility]        CHIEF COMPLAINT       Chief Complaint   Patient presents with    Epistaxis    Lip Laceration    Fall     History obtained from the patient, patient's daughter, chart review. HISTORY OF PRESENT ILLNESS        Farrah Mckeon is a 80 y.o. female who presents to the emergency department for evaluation of facial injury with active bleeding following a fall rating her current pain 5/10 in severity. Patient reports ambulating into her bedroom and then waking, and woke to a pool of blood surrounding her face. Patient reports she was unable to ambulate following the fall, and is unsure how long she was down before her daughter found her and assisted her. Patient reports she is unaware if she was dizzy or light-headed prior to the fall. She states that she is unaware of what caused her to fall. Patient's daughter reports the patient has been experiencing \"cardiac complications\" for the last month. She reports the patient had a cardiac stent placed on January 13, 2020, and did not experience any relief for the following two weeks. The patient then had her medications altered by Dr. Julissa Irvin (Cardiologist), and still did not have relief. The patient then was placed on a Holter monitor that was just removed 2 days ago. Patient reports that she is currently taking one Aspirin daily. Patient is currently denying back pain and neck pain. Patient has a past medical history of DVT, COPD, HTN, HLD, Type II DM, and PE. The patient has no other acute complaints at this time. REVIEW OF SYSTEMS   Review of Systems   Constitutional: Negative for chills, fever and unexpected weight change. HENT: Negative for congestion, ear pain, nosebleeds, sinus pressure and sinus pain. Eyes: Negative for pain. Respiratory: Negative for cough, chest tightness and shortness of breath. Cardiovascular: Negative for chest pain.    Gastrointestinal: 3    metoprolol tartrate (LOPRESSOR) 50 MG tablet, Take 1.5 tablets by mouth 2 times daily, Disp: 90 tablet, Rfl: 3    metFORMIN (GLUCOPHAGE-XR) 750 MG extended release tablet, Take 1 tablet by mouth Daily with supper Restart this medication Friday am, Disp: 30 tablet, Rfl: 3    nitroGLYCERIN (NITROSTAT) 0.4 MG SL tablet, up to max of 3 total doses. If no relief after 1 dose, call 911., Disp: 25 tablet, Rfl: 1    atorvastatin (LIPITOR) 40 MG tablet, Take 1 tablet by mouth daily, Disp: 30 tablet, Rfl: 3    Multiple Vitamins-Minerals (PRESERVISION AREDS PO), Take by mouth 2 times daily, Disp: , Rfl:     potassium chloride (KLOR-CON) 10 MEQ extended release tablet, Take 1 tablet by mouth daily, Disp: 30 tablet, Rfl: 0    D-Mannose 500 MG CAPS, Take 1 capsule by mouth daily , Disp: , Rfl:     DULoxetine (CYMBALTA) 60 MG extended release capsule, Take 2 capsules by mouth daily, Disp: 60 capsule, Rfl: 0    Multiple Vitamins-Minerals (SENIOR MULTIVITAMIN PLUS) TABS, Take 1 tablet by mouth daily, Disp: , Rfl:     glucose blood VI test strips (ONE TOUCH ULTRA TEST) strip, Test daily or AD. Dx. 250.00, Disp: 50 each, Rfl: 11    acetaminophen (TYLENOL) 325 MG tablet, Take 650 mg by mouth every 6 hours as needed for Pain, Disp: , Rfl:     aspirin 81 MG EC tablet, 1 Tab Oral DAILY , Disp: , Rfl:       SOCIAL HISTORY     Social History     Patient does not qualify to have social determinant information on file (likely too young).    Social History Narrative    Not on file     Social History     Tobacco Use    Smoking status: Never Smoker    Smokeless tobacco: Never Used   Substance Use Topics    Alcohol use: Yes     Comment: rare    Drug use: No         ALLERGIES     Allergies   Allergen Reactions    Ciprofloxacin Hcl     Food Rash     strawberries    Penicillins Swelling and Rash         FAMILY HISTORY     Family History   Problem Relation Age of Onset    Cancer Mother         pancreatic    Heart Disease extremities or chest.  Pelvis is stable. No lower extremity shortening. Normal pulses. Skin:     General: Skin is warm and dry. Findings: Bruising (Significant to inferior orbital bilaterally) and laceration present. Comments: 1 cm V-shaped laceration to the tip of the nose with one area of arterial bleeding. Stellate laceration to right-sided lower lip, no active bleeding. Neurological:      Mental Status: She is alert and oriented to person, place, and time. Cranial Nerves: No cranial nerve deficit. Sensory: No sensory deficit. Psychiatric:         Behavior: Behavior normal.               MEDICAL DECISION MAKING   Initial Assessment: fall possible syncopal episode. Facial and head injury. Plan: laceration repair, labs, imaging, trauma surgery consult, observation. Likely will need to be admitted. Lac Repair  Date/Time: 2/17/2020 5:02 PM  Performed by: Son Cameron MD  Authorized by: Son Cameron MD     Consent:     Consent obtained:  Verbal    Consent given by:  Patient    Risks discussed:  Infection and need for additional repair    Alternatives discussed:  No treatment  Anesthesia (see MAR for exact dosages):      Anesthesia method:  Local infiltration    Local anesthetic:  Lidocaine 1% WITH epi  Laceration details:     Location:  Face    Face location:  Nose    Length (cm):  1  Repair type:     Repair type:  Simple  Pre-procedure details:     Preparation:  Patient was prepped and draped in usual sterile fashion  Exploration:     Hemostasis achieved with:  Direct pressure    Contaminated: no    Treatment:     Area cleansed with:  Saline    Amount of cleaning:  Standard    Irrigation solution:  Sterile saline    Visualized foreign bodies/material removed: no    Skin repair:     Repair method:  Sutures    Suture size:  6-0    Suture material:  Nylon    Suture technique:  Simple interrupted    Number of sutures:  6  Approximation:     Approximation: separately billable procedures. I personally supervised all procedures and actions performed on this patient. MEDICATION CHANGES     New Prescriptions    No medications on file         FINAL DISPOSITION     Final diagnoses:   Syncope and collapse   Hypotension, unspecified hypotension type   Facial injury, initial encounter   Laceration of nose, initial encounter   Lip laceration, initial encounter   Closed fracture of nasal bone, initial encounter     Condition: condition: fair  Dispo: Admit to med/surg floor        Parts of this transcription were electronically signed. It was dictated by use of voice recognition software and electronically transcribed. The transcription may contain errors not detected in proofreading. I attest that this documentation has been prepared under my direction and in the presence of Ms. Quynh Benavides (ED scribe). Shea Galeano, attest that the scribe's documentation has been prepared under my direction and in my presence, and was personally reviewed by me. I confirmed that the note accurately reflects the work and medical decision making performed by me.     Electronically Signed: Maria R Amezcua, 02/17/20, 7:24 PM           Maria R Amezcua MD  02/17/20 9698

## 2020-02-17 NOTE — ED NOTES
Pt and family updated on POC. Pt resting on cot. Lights dimmed w/family at bedside. Pt remains alert and oriented x4. Rprts feeling thirsty. NPO at this time.       Daly Arredondo RN  02/17/20 1238

## 2020-02-18 ENCOUNTER — APPOINTMENT (OUTPATIENT)
Dept: GENERAL RADIOLOGY | Age: 85
DRG: 884 | End: 2020-02-18
Payer: MEDICARE

## 2020-02-18 PROBLEM — I50.42 CHRONIC COMBINED SYSTOLIC AND DIASTOLIC CONGESTIVE HEART FAILURE (HCC): Status: ACTIVE | Noted: 2019-09-10

## 2020-02-18 LAB
ANION GAP SERPL CALCULATED.3IONS-SCNC: 18 MEQ/L (ref 8–16)
BUN BLDV-MCNC: 38 MG/DL (ref 7–22)
CALCIUM SERPL-MCNC: 9.1 MG/DL (ref 8.5–10.5)
CHLORIDE BLD-SCNC: 93 MEQ/L (ref 98–111)
CO2: 25 MEQ/L (ref 23–33)
CREAT SERPL-MCNC: 1.3 MG/DL (ref 0.4–1.2)
ERYTHROCYTE [DISTWIDTH] IN BLOOD BY AUTOMATED COUNT: 15.4 % (ref 11.5–14.5)
ERYTHROCYTE [DISTWIDTH] IN BLOOD BY AUTOMATED COUNT: 54.5 FL (ref 35–45)
GFR SERPL CREATININE-BSD FRML MDRD: 39 ML/MIN/1.73M2
GLUCOSE BLD-MCNC: 156 MG/DL (ref 70–108)
GLUCOSE BLD-MCNC: 164 MG/DL (ref 70–108)
GLUCOSE BLD-MCNC: 180 MG/DL (ref 70–108)
GLUCOSE BLD-MCNC: 191 MG/DL (ref 70–108)
HCT VFR BLD CALC: 35.3 % (ref 37–47)
HEMOGLOBIN: 11.5 GM/DL (ref 12–16)
MAGNESIUM: 1.7 MG/DL (ref 1.6–2.4)
MCH RBC QN AUTO: 31.9 PG (ref 26–33)
MCHC RBC AUTO-ENTMCNC: 32.6 GM/DL (ref 32.2–35.5)
MCV RBC AUTO: 98.1 FL (ref 81–99)
OSMOLALITY CALCULATION: 286.1 MOSMOL/KG (ref 275–300)
PLATELET # BLD: 223 THOU/MM3 (ref 130–400)
PMV BLD AUTO: 9.1 FL (ref 9.4–12.4)
POTASSIUM SERPL-SCNC: 3.2 MEQ/L (ref 3.5–5.2)
RBC # BLD: 3.6 MILL/MM3 (ref 4.2–5.4)
SODIUM BLD-SCNC: 136 MEQ/L (ref 135–145)
TROPONIN T: < 0.01 NG/ML
WBC # BLD: 10.1 THOU/MM3 (ref 4.8–10.8)

## 2020-02-18 PROCEDURE — 6360000002 HC RX W HCPCS: Performed by: PHYSICIAN ASSISTANT

## 2020-02-18 PROCEDURE — 2580000003 HC RX 258: Performed by: PHYSICIAN ASSISTANT

## 2020-02-18 PROCEDURE — 96361 HYDRATE IV INFUSION ADD-ON: CPT

## 2020-02-18 PROCEDURE — 96372 THER/PROPH/DIAG INJ SC/IM: CPT

## 2020-02-18 PROCEDURE — 6370000000 HC RX 637 (ALT 250 FOR IP): Performed by: PHYSICIAN ASSISTANT

## 2020-02-18 PROCEDURE — 93010 ELECTROCARDIOGRAM REPORT: CPT | Performed by: INTERNAL MEDICINE

## 2020-02-18 PROCEDURE — 83735 ASSAY OF MAGNESIUM: CPT

## 2020-02-18 PROCEDURE — 80048 BASIC METABOLIC PNL TOTAL CA: CPT

## 2020-02-18 PROCEDURE — 6360000002 HC RX W HCPCS: Performed by: EMERGENCY MEDICINE

## 2020-02-18 PROCEDURE — 6370000000 HC RX 637 (ALT 250 FOR IP): Performed by: INTERNAL MEDICINE

## 2020-02-18 PROCEDURE — 99233 SBSQ HOSP IP/OBS HIGH 50: CPT | Performed by: INTERNAL MEDICINE

## 2020-02-18 PROCEDURE — 73110 X-RAY EXAM OF WRIST: CPT

## 2020-02-18 PROCEDURE — 96374 THER/PROPH/DIAG INJ IV PUSH: CPT

## 2020-02-18 PROCEDURE — 85027 COMPLETE CBC AUTOMATED: CPT

## 2020-02-18 PROCEDURE — 99231 SBSQ HOSP IP/OBS SF/LOW 25: CPT | Performed by: NURSE PRACTITIONER

## 2020-02-18 PROCEDURE — 1200000003 HC TELEMETRY R&B

## 2020-02-18 PROCEDURE — 36415 COLL VENOUS BLD VENIPUNCTURE: CPT

## 2020-02-18 PROCEDURE — 99223 1ST HOSP IP/OBS HIGH 75: CPT | Performed by: NUCLEAR MEDICINE

## 2020-02-18 PROCEDURE — 82948 REAGENT STRIP/BLOOD GLUCOSE: CPT

## 2020-02-18 PROCEDURE — 84484 ASSAY OF TROPONIN QUANT: CPT

## 2020-02-18 PROCEDURE — 6360000002 HC RX W HCPCS: Performed by: INTERNAL MEDICINE

## 2020-02-18 RX ORDER — METOLAZONE 2.5 MG/1
2.5 TABLET ORAL DAILY PRN
Status: DISCONTINUED | OUTPATIENT
Start: 2020-02-18 | End: 2020-02-18

## 2020-02-18 RX ORDER — HALOPERIDOL 5 MG/ML
2 INJECTION INTRAMUSCULAR ONCE
Status: DISCONTINUED | OUTPATIENT
Start: 2020-02-18 | End: 2020-02-21 | Stop reason: HOSPADM

## 2020-02-18 RX ORDER — POTASSIUM CHLORIDE 20 MEQ/1
60 TABLET, EXTENDED RELEASE ORAL ONCE
Status: COMPLETED | OUTPATIENT
Start: 2020-02-18 | End: 2020-02-18

## 2020-02-18 RX ORDER — HALOPERIDOL 5 MG/ML
1 INJECTION INTRAMUSCULAR ONCE
Status: COMPLETED | OUTPATIENT
Start: 2020-02-18 | End: 2020-02-18

## 2020-02-18 RX ADMIN — METOPROLOL TARTRATE 75 MG: 50 TABLET, FILM COATED ORAL at 01:43

## 2020-02-18 RX ADMIN — Medication 10 ML: at 09:56

## 2020-02-18 RX ADMIN — DULOXETINE HYDROCHLORIDE 120 MG: 60 CAPSULE, DELAYED RELEASE ORAL at 09:56

## 2020-02-18 RX ADMIN — ASPIRIN 81 MG: 81 TABLET ORAL at 01:00

## 2020-02-18 RX ADMIN — SODIUM CHLORIDE: 9 INJECTION, SOLUTION INTRAVENOUS at 00:51

## 2020-02-18 RX ADMIN — INSULIN LISPRO 1 UNITS: 100 INJECTION, SOLUTION INTRAVENOUS; SUBCUTANEOUS at 18:40

## 2020-02-18 RX ADMIN — SODIUM CHLORIDE: 9 INJECTION, SOLUTION INTRAVENOUS at 11:50

## 2020-02-18 RX ADMIN — ACETAMINOPHEN 650 MG: 325 TABLET ORAL at 11:53

## 2020-02-18 RX ADMIN — FENTANYL CITRATE 75 MCG: 50 INJECTION, SOLUTION INTRAMUSCULAR; INTRAVENOUS at 00:50

## 2020-02-18 RX ADMIN — ENOXAPARIN SODIUM 40 MG: 40 INJECTION SUBCUTANEOUS at 01:00

## 2020-02-18 RX ADMIN — CLOPIDOGREL 75 MG: 75 TABLET, FILM COATED ORAL at 09:56

## 2020-02-18 RX ADMIN — POTASSIUM CHLORIDE 60 MEQ: 1500 TABLET, EXTENDED RELEASE ORAL at 09:57

## 2020-02-18 RX ADMIN — HALOPERIDOL LACTATE 1 MG: 5 INJECTION INTRAMUSCULAR at 16:07

## 2020-02-18 RX ADMIN — ACETAMINOPHEN 650 MG: 325 TABLET ORAL at 06:49

## 2020-02-18 RX ADMIN — METOPROLOL TARTRATE 75 MG: 50 TABLET, FILM COATED ORAL at 11:51

## 2020-02-18 RX ADMIN — ATORVASTATIN CALCIUM 40 MG: 40 TABLET, FILM COATED ORAL at 19:56

## 2020-02-18 ASSESSMENT — PAIN SCALES - GENERAL
PAINLEVEL_OUTOF10: 2
PAINLEVEL_OUTOF10: 8
PAINLEVEL_OUTOF10: 0
PAINLEVEL_OUTOF10: 5
PAINLEVEL_OUTOF10: 6

## 2020-02-18 NOTE — ED NOTES
Pt attepting to urinate on bedpan. Pt VSS. Pt respirations easy and unlabored. Call light in reach. Will continue to monitor.      Chula Preston RN  02/17/20 1599

## 2020-02-18 NOTE — PROGRESS NOTES
Hospitalist Progress Note      Patient:  Paloma Padilla    Unit/Bed:8A-08/008-A  YOB: 1935  MRN: [id number]   Acct: [de-identified]   PCP: Juaquin Cruz MD  Date of Admission: 2/17/2020    Assessment/Plan:    1. Syncope and fall-patient had a Holter monitor by cardiology-and also had recent PCI to LAD in June 2020-troponin has been negative and EKG was normal-cardiology has been consulted patient is currently asymptomatic-continue IV fluid  2. Closed head injury with traumatic nasal bone fracture and bilateral periorbital ecchymosis following the fall-ENT consulted and trauma surgery managing-hold blood thinners for now  3. Epistaxis secondary to above-hold blood thinners for now hold aspirin  4. Acute triquetral (wrist) fracture again following the fall-consult orthopedic surgery  5. History of coronary disease status post PCI to LAD in June 2020  6. History of chronic systolic congestive heart failure  7. ARTIE on chronic any disease stage III-  8. History of type 2 diabetes-controlled-on insulin sliding scale continue to check blood sugars every 6 hours and at nighttime  9. History of hyperlipidemia-on statin-continue  10. History of anxiety-on duloxetine-continue  11. Chiatnlpufjrjy-leza-aplcop age-related-added PT OT stays at assisted living likely will need nursing home-discussed with     Chief Complaint: Syncope and fall    Initial H and P:-    Bassem Thornton is a 80year old white female smoker with PMH of CAD s/p stenting, Hx DVT/PE, GERD, HLD, HTN, OA s/p knee replacement, alzheimers, NIDDM type 2 who presents to [address] [place] [address] ED from assisted living facility via EMS on 2/17/20 c/o syncope and fall from standing and hit her face. Prior to falling, patient admitted to 3/10 transient chest pressure. She also had associated disequilibrium. She has chronic SOB which she states is why she had a cardiac work up and stent in January.  She Subcutaneous Q24H    insulin lispro  0-6 Units Subcutaneous TID WC    insulin lispro  0-3 Units Subcutaneous Nightly     PRN Meds: sodium chloride flush, magnesium hydroxide, ondansetron, acetaminophen    No intake or output data in the 24 hours ending 02/18/20 0758    Diet:  DIET CARB CONTROL; Exam:  BP (!) 113/56   Pulse 92   Temp 97.3 °F (36.3 °C) (Axillary)   Resp 16   Ht 5' 5\" (1.651 m)   Wt 178 lb (80.7 kg)   SpO2 94%   BMI 29.62 kg/m²   General appearance: No apparent distress, appears stated age and cooperative. HEENT: Pupils equal, round, and reactive to light. Conjunctivae/corneas clear. Bilateral periorbital ecchymosis, depressed nasal bridge with laceration to the tip of the nose, with dried blood in the nasal nare, trauma to the lip with a small laceration on the right side of the left  Neck: Supple, with full range of motion. No jugular venous distention. Trachea midline. Respiratory:  Normal respiratory effort. Clear to auscultation, bilaterally without Rales/Wheezes/Rhonchi. Cardiovascular: Regular rate and rhythm with normal S1/S2 without murmurs, rubs or gallops. Abdomen: Soft, non-tender, non-distended with normal bowel sounds. Musculoskeletal: passive and active ROM x 4 extremities. Left wrist swelling and tender  Skin: Skin color, texture, turgor normal.  No rashes or lesions. Neurologic:  Neurovascularly intact without any focal sensory/motor deficits.  Cranial nerves: II-XII intact, grossly non-focal.  Psychiatric: Alert and oriented, thought content appropriate, normal insight  Capillary Refill: Brisk,< 3 seconds   Peripheral Pulses: +2 palpable, equal bilaterally     Labs:   Recent Labs     02/17/20  1716 02/18/20  0544   WBC 12.6* 10.1   HGB 12.5 11.5*   HCT 38.6 35.3*    223     Recent Labs     02/17/20 02/17/20  1716 02/18/20  0544   * 137 136   K 3.9 3.7 3.2*   CL 88* 90* 93*   CO2 28 27 25   BUN 32* 34* 38*   CREATININE 1.3* 1.4* 1.3*   CALCIUM 9.6  -- 9.1     Recent Labs     02/17/20  1716   AST 26   ALT 24   BILIDIR <0.2   BILITOT 0.4   ALKPHOS 70     No results for input(s): INR in the last 72 hours. No results for input(s): Gustabo Noé in the last 72 hours. Microbiology:    Blood culture #1: No results found for: BC    Blood culture #2:No results found for: BLOODCULT2    Organism:  Lab Results   Component Value Date    ORG Escherichia coli 06/29/2018       No results found for: LABGRAM    MRSA culture only:No results found for: [facility]    Urine culture: No results found for: LABURIN    Respiratory culture: No results found for: CULTRESP    Aerobic and Anaerobic :  No results found for: LABAERO  No results found for: LABANAE    Urinalysis:      Lab Results   Component Value Date    NITRU NEGATIVE 02/17/2020    WBCUA 15-25 02/17/2020    BACTERIA MANY 02/17/2020    RBCUA 3-5 02/17/2020    BLOODU NEGATIVE 02/17/2020    SPECGRAV 1.013 03/25/2019    GLUCOSEU NEGATIVE 02/17/2020       Radiology:  VL DUP LOWER EXTREMITY VENOUS BILATERAL   Final Result   No evidence of acute occlusive bilateral lower extremity DVT. **This report has been created using voice recognition software. It may contain minor errors which are inherent in voice recognition technology. **       Final report electronically signed by Dr. Ariane Jauregui on 2/17/2020 9:58 PM      CT Head WO Contrast   Final Result   There is no acute edema or hemorrhage. No calvarial fracture. Facial bones show air-fluid levels within each maxillary sinus without a gross maxillary fracture on either side. There is a comminuted and depressed nasal bone fracture. **This report has been created using voice recognition software. It may contain minor errors which are inherent in voice recognition technology. **      Final report electronically signed by Dr. Lisbeth Ferreira on 2/17/2020 6:26 PM      CT FACIAL BONES WO CONTRAST   Final Result   There is no acute edema or hemorrhage.        No calvarial fracture. Facial bones show air-fluid levels within each maxillary sinus without a gross maxillary fracture on either side. There is a comminuted and depressed nasal bone fracture. **This report has been created using voice recognition software. It may contain minor errors which are inherent in voice recognition technology. **      Final report electronically signed by Dr. Serge Umanzor on 2/17/2020 6:26 PM      CT Cervical Spine WO Contrast   Final Result       No gross acute fracture. **This report has been created using voice recognition software. It may contain minor errors which are inherent in voice recognition technology. **      Final report electronically signed by Dr. Serge Umanzor on 2/17/2020 6:47 PM      XR CHEST PORTABLE   Final Result   1. Normal heart size. SI joints right axillary region. 2. No acute findings. No infiltrates or effusions are seen. **This report has been created using voice recognition software. It may contain minor errors which are inherent in voice recognition technology. **      Final report electronically signed by Dr. Tello Mcknight on 2/17/2020 5:16 PM        Ct Head Wo Contrast    Result Date: 2/17/2020  PROCEDURE: CT HEAD WO CONTRAST, CT FACIAL BONES WO CONTRAST CLINICAL INFORMATION: Fall. COMPARISON: CT 12/2/2019 TECHNIQUE: Noncontrast 5 mm axial images were obtained through the brain. All CT scans at this facility use dose modulation, iterative reconstruction, and/or weight-based dosing when appropriate to reduce radiation dose to as low as reasonably achievable. FINDINGS: Parenchyma: Mild low-attenuation areas throughout the periventricular white matter are grossly similar to previous. No acute area of edema. Ventricles and sulci: Normal size for age. Other: There is no acute intracranial hemorrhage. No calvarial fracture. Nasal bones: Comminuted acute fracture of the nasal bone with mild depression.  There are multiple air bubbles anterior to the right maxilla. Frontal sinuses: Normally aerated and pneumatized. Ethmoid air cells: Normally aerated and pneumatized. Sphenoid sinus: Normally aerated and pneumatized. Maxillary sinuses: Moderate left and small right maxillary air-fluid levels. No definite acute fracture is evident on either side. The mastoid air cells and middle ear cavities are normally aerated. Zygomatic arches:  Intact, no fracture. Pterygoid plates: Normal Mandible: There are large rajendra right bilaterally. No gross fracture. Orbital walls: Normal Skull base: Normal     There is no acute edema or hemorrhage. No calvarial fracture. Facial bones show air-fluid levels within each maxillary sinus without a gross maxillary fracture on either side. There is a comminuted and depressed nasal bone fracture. **This report has been created using voice recognition software. It may contain minor errors which are inherent in voice recognition technology. ** Final report electronically signed by Dr. Trina Woo on 2/17/2020 6:26 PM    Ct Facial Bones Wo Contrast    Result Date: 2/17/2020  PROCEDURE: CT HEAD WO CONTRAST, CT FACIAL BONES WO CONTRAST CLINICAL INFORMATION: Fall. COMPARISON: CT 12/2/2019 TECHNIQUE: Noncontrast 5 mm axial images were obtained through the brain. All CT scans at this facility use dose modulation, iterative reconstruction, and/or weight-based dosing when appropriate to reduce radiation dose to as low as reasonably achievable. FINDINGS: Parenchyma: Mild low-attenuation areas throughout the periventricular white matter are grossly similar to previous. No acute area of edema. Ventricles and sulci: Normal size for age. Other: There is no acute intracranial hemorrhage. No calvarial fracture. Nasal bones: Comminuted acute fracture of the nasal bone with mild depression. There are multiple air bubbles anterior to the right maxilla. Frontal sinuses: Normally aerated and pneumatized.  Ethmoid air cells: Normally aerated and pneumatized. Sphenoid sinus: Normally aerated and pneumatized. Maxillary sinuses: Moderate left and small right maxillary air-fluid levels. No definite acute fracture is evident on either side. The mastoid air cells and middle ear cavities are normally aerated. Zygomatic arches:  Intact, no fracture. Pterygoid plates: Normal Mandible: There are large rajendra right bilaterally. No gross fracture. Orbital walls: Normal Skull base: Normal     There is no acute edema or hemorrhage. No calvarial fracture. Facial bones show air-fluid levels within each maxillary sinus without a gross maxillary fracture on either side. There is a comminuted and depressed nasal bone fracture. **This report has been created using voice recognition software. It may contain minor errors which are inherent in voice recognition technology. ** Final report electronically signed by Dr. Renu Larose on 2/17/2020 6:26 PM    Ct Cervical Spine Wo Contrast    Result Date: 2/17/2020  PROCEDURE: CT CERVICAL SPINE WO CONTRAST CLINICAL INFORMATION: fall. COMPARISON: 3/28/2019 TECHNIQUE: 3 mm noncontrast axial images were obtained through the cervical spine with sagittal and coronal reconstructions. All CT scans at this facility use dose modulation, iterative reconstruction, and/or weight-based dosing when appropriate to reduce radiation dose to as low as reasonably achievable. FINDINGS: The study is mildly limited by motion artifact. There is mild spondylosis. No gross acute fracture. No swelling. Mild reversal of the cervical lordosis could relate to muscular spasm, and has worsened from previous. C2-C3: Moderate disc bulge. C3-C4: Mild disc bulge. Severe left and mild right foraminal narrowing secondary to uncovertebral spur. C4-C5: Mild disc space narrowing and minimal anterolisthesis. Mild disc bulge. Mild left foraminal narrowing. C5-C6: Moderate disc space narrowing. No gross disc herniation.  Moderate left foraminal narrowing secondary to

## 2020-02-18 NOTE — ED NOTES
Called 8A and informed Karine that the patient is on their way to the unit.       Marisabel Expose  02/18/20 4447

## 2020-02-18 NOTE — H&P
CT head showed no acute edema or hemorrhage. CT facial bones shows Facial bones show air-fluid levels within each maxillary sinus without a gross maxillary fracture on either side. There is a comminuted and depressed nasal bone fracture. CT cervical spine shows no gross acute fracture. EKG shows NSR. CXR shows no acute findings. Review of Systems   Constitutional: Negative for chills and fever. HENT: Positive for dental problem (pain) and facial swelling. Negative for postnasal drip and rhinorrhea. Eyes: Negative for pain, redness and visual disturbance. Respiratory: Positive for cough (chronic dry cough) and shortness of breath (chronic). Negative for chest tightness. Cardiovascular: Positive for chest pain (\"pressure\") and leg swelling (chronic). Gastrointestinal: Negative for constipation, diarrhea and nausea. Endocrine: Negative for polydipsia and polyphagia. Genitourinary: Negative for frequency and urgency. Musculoskeletal: Negative for back pain and neck pain. Skin: Negative for pallor and rash. Allergic/Immunologic: Positive for food allergies. Negative for environmental allergies. Neurological: Positive for dizziness and syncope. Negative for seizures, weakness, light-headedness and numbness. Psychiatric/Behavioral: Negative for agitation. The patient is not nervous/anxious. PMH:  Per HPI  SHX:  Rare EtOH use, Never smoked, denies drug use  FHX: Significant for pancreatic CA in mother, Heart disease in her father, Pollo Alan in brother.    Allergies: Ciprofloxacin, strawberries (rash), penicillins (swelling/rash)  Medications:       lidocaine        fentanNYL  75 mcg Intravenous Once       Vital Signs:   /73   Pulse 98   Temp 97.3 °F (36.3 °C) (Axillary)   Resp 18   Ht 5' 5\" (1.651 m)   Wt 178 lb (80.7 kg)   SpO2 98%   BMI 29.62 kg/m²    No intake or output data in the 24 hours ending 02/17/20 1924     General:  White female well groomed, well-nourished, 0.3 - 1.2 mg/dL    Bilirubin, Direct <0.2 0.0 - 0.3 mg/dL    Alkaline Phosphatase 70 38 - 126 U/L    AST 26 5 - 40 U/L    ALT 24 11 - 66 U/L    Total Protein 8.0 6.1 - 8.0 g/dL   Ethanol - ETOH Alcohol Level   Result Value Ref Range    ETHYL ALCOHOL, SERUM < 0.01 0.00 %   Platelet function test   Result Value Ref Range    PLT Function, EPI 94 68 - 175 seconds   Troponin   Result Value Ref Range    Troponin T < 0.010 ng/ml   Platelet Function Interpretation   Result Value Ref Range    Platelet Function, Interp NORMAL seconds   Glomerular Filtration Rate, Estimated   Result Value Ref Range    Est, Glom Filt Rate 36 (A) ml/min/1.73m2   Osmolality   Result Value Ref Range    Osmolality Calc 287.1 275.0 - 300 mOsmol/kg   Anion Gap   Result Value Ref Range    Anion Gap 20.0 (H) 8.0 - 16.0 meq/L   EKG 12 lead   Result Value Ref Range    Ventricular Rate 94 BPM    Atrial Rate 94 BPM    P-R Interval 132 ms    QRS Duration 84 ms    Q-T Interval 390 ms    QTc Calculation (Bazett) 487 ms    P Axis 40 degrees    R Axis 19 degrees    T Axis 44 degrees   TYPE AND SCREEN   Result Value Ref Range    ABO A     Rh Factor POS     Antibody Screen NEG        EKG / Radiology:     EKG:  Reviewed by me: NSR    CXR:   Reviewed by me: No acute findings. Xr Chest Standard (2 Vw)    Result Date: 2/14/2020  PROCEDURE: XR CHEST (2 VW) CLINICAL INFORMATION: SOB (shortness of breath) COMPARISON: 3/28/2019 TECHNIQUE: PA and lateral views of the chest were obtained. 1. Suboptimal inflation of the lungs. Vascular clips right axillary region. Prior cholecystectomy. Borderline heart size. 2. Minimal atelectasis/pneumonia along left hemidiaphragm. No effusion. **This report has been created using voice recognition software. It may contain minor errors which are inherent in voice recognition technology. ** Final report electronically signed by Dr. Khushboo Ro on 2/14/2020 8:04 AM    Ct Head Wo Contrast    Result Date: 2/17/2020  PROCEDURE: CT mm axial images were obtained through the brain. All CT scans at this facility use dose modulation, iterative reconstruction, and/or weight-based dosing when appropriate to reduce radiation dose to as low as reasonably achievable. FINDINGS: Parenchyma: Mild low-attenuation areas throughout the periventricular white matter are grossly similar to previous. No acute area of edema. Ventricles and sulci: Normal size for age. Other: There is no acute intracranial hemorrhage. No calvarial fracture. Nasal bones: Comminuted acute fracture of the nasal bone with mild depression. There are multiple air bubbles anterior to the right maxilla. Frontal sinuses: Normally aerated and pneumatized. Ethmoid air cells: Normally aerated and pneumatized. Sphenoid sinus: Normally aerated and pneumatized. Maxillary sinuses: Moderate left and small right maxillary air-fluid levels. No definite acute fracture is evident on either side. The mastoid air cells and middle ear cavities are normally aerated. Zygomatic arches:  Intact, no fracture. Pterygoid plates: Normal Mandible: There are large rajendra right bilaterally. No gross fracture. Orbital walls: Normal Skull base: Normal     There is no acute edema or hemorrhage. No calvarial fracture. Facial bones show air-fluid levels within each maxillary sinus without a gross maxillary fracture on either side. There is a comminuted and depressed nasal bone fracture. **This report has been created using voice recognition software. It may contain minor errors which are inherent in voice recognition technology. ** Final report electronically signed by Dr. Mell Olivera on 2/17/2020 6:26 PM    Ct Cervical Spine Wo Contrast    Result Date: 2/17/2020  PROCEDURE: CT CERVICAL SPINE WO CONTRAST CLINICAL INFORMATION: fall. COMPARISON: 3/28/2019 TECHNIQUE: 3 mm noncontrast axial images were obtained through the cervical spine with sagittal and coronal reconstructions.  All CT scans at this facility use dose modulation, iterative reconstruction, and/or weight-based dosing when appropriate to reduce radiation dose to as low as reasonably achievable. FINDINGS: The study is mildly limited by motion artifact. There is mild spondylosis. No gross acute fracture. No swelling. Mild reversal of the cervical lordosis could relate to muscular spasm, and has worsened from previous. C2-C3: Moderate disc bulge. C3-C4: Mild disc bulge. Severe left and mild right foraminal narrowing secondary to uncovertebral spur. C4-C5: Mild disc space narrowing and minimal anterolisthesis. Mild disc bulge. Mild left foraminal narrowing. C5-C6: Moderate disc space narrowing. No gross disc herniation. Moderate left foraminal narrowing secondary to uncovertebral spur. C6-C7: Severe disc space narrowing. Broad disc osteophyte complex causes moderate thecal sac effacement. Moderate bilateral foraminal narrowing. C7-T1: Moderate disc space narrowing. No gross disc herniation. Severe carotid artery calcification bilaterally. No gross acute fracture. **This report has been created using voice recognition software. It may contain minor errors which are inherent in voice recognition technology. ** Final report electronically signed by Dr. Pawel Cuevas on 2/17/2020 6:47 PM    Xr Chest Portable    Result Date: 2/17/2020  PROCEDURE: XR CHEST PORTABLE CLINICAL INFORMATION: Fall COMPARISON: 2/14/2020 TECHNIQUE: A single mobile view of the chest was obtained. 1. Normal heart size. SI joints right axillary region. 2. No acute findings. No infiltrates or effusions are seen. **This report has been created using voice recognition software. It may contain minor errors which are inherent in voice recognition technology. ** Final report electronically signed by Dr. Kitty Harmon on 2/17/2020 5:16 PM      Case and Plan discussed with Ru Jacobsen MD  Electronically signed by Teodoro Martino on 2/17/2020 at 7:24 PM

## 2020-02-18 NOTE — CONSULTS
of Consciousness []No []Yes[x]Unknown  Duration(min)  Mechanism of Injury:  []Motor Vehicle crash   []Single Vehicle [] []Passenger []Scene Fatality []Front Seat  []Restrained   []Air Bag Deployed   []Ejected []Rollover []Pedestrian []Trapped   Type of vehicle:   Protective Devices:   []Motorcycle  Wearing Helmet []Yes []No  []Bicycle  Wearing Helmet []Yes []No  [x]Fall   Distance - from standing  []Assault    Abuse Reported []Yes []No  []Gunshot  []Stabbing  []Work Related  []Burn: []Flame []Scald []Electrical []Chemical []Contact []Inhalation []House Fire  []Other:   Patient Active Problem List   Diagnosis    Diabetes type 2, uncontrolled    Type 2 diabetes mellitus without complication ([address]    Acute cholecystitis    Lactic acidosis    Type 2 diabetes mellitus with hyperosmolarity without coma, without long-term current use of insulin ([address]    Hypertension    Choledocholithiasis    Essential hypertension    Mirizzi's syndrome    Encounter for fitting or adjustment of hearing aid    FATOUMATA (generalized anxiety disorder)    Transient cerebral ischemia    Left hip pain    Aphasia    TIA (transient ischemic attack)    Type 2 diabetes mellitus ([address]    Major depression, recurrent, chronic ([address]    CHF (congestive heart failure), NYHA class II, unspecified failure chronicity, combined ([address]    Dermatitis    SOB (shortness of breath)    Fall from ground level    CHF (congestive heart failure), NYHA class III, chronic, diastolic (HCC)    Tachycardia    Angina of effort ([address]    Status post angioplasty    Abnormal findings on cardiac catheterization    Angina, class III ([address]    Syncope and collapse     Subjective   Chief Complaint: Fall     History of Present Illness: Patient is an 45-year-old female presents to [facility] as an activation of level 3 trauma consult following a fall this afternoon at assisted living facility.   Patient reported that she fell and struck her face. Patient unsure of the events of the fall and unsure if she lost consciousness following the fall. Patient did report she felt lightheaded and dizzy today with a chest pressure. Patient endorses being on blood thinners following a stent placement in January. Home meds reviewed patient on asprin and plavix. Daughter at bedside endorsed patient is being more forgetful than usual with more difficulty with words. Patient's ABCs intact with obvious signs of facial injury. Patient was awake, alert and oriented x3, in no acute distress. Upon assessment patient endorsed having a headache and facial pain but denied any currently lightheaded or dizziness, changes in vision, neck pain, back pain, arthralgias, chest pain, abdominal pain, nausea/vomiting, and paresthesias. On exam patient's pupils were equal and reactive to light. PMS intact in all 4 extremities. No signs of focal logical deficits on exam, cranial nerves grossly intact. No arm drift, extremities weakness, or abnormal sensation appreciated. Patient able to flex and extend joints of bilateral upper and lower extremities without complication. Laceration to nose with arterial involvement was reported by ED physician and closed and covered with steri-strips prior to trauma consult. Stellate laceration through vermillion boarder on anterior aspect of right side lower lip. See laceration repair note below. Chest and abdomen non-tender. No midline spinal tenderness to palpation. Patient with bilateral periorbital ecchymosis and ecchymosis and swelling noted to nasal bridge. Patient was stable and taken radiology for CT imaging. CT images reviewed. Patient sustained comminuted depressed nasal bone fracture. Patient did not sustain any traumatic injuries that warrant admission under trauma surgery.   Since patient unable to recall the events over the fall and reported feeling lightheaded and dizzy today with some chest pressure recommend admission under medicine for evaluation of possible syncopal episode. Further trauma surgery recommendations noted in plan. Care coordinated with ED physician and trauma surgeon, Dr. Fabricio Baldwin. Review of Systems:   Review of Systems   Constitutional: Negative for chills, diaphoresis and fever. HENT: Positive for facial swelling, mouth sores and nosebleeds. Eyes: Negative for photophobia, pain and visual disturbance. Respiratory: Negative for shortness of breath, wheezing and stridor. Cardiovascular: Negative for palpitations. Endorsed chest pressure today  Gastrointestinal: Negative for abdominal pain, diarrhea and vomiting. Musculoskeletal: Negative for arthralgias, back pain and neck pain. Skin: Positive for wound. Negative for rash. Facial lacerations and ecchymosis   Neurological: Positive for headaches. Negative for dizziness, light-headedness and numbness. Endorsed feeling lightheaded and dizzy today but not currently  Hematological: Bruises/bleeds easily. Psychiatric/Behavioral: Negative for agitation, behavioral problems and confusion.      Ciprofloxacin hcl; Food; and Penicillins  Past Surgical History:   Procedure Laterality Date    BREAST SURGERY      right lumpectomy    CHOLECYSTECTOMY  4-15-16    cholangiogram    COLONOSCOPY      EYE SURGERY      macular hole left eye    JOINT REPLACEMENT      left knee & right hip    SKIN BIOPSY      right arm     Past Medical History:   Diagnosis Date    Anxiety     nervous breakdown 9/2016    Breast cancer ([address]     CAD (coronary artery disease)     cardiomegaly    COPD (chronic obstructive pulmonary disease) (HCC)     Diverticulitis     DVT (deep venous thrombosis) (HCC)     GERD (gastroesophageal reflux disease)     irritable bowel disease    Hyperlipidemia     Hypertension     Osteoarthritis     Pancreatic cancer ([address]     Family    Psychiatric problem     alzheimers start    Pulmonary embolism ([address]     S/P knee replacement     Type 2 [facility] mellitus [facility] 2009     Past Surgical History:   Procedure Laterality Date    BREAST SURGERY      right lumpectomy    CHOLECYSTECTOMY  4-15-16    cholangiogram    COLONOSCOPY      EYE SURGERY      macular hole left eye    JOINT REPLACEMENT      left knee & right hip    SKIN BIOPSY      right arm     Social History     Socioeconomic History    Marital status:       Spouse name: Naomi Mcconnell Number of children: 11    Years of education: 15    Highest education level: None   Occupational History    Occupation: retired   Social Needs    Financial resource strain: None    Food insecurity:     Worry: None     Inability: None    Transportation needs:     Medical: None     Non-medical: None   Tobacco Use    Smoking status: Never Smoker    Smokeless tobacco: Never Used   Substance and Sexual Activity    Alcohol use: Yes     Comment: rare    Drug use: No    Sexual activity: Not Currently   Lifestyle    Physical activity:     Days per week: None     Minutes per session: None    Stress: None   Relationships    Social connections:     Talks on phone: None     Gets together: None     Attends [employer] service: None     Active member of club or organization: None     Attends meetings of clubs or organizations: None     Relationship status: None    Intimate partner violence:     Fear of current or ex partner: None     Emotionally abused: None     Physically abused: None     Forced sexual activity: None   Other Topics Concern    None   Social History Narrative    None     Family History   Problem Relation Age of Onset    Cancer Mother         pancreatic    Heart Disease Father 80    Other Sister         infant death    High Cholesterol Brother        Home medications:    Previous Medications    ACETAMINOPHEN (TYLENOL) 325 MG TABLET    Take 650 mg by mouth every 6 hours as needed for Pain    ASPIRIN 81 MG EC TABLET    1 Tab Oral DAILY     ATORVASTATIN (LIPITOR) 40 MG TABLET    Take 1 tablet by mouth daily    BUMETANIDE (BUMEX) 2 MG TABLET    Take 1 tablet by mouth daily    CLOPIDOGREL (PLAVIX) 75 MG TABLET    Pt takes 300 mg tonight then 75 mg every day starting on 2/4/2020    D-MANNOSE 500 MG CAPS    Take 1 capsule by mouth daily     DULOXETINE (CYMBALTA) 60 MG EXTENDED RELEASE CAPSULE    Take 2 capsules by mouth daily    GLUCOSE BLOOD VI TEST STRIPS (ONE TOUCH ULTRA TEST) STRIP    Test daily or AD. Dx. 250.00    METFORMIN (GLUCOPHAGE-XR) 750 MG EXTENDED RELEASE TABLET    Take 1 tablet by mouth Daily with supper Restart this medication Friday am    METOLAZONE (ZAROXOLYN) 2.5 MG TABLET    Take 1 tablet by mouth daily as needed (wt gain, sob, edema)    METOPROLOL TARTRATE (LOPRESSOR) 50 MG TABLET    Take 1.5 tablets by mouth 2 times daily    MULTIPLE VITAMINS-MINERALS (PRESERVISION AREDS PO)    Take by mouth 2 times daily    MULTIPLE VITAMINS-MINERALS (SENIOR MULTIVITAMIN PLUS) TABS    Take 1 tablet by mouth daily    NITROGLYCERIN (NITROSTAT) 0.4 MG SL TABLET    up to max of 3 total doses. If no relief after 1 dose, call 911.     POTASSIUM CHLORIDE (KLOR-CON) 10 MEQ EXTENDED RELEASE TABLET    Take 1 tablet by mouth daily       Hospital medications:  Scheduled Meds:   lidocaine        fentanNYL  75 mcg Intravenous Once    aspirin  81 mg Oral Once    atorvastatin  40 mg Oral Daily    [Held by provider] bumetanide  2 mg Oral Daily    clopidogrel  75 mg Oral Daily    DULoxetine  120 mg Oral Daily    metoprolol tartrate  75 mg Oral BID     Continuous Infusions:  PRN Meds:  Objective   ED TRIAGE VITALS  BP: 113/64, Temp: 97.3 °F (36.3 °C), Pulse: 103, Resp: 19, SpO2: 100 %  Beverly Coma Scale  Eye Opening: Spontaneous  Best Verbal Response: Oriented  Best Motor Response: Obeys commands  Parkman Coma Scale Score: 15  Results for orders placed or performed during the hospital encounter of 02/17/20   CBC without Differential   Result Value Ref Range    WBC 12.6 (H) 4.8 - 10.8 thou/mm3    RBC 4.02 (L) 4.20 - 5.40 mill/mm3    Hemoglobin 12.5 12.0 - 16.0 gm/dl    Hematocrit 38.6 37.0 - 47.0 %    MCV 96.0 81.0 - 99.0 fL    MCH 31.1 26.0 - 33.0 pg    MCHC 32.4 32.2 - 35.5 gm/dl    RDW-CV 14.7 (H) 11.5 - 14.5 %    RDW-SD 51.7 (H) 35.0 - 45.0 fL    Platelets 380 033 - 276 thou/mm3    MPV 9.0 (L) 9.4 - 12.4 fL   Basic Metabolic Panel, Without Calcium   Result Value Ref Range    Sodium 137 135 - 145 meq/L    Potassium 3.7 3.5 - 5.2 meq/L    Chloride 90 (L) 98 - 111 meq/L    CO2 27 23 - 33 meq/L    Glucose 200 (H) 70 - 108 mg/dL    BUN 34 (H) 7 - 22 mg/dL    CREATININE 1.4 (H) 0.4 - 1.2 mg/dL   Hepatic function panel   Result Value Ref Range    Alb 3.8 3.5 - 5.1 g/dL    Total Bilirubin 0.4 0.3 - 1.2 mg/dL    Bilirubin, Direct <0.2 0.0 - 0.3 mg/dL    Alkaline Phosphatase 70 38 - 126 U/L    AST 26 5 - 40 U/L    ALT 24 11 - 66 U/L    Total Protein 8.0 6.1 - 8.0 g/dL   Ethanol - ETOH Alcohol Level   Result Value Ref Range    ETHYL ALCOHOL, SERUM < 0.01 0.00 %   Platelet function test   Result Value Ref Range    PLT Function, EPI 94 68 - 175 seconds   Troponin   Result Value Ref Range    Troponin T < 0.010 ng/ml   Platelet Function Interpretation   Result Value Ref Range    Platelet Function, Interp NORMAL seconds   Glomerular Filtration Rate, Estimated   Result Value Ref Range    Est, Glom Filt Rate 36 (A) ml/min/1.73m2   Osmolality   Result Value Ref Range    Osmolality Calc 287.1 275.0 - 300 mOsmol/kg   Anion Gap   Result Value Ref Range    Anion Gap 20.0 (H) 8.0 - 16.0 meq/L   EKG 12 lead   Result Value Ref Range    Ventricular Rate 94 BPM    Atrial Rate 94 BPM    P-R Interval 132 ms    QRS Duration 84 ms    Q-T Interval 390 ms    QTc Calculation (Bazett) 487 ms    P Axis 40 degrees    R Axis 19 degrees    T Axis 44 degrees   TYPE AND SCREEN   Result Value Ref Range    ABO A     Rh Factor POS     Antibody Screen NEG        Physical Exam:  Patient Vitals for the past 24 hrs:   BP Temp Temp src Pulse Resp SpO2 Height Weight   02/17/20 1930 113/64 -- -- 103 19 100 % -- --   02/17/20 1758 121/73 -- -- 98 18 98 % -- --   02/17/20 1708 -- -- -- -- -- 100 % -- --   02/17/20 1624 110/69 -- -- -- -- 99 % -- --   02/17/20 1345 (!) 90/56 97.3 °F (36.3 °C) Axillary 98 20 100 % 5' 5\" (1.651 m) 178 lb (80.7 kg)     Primary Assessment:  Airway: Patent, trachea midline  Breathing: Breath sounds present and equal bilaterally, spontaneous, and unlabored  Circulation: Hemodynamically stable, 2+ central and peripheral pulses. Disability: TELLO x 4, following commands. GCS =15    Secondary Assessment:  General: Alert, NAD. Head: Normocephalic, mid face stable, with significant bilateral inferior periorbital ecchymosis. Epistaxis noted in bilateral nares with bleeding well controlled. Nasal swelling. Laceration reported to tip of nose which was closed and steri-strips placed prior to trauma consult. Mouth clear of foreign bodies but blood noted in oral cavity from stellate laceration of right sided lower lip. Eyes: PERRL, EOMI  Neurologic: A & O x3. Following commands. CN 2-12 grossly intact, no signs of focal neurological deficits. No slurred speech, no arm drift, no extremity weakness or abnormal sensation. Neck: trachea midline. Cervical spines NTTP midline, without step-offs, crepitus or deformity. Back:TL spines are NTTP midline, without step-offs, crepitus or deformity. No abrasions, contusions, or ecchymosis noted. Lungs: Clear to auscultation bilaterally. Chest Wall: Chest rise symmetrical.  Chest wall without tenderness to palpation. Heart: RRR. Normal S1/S2. No obvious M/G/R. Abdomen:  Soft, NTTP. No guarding. Non-peritoneal.  Pelvis:  NTTP, stable to compression  Extremities: No gross deformities. PMS intact. Radial /DP/PT pulses 2+ bilaterally. Patient able to flex and extend joints of bilateral upper and lower extremities without complication.  Mild tenderness in left knee with no overlying abrasion, contusions, or signs of trauma. Skin: Skin warm and dry. Normal for ethnicity. Laceration Repair  Location: Right-sided lower lip laceration  Verbal consent for laceration repair obtained from patient. Discussed risks, benefits, and alternatives. Discussed plastics repair for better cosmetic result. Patient agreeable to repair at bedside. Laceration evaluated with ED attending physician and discussed closure technique. Patient had stellate laceration of right sided lower lip with central tissue loss. Laceration crosses the lower vermilion boarder. Estimated 3cm. Laceration was cleaned with sterile saline flushes and antiseptic spray. Local anesthesia obtained using 1% lidocaine with epinephrine. Hemostasis obtained using direct pressure, bleeding well controlled. Laceration closed with simple interrupted sutures using 6-0 nylon. The vermilion boarder was properly aligned and the first suture was placed through the vermilion boarder with close approximation without significant tension. The remainder of the laceration was closed after the vermilion boarder was aligned. A total of 6 simple interrupted sutures were placed and a small piece of tissue was removed for proper approximation and due to viability concern. Laceration was well approximated. Patient tolerated the procedure well, no immediate complications were noted. Discussed sign and symptoms of infection. Recommended local wound care with topical antibiotics. Discussed to remove sutures in 5 days. ED provider to assess tetanus status. Radiology:     CT Head WO Contrast   Final Result   There is no acute edema or hemorrhage. No calvarial fracture. Facial bones show air-fluid levels within each maxillary sinus without a gross maxillary fracture on either side. There is a comminuted and depressed nasal bone fracture. **This report has been created using voice recognition software.  It may contain minor errors which are inherent in

## 2020-02-19 LAB
ANION GAP SERPL CALCULATED.3IONS-SCNC: 13 MEQ/L (ref 8–16)
BUN BLDV-MCNC: 26 MG/DL (ref 7–22)
CALCIUM SERPL-MCNC: 8.8 MG/DL (ref 8.5–10.5)
CHLORIDE BLD-SCNC: 92 MEQ/L (ref 98–111)
CO2: 27 MEQ/L (ref 23–33)
CREAT SERPL-MCNC: 1 MG/DL (ref 0.4–1.2)
GFR SERPL CREATININE-BSD FRML MDRD: 53 ML/MIN/1.73M2
GLUCOSE BLD-MCNC: 170 MG/DL (ref 70–108)
GLUCOSE BLD-MCNC: 175 MG/DL (ref 70–108)
GLUCOSE BLD-MCNC: 182 MG/DL (ref 70–108)
GLUCOSE BLD-MCNC: 188 MG/DL (ref 70–108)
GLUCOSE BLD-MCNC: 233 MG/DL (ref 70–108)
POTASSIUM SERPL-SCNC: 3.2 MEQ/L (ref 3.5–5.2)
SODIUM BLD-SCNC: 132 MEQ/L (ref 135–145)

## 2020-02-19 PROCEDURE — 90792 PSYCH DIAG EVAL W/MED SRVCS: CPT | Performed by: PSYCHIATRY & NEUROLOGY

## 2020-02-19 PROCEDURE — 97166 OT EVAL MOD COMPLEX 45 MIN: CPT

## 2020-02-19 PROCEDURE — 1200000003 HC TELEMETRY R&B

## 2020-02-19 PROCEDURE — 36415 COLL VENOUS BLD VENIPUNCTURE: CPT

## 2020-02-19 PROCEDURE — 80048 BASIC METABOLIC PNL TOTAL CA: CPT

## 2020-02-19 PROCEDURE — 99231 SBSQ HOSP IP/OBS SF/LOW 25: CPT | Performed by: INTERNAL MEDICINE

## 2020-02-19 PROCEDURE — 82948 REAGENT STRIP/BLOOD GLUCOSE: CPT

## 2020-02-19 PROCEDURE — 97535 SELF CARE MNGMENT TRAINING: CPT

## 2020-02-19 PROCEDURE — 97163 PT EVAL HIGH COMPLEX 45 MIN: CPT

## 2020-02-19 PROCEDURE — 6370000000 HC RX 637 (ALT 250 FOR IP): Performed by: PHYSICIAN ASSISTANT

## 2020-02-19 PROCEDURE — 99232 SBSQ HOSP IP/OBS MODERATE 35: CPT | Performed by: NURSE PRACTITIONER

## 2020-02-19 RX ADMIN — METOPROLOL TARTRATE 75 MG: 50 TABLET, FILM COATED ORAL at 20:43

## 2020-02-19 RX ADMIN — ACETAMINOPHEN 650 MG: 325 TABLET ORAL at 20:43

## 2020-02-19 RX ADMIN — METOPROLOL TARTRATE 75 MG: 50 TABLET, FILM COATED ORAL at 10:08

## 2020-02-19 RX ADMIN — CLOPIDOGREL 75 MG: 75 TABLET, FILM COATED ORAL at 10:08

## 2020-02-19 RX ADMIN — DULOXETINE HYDROCHLORIDE 120 MG: 60 CAPSULE, DELAYED RELEASE ORAL at 13:39

## 2020-02-19 ASSESSMENT — PAIN SCALES - GENERAL
PAINLEVEL_OUTOF10: 0
PAINLEVEL_OUTOF10: 9
PAINLEVEL_OUTOF10: 0

## 2020-02-19 ASSESSMENT — PAIN DESCRIPTION - PAIN TYPE: TYPE: ACUTE PAIN

## 2020-02-19 NOTE — PROGRESS NOTES
Assistance: Independent  Ambulation Assistance: Independent  Transfer Assistance: Independent          Additional Comments: Pt was ambulating at [facility]. [facility]. with RW down the herrera per pt. Pt stating she was indep with all her ADL tasks including her showering. Pt stating she would get her lunch and breakfast meal together abd went to dining room for supper. VISION:Pt with significant bruising around bilateral eyes. Pt stating she is having no difficulties with her vision even though her chart stating she was having double vision at times. HEARING:  WNL    COGNITION: Decreased Safety Awareness    RANGE OF MOTION:  Bilateral Upper Extremity:  WNL at all joints except wrist on left UE    STRENGTH:  Bilateral Upper Extremity:  Impaired - bilateral UE general weakness and deconditioning thorughout    ADL:   Grooming: Contact Guard Assistance. standing at sink for washing of right hand requiring pt to reach outside base of support and across midline with no LOB   Toileting: Maximum Assistance. for hygiene  Toilet Transfer: [address] from [facility] . BALANCE:  Standing Balance: Contact Guard Assistance. during washign of hands    BED MOBILITY:  Sit to Supine: Minimal Assistance      TRANSFERS:  Sit to Stand:  Minimal Assistance. from EOB with cues for safety and to not use left UE  Stand to Sit: Contact Guard Assistance. with cues top step all the way back for bed prior to sitting    FUNCTIONAL MOBILITY:  Assistive Device: Andre-walker  Assist Level:  Minimal Assistance. Distance: To and from bathroom  With slow pace and slight unsteadiness throughout       Activity Tolerance:  Patient tolerance of  treatment: good. Pt fatigued after requesting to return to bed      Assessment:  Assessment: Pt s/p fall with difficulty completing her ADL tasks and mobility d/t injuries from fall and demo decreased balance as well during.  Pt would benefit from skilled OT services to incerase her indep with aDL tasks by ADL tasks and simple meal prep         Following session, patient left in safe position with all fall risk precautions in place.

## 2020-02-19 NOTE — PROGRESS NOTES
Cardiology Progress Note      Patient:  Andres Caldera  YOB: 1935  MRN: [id number]   Acct: [de-identified]  Admit Date:  2/17/2020  Primary Cardiologist: Elliot Valladares MD    Per Dr. Jen Jaquez consult note:  CONSULT DATE:  02/18/2020     CARDIOLOGY CONSULTATION     REASON FOR THE CONSULT:  Status post fall and facial trauma with history  of congestive heart failure.     REQUESTING PROVIDER:  Hospitalist service.     HISTORY OF PRESENT ILLNESS:  This is a very pleasant, unfortunate  80year-old patient who continues to progress with worsening shortness  of breath, worsening overall condition, has had recent evaluation,  underwent cardiac catheterization, subsequently angioplasty and stenting  of the left circumflex; however, continued to have shortness of breath  afterwards for which she has been treated for diastolic dysfunction and  fluid overload. In spite of aggressive treatment, she continues to have  shortness of breath and she has had extensive evaluation accordingly. She comes in status post falling, trauma to the face and she is not very  aware of what happened and what the circumstances are. She seems to be  slightly confused. Denies any chest discomfort; however, she has had  shortness of breath as above. She sustained trauma to the face with  bruises and ecchymosis. Subjective (Events in last 24 hours): Resting in bed in nad. Has been up today twice without dizziness, lightheadedness. Denies chest pain or sob.  VSS      Objective:   BP (!) 119/56   Pulse 98   Temp 97.8 °F (36.6 °C) (Oral)   Resp 20   Ht 5' 5\" (1.651 m)   Wt 178 lb (80.7 kg)   SpO2 95%   BMI 29.62 kg/m²        TELEMETRY: SR    Physical Exam:  General Appearance: alert and oriented to person, place and time, in no acute distress  Cardiovascular: normal rate, regular rhythm, normal S1 and S2, no murmurs, rubs, clicks, or gallops, distal pulses intact, no carotid bruits, no JVD  Pulmonary/Chest: clear to auscultation bilaterally- no wheezes, rales or rhonchi, normal air movement, no respiratory distress  Abdomen: soft, non-tender, non-distended, normal bowel sounds, no masses   Extremities: no cyanosis, clubbing or edema, pulse   Skin: warm and dry, no rash or erythema  Head: normocephalic and atraumatic, face with ecchymosis, nose and lip laceration  Eyes: pupils equal, round, and reactive to light, orbital ecchymosis  Neck: supple and non-tender without mass, no thyromegaly   Musculoskeletal: normal range of motion, no joint swelling, deformity or tenderness  Neurological: alert, oriented, normal speech, no focal findings or movement disorder noted    Medications:    magnesium replacement protocol   Other RX Placeholder    potassium replacement protocol   Other RX Placeholder    haloperidol lactate  2 mg Intramuscular Once    atorvastatin  40 mg Oral Daily    [Held by provider] bumetanide  2 mg Oral Daily    clopidogrel  75 mg Oral Daily    DULoxetine  120 mg Oral Daily    metoprolol tartrate  75 mg Oral BID    sodium chloride flush  10 mL Intravenous 2 times per day    insulin lispro  0-6 Units Subcutaneous TID WC    insulin lispro  0-3 Units Subcutaneous Nightly      sodium chloride Stopped (20 1617)     sodium chloride flush, 10 mL, PRN  magnesium hydroxide, 30 mL, Daily PRN  ondansetron, 4 mg, Q6H PRN  acetaminophen, 650 mg, Q4H PRN        Diagnostics:  EK20  Normal sinus rhythm  Normal ECG  When compared with ECG of 2020 08:24,  No significant change was found  Confirmed by [address] [place] [address] ([facility]) on 2020 5:24:21 AM    Limited Echo: 20   Summary   limited echo   Ejection fraction is visually estimated at 50%. Overall left ventricular function is normal.   Moderately dilated left atrium. Myxomatotic degeneration of mitral valve. Decreased mitral valve mobility noted. No evidence of any pericardial effusion.       Signature ----------------------------------------------------------------   Electronically signed by Omega Guerin MD       Left Heart Cath: 12/2/19  CORONARY ANGIOGRAM RESULTS:  1. Left main is patent, gives rise to LAD and left circ. 2.  LAD has ostial disease which seems to be quite hazy and possibly  significant stenosis. There was difficult to evaluate due to eccentric  nature of the plaque. The rest of the LAD has nonobstructive mild  disease. 3.  Left circumflex artery is nondominant and patent. 4.  Right coronary artery is large, dominant, has nonobstructive luminal  irregularity.     CONCLUSION:  1. Disease in the LAD towards the ostium as described above that seems  to be significant. 2.  Normal LV function. COMMENDATIONS:  At this point, case was discussed with Dr. Ciara Perera. It  was felt that due to the patient!s trauma yesterday and fall, hitting  her head, it was safer for her to be considered for intervention in the  next several days. She did have a CT today which ruled out a bleed;  however, she still runs a high risk. The patient definitely does not  want any consideration for open heart surgery at any cost.  We will  discuss further with the family.           We will treat medically in the meantime and go from there. Killian Leo M.D.    [place]: 1/13/20  SUMMARY:  Successful PCI of the ostium of the LAD.     PLAN:  1. Bedrest.  2.  Optimal medical therapy. 3.  Risk factor management. 4.  Routine access site care. 5.  Overnight observation. 6.  IV fluids. 7. DAPT. 8.  Follow up with Dr. Brady Ward in one to two weeks postprocedure.     All the above was explained to the patient and the patient's family. They are agreeable and amenable to the above plan.           Patt York MD  Lab Data:    Cardiac Enzymes:  No results for input(s): CKTOTAL, CKMB, CKMBINDEX, TROPONINI in the last 72 hours.     CBC:   Lab Results   Component Value Date    WBC 10.1 02/18/2020    RBC 3.60 02/18/2020

## 2020-02-19 NOTE — CONSULTS
[address] [place] [address]                                  CONSULTATION    PATIENT NAME: Gordon Valentin                :        1935  MED REC NO:   [id number]                           ROOM:       0008  ACCOUNT NO:   [de-identified]                           ADMIT DATE: 2020  PROVIDER:     JENNI Becerra:  2020    CARDIOLOGY CONSULTATION    REASON FOR THE CONSULT:  Status post fall and facial trauma with history  of congestive heart failure. REQUESTING PROVIDER:  Hospitalist service. HISTORY OF PRESENT ILLNESS:  This is a very pleasant, unfortunate  80year-old patient who continues to progress with worsening shortness  of breath, worsening overall condition, has had recent evaluation,  underwent cardiac catheterization, subsequently angioplasty and stenting  of the left circumflex; however, continued to have shortness of breath  afterwards for which she has been treated for diastolic dysfunction and  fluid overload. In spite of aggressive treatment, she continues to have  shortness of breath and she has had extensive evaluation accordingly. She comes in status post falling, trauma to the face and she is not very  aware of what happened and what the circumstances are. She seems to be  slightly confused. Denies any chest discomfort; however, she has had  shortness of breath as above. She sustained trauma to the face with  bruises and ecchymosis. REVIEW OF SYSTEMS:  No obvious fever or chills. No hematuria or  dysuria. No abdominal pain, nausea, vomiting, or diarrhea. No obvious  active psych issues. The patient seems to have some dementia. No skin  rashes. Denies any dizziness, lightheadedness or loss of consciousness. No recent trauma except what is mentioned above. No bleeding problem. Worsening shortness of breath over the last several months.     PAST MEDICAL

## 2020-02-19 NOTE — DISCHARGE INSTR - COC
signature: Electronically signed by DAVIAN Gabriel on 2/19/20 at 12:02 PM    PHYSICIAN SECTION    Prognosis: Good    Condition at Discharge: Stable    Rehab Potential (if transferring to Rehab): Good    Recommended Labs or Other Treatments After Discharge:     Physician Certification: I certify the above information and transfer of Bhupendra Skaggs  is necessary for the continuing treatment of the diagnosis listed and that she requires [place] for greater 30 days.      Update Admission H&P: No change in H&P    PHYSICIAN SIGNATURE:  Electronically signed by Rohan Burden DO on 2/21/20 at 3:50 PM

## 2020-02-19 NOTE — PROGRESS NOTES
Problem: Falls - Risk of:  Goal: Will remain free from falls  Description  Will remain free from falls  Outcome: Met This Shift  Note:   Patient remained free from falls this shift. Confused. Sitter at bedside for patient safety. . Wore nonskid socks when ambulating with assistance. Bed alarm on. Call light in reach.      Goal: Absence of physical injury  Description  Absence of physical injury  Outcome: Met This Shift  Note:   Patient remained free from physical injury this shift. Confused. Sitter at bedside for patient safety. . Wore nonskid socks when ambulating with assistance. Bed alarm on. Call light in reach.         Problem: Pain:  Goal: Pain level will decrease  Description  Pain level will decrease  Outcome: Met This Shift  Note:   Pain goal: no pain. Denies pain so far this shift. Will continue to monitor for pain. Problem: Injury - Risk of, Physical Injury:  Goal: Will remain free from falls  Description  Will remain free from falls  Outcome: Met This Shift  Note:   Patient remained free from falls this shift. Confused. Sitter at bedside for patient safety. . Wore nonskid socks when ambulating with assistance. Bed alarm on. Call light in reach.      Goal: Absence of physical injury  Description  Absence of physical injury  Outcome: Met This Shift  Note:   Patient remained free from physical injury this shift. Confused. Sitter at bedside for patient safety. . Wore nonskid socks when ambulating with assistance. Bed alarm on. Call light in reach.         Problem: Cardiovascular  Goal: Hemodynamic stability  Outcome: Met This Shift  Note:   Patient VSS. Will continue to monitor vitals Q4H and PRN. Problem: Risk for Impaired Skin Integrity  Goal: Tissue integrity - skin and mucous membranes  Description  Structural intactness and normal physiological function of skin and  mucous membranes. Outcome: Ongoing  Note:   Patient with facial laceration, bruising and abrasions. No skin breakdown noted.  Patient turns self in bed. Pillow support provided. Will continue to monitor skin integrity and encourage repositioning Q2H and PRN. Problem: Confusion - Acute:  Goal: Mental status will be restored to baseline  Description  Mental status will be restored to baseline  Outcome: Ongoing  Note:   Patient confused this shift. Only alert to name. Disoriented to place, situation and time. Confused and easily agitated at times when reoriented. Sitter at bedside for patient safety will continue to monitor mental status. Problem: Mood - Altered:  Goal: Absence of abusive behavior  Description  Absence of abusive behavior  Outcome: Ongoing  Note:   Patient slightly aggressive at start of shift with sitter. Patient was able to be reoriented to calmed down. No signs of abusive behavior this shift. Will continue to monitor for s/s of aggressive/abusive behavior. Problem: Sleep Pattern Disturbance:  Goal: Appears well-rested  Description  Appears well-rested  Outcome: Ongoing  Note:   Patient slept for a few hours this shift. Will continue to monitor sleep and encourage rest.      Problem: Neurological  Goal: Maximum potential motor/sensory/cognitive function  Outcome: Ongoing  Note:   Patient c/o some intermittent numbness and tingling to left hand. Patient has left arm fracture. Patient alert to name only this shift. Gait somewhat steady with standby assist and walker. Patient with double vision at times. Will continue to monitor neuro status.      Patient participated in plan of care and contributed to goal setting.

## 2020-02-19 NOTE — CONSULTS
tablet up to max of 3 total doses. If no relief after 1 dose, call 911. 1/14/20   [facility]   atorvastatin (LIPITOR) 40 MG tablet Take 1 tablet by mouth daily 1/14/20   [facility]   Multiple Vitamins-Minerals (PRESERVISION AREDS PO) Take by mouth 2 times daily    Historical Provider, MD   potassium chloride (KLOR-CON) 10 MEQ extended release tablet Take 1 tablet by mouth daily 9/10/19   BAILEY Conteh CNP   D-Mannose 500 MG CAPS Take 1 capsule by mouth daily     Historical Provider, MD   DULoxetine (CYMBALTA) 60 MG extended release capsule Take 2 capsules by mouth daily 10/10/17   BAILEY Manzanares CNP   Multiple Vitamins-Minerals (SENIOR MULTIVITAMIN PLUS) TABS Take 1 tablet by mouth daily    Historical Provider, MD   glucose blood VI test strips (ONE TOUCH ULTRA TEST) strip Test daily or AD.   Dx. 250.00 12/6/16   Fredrica Hodgkin, APRN - CNP   acetaminophen (TYLENOL) 325 MG tablet Take 650 mg by mouth every 6 hours as needed for Pain    Historical Provider, MD   aspirin 81 MG EC tablet 1 Tab Oral DAILY     Historical Provider, MD        Medications:    Current Facility-Administered Medications: magnesium replacement protocol, , Other, RX Placeholder  potassium replacement protocol, , Other, RX Placeholder  haloperidol lactate (HALDOL) injection 2 mg, 2 mg, Intramuscular, Once  atorvastatin (LIPITOR) tablet 40 mg, 40 mg, Oral, Daily  [Held by provider] bumetanide (BUMEX) tablet 2 mg, 2 mg, Oral, Daily  clopidogrel (PLAVIX) tablet 75 mg, 75 mg, Oral, Daily  DULoxetine (CYMBALTA) extended release capsule 120 mg, 120 mg, Oral, Daily  metoprolol tartrate (LOPRESSOR) tablet 75 mg, 75 mg, Oral, BID  0.9 % sodium chloride infusion, , Intravenous, Continuous  sodium chloride flush 0.9 % injection 10 mL, 10 mL, Intravenous, 2 times per day  sodium chloride flush 0.9 % injection 10 mL, 10 mL, Intravenous, PRN  magnesium hydroxide (MILK OF MAGNESIA) 400 MG/5ML suspension 30 mL, 30 mL, Oral, Daily PRN  ondansetron (ZOFRAN) injection 4 mg, 4 mg, Intravenous, Q6H PRN  acetaminophen (TYLENOL) tablet 650 mg, 650 mg, Oral, Q4H PRN  insulin lispro (HUMALOG) injection vial 0-6 Units, 0-6 Units, Subcutaneous, TID WC  insulin lispro (HUMALOG) injection vial 0-3 Units, 0-3 Units, Subcutaneous, Nightly     Past Medical History:        Diagnosis Date    Anxiety     nervous breakdown 9/2016    [facility]     CAD (coronary artery disease)     cardiomegaly    COPD [facility]chronic obstructive pulmonary disease) ([facility]     Diverticulitis     [facility]     GERD (gastroesophageal reflux disease)     irritable bowel disease    Hyperlipidemia     Hypertension     Osteoarthritis     Pancreatic cancer ([facility] [address] [facility]     Family    Psychiatric problem     alzheimers start    Pulmonary embolism ([facility] [facility]     S/P knee replacement     Type 2 diabetes mellitus ([facility] [address] 2009       Past Surgical History:        Procedure Laterality Date    BREAST SURGERY      right lumpectomy    CHOLECYSTECTOMY  4-15-16    cholangiogram    COLONOSCOPY      EYE SURGERY      macular hole left eye    JOINT REPLACEMENT      left knee & right hip    SKIN BIOPSY      right arm       Allergies: Ciprofloxacin hcl; Food; and Penicillins      Social History:    Patient reports she was born and raised in [address] [place] [address]. 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 She has been living at Assisted living facility since one and half year. She is a  and was  for 61 years.      SUBSTANCE USE HISTORY: denies        Family Medical and Psychiatric History:           Problem Relation Age of Onset    Cancer Mother         pancreatic    Heart Disease Father 80    Other Sister         infant death    High Cholesterol Brother          Physical  BP (!) 119/56   Pulse 98   Temp 97.8 °F (36.6 °C) (Oral)   Resp 20   Ht 5' 5\" (1.651 m)   Wt 178 lb (80.7 kg)   SpO2 95%   BMI 29.62 kg/m²       Mental

## 2020-02-19 NOTE — CONSULTS
Orthopedic Consult    Requesting Physician: Dr. Chapito Dong:  Left wrist pain    HISTORY OF PRESENT ILLNESS:      The patient is a 80 y.o. female  who presents with left wrist pain. She stated that she went to get up and fell hitting her face. She had a syncopal episode. Ortho was consulted for a left triquetrium fracture. She is complaining of left hand pain. Right hand dominant. Denies numbness or tingling in left hand. Left hand x-ray revealed no obvious deformity, but tender to palpate. Significant underlying arthritis. Osteophyte on scaphoid.        Past Medical History:    Past Medical History:   Diagnosis Date    Anxiety     nervous breakdown 9/2016    [facility]     CAD (coronary artery disease)     cardiomegaly    COPD (chronic obstructive pulmonary disease) (H[facility])     Diverticulitis     DVT (deep venous thrombosis) (HCC)     GERD (gastroesophageal reflux disease)     irritable bowel disease    Hyperlipidemia     Hypertension     Osteoarthritis     Pancreatic cancer ([facility] [facility]     Family    Psychiatric problem     alzheimers start    Pulmonary embolism ([facility] [facility]     S/P knee replacement     Type 2 diabetes mellitus ([facility] [facility] 2009       Past Surgical History:    Past Surgical History:   Procedure Laterality Date    BREAST SURGERY      right lumpectomy    CHOLECYSTECTOMY  4-15-16    cholangiogram    COLONOSCOPY      EYE SURGERY      macular hole left eye    JOINT REPLACEMENT      left knee & right hip    SKIN BIOPSY      right arm       Medications Prior to Admission:   Current Facility-Administered Medications   Medication Dose Route Frequency Provider Last Rate Last Dose    magnesium replacement protocol   Other RX Placeholder Montana Wilson MD        potassium replacement protocol   Other RX Placeholder [place], [place]        haloperidol lactate (HALDOL) injection 2 mg  2 mg Intramuscular Once [place]        atorvastatin (LIPITOR) tablet 40 mg  40 mg Oral Daily [facility] [place]   40 mg at 02/18/20 1956    [Held by provider] bumetanide (BUMEX) tablet 2 mg  2 mg Oral Daily [facility]        clopidogrel (PLAVIX) tablet 75 mg  75 mg Oral Daily [facility] [place]   75 mg at 02/19/20 1008    DULoxetine (CYMBALTA) extended release capsule 120 mg  120 mg Oral Daily [facility]   120 mg at 02/18/20 0956    metoprolol tartrate (LOPRESSOR) tablet 75 mg  75 mg Oral BID SHELBIE Johnson   75 mg at 02/19/20 1008    0.9 % sodium chloride infusion   Intravenous Continuous SHELBIE Johnson   Stopped at 02/18/20 1617    sodium chloride flush 0.9 % injection 10 mL  10 mL Intravenous 2 times per day SHELBIE Johnson   10 mL at 02/18/20 0956    sodium chloride flush 0.9 % injection 10 mL  10 mL Intravenous PRN Teodoro Johnson        magnesium hydroxide (MILK OF MAGNESIA) 400 MG/5ML suspension 30 mL  30 mL Oral Daily PRN SHELBIE Johnson        [facility]etron [facility]) injection 4 mg  4 mg Intravenous Q6H PRN Teodoro Johnson        acetaminophen (TYLENOL) tablet 650 mg  650 mg Oral Q4H PRN SHELBIE Johnson   650 mg at 02/18/20 1153    insulin lispro (HUMALOG) injection vial 0-6 Units  0-6 Units Subcutaneous TID WC SHELBIE Johnson   1 Units at 02/18/20 1840    insulin lispro (HUMALOG) injection vial 0-3 Units  0-3 Units Subcutaneous Nightly SHELBIE Johnson           Allergies:  Ciprofloxacin hcl; Food; and Penicillins    Social History:   Social History     Tobacco Use   Smoking Status Never Smoker   Smokeless Tobacco Never Used     Social History     Substance and Sexual Activity   Alcohol Use Yes    Comment: rare     Social History     Substance and Sexual Activity   Drug Use No       Family History:  Family History   Problem Relation Age of Onset    Cancer Mother         pancreatic    Heart Disease Father 80    Other Sister         infant death    High Cholesterol Brother        REVIEW OF mild degenerative changes at the triscaphe articulation with marginal osteophytes. 3. There is a well-circumscribed erosion along the radial margin of the distal pole of the scaphoid and also an intraosseous ganglion within the distal half of the scaphoid. SOFT TISSUES: Unremarkable. 1. There is an ossific fragment within the soft tissues dorsal to the mid wrist measuring 0.6 x 0.2 cm consistent with a fracture fragment which most commonly is seen with an acute triquetral fracture. **This report has been created using voice recognition software. It may contain minor errors which are inherent in voice recognition technology. ** Final report electronically signed by Dr. Rigo Coronado on 2/18/2020 9:25 AM    Ct Head Wo Contrast    Result Date: 2/17/2020  PROCEDURE: CT HEAD WO CONTRAST, CT FACIAL BONES WO CONTRAST CLINICAL INFORMATION: Fall. COMPARISON: CT 12/2/2019 TECHNIQUE: Noncontrast 5 mm axial images were obtained through the brain. All CT scans at this facility use dose modulation, iterative reconstruction, and/or weight-based dosing when appropriate to reduce radiation dose to as low as reasonably achievable. FINDINGS: Parenchyma: Mild low-attenuation areas throughout the periventricular white matter are grossly similar to previous. No acute area of edema. Ventricles and sulci: Normal size for age. Other: There is no acute intracranial hemorrhage. No calvarial fracture. Nasal bones: Comminuted acute fracture of the nasal bone with mild depression. There are multiple air bubbles anterior to the right maxilla. Frontal sinuses: Normally aerated and pneumatized. Ethmoid air cells: Normally aerated and pneumatized. Sphenoid sinus: Normally aerated and pneumatized. Maxillary sinuses: Moderate left and small right maxillary air-fluid levels. No definite acute fracture is evident on either side. The mastoid air cells and middle ear cavities are normally aerated. Zygomatic arches:  Intact, no fracture.  Pterygoid plates: Normal Mandible: There are large rajendra right bilaterally. No gross fracture. Orbital walls: Normal Skull base: Normal     There is no acute edema or hemorrhage. No calvarial fracture. Facial bones show air-fluid levels within each maxillary sinus without a gross maxillary fracture on either side. There is a comminuted and depressed nasal bone fracture. **This report has been created using voice recognition software. It may contain minor errors which are inherent in voice recognition technology. ** Final report electronically signed by Dr. Junie Monte on 2/17/2020 6:26 PM    Ct Facial Bones Wo Contrast    Result Date: 2/17/2020  PROCEDURE: CT HEAD WO CONTRAST, CT FACIAL BONES WO CONTRAST CLINICAL INFORMATION: Fall. COMPARISON: CT 12/2/2019 TECHNIQUE: Noncontrast 5 mm axial images were obtained through the brain. All CT scans at this facility use dose modulation, iterative reconstruction, and/or weight-based dosing when appropriate to reduce radiation dose to as low as reasonably achievable. FINDINGS: Parenchyma: Mild low-attenuation areas throughout the periventricular white matter are grossly similar to previous. No acute area of edema. Ventricles and sulci: Normal size for age. Other: There is no acute intracranial hemorrhage. No calvarial fracture. Nasal bones: Comminuted acute fracture of the nasal bone with mild depression. There are multiple air bubbles anterior to the right maxilla. Frontal sinuses: Normally aerated and pneumatized. Ethmoid air cells: Normally aerated and pneumatized. Sphenoid sinus: Normally aerated and pneumatized. Maxillary sinuses: Moderate left and small right maxillary air-fluid levels. No definite acute fracture is evident on either side. The mastoid air cells and middle ear cavities are normally aerated. Zygomatic arches:  Intact, no fracture. Pterygoid plates: Normal Mandible: There are large rajendra right bilaterally. No gross fracture.  Orbital walls: Normal Skull base: Normal     There is no acute edema or hemorrhage. No calvarial fracture. Facial bones show air-fluid levels within each maxillary sinus without a gross maxillary fracture on either side. There is a comminuted and depressed nasal bone fracture. **This report has been created using voice recognition software. It may contain minor errors which are inherent in voice recognition technology. ** Final report electronically signed by Dr. Titi Richards on 2/17/2020 6:26 PM    Ct Cervical Spine Wo Contrast    Result Date: 2/17/2020  PROCEDURE: CT CERVICAL SPINE WO CONTRAST CLINICAL INFORMATION: fall. COMPARISON: 3/28/2019 TECHNIQUE: 3 mm noncontrast axial images were obtained through the cervical spine with sagittal and coronal reconstructions. All CT scans at this facility use dose modulation, iterative reconstruction, and/or weight-based dosing when appropriate to reduce radiation dose to as low as reasonably achievable. FINDINGS: The study is mildly limited by motion artifact. There is mild spondylosis. No gross acute fracture. No swelling. Mild reversal of the cervical lordosis could relate to muscular spasm, and has worsened from previous. C2-C3: Moderate disc bulge. C3-C4: Mild disc bulge. Severe left and mild right foraminal narrowing secondary to uncovertebral spur. C4-C5: Mild disc space narrowing and minimal anterolisthesis. Mild disc bulge. Mild left foraminal narrowing. C5-C6: Moderate disc space narrowing. No gross disc herniation. Moderate left foraminal narrowing secondary to uncovertebral spur. C6-C7: Severe disc space narrowing. Broad disc osteophyte complex causes moderate thecal sac effacement. Moderate bilateral foraminal narrowing. C7-T1: Moderate disc space narrowing. No gross disc herniation. Severe carotid artery calcification bilaterally. No gross acute fracture. **This report has been created using voice recognition software.  It may contain minor errors which are inherent in voice recognition technology. ** Final report electronically signed by Dr. Serge Umanzor on 2/17/2020 6:47 PM    Xr Chest Portable    Result Date: 2/17/2020  PROCEDURE: XR CHEST PORTABLE CLINICAL INFORMATION: Fall COMPARISON: 2/14/2020 TECHNIQUE: A single mobile view of the chest was obtained. 1. Normal heart size. SI joints right axillary region. 2. No acute findings. No infiltrates or effusions are seen. **This report has been created using voice recognition software. It may contain minor errors which are inherent in voice recognition technology. ** Final report electronically signed by Dr. Tello Mcknight on 2/17/2020 5:16 PM    Vl Dup Lower Extremity Venous Bilateral    Result Date: 2/17/2020  PROCEDURE: VL DUP LOWER EXTREMITY VENOUS BILATERAL CLINICAL INFORMATION: LE swelling. COMPARISON: Right lower extremity duplex venous ultrasound dated 8/6/2019. TECHNIQUE: Venous doppler ultrasound was performed of the bilateral lower extremities using gray scale, color flow and spectral doppler imaging. FINDINGS: Right leg: Deep veins (common femoral, femoral, popliteal, and calf veins): Patent and compressible, with spontaneous flow, phasicity, and satisfactory augmentation. Superficial veins (saphenous veins): The imaged portions of the greater saphenous vein at the saphenofemoral junction and mid thigh and mid calf are patent by color-flow ultrasound. The veins are compressible. Left leg: Deep veins (common femoral, femoral, popliteal, and calf veins): Patent and compressible, with spontaneous flow, phasicity, and satisfactory augmentation. Superficial veins (saphenous veins): The imaged portions of the greater saphenous vein at the saphenofemoral junction and mid thigh and mid calf are patent by color-flow ultrasound. The veins are compressible. Soft tissues: No evidence of a Baker's cyst. No focal fluid collection or mass. No evidence of acute occlusive bilateral lower extremity DVT.  **This report has been created using voice

## 2020-02-19 NOTE — PROGRESS NOTES
Patient would benefit from continued therapy after discharge    Patient Education:  PT Education: Functional Mobility Training, Goals, PT Role, Plan of Care, Gait Training, Precautions  Patient Education: NWB left wrist    Equipment Recommendations:  Equipment Needed: No    Plan:  Times per week: 3-5 X O  Specific instructions for Next Treatment: check plan from ortho on left wrist fx  Current Treatment Recommendations: Strengthening, Gait Training, Balance Training, Functional Mobility Training, Endurance Training, Transfer Training, Home Exercise Program, Equipment Evaluation, Education, & procurement    Goals:  Patient goals : Walk on own  Short term goals  Time Frame for Short term goals: by discharge  Short term goal 1: supine to sit and return with  CGA  to get in and out of bed  Short term goal 2: sit to stand with CGA to to get on and off various surfaces  Short term goal 3: ambulate with hemiwalker  40 feet with CGA to walk safely to bathroom  Long term goals  Time Frame for Long term goals : NA due to short ELOS    Following session, patient left in safe position with all fall risk precautions in place.

## 2020-02-20 LAB
ANION GAP SERPL CALCULATED.3IONS-SCNC: 16 MEQ/L (ref 8–16)
BUN BLDV-MCNC: 23 MG/DL (ref 7–22)
CALCIUM SERPL-MCNC: 9 MG/DL (ref 8.5–10.5)
CHLORIDE BLD-SCNC: 92 MEQ/L (ref 98–111)
CO2: 25 MEQ/L (ref 23–33)
CREAT SERPL-MCNC: 1 MG/DL (ref 0.4–1.2)
ERYTHROCYTE [DISTWIDTH] IN BLOOD BY AUTOMATED COUNT: 15.2 % (ref 11.5–14.5)
ERYTHROCYTE [DISTWIDTH] IN BLOOD BY AUTOMATED COUNT: 54 FL (ref 35–45)
GFR SERPL CREATININE-BSD FRML MDRD: 53 ML/MIN/1.73M2
GLUCOSE BLD-MCNC: 162 MG/DL (ref 70–108)
GLUCOSE BLD-MCNC: 172 MG/DL (ref 70–108)
GLUCOSE BLD-MCNC: 209 MG/DL (ref 70–108)
GLUCOSE BLD-MCNC: 234 MG/DL (ref 70–108)
GLUCOSE BLD-MCNC: 244 MG/DL (ref 70–108)
HCT VFR BLD CALC: 30.7 % (ref 37–47)
HEMOGLOBIN: 9.7 GM/DL (ref 12–16)
MCH RBC QN AUTO: 31 PG (ref 26–33)
MCHC RBC AUTO-ENTMCNC: 31.6 GM/DL (ref 32.2–35.5)
MCV RBC AUTO: 98.1 FL (ref 81–99)
ORGANISM: ABNORMAL
ORGANISM: ABNORMAL
PLATELET # BLD: 231 THOU/MM3 (ref 130–400)
PMV BLD AUTO: 9.1 FL (ref 9.4–12.4)
POTASSIUM SERPL-SCNC: 3.4 MEQ/L (ref 3.5–5.2)
RBC # BLD: 3.13 MILL/MM3 (ref 4.2–5.4)
SODIUM BLD-SCNC: 133 MEQ/L (ref 135–145)
URINE CULTURE REFLEX: ABNORMAL
URINE CULTURE REFLEX: ABNORMAL
WBC # BLD: 8.3 THOU/MM3 (ref 4.8–10.8)

## 2020-02-20 PROCEDURE — 36415 COLL VENOUS BLD VENIPUNCTURE: CPT

## 2020-02-20 PROCEDURE — 99232 SBSQ HOSP IP/OBS MODERATE 35: CPT | Performed by: PHYSICIAN ASSISTANT

## 2020-02-20 PROCEDURE — 80048 BASIC METABOLIC PNL TOTAL CA: CPT

## 2020-02-20 PROCEDURE — 6370000000 HC RX 637 (ALT 250 FOR IP): Performed by: PHYSICIAN ASSISTANT

## 2020-02-20 PROCEDURE — 97530 THERAPEUTIC ACTIVITIES: CPT

## 2020-02-20 PROCEDURE — 82948 REAGENT STRIP/BLOOD GLUCOSE: CPT

## 2020-02-20 PROCEDURE — 85027 COMPLETE CBC AUTOMATED: CPT

## 2020-02-20 PROCEDURE — 1200000003 HC TELEMETRY R&B

## 2020-02-20 PROCEDURE — 97110 THERAPEUTIC EXERCISES: CPT

## 2020-02-20 PROCEDURE — 94760 N-INVAS EAR/PLS OXIMETRY 1: CPT

## 2020-02-20 RX ADMIN — ATORVASTATIN CALCIUM 40 MG: 40 TABLET, FILM COATED ORAL at 20:02

## 2020-02-20 RX ADMIN — INSULIN LISPRO 1 UNITS: 100 INJECTION, SOLUTION INTRAVENOUS; SUBCUTANEOUS at 20:02

## 2020-02-20 RX ADMIN — Medication 1 DROP: at 20:02

## 2020-02-20 RX ADMIN — Medication 1 DROP: at 17:12

## 2020-02-20 RX ADMIN — CLOPIDOGREL 75 MG: 75 TABLET, FILM COATED ORAL at 08:47

## 2020-02-20 RX ADMIN — ACETAMINOPHEN 650 MG: 325 TABLET ORAL at 08:47

## 2020-02-20 RX ADMIN — DULOXETINE HYDROCHLORIDE 120 MG: 60 CAPSULE, DELAYED RELEASE ORAL at 08:43

## 2020-02-20 RX ADMIN — METOPROLOL TARTRATE 75 MG: 50 TABLET, FILM COATED ORAL at 08:43

## 2020-02-20 ASSESSMENT — PAIN SCALES - GENERAL
PAINLEVEL_OUTOF10: 3
PAINLEVEL_OUTOF10: 2
PAINLEVEL_OUTOF10: 0
PAINLEVEL_OUTOF10: 0
PAINLEVEL_OUTOF10: 2
PAINLEVEL_OUTOF10: 0

## 2020-02-20 ASSESSMENT — PAIN DESCRIPTION - PAIN TYPE: TYPE: ACUTE PAIN

## 2020-02-20 NOTE — PROGRESS NOTES
[address] [address]  INPATIENT PHYSICAL THERAPY  DAILY NOTE  [facility] MED SURG 8A - 8A-08/008-A    Time In: 0848  Time Out: 2533  Timed Code Treatment Minutes: 23 Minutes  Minutes: 23          Date: 2020  Patient Name: Dea Pizarro,  Gender:  female        MRN: [id number]  : 1935  (80 y.o.)     Referring Practitioner: Eryn Simmons MD  Diagnosis: Syncope and Collapse  Additional Pertinent Hx: Pt admitted 2-17 after a fall at [facility]. due to syncope and collapse. Pt suffered a fx left wrist. Ortho has not seen yet so kept the pt NWB this date. Prior Level of Function:  Type of Home: Assisted living  Home Layout: One level  Home Access: Level entry  Home Equipment: Rolling walker   Bathroom Shower/Tub: Walk-in shower, Shower chair with back  Bathroom Toilet: Handicap height  Bathroom Accessibility: Accessible    ADL Assistance: Independent  Ambulation Assistance: Independent  Transfer Assistance: Independent  Additional Comments: Pt was ambulating at A. L. with RW down the herrera per pt. Pt stating she was indep with all her ADL tasks including her showering. Pt stating she would get her lunch and breakfast meal together abd went to dining room for supper. Restrictions/Precautions:  Restrictions/Precautions: Weight Bearing, Fall Risk  Right Upper Extremity Weight Bearing: Non Weight Bearing  Position Activity Restriction  Other position/activity restrictions: Ortho has not seen yet for the left wrist fx so kept the pt NWB this date. SUBJECTIVE: RN approved session. Patient laying in bed, and agreeable to therapy. RN in room upon arrival giving tylenol. Patient requesting to use restroom.       PAIN: 1/10: LUE and face    OBJECTIVE:  Bed Mobility:  Supine to Sit: Minimal Assistance, Moderate Assistance, X 1, with head of bed raised, with verbal cues , with increased time for completion, posterior lean    Transfers:  Sit to Stand: [address], Minimal Assistance, X 1, CGA from bed, Min from toilet  Stand to [facility] 68    Ambulation:  Contact Guard Assistance  Distance: ~8ft x2  Surface: Level Tile  Device:Andre-Walker  Gait Deviations: Forward Flexed Posture, Slow Brittny, Decreased Step Length Bilaterally, Decreased Gait Speed, Decreased Heel Strike Bilaterally, Mild Path Deviations and cues for sequencing with hemiwalker    Balance:  Dynamic Sitting Balance: Stand By Assistance, Contact Guard Assistance, X 1, initial CGA, improved to SBA, posterior lean at times and trunk sway, able to self correct    Exercise:  Patient was guided in 1 set(s) 15 reps of exercise to both lower extremities. Glut sets, Quad sets, Heelslides, Long arc quads, Hip abduction/adduction, Seated hip flexion and Seated heel/toe raises. Exercises were completed for increased independence with functional mobility. Functional Outcome Measures: Completed  AM-PAC Inpatient Mobility without Stair Climbing Raw Score : 15  AM-PAC Inpatient without Stair Climbing T-Scale Score : 43.03    ASSESSMENT:  Assessment: Patient progressing toward established goals. Activity Tolerance:  Patient tolerance of  treatment: good.       Equipment Recommendations:Equipment Needed: No  Discharge Recommendations:  [address], Patient would benefit from continued therapy after discharge    Plan: Times per week: 3-5 X O  Specific instructions for Next Treatment: check plan from ortho on left wrist fx  Current Treatment Recommendations: Strengthening, Gait Training, Balance Training, Functional Mobility Training, Endurance Training, Transfer Training, Home Exercise Program, Equipment Evaluation, Education, & procurement    Patient Education  Patient Education: Plan of Care, [employer] Mobility, Transfers, Gait, Up in Chair for Alma Wolff, Verbal Exercise Instruction    Goals:  Patient goals : Walk on own  Short term goals  Time Frame for Short term goals: by discharge  Short term

## 2020-02-20 NOTE — PROGRESS NOTES
to Stand:  [address] from eOB with education to push up with right UE instead of palcing on hemiwalker  Stand to Sit: [address] with education on steppign all the way back to surface sitting    FUNCTIONAL MOBILITY:  Assistive Device: Andre-walker  Assist Level:  Contact Guard Assistance. Distance: to outside doorway  Pt slighlty unsteady throughout but no LOB erquiring assistance for correction          ASSESSMENT:     Activity Tolerance:  Patient tolerance of  treatment: good. Pt stating she is still tired and wants to sleep      Discharge Recommendations: [address], Patient would benefit from continued therapy after discharge  Equipment Recommendations: Other: continue to assess   Plan: Times per week: 3-5x  Current Treatment Recommendations: Strengthening, Balance Training, Endurance Training, Patient/Caregiver Education & Training, Self-Care / ADL, Safety Education & Training, Functional Mobility Training    Patient Education  Patient Education: Importance of Increasing Activity     Goals  Short term goals  Time Frame for Short term goals: by discharge  Short term goal 1: Pt to complete toileting tasks and transfer with CGA and no vcs for sfety   Short term goal 2: Pt to complete LB dressing with min A   Short term goal 3: pt to navigate to/from bathroom using appropriate AD with CGA and no vcs for safety to complete ADL tasks   Short term goal 4: Pt to demo dynamic standing > 3 min with no UE support and CGA to compelte ADL tasks and simple meal prep    Following session, patient left in safe position with all fall risk precautions in place.

## 2020-02-20 NOTE — PROGRESS NOTES
from assisted living facility via EMS on 2/17/20 c/o syncope and fall from standing and hit her face. Prior to falling, patient admitted to 3/10 transient chest pressure. She also had associated disequilibrium. She has chronic SOB which she states is why she had a cardiac work up and stent in January. She states that the SOB has been worse since the stent and she was given a 24 hour Holter monitor but does not think that it has been looked at yet. She has a remote hx of an unprovoked DVT/PE about 25 years ago. She has chronic LE swelling and pain. She dose not remember the fall but states she came too in the middle of the room like she had been walking. She then crawled to the herrera of her assisted living facility [address] and called for help. She takes plavix/ASA for new stent in January. Associated facial pain. She denies any      In the ED, Patient's vitals showed HR 98, BP 90/56, RR 20, 100% on RA and afebrile. Labs showed ARTIE (Cr 1.4, BUN), AG 20, glucose 200, leukocytosis (WBC 12.6), normal plt function. CT head showed no acute edema or hemorrhage. CT facial bones shows Facial bones show air-fluid levels within each maxillary sinus without a gross maxillary fracture on either side. There is a comminuted and depressed nasal bone fracture. CT cervical spine shows no gross acute fracture. EKG shows NSR. CXR shows no acute findings. Subjective (past 24 hours):   2/20 -> pt sitting at edge of the bed. A&Ox3. Denies CP/SOB. Denies urinary symptoms including dysuria, denies fever/chills. States she has not had any lightheadedness/dizziness since her fall. Pt is accepted into TCU and will be transferred when there is an available bed. Pt was seen by ortho surgery with follow up scheduled in 2 weeks. Cardiology saw given hx of CHF, pt to continue conservative treatment with BP monitoring. VSS and suitable for likely discharge tomorrow.        Past medical history, family history, social history and allergies reviewed again and is unchanged since admission. ROS (12 point review of systems completed. Pertinent positives noted. Otherwise ROS is negative)     Medications:  Reviewed    Infusion Medications    sodium chloride Stopped (02/18/20 1617)     Scheduled Medications    glycerin-hypromellose-  1 drop Both Eyes TID    magnesium replacement protocol   Other RX Placeholder    potassium replacement protocol   Other RX Placeholder    haloperidol lactate  2 mg Intramuscular Once    atorvastatin  40 mg Oral Daily    [Held by provider] bumetanide  2 mg Oral Daily    clopidogrel  75 mg Oral Daily    DULoxetine  120 mg Oral Daily    metoprolol tartrate  75 mg Oral BID    sodium chloride flush  10 mL Intravenous 2 times per day    insulin lispro  0-6 Units Subcutaneous TID WC    insulin lispro  0-3 Units Subcutaneous Nightly     PRN Meds: sodium chloride flush, magnesium hydroxide, ondansetron, acetaminophen      Intake/Output Summary (Last 24 hours) at 2/20/2020 1846  Last data filed at 2/20/2020 1836  Gross per 24 hour   Intake 1010 ml   Output 400 ml   Net 610 ml       Diet:  DIET CARB CONTROL; Daily Fluid Restriction: 2000 ml    Exam:  BP (!) 110/54   Pulse 87   Temp 97.6 °F (36.4 °C) (Oral)   Resp 18   Ht 5' 5\" (1.651 m)   Wt 178 lb (80.7 kg)   SpO2 97%   BMI 29.62 kg/m²   General appearance: No apparent distress, appears stated age and cooperative. HEENT: Pupils equal, round, and reactive to light. Conjunctivae/corneas clear. Neck: Supple, with full range of motion. No jugular venous distention. Trachea midline. Respiratory:  Normal respiratory effort. Clear to auscultation, bilaterally without Rales/Wheezes/Rhonchi. Cardiovascular: Regular rate and rhythm with normal S1/S2 without murmurs, rubs or gallops. Abdomen: Soft, non-tender, non-distended with normal bowel sounds. Musculoskeletal: passive and active ROM x 4 extremities.   Skin: Skin color, texture, turgor normal. Ecchymosis of Result   1. Normal heart size. SI joints right axillary region. 2. No acute findings. No infiltrates or effusions are seen. **This report has been created using voice recognition software. It may contain minor errors which are inherent in voice recognition technology. **      Final report electronically signed by Dr. Briana See on 2/17/2020 5:16 PM        Xr Wrist Left (min 3 Views)    Result Date: 2/18/2020  PROCEDURE: XR WRIST LEFT (MIN 3 VIEWS) CLINICAL INFORMATION: Wrist pain following a recent fall. COMPARISON: No prior study. TECHNIQUE: PA, lateral, oblique, and scaphoid views of the wrist performed. FINDINGS: POSTOPERATIVE CHANGES: None. ALIGNMENT: Anatomic. MINERALIZATION: Normal. FRACTURE: 1. There is an ossific fragment within the soft tissues dorsal to the mid wrist measuring 0.6 x 0.2 cm consistent with a fracture fragment which most commonly is seen with an acute triquetral fracture. JOINTS: 1. There is moderate moderate degenerative changes at the 1st carpometacarpal articulation with joint space loss, subchondral sclerosis and marginal osteophyte. 2. There are mild degenerative changes at the triscaphe articulation with marginal osteophytes. 3. There is a well-circumscribed erosion along the radial margin of the distal pole of the scaphoid and also an intraosseous ganglion within the distal half of the scaphoid. SOFT TISSUES: Unremarkable. 1. There is an ossific fragment within the soft tissues dorsal to the mid wrist measuring 0.6 x 0.2 cm consistent with a fracture fragment which most commonly is seen with an acute triquetral fracture. **This report has been created using voice recognition software. It may contain minor errors which are inherent in voice recognition technology. ** Final report electronically signed by Dr. Samir Marks on 2/18/2020 9:25 AM    Ct Head Wo Contrast    Result Date: 2/17/2020  PROCEDURE: CT HEAD WO CONTRAST, CT FACIAL BONES WO CONTRAST CLINICAL INFORMATION: Fall. COMPARISON: CT 12/2/2019 TECHNIQUE: Noncontrast 5 mm axial images were obtained through the brain. All CT scans at this facility use dose modulation, iterative reconstruction, and/or weight-based dosing when appropriate to reduce radiation dose to as low as reasonably achievable. FINDINGS: Parenchyma: Mild low-attenuation areas throughout the periventricular white matter are grossly similar to previous. No acute area of edema. Ventricles and sulci: Normal size for age. Other: There is no acute intracranial hemorrhage. No calvarial fracture. Nasal bones: Comminuted acute fracture of the nasal bone with mild depression. There are multiple air bubbles anterior to the right maxilla. Frontal sinuses: Normally aerated and pneumatized. Ethmoid air cells: Normally aerated and pneumatized. Sphenoid sinus: Normally aerated and pneumatized. Maxillary sinuses: Moderate left and small right maxillary air-fluid levels. No definite acute fracture is evident on either side. The mastoid air cells and middle ear cavities are normally aerated. Zygomatic arches:  Intact, no fracture. Pterygoid plates: Normal Mandible: There are large rajendra right bilaterally. No gross fracture. Orbital walls: Normal Skull base: Normal     There is no acute edema or hemorrhage. No calvarial fracture. Facial bones show air-fluid levels within each maxillary sinus without a gross maxillary fracture on either side. There is a comminuted and depressed nasal bone fracture. **This report has been created using voice recognition software. It may contain minor errors which are inherent in voice recognition technology. ** Final report electronically signed by Dr. Erie Boeck on 2/17/2020 6:26 PM    Ct Facial Bones Wo Contrast    Result Date: 2/17/2020  PROCEDURE: CT HEAD WO CONTRAST, CT FACIAL BONES WO CONTRAST CLINICAL INFORMATION: Fall. COMPARISON: CT 12/2/2019 TECHNIQUE: Noncontrast 5 mm axial images were obtained through the brain.  All dosing when appropriate to reduce radiation dose to as low as reasonably achievable. FINDINGS: The study is mildly limited by motion artifact. There is mild spondylosis. No gross acute fracture. No swelling. Mild reversal of the cervical lordosis could relate to muscular spasm, and has worsened from previous. C2-C3: Moderate disc bulge. C3-C4: Mild disc bulge. Severe left and mild right foraminal narrowing secondary to uncovertebral spur. C4-C5: Mild disc space narrowing and minimal anterolisthesis. Mild disc bulge. Mild left foraminal narrowing. C5-C6: Moderate disc space narrowing. No gross disc herniation. Moderate left foraminal narrowing secondary to uncovertebral spur. C6-C7: Severe disc space narrowing. Broad disc osteophyte complex causes moderate thecal sac effacement. Moderate bilateral foraminal narrowing. C7-T1: Moderate disc space narrowing. No gross disc herniation. Severe carotid artery calcification bilaterally. No gross acute fracture. **This report has been created using voice recognition software. It may contain minor errors which are inherent in voice recognition technology. ** Final report electronically signed by Dr. Agustina Eldridge on 2/17/2020 6:47 PM    Xr Chest Portable    Result Date: 2/17/2020  PROCEDURE: XR CHEST PORTABLE CLINICAL INFORMATION: Fall COMPARISON: 2/14/2020 TECHNIQUE: A single mobile view of the chest was obtained. 1. Normal heart size. SI joints right axillary region. 2. No acute findings. No infiltrates or effusions are seen. **This report has been created using voice recognition software. It may contain minor errors which are inherent in voice recognition technology. ** Final report electronically signed by Dr. Khushboo Ro on 2/17/2020 5:16 PM    Vl Dup Lower Extremity Venous Bilateral    Result Date: 2/17/2020  PROCEDURE: VL DUP LOWER EXTREMITY VENOUS BILATERAL CLINICAL INFORMATION: LE swelling.  COMPARISON: Right lower extremity duplex venous ultrasound dated

## 2020-02-20 NOTE — PROGRESS NOTES
states that the SOB has been worse since the stent and she was given a 24 hour Holter monitor but does not think that it has been looked at yet. She has a remote hx of an unprovoked DVT/PE about 25 years ago. She has chronic LE swelling and pain. She dose not remember the fall but states she came too in the middle of the room like she had been walking. She then crawled to the herrera of her assisted living facility [address] and called for help. She takes plavix/ASA for new stent in January. Associated facial pain. She denies any      In the ED, Patient's vitals showed HR 98, BP 90/56, RR 20, 100% on RA and afebrile. Labs showed ARTIE (Cr 1.4, BUN), AG 20, glucose 200, leukocytosis (WBC 12.6), normal plt function. CT head showed no acute edema or hemorrhage. CT facial bones shows Facial bones show air-fluid levels within each maxillary sinus without a gross maxillary fracture on either side. There is a comminuted and depressed nasal bone fracture. CT cervical spine shows no gross acute fracture. EKG shows NSR. CXR shows no acute findings.        Subjective (past 24 hours):   Still sore, working on placement. Past medical history, family history, social history and allergies reviewed again and is unchanged since admission. ROS (12 point review of systems completed. Pertinent positives noted.  Otherwise ROS is negative)     Medications:  Reviewed    Infusion Medications    sodium chloride Stopped (02/18/20 8057)     Scheduled Medications    magnesium replacement protocol   Other RX Placeholder    potassium replacement protocol   Other RX Placeholder    haloperidol lactate  2 mg Intramuscular Once    atorvastatin  40 mg Oral Daily    [Held by provider] bumetanide  2 mg Oral Daily    clopidogrel  75 mg Oral Daily    DULoxetine  120 mg Oral Daily    metoprolol tartrate  75 mg Oral BID    sodium chloride flush  10 mL Intravenous 2 times per day    insulin lispro  0-6 Units Subcutaneous TID     insulin 27 25 27   BUN 32* 34* 38* 26*   CREATININE 1.3* 1.4* 1.3* 1.0   CALCIUM 9.6  --  9.1 8.8     Recent Labs     02/17/20  1716   AST 26   ALT 24   BILIDIR <0.2   BILITOT 0.4   ALKPHOS 70     No results for input(s): INR in the last 72 hours. No results for input(s): Toshia Dapper in the last 72 hours. Microbiology:    Blood culture #1: No results found for: BC    Blood culture #2:No results found for: BLOODCULT2    Organism:  Lab Results   Component Value Date    ORG Escherichia coli 02/17/2020    ORG Klebsiella pneumoniae 02/17/2020       No results found for: LABGRAM    MRSA culture only:No results found for: [address]    Urine culture: No results found for: LABURIN    Respiratory culture: No results found for: CULTRESP    Aerobic and Anaerobic :  No results found for: LABAERO  No results found for: LABANAE    Urinalysis:      Lab Results   Component Value Date    NITRU NEGATIVE 02/17/2020    WBCUA 15-25 02/17/2020    BACTERIA MANY 02/17/2020    RBCUA 3-5 02/17/2020    BLOODU NEGATIVE 02/17/2020    SPECGRAV 1.013 03/25/2019    GLUCOSEU NEGATIVE 02/17/2020       Radiology:  XR WRIST LEFT (MIN 3 VIEWS)   Final Result   1. There is an ossific fragment within the soft tissues dorsal to the mid wrist measuring 0.6 x 0.2 cm consistent with a fracture fragment which most commonly is seen with an acute triquetral fracture. **This report has been created using voice recognition software. It may contain minor errors which are inherent in voice recognition technology. **      Final report electronically signed by Dr. Samir Marks on 2/18/2020 9:25 AM      VL DUP LOWER EXTREMITY VENOUS BILATERAL   Final Result   No evidence of acute occlusive bilateral lower extremity DVT. **This report has been created using voice recognition software. It may contain minor errors which are inherent in voice recognition technology. **       Final report electronically signed by Dr. Manfred Sierra on 2/17/2020 9:58 PM      CT Head WO Contrast   Final Result   There is no acute edema or hemorrhage. No calvarial fracture. Facial bones show air-fluid levels within each maxillary sinus without a gross maxillary fracture on either side. There is a comminuted and depressed nasal bone fracture. **This report has been created using voice recognition software. It may contain minor errors which are inherent in voice recognition technology. **      Final report electronically signed by Dr. Quang Hawthorne on 2/17/2020 6:26 PM      CT FACIAL BONES WO CONTRAST   Final Result   There is no acute edema or hemorrhage. No calvarial fracture. Facial bones show air-fluid levels within each maxillary sinus without a gross maxillary fracture on either side. There is a comminuted and depressed nasal bone fracture. **This report has been created using voice recognition software. It may contain minor errors which are inherent in voice recognition technology. **      Final report electronically signed by Dr. Quang Hawthorne on 2/17/2020 6:26 PM      CT Cervical Spine WO Contrast   Final Result       No gross acute fracture. **This report has been created using voice recognition software. It may contain minor errors which are inherent in voice recognition technology. **      Final report electronically signed by Dr. Quang Hawthorne on 2/17/2020 6:47 PM      XR CHEST PORTABLE   Final Result   1. Normal heart size. SI joints right axillary region. 2. No acute findings. No infiltrates or effusions are seen. **This report has been created using voice recognition software. It may contain minor errors which are inherent in voice recognition technology. **      Final report electronically signed by Dr. Reena Carney on 2/17/2020 5:16 PM        Ct Head Wo Contrast    Result Date: 2/17/2020  PROCEDURE: CT HEAD WO CONTRAST, CT FACIAL BONES WO CONTRAST CLINICAL INFORMATION: Fall.  COMPARISON: CT 12/2/2019 TECHNIQUE: low as reasonably achievable. FINDINGS: The study is mildly limited by motion artifact. There is mild spondylosis. No gross acute fracture. No swelling. Mild reversal of the cervical lordosis could relate to muscular spasm, and has worsened from previous. C2-C3: Moderate disc bulge. C3-C4: Mild disc bulge. Severe left and mild right foraminal narrowing secondary to uncovertebral spur. C4-C5: Mild disc space narrowing and minimal anterolisthesis. Mild disc bulge. Mild left foraminal narrowing. C5-C6: Moderate disc space narrowing. No gross disc herniation. Moderate left foraminal narrowing secondary to uncovertebral spur. C6-C7: Severe disc space narrowing. Broad disc osteophyte complex causes moderate thecal sac effacement. Moderate bilateral foraminal narrowing. C7-T1: Moderate disc space narrowing. No gross disc herniation. Severe carotid artery calcification bilaterally. No gross acute fracture. **This report has been created using voice recognition software. It may contain minor errors which are inherent in voice recognition technology. ** Final report electronically signed by Dr. Monica Aguirre on 2/17/2020 6:47 PM    Xr Chest Portable    Result Date: 2/17/2020  PROCEDURE: XR CHEST PORTABLE CLINICAL INFORMATION: Fall COMPARISON: 2/14/2020 TECHNIQUE: A single mobile view of the chest was obtained. 1. Normal heart size. SI joints right axillary region. 2. No acute findings. No infiltrates or effusions are seen. **This report has been created using voice recognition software. It may contain minor errors which are inherent in voice recognition technology. ** Final report electronically signed by Dr. Chad Basurto on 2/17/2020 5:16 PM    Vl Dup Lower Extremity Venous Bilateral    Result Date: 2/17/2020  PROCEDURE: VL DUP LOWER EXTREMITY VENOUS BILATERAL CLINICAL INFORMATION: LE swelling. COMPARISON: Right lower extremity duplex venous ultrasound dated 8/6/2019.  TECHNIQUE: Venous doppler ultrasound was

## 2020-02-20 NOTE — PROGRESS NOTES
9609 . Patient refused insulin. 0830 Patient is resting in the bed. Denies any CP or SOB this am. Patient is A&Ox4. Very pleasant. Vitals are stable. Assessment completed. 9738 PT/OT is working with patient ADLs and getting up into the chair. 21  Patient requested to help get up and use the bathroom. She had a large BM. Helped her brush her teeth and gave her a sponge bath. New bedding on bed. Patient denies any pain and did well transferring from bed to bathroom and bathroom to bed.    1124 Vital signs stable. BP was a little low at 98/55. Will cont to monitor. 1229 . Patient refused insulin. 1315 Patient requested coffee with lunch. No other needs at this time. 1430 Patient requested eye drops for her eyes. Notifed the hospitalist.     1600 Gave updates to daughter that we are waiting for precert for rehab/snf.    3868 Vital signs are stable. 1712 Meds given per MAR.    1728 . Patient declined insulin coverage. 1815 patient is resting in chair waiting for dinner and denies any SOB or CP. Will cont to monitor.

## 2020-02-21 VITALS
TEMPERATURE: 97.4 F | BODY MASS INDEX: 29.66 KG/M2 | OXYGEN SATURATION: 93 % | HEART RATE: 77 BPM | SYSTOLIC BLOOD PRESSURE: 101 MMHG | DIASTOLIC BLOOD PRESSURE: 50 MMHG | HEIGHT: 65 IN | RESPIRATION RATE: 16 BRPM | WEIGHT: 178 LBS

## 2020-02-21 LAB
ANION GAP SERPL CALCULATED.3IONS-SCNC: 16 MEQ/L (ref 8–16)
BUN BLDV-MCNC: 22 MG/DL (ref 7–22)
CALCIUM SERPL-MCNC: 9.3 MG/DL (ref 8.5–10.5)
CHLORIDE BLD-SCNC: 101 MEQ/L (ref 98–111)
CO2: 24 MEQ/L (ref 23–33)
CREAT SERPL-MCNC: 0.9 MG/DL (ref 0.4–1.2)
ERYTHROCYTE [DISTWIDTH] IN BLOOD BY AUTOMATED COUNT: 15 % (ref 11.5–14.5)
ERYTHROCYTE [DISTWIDTH] IN BLOOD BY AUTOMATED COUNT: 54.4 FL (ref 35–45)
GFR SERPL CREATININE-BSD FRML MDRD: 60 ML/MIN/1.73M2
GLUCOSE BLD-MCNC: 168 MG/DL (ref 70–108)
GLUCOSE BLD-MCNC: 187 MG/DL (ref 70–108)
GLUCOSE BLD-MCNC: 227 MG/DL (ref 70–108)
HCT VFR BLD CALC: 29.9 % (ref 37–47)
HEMOGLOBIN: 9.5 GM/DL (ref 12–16)
MCH RBC QN AUTO: 31.3 PG (ref 26–33)
MCHC RBC AUTO-ENTMCNC: 31.8 GM/DL (ref 32.2–35.5)
MCV RBC AUTO: 98.4 FL (ref 81–99)
PLATELET # BLD: 215 THOU/MM3 (ref 130–400)
PMV BLD AUTO: 8.7 FL (ref 9.4–12.4)
POTASSIUM SERPL-SCNC: 3.5 MEQ/L (ref 3.5–5.2)
RBC # BLD: 3.04 MILL/MM3 (ref 4.2–5.4)
SODIUM BLD-SCNC: 141 MEQ/L (ref 135–145)
WBC # BLD: 7 THOU/MM3 (ref 4.8–10.8)

## 2020-02-21 PROCEDURE — 36415 COLL VENOUS BLD VENIPUNCTURE: CPT

## 2020-02-21 PROCEDURE — 80048 BASIC METABOLIC PNL TOTAL CA: CPT

## 2020-02-21 PROCEDURE — 6370000000 HC RX 637 (ALT 250 FOR IP): Performed by: PHYSICIAN ASSISTANT

## 2020-02-21 PROCEDURE — 85027 COMPLETE CBC AUTOMATED: CPT

## 2020-02-21 PROCEDURE — 82948 REAGENT STRIP/BLOOD GLUCOSE: CPT

## 2020-02-21 PROCEDURE — 99231 SBSQ HOSP IP/OBS SF/LOW 25: CPT | Performed by: NURSE PRACTITIONER

## 2020-02-21 PROCEDURE — 99239 HOSP IP/OBS DSCHRG MGMT >30: CPT | Performed by: PHYSICIAN ASSISTANT

## 2020-02-21 RX ORDER — LANOLIN ALCOHOL/MO/W.PET/CERES
3 CREAM (GRAM) TOPICAL NIGHTLY PRN
Status: DISCONTINUED | OUTPATIENT
Start: 2020-02-21 | End: 2020-02-21 | Stop reason: HOSPADM

## 2020-02-21 RX ADMIN — DULOXETINE HYDROCHLORIDE 120 MG: 60 CAPSULE, DELAYED RELEASE ORAL at 08:55

## 2020-02-21 RX ADMIN — CLOPIDOGREL 75 MG: 75 TABLET, FILM COATED ORAL at 08:55

## 2020-02-21 RX ADMIN — METOPROLOL TARTRATE 75 MG: 50 TABLET, FILM COATED ORAL at 08:55

## 2020-02-21 RX ADMIN — Medication 3 MG: at 02:36

## 2020-02-21 RX ADMIN — Medication 1 DROP: at 08:55

## 2020-02-21 RX ADMIN — Medication 1 DROP: at 13:29

## 2020-02-21 RX ADMIN — INSULIN LISPRO 2 UNITS: 100 INJECTION, SOLUTION INTRAVENOUS; SUBCUTANEOUS at 13:30

## 2020-02-21 RX ADMIN — ACETAMINOPHEN 650 MG: 325 TABLET ORAL at 02:36

## 2020-02-21 RX ADMIN — INSULIN LISPRO 1 UNITS: 100 INJECTION, SOLUTION INTRAVENOUS; SUBCUTANEOUS at 08:58

## 2020-02-21 ASSESSMENT — PAIN DESCRIPTION - LOCATION: LOCATION: MOUTH

## 2020-02-21 ASSESSMENT — PAIN SCALES - GENERAL
PAINLEVEL_OUTOF10: 0
PAINLEVEL_OUTOF10: 2

## 2020-02-21 ASSESSMENT — PAIN DESCRIPTION - PAIN TYPE
TYPE: ACUTE PAIN
TYPE: ACUTE PAIN

## 2020-02-21 NOTE — PLAN OF CARE
Problem: Falls - Risk of:  Goal: Will remain free from falls  Description  Will remain free from falls  Outcome: Ongoing    Patient is instructed to put her call light on if she needs to get up or use the bathroom. Patient is A&Ox4. Goal: Absence of physical injury  Description  Absence of physical injury  Outcome: Ongoing     Problem: Pain:  Goal: Pain level will decrease  Description  Pain level will decrease  Outcome: Ongoing     Problem: Risk for Impaired Skin Integrity  Goal: Tissue integrity - skin and mucous membranes  Description  Structural intactness and normal physiological function of skin and  mucous membranes.   Outcome: Ongoing       Problem: Injury - Risk of, Physical Injury:  Goal: Will remain free from falls  Description  Will remain free from falls  Outcome: Ongoing  Goal: Absence of physical injury  Description  Absence of physical injury  Outcome: Ongoing
Problem: Cardiovascular  Goal: Hemodynamic stability  2/20/2020 2219 by Claudeen Poli, RN  Outcome: Ongoing  Note:   Patient hemodynamically stable this shift. Care plan reviewed with patient. Patient verbalizes understanding of the plan of care and contribute to goal setting.       Electronically signed by Claudeen Poli, RN on 2/20/2020 at 10:22 PM
turns self in bed. Pillow support provided. Will continue to monitor skin integrity and encourage repositioning Q2H and PRN. Problem: Confusion - Acute:  Goal: Mental status will be restored to baseline  Description  Mental status will be restored to baseline  Outcome: Ongoing  Note:   Patient confused this shift. Only alert to name. Disoriented to place, situation and time. Confused and easily agitated at times when reoriented. Sitter at bedside for patient safety will continue to monitor mental status. Problem: Mood - Altered:  Goal: Absence of abusive behavior  Description  Absence of abusive behavior  Outcome: Ongoing  Note:   Patient slightly aggressive at start of shift with sitter. Patient was able to be reoriented to calmed down. No signs of abusive behavior this shift. Will continue to monitor for s/s of aggressive/abusive behavior. Problem: Sleep Pattern Disturbance:  Goal: Appears well-rested  Description  Appears well-rested  Outcome: Ongoing  Note:   Patient slept for a few hours this shift. Will continue to monitor sleep and encourage rest.      Problem: Neurological  Goal: Maximum potential motor/sensory/cognitive function  Outcome: Ongoing  Note:   Patient c/o some intermittent numbness and tingling to left hand. Patient has left arm fracture. Patient alert to name only this shift. Gait somewhat steady with standby assist and walker. Patient with double vision at times. Will continue to monitor neuro status. Patient participated in plan of care and contributed to goal setting.

## 2020-02-21 NOTE — PROGRESS NOTES
no wheezes, rales or rhonchi, normal air movement, no respiratory distress  Abdomen: soft, non-tender, non-distended, normal bowel sounds, no masses Extremities: no cyanosis, clubbing or edema, pulse   Skin: Ecchymosis with the eyes, nose, and mouth, warm and dry, no rash or erythema  Head: Closed nasal fracture deformity, normocephalic. Eyes: pupils equal, round, and reactive to light  Neck: supple and non-tender without mass, no thyromegaly   Musculoskeletal: Left wrist fracture and in splint, normal range of motion, no joint swelling, deformity or tenderness  Neurological: alert, oriented, normal speech, no focal findings or movement disorder noted    Medications:    glycerin-hypromellose-  1 drop Both Eyes TID    magnesium replacement protocol   Other RX Placeholder    potassium replacement protocol   Other RX Placeholder    haloperidol lactate  2 mg Intramuscular Once    atorvastatin  40 mg Oral Daily    [Held by provider] bumetanide  2 mg Oral Daily    clopidogrel  75 mg Oral Daily    DULoxetine  120 mg Oral Daily    metoprolol tartrate  75 mg Oral BID    sodium chloride flush  10 mL Intravenous 2 times per day    insulin lispro  0-6 Units Subcutaneous TID     insulin lispro  0-3 Units Subcutaneous Nightly      sodium chloride Stopped (20 1617)     melatonin, 3 mg, Nightly PRN  sodium chloride flush, 10 mL, PRN  magnesium hydroxide, 30 mL, Daily PRN  ondansetron, 4 mg, Q6H PRN  acetaminophen, 650 mg, Q4H PRN        Diagnostics:  [id number]  Normal Sinus Rhythm     Echo: 2020  Conclusions      Summary   limited echo   Ejection fraction is visually estimated at 50%. Overall left ventricular function is normal.   Moderately dilated left atrium. Myxomatotic degeneration of mitral valve. Decreased mitral valve mobility noted. No evidence of any pericardial effusion.       Stress: 2019  Conclusions      Summary   This Nuclear Medicine study was negative for ischemia

## 2020-02-21 NOTE — DISCHARGE SUMMARY
Hospitalist Discharge Summary        Patient: Mitchell Marion  YOB: 1935  MRN: [id number]   Acct: [de-identified]    Primary Care Physician: Corinne Pence, MD    Admit date  2/17/2020    Discharge date:  2/21/2020 12:42 PM    Chief Complaint on presentation :-  Syncope and fall     Discharge Assessment and Plan:-   1. Syncope and fall secondary to age-related physical debility: recent Holter monitor by cardiology, results not available to date. Recent PCI to LAD in June 2020, troponin has been negative and EKG was unremakrable. Cardiology has been consulted, felt syncope to be secondary to orthostatic vs dehydration/general decondition given age. Continue IVFs. 2. Closed head injury with traumatic nasal bone fracture and bilateral periorbital ecchymosis: secondary to fall, ENT consulted, suggested follow up 5-7 days to reevaluate once acute swelling has resolved. Ice to bridge of nose in meantime. Trauma surgery service managing. 3. Epistaxis: secondary to above, continue to hold blood thinners for now hold aspirin. Continue upon discharge. 4. Acute triquetral (wrist) fracture: again following the fall-consult orthopedic surgery. Decided to go with non-operative management, JENNIFER PEACE. Maintain spint for 2 weeks, will re-eval on FU. Ice/Elevate, f/u in 2 weeks with Dr. Mikey Cottrell. 5. History of CAD status post PCI to LAD in June 2020  6. History of chronic HFrEF  7. ARTIE on chronic any disease stage III- Cr 1.3 on admission, resolved. Wnl Cr 1.0  8. Controlled IDDM II: on SSI continue to check blood sugars every 6 hours and at nighttime. 9. History of hyperlipidemia: on statin, continue  10. History of anxiety: on Duloxetine, continue  11. Deconditioning: fall, likely age-related. Added PT/OT, return to [address] [facility], pre-cert underway.     Initial H and P and Hospital course:-  Judy Nguyen is a 80year old white female smoker with PMH of CAD s/p stenting, Hx DVT/PE, GERD, HLD, HTN, OA s/p knee replacement, alzheimers, NIDDM type 2 who presents to [facility] ED from assisted living facility via EMS on 2/17/20 c/o syncope and fall from standing and hit her face. Prior to falling, patient admitted to 3/10 transient chest pressure. She also had associated disequilibrium. She has chronic SOB which she states is why she had a cardiac work up and stent in January. She states that the SOB has been worse since the stent and she was given a 24 hour Holter monitor but does not think that it has been looked at yet. Xin Davis has a remote hx of an unprovoked DVT/PE about 25 years ago. She has chronic LE swelling and pain. She dose not remember the fall but states she came too in the middle of the room like she had been walking. She then crawled to the [facility]rera of her assisted living facility [address] and called for help. She takes plavix/ASA for new stent in January. Associated facial pain. She denies any      In the ED, Patient's vitals showed HR 98, BP 90/56, RR 20, 100% on RA and afebrile. Labs showed ARTIE (Cr 1.4, BUN), AG 20, glucose 200, leukocytosis (WBC 12.6), normal plt function. CT head showed no acute edema or hemorrhage. CT facial bones shows Facial bones show air-fluid levels within each maxillary sinus without a gross maxillary fracture on either side. There is a comminuted and depressed nasal bone fracture. CT cervical spine shows no gross acute fracture. EKG shows NSR. CXR shows no acute findings.      Subjective (past 24 hours):   2/20 -> pt sitting at edge of the bed. A&Ox3. Denies CP/SOB. Denies urinary symptoms including dysuria, denies fever/chills. States she has not had any lightheadedness/dizziness since her fall. Pt is accepted into TCU and will be transferred when there is an available bed. Pt was seen by ortho surgery with follow up scheduled in 2 weeks. Cardiology saw given hx of CHF, pt to continue conservative treatment with BP monitoring. VSS and suitable for likely discharge tomorrow.      2/21 -> pt had acute episode of confusion overnight per RN. [id number]&[id number] this am, and able to say that she felt confused last night. Evaluated by Psychiatry who say she is stable for discharge when medically clear. Pt denies any symptoms, reports she is ready to return to the assisted living home. Denies CP/SOB. Denies dysuria, denies n/v/d. VSS. Suitable for discharge. Pt to follow up with both Ortho and ENT after being discharged. Physical Exam:-  Vitals:   Patient Vitals for the past 24 hrs:   BP Temp Temp src Pulse Resp SpO2   02/21/20 1157 (!) 101/50 97.4 °F (36.3 °C) Oral 77 16 93 %   02/21/20 0850 (!) 118/54 97.6 °F (36.4 °C) Oral 95 16 93 %   02/21/20 0422 (!) 112/58 98.1 °F (36.7 °C) Oral 80 18 94 %   02/21/20 0236 -- -- -- -- -- 96 %   02/20/20 1930 (!) 105/50 97.8 °F (36.6 °C) Oral 83 18 97 %   02/20/20 1712 -- -- -- -- -- 97 %   02/20/20 1647 (!) 110/54 97.6 °F (36.4 °C) Oral 87 18 92 %     Weight:   Weight: 178 lb (80.7 kg)   24 hour intake/output:     Intake/Output Summary (Last 24 hours) at 2/21/2020 1242  Last data filed at 2/20/2020 1836  Gross per 24 hour   Intake 710 ml   Output 400 ml   Net 310 ml       1. General appearance: No apparent distress, appears stated age and cooperative. 2. HEENT: Normal cephalic, atraumatic without obvious deformity. Pupils equal, round, and reactive to light. Extra ocular muscles intact. Conjunctivae/corneas clear. 3. Neck: Supple, with full range of motion. No jugular venous distention. Trachea midline. 4. Respiratory:  Normal respiratory effort. Clear to auscultation, bilaterally without Rales/Wheezes/Rhonchi. 5. Cardiovascular: Regular rate and rhythm with normal S1/S2 without murmurs, rubs or gallops. 6. Abdomen: Soft, non-tender, non-distended with normal bowel sounds. 7. Musculoskeletal:  No clubbing, cyanosis or edema bilaterally. 8. Skin: Skin color, texture, turgor normal.  No rashes or lesions. Ecchymosis of eyes/nose.    9. Neurologic:  Neurovascularly intact without any focal sensory/motor deficits. Cranial nerves: II-XII intact, grossly non-focal.  10. Psychiatric: Alert and oriented, thought content appropriate, normal insight  11. Capillary Refill: Brisk,< 3 seconds   12. Peripheral Pulses: +2 palpable, equal bilaterally       Discharge Medications:-      Medication List      ASK your doctor about these medications    acetaminophen 325 MG tablet  Commonly known as:  TYLENOL     aspirin 81 MG EC tablet     atorvastatin 40 MG tablet  Commonly known as:  LIPITOR  Take 1 tablet by mouth daily     blood glucose test strips strip  Commonly known as:  ONE TOUCH ULTRA TEST  Test daily or AD. Dx. 250.00     bumetanide 2 MG tablet  Commonly known as:  Bumex  Take 1 tablet by mouth daily     clopidogrel 75 MG tablet  Commonly known as:  PLAVIX  Pt takes 300 mg tonight then 75 mg every day starting on 2/4/2020     D-Mannose 500 MG Caps     DULoxetine 60 MG extended release capsule  Commonly known as:  Cymbalta  Take 2 capsules by mouth daily     metFORMIN 750 MG extended release tablet  Commonly known as:  GLUCOPHAGE-XR  Take 1 tablet by mouth Daily with supper Restart this medication Friday am     metOLazone 2.5 MG tablet  Commonly known as:  ZAROXOLYN  Take 1 tablet by mouth daily as needed (wt gain, sob, edema)     metoprolol tartrate 50 MG tablet  Commonly known as:  LOPRESSOR  Take 1.5 tablets by mouth 2 times daily     nitroGLYCERIN 0.4 MG SL tablet  Commonly known as:  NITROSTAT  up to max of 3 total doses. If no relief after 1 dose, call 911. potassium chloride 10 MEQ extended release tablet  Commonly known as:  KLOR-CON  Take 1 tablet by mouth daily     * Senior Multivitamin Plus Tabs     * PRESERVISION AREDS PO         * This list has 2 medication(s) that are the same as other medications prescribed for you. Read the directions carefully, and ask your doctor or other care provider to review them with you.                  Labs :-  Recent Results (from the past 67 Sodium 133 (L) 135 - 145 meq/L    Potassium 3.4 (L) 3.5 - 5.2 meq/L    Chloride 92 (L) 98 - 111 meq/L    CO2 25 23 - 33 meq/L    Glucose 244 (H) 70 - 108 mg/dL    BUN 23 (H) 7 - 22 mg/dL    CREATININE 1.0 0.4 - 1.2 mg/dL    Calcium 9.0 8.5 - 10.5 mg/dL   Anion Gap    Collection Time: 02/20/20 10:09 AM   Result Value Ref Range    Anion Gap 16.0 8.0 - 16.0 meq/L   Glomerular Filtration Rate, Estimated    Collection Time: 02/20/20 10:09 AM   Result Value Ref Range    Est, Glom Filt Rate 53 (A) ml/min/1.73m2   POCT Glucose    Collection Time: 02/20/20 12:29 PM   Result Value Ref Range    POC Glucose 209 (H) 70 - 108 mg/dl   POCT Glucose    Collection Time: 02/20/20  5:28 PM   Result Value Ref Range    POC Glucose 172 (H) 70 - 108 mg/dl   POCT Glucose    Collection Time: 02/20/20  7:58 PM   Result Value Ref Range    POC Glucose 234 (H) 70 - 108 mg/dl   CBC    Collection Time: 02/21/20  4:35 AM   Result Value Ref Range    WBC 7.0 4.8 - 10.8 thou/mm3    RBC 3.04 (L) 4.20 - 5.40 mill/mm3    Hemoglobin 9.5 (L) 12.0 - 16.0 gm/dl    Hematocrit 29.9 (L) 37.0 - 47.0 %    MCV 98.4 81.0 - 99.0 fL    MCH 31.3 26.0 - 33.0 pg    MCHC 31.8 (L) 32.2 - 35.5 gm/dl    RDW-CV 15.0 (H) 11.5 - 14.5 %    RDW-SD 54.4 (H) 35.0 - 45.0 fL    Platelets 205 390 - 246 thou/mm3    MPV 8.7 (L) 9.4 - 12.4 fL   Basic Metabolic Panel    Collection Time: 02/21/20  4:35 AM   Result Value Ref Range    Sodium 141 135 - 145 meq/L    Potassium 3.5 3.5 - 5.2 meq/L    Chloride 101 98 - 111 meq/L    CO2 24 23 - 33 meq/L    Glucose 168 (H) 70 - 108 mg/dL    BUN 22 7 - 22 mg/dL    CREATININE 0.9 0.4 - 1.2 mg/dL    Calcium 9.3 8.5 - 10.5 mg/dL   Anion Gap    Collection Time: 02/21/20  4:35 AM   Result Value Ref Range    Anion Gap 16.0 8.0 - 16.0 meq/L   Glomerular Filtration Rate, Estimated    Collection Time: 02/21/20  4:35 AM   Result Value Ref Range    Est, Glom Filt Rate 60 (A) ml/min/1.73m2   POCT Glucose    Collection Time: 02/21/20  8:12 AM   Result Value Ref Range    POC Glucose 187 (H) 70 - 108 mg/dl        Microbiology:    Blood culture #1: No results found for: BC    Blood culture #2:No results found for: BLOODCULT2    Organism:  No results found for: LABGRAM    MRSA culture only:No results found for: [facility]    Urine culture: No results found for: LABURIN  Lab Results   Component Value Date    ORG Escherichia coli 02/17/2020    ORG Klebsiella pneumoniae 02/17/2020        Respiratory culture: No results found for: CULTRESP    Aerobic and Anaerobic :  No results found for: LABAERO  No results found for: LABANAE    Urinalysis:      Lab Results   Component Value Date    NITRU NEGATIVE 02/17/2020    WBCUA 15-25 02/17/2020    BACTERIA MANY 02/17/2020    RBCUA 3-5 02/17/2020    BLOODU NEGATIVE 02/17/2020    SPECGRAV 1.013 03/25/2019    GLUCOSEU NEGATIVE 02/17/2020       Radiology:-  Xr Wrist Left (min 3 Views)    Result Date: 2/18/2020  PROCEDURE: XR WRIST LEFT (MIN 3 VIEWS) CLINICAL INFORMATION: Wrist pain following a recent fall. COMPARISON: No prior study. TECHNIQUE: PA, lateral, oblique, and scaphoid views of the wrist performed. FINDINGS: POSTOPERATIVE CHANGES: None. ALIGNMENT: Anatomic. MINERALIZATION: Normal. FRACTURE: 1. There is an ossific fragment within the soft tissues dorsal to the mid wrist measuring 0.6 x 0.2 cm consistent with a fracture fragment which most commonly is seen with an acute triquetral fracture. JOINTS: 1. There is moderate moderate degenerative changes at the 1st carpometacarpal articulation with joint space loss, subchondral sclerosis and marginal osteophyte. 2. There are mild degenerative changes at the triscaphe articulation with marginal osteophytes. 3. There is a well-circumscribed erosion along the radial margin of the distal pole of the scaphoid and also an intraosseous ganglion within the distal half of the scaphoid. SOFT TISSUES: Unremarkable.      1. There is an ossific fragment within the soft tissues dorsal to the mid wrist measuring 0.6 x 0.2 cm consistent with a fracture fragment which most commonly is seen with an acute triquetral fracture. **This report has been created using voice recognition software. It may contain minor errors which are inherent in voice recognition technology. ** Final report electronically signed by Dr. Wilmer Spencer on 2/18/2020 9:25 AM    Ct Head Wo Contrast    Result Date: 2/17/2020  PROCEDURE: CT HEAD WO CONTRAST, CT FACIAL BONES WO CONTRAST CLINICAL INFORMATION: Fall. COMPARISON: CT 12/2/2019 TECHNIQUE: Noncontrast 5 mm axial images were obtained through the brain. All CT scans at this facility use dose modulation, iterative reconstruction, and/or weight-based dosing when appropriate to reduce radiation dose to as low as reasonably achievable. FINDINGS: Parenchyma: Mild low-attenuation areas throughout the periventricular white matter are grossly similar to previous. No acute area of edema. Ventricles and sulci: Normal size for age. Other: There is no acute intracranial hemorrhage. No calvarial fracture. Nasal bones: Comminuted acute fracture of the nasal bone with mild depression. There are multiple air bubbles anterior to the right maxilla. Frontal sinuses: Normally aerated and pneumatized. Ethmoid air cells: Normally aerated and pneumatized. Sphenoid sinus: Normally aerated and pneumatized. Maxillary sinuses: Moderate left and small right maxillary air-fluid levels. No definite acute fracture is evident on either side. The mastoid air cells and middle ear cavities are normally aerated. Zygomatic arches:  Intact, no fracture. Pterygoid plates: Normal Mandible: There are large rajendra right bilaterally. No gross fracture. Orbital walls: Normal Skull base: Normal     There is no acute edema or hemorrhage. No calvarial fracture. Facial bones show air-fluid levels within each maxillary sinus without a gross maxillary fracture on either side. There is a comminuted and depressed nasal bone fracture.  **This report has been created using voice recognition software. It may contain minor errors which are inherent in voice recognition technology. ** Final report electronically signed by Dr. Olga Lawler on 2/17/2020 6:26 PM    Ct Facial Bones Wo Contrast    Result Date: 2/17/2020  PROCEDURE: CT HEAD WO CONTRAST, CT FACIAL BONES WO CONTRAST CLINICAL INFORMATION: Fall. COMPARISON: CT 12/2/2019 TECHNIQUE: Noncontrast 5 mm axial images were obtained through the brain. All CT scans at this facility use dose modulation, iterative reconstruction, and/or weight-based dosing when appropriate to reduce radiation dose to as low as reasonably achievable. FINDINGS: Parenchyma: Mild low-attenuation areas throughout the periventricular white matter are grossly similar to previous. No acute area of edema. Ventricles and sulci: Normal size for age. Other: There is no acute intracranial hemorrhage. No calvarial fracture. Nasal bones: Comminuted acute fracture of the nasal bone with mild depression. There are multiple air bubbles anterior to the right maxilla. Frontal sinuses: Normally aerated and pneumatized. Ethmoid air cells: Normally aerated and pneumatized. Sphenoid sinus: Normally aerated and pneumatized. Maxillary sinuses: Moderate left and small right maxillary air-fluid levels. No definite acute fracture is evident on either side. The mastoid air cells and middle ear cavities are normally aerated. Zygomatic arches:  Intact, no fracture. Pterygoid plates: Normal Mandible: There are large rajendra right bilaterally. No gross fracture. Orbital walls: Normal Skull base: Normal     There is no acute edema or hemorrhage. No calvarial fracture. Facial bones show air-fluid levels within each maxillary sinus without a gross maxillary fracture on either side. There is a comminuted and depressed nasal bone fracture. **This report has been created using voice recognition software. It may contain minor errors which are inherent in voice recognition technology. ** Final report electronically signed by Dr. Brodie Camara on 2/17/2020 6:26 PM    Ct Cervical Spine Wo Contrast    Result Date: 2/17/2020  PROCEDURE: CT CERVICAL SPINE WO CONTRAST CLINICAL INFORMATION: fall. COMPARISON: 3/28/2019 TECHNIQUE: 3 mm noncontrast axial images were obtained through the cervical spine with sagittal and coronal reconstructions. All CT scans at this facility use dose modulation, iterative reconstruction, and/or weight-based dosing when appropriate to reduce radiation dose to as low as reasonably achievable. FINDINGS: The study is mildly limited by motion artifact. There is mild spondylosis. No gross acute fracture. No swelling. Mild reversal of the cervical lordosis could relate to muscular spasm, and has worsened from previous. C2-C3: Moderate disc bulge. C3-C4: Mild disc bulge. Severe left and mild right foraminal narrowing secondary to uncovertebral spur. C4-C5: Mild disc space narrowing and minimal anterolisthesis. Mild disc bulge. Mild left foraminal narrowing. C5-C6: Moderate disc space narrowing. No gross disc herniation. Moderate left foraminal narrowing secondary to uncovertebral spur. C6-C7: Severe disc space narrowing. Broad disc osteophyte complex causes moderate thecal sac effacement. Moderate bilateral foraminal narrowing. C7-T1: Moderate disc space narrowing. No gross disc herniation. Severe carotid artery calcification bilaterally. No gross acute fracture. **This report has been created using voice recognition software. It may contain minor errors which are inherent in voice recognition technology. ** Final report electronically signed by Dr. Brodie Camara on 2/17/2020 6:47 PM    Xr Chest Portable    Result Date: 2/17/2020  PROCEDURE: XR CHEST PORTABLE CLINICAL INFORMATION: Fall COMPARISON: 2/14/2020 TECHNIQUE: A single mobile view of the chest was obtained. 1. Normal heart size. SI joints right axillary region. 2. No acute findings.  No infiltrates or effusions are seen. **This report has been created using voice recognition software. It may contain minor errors which are inherent in voice recognition technology. ** Final report electronically signed by Dr. David Mcdonald on 2/17/2020 5:16 PM    Vl Dup Lower Extremity Venous Bilateral    Result Date: 2/17/2020  PROCEDURE: VL DUP LOWER EXTREMITY VENOUS BILATERAL CLINICAL INFORMATION: LE swelling. COMPARISON: Right lower extremity duplex venous ultrasound dated 8/6/2019. TECHNIQUE: Venous doppler ultrasound was performed of the bilateral lower extremities using gray scale, color flow and spectral doppler imaging. FINDINGS: Right leg: Deep veins (common femoral, femoral, popliteal, and calf veins): Patent and compressible, with spontaneous flow, phasicity, and satisfactory augmentation. Superficial veins (saphenous veins): The imaged portions of the greater saphenous vein at the saphenofemoral junction and mid thigh and mid calf are patent by color-flow ultrasound. The veins are compressible. Left leg: Deep veins (common femoral, femoral, popliteal, and calf veins): Patent and compressible, with spontaneous flow, phasicity, and satisfactory augmentation. Superficial veins (saphenous veins): The imaged portions of the greater saphenous vein at the saphenofemoral junction and mid thigh and mid calf are patent by color-flow ultrasound. The veins are compressible. Soft tissues: No evidence of a Baker's cyst. No focal fluid collection or mass. No evidence of acute occlusive bilateral lower extremity DVT. **This report has been created using voice recognition software. It may contain minor errors which are inherent in voice recognition technology. ** Final report electronically signed by Dr. Coni Mckeon on 2/17/2020 9:58 PM       Follow-up scheduled after discharge :-    in 5 days with Donna Sanchez MD  in 1 weeks with ENT    Consultations during this hospital stay:-  [] NONE [x] Cardiology  [] Nephrology  [] Hemo onco   [] GI [] ID  [] Endocrine  [] Pulm    [] Neuro    [x] Psych   [] Urology  [x] ENT   [] G SURGERY   [x]Ortho    []CV surg    [] Palliative  [] Hospice [] Pain management   []    []TCU   [] PT/OT  OTHERS:- Trauma    Disposition: Assisted Living  Condition at Discharge: Stable    Time Spent:- 35 minutes    Electronically signed by Josy Schmitz PA-C on 2/21/2020 at 12:42 PM  Discharging Hospitalist

## 2020-02-22 LAB
ACQUISITION DURATION: NORMAL S
AVERAGE HEART RATE: 110 BPM
FASTEST SUPRAVENTRICULAR RATE: 182 BPM
HOOKUP DATE: NORMAL
HOOKUP TIME: NORMAL
LONGEST SUPRAVENTRICULAR RATE: 182 BPM
MAX HEART RATE TIME/DATE: NORMAL
MAX HEART RATE: 151 BPM
MIN HEART RATE TIME/DATE: NORMAL
MIN HEART RATE: 93 BPM
NUMBER OF FASTEST SUPRAVENTRICULAR BEATS: 3
NUMBER OF LONGEST SUPRAVENTRICULAR BEATS: 3
NUMBER OF QRS COMPLEXES: NORMAL
NUMBER OF SUPRAVENTRICULAR BEATS IN RUNS: 3
NUMBER OF SUPRAVENTRICULAR COUPLETS: 0
NUMBER OF SUPRAVENTRICULAR ECTOPICS: 5
NUMBER OF SUPRAVENTRICULAR ISOLATED BEATS: 2
NUMBER OF SUPRAVENTRICULAR RUNS: 1
NUMBER OF VENTRICULAR BEATS IN RUNS: 0
NUMBER OF VENTRICULAR BIGEMINAL CYCLES: 0
NUMBER OF VENTRICULAR COUPLETS: 0
NUMBER OF VENTRICULAR ECTOPICS: 92
NUMBER OF VENTRICULAR ISOLATED BEATS: 92
NUMBER OF VENTRICULAR RUNS: 0

## 2020-02-24 ENCOUNTER — TELEPHONE (OUTPATIENT)
Dept: CARDIOLOGY CLINIC | Age: 85
End: 2020-02-24

## 2020-02-24 LAB
BUN BLDV-MCNC: 29 MG/DL
CALCIUM SERPL-MCNC: 8.6 MG/DL
CHLORIDE BLD-SCNC: 100 MMOL/L
CO2: 32 MMOL/L
CREAT SERPL-MCNC: 1.1 MG/DL
GFR CALCULATED: 47
GLUCOSE BLD-MCNC: 130 MG/DL
POTASSIUM SERPL-SCNC: 3.9 MMOL/L
SODIUM BLD-SCNC: 140 MMOL/L

## 2020-02-24 NOTE — TELEPHONE ENCOUNTER
Please see holter      INDICATION FOR STUDY:  Tachycardia     CONCLUSION:   Normal sinus rhythm  predominent sinus tachycardia  average  BPM  No other form of arrhythmia noted.

## 2020-02-25 ENCOUNTER — PROCEDURE VISIT (OUTPATIENT)
Dept: ENT CLINIC | Age: 85
End: 2020-02-25

## 2020-02-25 ENCOUNTER — OFFICE VISIT (OUTPATIENT)
Dept: ENT CLINIC | Age: 85
End: 2020-02-25
Payer: MEDICARE

## 2020-02-25 VITALS
DIASTOLIC BLOOD PRESSURE: 60 MMHG | WEIGHT: 161 LBS | TEMPERATURE: 98.1 F | BODY MASS INDEX: 26.82 KG/M2 | RESPIRATION RATE: 14 BRPM | HEART RATE: 76 BPM | HEIGHT: 65 IN | SYSTOLIC BLOOD PRESSURE: 100 MMHG

## 2020-02-25 PROCEDURE — 99024 POSTOP FOLLOW-UP VISIT: CPT | Performed by: PHYSICIAN ASSISTANT

## 2020-02-25 PROCEDURE — 99213 OFFICE O/P EST LOW 20 MIN: CPT | Performed by: OTOLARYNGOLOGY

## 2020-02-25 ASSESSMENT — ENCOUNTER SYMPTOMS
COLOR CHANGE: 0
STRIDOR: 0
CHEST TIGHTNESS: 0
COUGH: 0
DIARRHEA: 0
NAUSEA: 0
FACIAL SWELLING: 0
SORE THROAT: 0
ABDOMINAL PAIN: 0
RHINORRHEA: 0
APNEA: 0
VOICE CHANGE: 0
EYE DISCHARGE: 1
TROUBLE SWALLOWING: 0
SHORTNESS OF BREATH: 0
CHOKING: 0
VOMITING: 0
SINUS PRESSURE: 0
WHEEZING: 0

## 2020-02-25 NOTE — PROGRESS NOTES
tablet Take 1 tablet by mouth daily 30 tablet 0    D-Mannose 500 MG CAPS Take 1 capsule by mouth daily       DULoxetine (CYMBALTA) 60 MG extended release capsule Take 2 capsules by mouth daily 60 capsule 0    Multiple Vitamins-Minerals (SENIOR MULTIVITAMIN PLUS) TABS Take 1 tablet by mouth daily      glucose blood VI test strips (ONE TOUCH ULTRA TEST) strip Test daily or AD. Dx. 250.00 50 each 11    acetaminophen (TYLENOL) 325 MG tablet Take 650 mg by mouth every 6 hours as needed for Pain      aspirin 81 MG EC tablet 1 Tab Oral DAILY        No current facility-administered medications for this visit.       Past Medical History:   Diagnosis Date    Anxiety     nervous breakdown 9/2016    [facility] ([facility]     CAD (coronary artery disease)     cardiomegaly    COPD (chronic obstructive pulmonary disease) (HCC)     Diverticulitis     DVT (deep venous thrombosis) (HCC)     GERD (gastroesophageal reflux disease)     irritable bowel disease    Hyperlipidemia     Hypertension     Osteoarthritis     Pancreatic cancer ([facility]     Family    Psychiatric problem     alzheimers start    Pulmonary embolism ([facility]     S/P knee replacement     Type 2 diabetes mellitus ([facility] [facility] 2009      Past Surgical History:   Procedure Laterality Date    BREAST SURGERY      right lumpectomy    CHOLECYSTECTOMY  4-15-16    cholangiogram    COLONOSCOPY      EYE SURGERY      macular hole left eye    JOINT REPLACEMENT      left knee & right hip    SKIN BIOPSY      right arm     Family History   Problem Relation Age of Onset    Cancer Mother         pancreatic    Heart Disease Father 80    Other Sister         infant death    High Cholesterol Brother      Social History     Tobacco Use    Smoking status: Never Smoker    Smokeless tobacco: Never Used   Substance Use Topics    Alcohol use: Yes     Comment: rare       Subjective:      Review of Systems   Constitutional: Negative for activity change, appetite change, chills, diaphoresis, fatigue, fever and unexpected weight change. HENT: Negative for congestion, dental problem, ear discharge, ear pain, facial swelling, hearing loss, mouth sores, nosebleeds, postnasal drip, rhinorrhea, sinus pressure, sneezing, sore throat, tinnitus, trouble swallowing and voice change. Eyes: Positive for discharge. Negative for visual disturbance. Respiratory: Negative for apnea, cough, choking, chest tightness, shortness of breath, wheezing and stridor. Cardiovascular: Negative for chest pain, palpitations and leg swelling. Gastrointestinal: Negative for abdominal pain, diarrhea, nausea and vomiting. Endocrine: Negative for cold intolerance, heat intolerance, polydipsia and polyuria. Genitourinary: Negative for difficulty urinating, dysuria, enuresis, hematuria and urgency. Musculoskeletal: Negative for arthralgias, gait problem, neck pain and neck stiffness. Skin: Negative for color change and rash. Allergic/Immunologic: Negative for environmental allergies, food allergies and immunocompromised state. Neurological: Negative for dizziness, syncope, facial asymmetry, speech difficulty, light-headedness and headaches. Hematological: Negative for adenopathy. Does not bruise/bleed easily. Psychiatric/Behavioral: Negative for confusion and sleep disturbance. The patient is not nervous/anxious. Objective:   /60 (Site: Left Upper Arm, Position: Sitting)   Pulse 76   Temp 98.1 °F (36.7 °C) (Oral)   Resp 14   Ht 5' 5\" (1.651 m)   Wt 161 lb (73 kg)   BMI 26.79 kg/m²     Physical Exam   Nose: Surrounding ecchymosis and mild swelling. Airway is probably okay, given the amount of swelling that remains    Data:  All of the past medical history, past surgical history, family history,social history, allergies and current medications were reviewed with the patient. Assessment & Plan   Diagnoses and all orders for this visit:     Diagnosis Orders   1.  Closed displaced

## 2020-02-25 NOTE — TELEPHONE ENCOUNTER
Pt is at [facility] per daughter and she wanted me to call them [phone]

## 2020-02-25 NOTE — TELEPHONE ENCOUNTER
Call to penny they state she is at [facility]. transferring me to her nurse, spoke with blanca and med list updated.

## 2020-02-27 ENCOUNTER — TELEPHONE (OUTPATIENT)
Dept: CARDIOLOGY CLINIC | Age: 85
End: 2020-02-27

## 2020-02-27 NOTE — TELEPHONE ENCOUNTER
Pre op Risk Assessment    Procedure Closed Reduction of Nasal Fx  Physician Juan  Date of surgery/procedure ASAP    Last OV 2/3/2020  Last Stress 4/29/19  Last Echo 2/14/2020  Last Cath 1/13/2020  Last Stent none in Epic  Is patient on blood thinners Plavix and ASA  Hold Meds/how many days 7 days

## 2020-02-28 NOTE — TELEPHONE ENCOUNTER
Rose Bray from Dr. Agustín Posadas office is calling. Rose Bray is asking if pt can be put to sleep and stay on blood thinners during sx?

## 2020-02-28 NOTE — TELEPHONE ENCOUNTER
Pt just underwent PCI of the LAD on 1/13/2020. Her plavix and ASA cannot be held at this time due to risk of stent thrombosis. Thank you.

## 2020-03-03 ENCOUNTER — OFFICE VISIT (OUTPATIENT)
Dept: CARDIOLOGY CLINIC | Age: 85
End: 2020-03-03
Payer: MEDICARE

## 2020-03-03 ENCOUNTER — TELEPHONE (OUTPATIENT)
Dept: ENT CLINIC | Age: 85
End: 2020-03-03

## 2020-03-03 VITALS
HEART RATE: 94 BPM | DIASTOLIC BLOOD PRESSURE: 60 MMHG | BODY MASS INDEX: 30.95 KG/M2 | WEIGHT: 186 LBS | OXYGEN SATURATION: 91 % | SYSTOLIC BLOOD PRESSURE: 110 MMHG

## 2020-03-03 PROCEDURE — 99213 OFFICE O/P EST LOW 20 MIN: CPT | Performed by: NURSE PRACTITIONER

## 2020-03-03 ASSESSMENT — ENCOUNTER SYMPTOMS
COUGH: 0
SHORTNESS OF BREATH: 1
ABDOMINAL DISTENTION: 0

## 2020-03-03 NOTE — PROGRESS NOTES
Diverticulitis     DVT (deep venous thrombosis) (HCC)     GERD (gastroesophageal reflux disease)     irritable bowel disease    Hyperlipidemia     Hypertension     Osteoarthritis     Pancreatic cancer ([facility]     Family    Psychiatric problem     alzheimers start    [facility]     S/P knee replacement     Type 2 diabetes mellitus ([facility] 2009     Past Surgical History:   Procedure Laterality Date    BREAST SURGERY      right lumpectomy    CHOLECYSTECTOMY  4-15-16    cholangiogram    COLONOSCOPY      EYE SURGERY      macular hole left eye    JOINT REPLACEMENT      left knee & right hip    SKIN BIOPSY      right arm     Family History   Problem Relation Age of Onset    Cancer Mother         pancreatic    Heart Disease Father 80    Other Sister         infant death    High Cholesterol Brother      Social History     Tobacco Use    Smoking status: Never Smoker    Smokeless tobacco: Never Used   Substance Use Topics    Alcohol use: Yes     Comment: rare     Current Outpatient Medications   Medication Sig Dispense Refill    metoprolol (LOPRESSOR) 100 MG tablet Take 100 mg by mouth 2 times daily      magnesium hydroxide (MILK OF MAGNESIA) 400 MG/5ML suspension Take by mouth daily as needed for Constipation      metOLazone (ZAROXOLYN) 2.5 MG tablet Take 1 tablet by mouth daily as needed (wt gain, sob, edema) 15 tablet 0    clopidogrel (PLAVIX) 75 MG tablet Pt takes 300 mg tonight then 75 mg every day starting on 2/4/2020 34 tablet 0    bumetanide (BUMEX) 2 MG tablet Take 1 tablet by mouth daily 30 tablet 3    metFORMIN (GLUCOPHAGE-XR) 750 MG extended release tablet Take 1 tablet by mouth Daily with supper Restart this medication Friday am 30 tablet 3    nitroGLYCERIN (NITROSTAT) 0.4 MG SL tablet up to max of 3 total doses.  If no relief after 1 dose, call 911. 25 tablet 1    atorvastatin (LIPITOR) 40 MG tablet Take 1 tablet by mouth daily 30 tablet 3    Multiple Vitamins-Minerals heart sounds. No murmur. Pulmonary:      Effort: Pulmonary effort is normal. No respiratory distress. Breath sounds: Rales (few left base) present. No wheezing. Abdominal:      General: Bowel sounds are normal. There is no distension. Palpations: Abdomen is soft. Tenderness: There is no abdominal tenderness. Musculoskeletal: Normal range of motion. Right lower leg: No edema. Left lower leg: No edema. Skin:     General: Skin is warm and dry. Capillary Refill: Capillary refill takes less than 2 seconds. Neurological:      Mental Status: She is alert and oriented to person, place, and time. Coordination: Coordination normal.   Psychiatric:         Behavior: Behavior normal.       Wt Readings from Last 3 Encounters:   03/03/20 186 lb (84.4 kg)   02/25/20 161 lb (73 kg)   02/17/20 178 lb (80.7 kg)     BP Readings from Last 3 Encounters:   03/03/20 110/60   02/25/20 100/60   02/21/20 (!) 101/50     Pulse Readings from Last 3 Encounters:   03/03/20 94   02/25/20 76   02/21/20 77     Body mass index is 30.95 kg/m². ECHO:    Summary   limited echo   Ejection fraction is visually estimated at 50%. Overall left ventricular function is normal.   Moderately dilated left atrium. Myxomatotic degeneration of mitral valve. Decreased mitral valve mobility noted. No evidence of any pericardial effusion. Signature      ----------------------------------------------------------------   Electronically signed by Aravind Ray MD (Interpreting   physician) on 02/14/2020 at 04:42 PM   ----------------------------------------------------------------      Findings      Mitral Valve   Myxomatotic degeneration of mitral valve. Decreased mitral valve mobility noted. Aortic Valve   The aortic valve leaflets were not well visualized. Aortic valve appears tricuspid. Aortic valve leaflets are somewhat thickened. Aortic valve leaflets are Mildly calcified.       Tricuspid

## 2020-03-03 NOTE — TELEPHONE ENCOUNTER
Patient saw Dr. Manuel Bernstein on 2/25 and we were awaiting cardiac clearance prior to scheduling nasal FX. Per SHELBIE Oliver, patient cannot hold Asp/Plavix. Dr. Manuel Bernstein asked that I inquire if she can proceed being put to sleep staying on blood thinners. We received clearance with moderate risk. I informed Dr. Manuel Bernstein and he stated the patient was pretty swollen when he saw her. He would like her to come back in again prior to scheduling. I called this morning and spoke to [facility] to see if patient could come in today @ 345 (there was a canc) and she is to call me back after checking with transport. I called again @ 1200 since I haven't heard back, and they were unavailable at this time.

## 2020-03-03 NOTE — PATIENT INSTRUCTIONS
You may receive a survey regarding the care you received during your visit. Your input is valuable to us. We encourage you to complete and return your survey. We hope you will choose us in the future for your healthcare needs.     Continue:  · Continue current medications  · Daily weights and record  · Fluid restriction of 2 Liters per day  · Limit sodium in diet to around 5541-8628 mg/day  · Monitor BP  · Activity as tolerated     Call the [facility] for any of the following symptoms: [phone]   Weight gain of 2-3 pounds in 1 day or 5 pounds in 1 week   Increased shortness of breath   Shortness of breath while laying down   Cough   Chest pain   Swelling in feet, ankles or legs   Tenderness or bloating in the abdomen   Fatigue    Decreased appetite or nausea    Confusion

## 2020-03-03 NOTE — TELEPHONE ENCOUNTER
Ameena Salomon, from transport, called back and confirmed she can bring patient 3/10 @ 12:00. She will notify family.

## 2020-03-09 ENCOUNTER — HOSPITAL ENCOUNTER (OUTPATIENT)
Dept: CT IMAGING | Age: 85
Discharge: HOME | End: 2020-03-09
Payer: MEDICARE

## 2020-03-09 ENCOUNTER — HOSPITAL ENCOUNTER (EMERGENCY)
Age: 85
Discharge: HOME | End: 2020-03-09
Attending: EMERGENCY MEDICINE
Payer: MEDICARE

## 2020-03-09 VITALS
WEIGHT: 181 LBS | OXYGEN SATURATION: 100 % | RESPIRATION RATE: 16 BRPM | HEART RATE: 103 BPM | SYSTOLIC BLOOD PRESSURE: 132 MMHG | HEIGHT: 65 IN | BODY MASS INDEX: 30.16 KG/M2 | TEMPERATURE: 97.6 F | DIASTOLIC BLOOD PRESSURE: 70 MMHG

## 2020-03-09 LAB
ALBUMIN SERPL-MCNC: 3.8 G/DL (ref 3.5–5.1)
ALP BLD-CCNC: 73 U/L (ref 38–126)
ALT SERPL-CCNC: 20 U/L (ref 11–66)
ANION GAP SERPL CALCULATED.3IONS-SCNC: 22 MEQ/L (ref 8–16)
AST SERPL-CCNC: 19 U/L (ref 5–40)
BACTERIA: ABNORMAL /HPF
BASOPHILS # BLD: 0.5 %
BASOPHILS ABSOLUTE: 0 THOU/MM3 (ref 0–0.1)
BILIRUB SERPL-MCNC: 0.5 MG/DL (ref 0.3–1.2)
BILIRUBIN URINE: NEGATIVE
BLOOD, URINE: ABNORMAL
BUN BLDV-MCNC: 29 MG/DL (ref 7–22)
CALCIUM SERPL-MCNC: 9.6 MG/DL (ref 8.5–10.5)
CASTS UA: ABNORMAL /LPF
CHARACTER, URINE: CLEAR
CHLORIDE BLD-SCNC: 94 MEQ/L (ref 98–111)
CO2: 27 MEQ/L (ref 23–33)
COLOR: YELLOW
CREAT SERPL-MCNC: 1.4 MG/DL (ref 0.4–1.2)
CRYSTALS, UA: ABNORMAL
EKG ATRIAL RATE: 96 BPM
EKG P AXIS: 40 DEGREES
EKG P-R INTERVAL: 140 MS
EKG Q-T INTERVAL: 386 MS
EKG QRS DURATION: 92 MS
EKG QTC CALCULATION (BAZETT): 487 MS
EKG R AXIS: 25 DEGREES
EKG T AXIS: 50 DEGREES
EKG VENTRICULAR RATE: 96 BPM
EOSINOPHIL # BLD: 1.6 %
EOSINOPHILS ABSOLUTE: 0.1 THOU/MM3 (ref 0–0.4)
EPITHELIAL CELLS, UA: ABNORMAL /HPF
ERYTHROCYTE [DISTWIDTH] IN BLOOD BY AUTOMATED COUNT: 15.1 % (ref 11.5–14.5)
ERYTHROCYTE [DISTWIDTH] IN BLOOD BY AUTOMATED COUNT: 54.4 FL (ref 35–45)
GFR SERPL CREATININE-BSD FRML MDRD: 36 ML/MIN/1.73M2
GLUCOSE BLD-MCNC: 125 MG/DL (ref 70–108)
GLUCOSE URINE: NEGATIVE MG/DL
HCT VFR BLD CALC: 36.9 % (ref 37–47)
HEMOGLOBIN: 11.6 GM/DL (ref 12–16)
IMMATURE GRANS (ABS): 0.02 THOU/MM3 (ref 0–0.07)
IMMATURE GRANULOCYTES: 0.2 %
INR BLD: 0.98 (ref 0.85–1.13)
KETONES, URINE: NEGATIVE
LEUKOCYTE ESTERASE, URINE: ABNORMAL
LYMPHOCYTES # BLD: 48.7 %
LYMPHOCYTES ABSOLUTE: 4.1 THOU/MM3 (ref 1–4.8)
MCH RBC QN AUTO: 31 PG (ref 26–33)
MCHC RBC AUTO-ENTMCNC: 31.4 GM/DL (ref 32.2–35.5)
MCV RBC AUTO: 98.7 FL (ref 81–99)
MONOCYTES # BLD: 10 %
MONOCYTES ABSOLUTE: 0.8 THOU/MM3 (ref 0.4–1.3)
NITRITE, URINE: POSITIVE
NUCLEATED RED BLOOD CELLS: 0 /100 WBC
OSMOLALITY CALCULATION: 292.3 MOSMOL/KG (ref 275–300)
PH UA: 8.5 (ref 5–9)
PLATELET # BLD: 276 THOU/MM3 (ref 130–400)
PMV BLD AUTO: 8.9 FL (ref 9.4–12.4)
POTASSIUM REFLEX MAGNESIUM: 3.6 MEQ/L (ref 3.5–5.2)
PRO-BNP: 536.1 PG/ML (ref 0–1800)
PROTEIN UA: ABNORMAL
RBC # BLD: 3.74 MILL/MM3 (ref 4.2–5.4)
RBC URINE: ABNORMAL /HPF
SEG NEUTROPHILS: 39 %
SEGMENTED NEUTROPHILS ABSOLUTE COUNT: 3.3 THOU/MM3 (ref 1.8–7.7)
SODIUM BLD-SCNC: 143 MEQ/L (ref 135–145)
SPECIFIC GRAVITY, URINE: 1.01 (ref 1–1.03)
TOTAL PROTEIN: 7.1 G/DL (ref 6.1–8)
TROPONIN T: < 0.01 NG/ML
UROBILINOGEN, URINE: 0.2 EU/DL (ref 0–1)
WBC # BLD: 8.4 THOU/MM3 (ref 4.8–10.8)
WBC UA: ABNORMAL /HPF

## 2020-03-09 PROCEDURE — 93005 ELECTROCARDIOGRAM TRACING: CPT | Performed by: EMERGENCY MEDICINE

## 2020-03-09 PROCEDURE — 6370000000 HC RX 637 (ALT 250 FOR IP): Performed by: EMERGENCY MEDICINE

## 2020-03-09 PROCEDURE — 93010 ELECTROCARDIOGRAM REPORT: CPT | Performed by: NUCLEAR MEDICINE

## 2020-03-09 PROCEDURE — 84484 ASSAY OF TROPONIN QUANT: CPT

## 2020-03-09 PROCEDURE — 85610 PROTHROMBIN TIME: CPT

## 2020-03-09 PROCEDURE — 99285 EMERGENCY DEPT VISIT HI MDM: CPT

## 2020-03-09 PROCEDURE — 71275 CT ANGIOGRAPHY CHEST: CPT

## 2020-03-09 PROCEDURE — 81001 URINALYSIS AUTO W/SCOPE: CPT

## 2020-03-09 PROCEDURE — 36415 COLL VENOUS BLD VENIPUNCTURE: CPT

## 2020-03-09 PROCEDURE — 87086 URINE CULTURE/COLONY COUNT: CPT

## 2020-03-09 PROCEDURE — 80053 COMPREHEN METABOLIC PANEL: CPT

## 2020-03-09 PROCEDURE — 87077 CULTURE AEROBIC IDENTIFY: CPT

## 2020-03-09 PROCEDURE — 6360000004 HC RX CONTRAST MEDICATION: Performed by: NURSE PRACTITIONER

## 2020-03-09 PROCEDURE — 85025 COMPLETE CBC W/AUTO DIFF WBC: CPT

## 2020-03-09 PROCEDURE — 87186 SC STD MICRODIL/AGAR DIL: CPT

## 2020-03-09 PROCEDURE — 83880 ASSAY OF NATRIURETIC PEPTIDE: CPT

## 2020-03-09 RX ORDER — OXYCODONE HYDROCHLORIDE AND ACETAMINOPHEN 5; 325 MG/1; MG/1
1 TABLET ORAL ONCE
Status: COMPLETED | OUTPATIENT
Start: 2020-03-09 | End: 2020-03-09

## 2020-03-09 RX ORDER — SULFAMETHOXAZOLE AND TRIMETHOPRIM 800; 160 MG/1; MG/1
1 TABLET ORAL 2 TIMES DAILY
Qty: 14 TABLET | Refills: 0 | Status: SHIPPED | OUTPATIENT
Start: 2020-03-09 | End: 2020-03-16

## 2020-03-09 RX ADMIN — IOPAMIDOL 80 ML: 755 INJECTION, SOLUTION INTRAVENOUS at 12:16

## 2020-03-09 RX ADMIN — OXYCODONE HYDROCHLORIDE AND ACETAMINOPHEN 1 TABLET: 5; 325 TABLET ORAL at 16:41

## 2020-03-09 ASSESSMENT — PAIN SCALES - GENERAL: PAINLEVEL_OUTOF10: 8

## 2020-03-09 NOTE — ED PROVIDER NOTES
[facility] [address] [address] EMERGENCY DEPT  EMERGENCY DEPARTMENT ENCOUNTER      Pt Name: Dixie Cheek  MRN: [id number]  [place] 1935  Date of evaluation: 3/9/2020  Provider: Gordon MD    [address] [place] [address]       Chief Complaint   Patient presents with    Dizziness    Fatigue         HISTORY OF PRESENT ILLNESS   (Location/Symptom, Timing/Onset, Context/Setting, Quality, Duration, Modifying Factors, Severity)  Note limiting factors. Dixie Cheek is a 80 y.o. female who presents to the emergency department for the evaluation of light headedness and shortness of breath. The patient was admitted on 2/17 for syncope and fall secondary to age-relate physical debility. The patient received an outpatient CTA of chest with contrast for shortness of breath today. However, upon leaving, the patient's family members reported that the patient was very light headed, weak, and short of breath while attempting to enter her car. The family also stated the patient seemed incoherent at the time. The patient received a cardiac cath lab procedure and had cardiac stents placed on 01/13/2020. The patient denied chest pain, abdominal pain, light headedness, nausea, emesis, diarrhea, cough, and leg swelling. The patient admitted she feels fatigued and dehydrated. The patient has medical history of type II diabetes, DVT, diverticulitis, CAD, and GERD. The patient has surgical history of cholecystectomy. The patient is a non smoker. The family has no further acute complaints at this time. HPI    Nursing Notes were reviewed. REVIEW OF SYSTEMS    (2-9 systems for level 4, 10 or more for level 5)     Review of Systems   All other systems reviewed and are negative. Except as noted above the remainder of the review of systems was reviewed and negative.        PAST MEDICAL HISTORY     Past Medical History:   Diagnosis Date    Anxiety     nervous breakdown 9/2016    [facility] ([facility] [address] [facility]     CAD (coronary artery by mouth 2 times dailyHistorical Med      potassium chloride (KLOR-CON) 10 MEQ extended release tablet Take 1 tablet by mouth daily, Disp-30 tablet, R-0NO PRINT      D-Mannose 500 MG CAPS Take 1 capsule by mouth daily Historical Med      DULoxetine (CYMBALTA) 60 MG extended release capsule Take 2 capsules by mouth daily, Disp-60 capsule, R-0Normal      !! Multiple Vitamins-Minerals (SENIOR MULTIVITAMIN PLUS) TABS Take 1 tablet by mouth daily      glucose blood VI test strips (ONE TOUCH ULTRA TEST) strip Disp-50 each, R-11, NormalTest daily or AD. Dx. 250.00      acetaminophen (TYLENOL) 325 MG tablet Take 650 mg by mouth every 6 hours as needed for Pain      aspirin 81 MG EC tablet 1 Tab Oral DAILY        ! ! - Potential duplicate medications found. Please discuss with provider. ALLERGIES     Ciprofloxacin hcl; Food; and Penicillins    FAMILY HISTORY       Family History   Problem Relation Age of Onset   [place] Cancer Mother         pancreatic    Heart Disease Father 80    Other Sister         infant death   [place] High Cholesterol Brother           SOCIAL HISTORY       Social History     Socioeconomic History    Marital status:       Spouse name: Frantz Campbell Number of children: 11    Years of education: 15    Highest education level: None   Occupational History    Occupation: retired   Social Needs    Financial resource strain: None    Food insecurity     Worry: None     Inability: None    Transportation needs     Medical: None     Non-medical: None   Tobacco Use    Smoking status: Never Smoker    Smokeless tobacco: Never Used   Substance and Sexual Activity    Alcohol use: Yes     Comment: rare    Drug use: No    Sexual activity: Not Currently   Lifestyle    Physical activity     Days per week: None     Minutes per session: None    Stress: None   Relationships    Social connections     Talks on phone: None     Gets together: None     Attends Buddhism service: None     Active member of club or organization: None     Attends meetings of clubs or organizations: None     Relationship status: None    Intimate partner violence     Fear of current or ex partner: None     Emotionally abused: None     Physically abused: None     Forced sexual activity: None   Other Topics Concern    None   Social History Narrative    None       SCREENINGS    Wilsey Coma Scale  Eye Opening: Spontaneous  Best Verbal Response: Oriented  Best Motor Response: Obeys commands  Wilsey Coma Scale Score: 15           PHYSICAL EXAM    (up to 7 for level 4, 8 or more for level 5)     ED Triage Vitals   BP Temp Temp Source Pulse Resp SpO2 Height Weight   03/09/20 1248 03/09/20 1257 03/09/20 1257 03/09/20 1248 03/09/20 1248 03/09/20 1248 03/09/20 1248 03/09/20 1248   136/66 97.6 °F (36.4 °C) Oral 99 16 99 % 5' 5\" (1.651 m) 181 lb (82.1 kg)       Physical Exam  Vitals signs and nursing note reviewed. Constitutional:       General: She is not in acute distress. Appearance: She is well-developed. She is not diaphoretic. HENT:      Head: Normocephalic. Mouth/Throat:      Pharynx: No oropharyngeal exudate. Eyes:      General:         Right eye: No discharge. Left eye: No discharge. Pupils: Pupils are equal, round, and reactive to light. Neck:      Musculoskeletal: Neck supple. Trachea: No tracheal deviation. Cardiovascular:      Rate and Rhythm: Normal rate and regular rhythm. Pulses:           Radial pulses are 2+ on the right side and 2+ on the left side. Dorsalis pedis pulses are 2+ on the right side and 2+ on the left side. Heart sounds: Normal heart sounds. No murmur. Comments: No calf redness, tenderness  Pulmonary:      Effort: Pulmonary effort is normal. No respiratory distress. Breath sounds: Normal breath sounds. No stridor. No wheezing or rales. Abdominal:      General: Bowel sounds are normal. There is no distension. Palpations: Abdomen is soft.       Tenderness: There is no abdominal tenderness. There is no guarding or rebound. Musculoskeletal:      Right lower le+ Edema present. Left lower le+ Edema present. Skin:     General: Skin is warm and dry. Capillary Refill: Capillary refill takes less than 2 seconds. Findings: No erythema or rash. Neurological:      Mental Status: She is alert and oriented to person, place, and time. Cranial Nerves: No cranial nerve deficit. Sensory: No sensory deficit. Motor: No abnormal muscle tone. Coordination: Coordination normal.   Psychiatric:         Behavior: Behavior normal.         Thought Content: Thought content normal.         Judgment: Judgment normal.         DIAGNOSTIC RESULTS     EKG: All EKG's are interpreted by the Emergency Department Physician who either signs or Co-signs this chart in the absence of a cardiologist.    Sinus with ventricular rate of 96. Nonspecific ST changes. QTC of 487. No T wave inversions in the inferior or lateral leads. RADIOLOGY:   Non-plain film images such as CT, Ultrasound and MRI are read by the radiologist. Plain radiographic images are visualized and preliminarily interpreted by the emergency physician with the below findings:        Interpretation per the Radiologist below, if available at the time of this note:    CTA Chest with contrast (3/9/2020)  Impression:There is scarring and emphysema. No pulmonary emboli or acute infiltrate. Thickening of the GE junction will need to be correlated clinically.     No orders to display         ED BEDSIDE ULTRASOUND:   Performed by ED Physician - none    LABS:  Labs Reviewed   CBC WITH AUTO DIFFERENTIAL - Abnormal; Notable for the following components:       Result Value    RBC 3.74 (*)     Hemoglobin 11.6 (*)     Hematocrit 36.9 (*)     MCHC 31.4 (*)     RDW-CV 15.1 (*)     RDW-SD 54.4 (*)     MPV 8.9 (*)     All other components within normal limits   COMPREHENSIVE METABOLIC PANEL W/ REFLEX TO MG FOR unremarkable. Patient feels that she can be discharged home and is agreeable to this plan of care. Did discuss that she needs to follow-up with her cardiologist and primary care doctor in the next week. REASSESSMENT        CONSULTS:  None    PROCEDURES:  Unless otherwise noted below, none     Procedures      FINAL IMPRESSION      1. Acute cystitis without hematuria    2. Dizziness    3. Chronic shortness of breath          DISPOSITION/PLAN   DISPOSITION Decision To Discharge 03/09/2020 05:33:06 PM      PATIENT REFERRED TO:  Yoandy Aviles MD  [facility] [place] [address]  [phone]    In 3 days        DISCHARGE MEDICATIONS:  Discharge Medication List as of 3/9/2020  5:38 PM      START taking these medications    Details   sulfamethoxazole-trimethoprim (BACTRIM DS) 800-160 MG per tablet Take 1 tablet by mouth 2 times daily for 7 days, Disp-14 tablet, R-0Print           Controlled Substances Monitoring:     RX Monitoring 2/28/2017   Attestation The Prescription Monitoring Report for this patient was reviewed today. Periodic Controlled Substance Monitoring No signs of potential drug abuse or diversion identified. (Please note that portions of this note were completed with a voice recognition program.  Efforts were made to edit the dictations but occasionally words are mis-transcribed.)  Scribe:  Vanita Matthews 3/9/20 3:35 PM EDT Scribing for and in the presence of Robert Bailon MD.    Signed by: Maikel Dunn, 03/09/20 10:39 PM    Provider:  I personally performed the services described in the documentation, reviewed and edited the documentation which was dictated to the scribe in my presence, and it accurately records my words and actions.     Robert Bailon MD 3/9/20 10:39 PM        Huang MD (electronically signed)  Attending Emergency Physician           Robert Bailon MD  03/09/20 [id number]

## 2020-03-09 NOTE — ED TRIAGE NOTES
Patient presents to ER with complaints of shortness of breath and fatigue that has been ongoing for a couple months, but increasingly worse over past week. Patient reports also feeling fatigued. [facility] of chest. EKG obtained.

## 2020-03-10 ENCOUNTER — OFFICE VISIT (OUTPATIENT)
Dept: ENT CLINIC | Age: 85
End: 2020-03-10
Payer: MEDICARE

## 2020-03-10 ENCOUNTER — TELEPHONE (OUTPATIENT)
Dept: CARDIOLOGY CLINIC | Age: 85
End: 2020-03-10

## 2020-03-10 VITALS
HEART RATE: 84 BPM | WEIGHT: 184.8 LBS | RESPIRATION RATE: 16 BRPM | DIASTOLIC BLOOD PRESSURE: 66 MMHG | SYSTOLIC BLOOD PRESSURE: 108 MMHG | BODY MASS INDEX: 30.75 KG/M2

## 2020-03-10 PROCEDURE — 99213 OFFICE O/P EST LOW 20 MIN: CPT | Performed by: OTOLARYNGOLOGY

## 2020-03-10 ASSESSMENT — ENCOUNTER SYMPTOMS
SHORTNESS OF BREATH: 0
STRIDOR: 0
SORE THROAT: 0
NAUSEA: 0
FACIAL SWELLING: 0
VOMITING: 0
WHEEZING: 0
DIARRHEA: 0
ABDOMINAL PAIN: 0
COUGH: 0
RHINORRHEA: 0
APNEA: 0
CHEST TIGHTNESS: 0
TROUBLE SWALLOWING: 0
VOICE CHANGE: 0
COLOR CHANGE: 0
CHOKING: 0
SINUS PRESSURE: 0

## 2020-03-10 NOTE — PROGRESS NOTES
nitroGLYCERIN (NITROSTAT) 0.4 MG SL tablet up to max of 3 total doses. If no relief after 1 dose, call 911. 25 tablet 1    atorvastatin (LIPITOR) 40 MG tablet Take 1 tablet by mouth daily 30 tablet 3    Multiple Vitamins-Minerals (PRESERVISION AREDS PO) Take by mouth 2 times daily      potassium chloride (KLOR-CON) 10 MEQ extended release tablet Take 1 tablet by mouth daily 30 tablet 0    D-Mannose 500 MG CAPS Take 1 capsule by mouth daily       DULoxetine (CYMBALTA) 60 MG extended release capsule Take 2 capsules by mouth daily 60 capsule 0    Multiple Vitamins-Minerals (SENIOR MULTIVITAMIN PLUS) TABS Take 1 tablet by mouth daily      glucose blood VI test strips (ONE TOUCH ULTRA TEST) strip Test daily or AD. Dx. 250.00 50 each 11    acetaminophen (TYLENOL) 325 MG tablet Take 650 mg by mouth every 6 hours as needed for Pain      aspirin 81 MG EC tablet 1 Tab Oral DAILY        No current facility-administered medications for this visit.       Past Medical History:   Diagnosis Date    Anxiety     nervous breakdown 9/2016    [facility]     CAD (coronary artery disease)     cardiomegaly    COPD (chronic obstructive pulmonary disease) (HCC)     Diverticulitis     DVT (deep venous thrombosis) (HCC)     GERD (gastroesophageal reflux disease)     irritable bowel disease    Hyperlipidemia     Hypertension     Osteoarthritis     Pancreatic cancer ([facility] [address]     Family    Psychiatric problem     alzheimers start    Pulmonary embolism ([facility] [facility]     S/P knee replacement     Type 2 diabetes mellitus ([facility] [facility] 2009      Past Surgical History:   Procedure Laterality Date    BREAST SURGERY      right lumpectomy    CHOLECYSTECTOMY  4-15-16    cholangiogram    COLONOSCOPY      EYE SURGERY      macular hole left eye    JOINT REPLACEMENT      left knee & right hip    SKIN BIOPSY      right arm     Family History   Problem Relation Age of Onset    Cancer Mother         pancreatic    Heart Disease Father 80    Other Sister         infant death   Workman High Cholesterol Brother      Social History     Tobacco Use    Smoking status: Never Smoker    Smokeless tobacco: Never Used   Substance Use Topics    Alcohol use: Yes     Comment: rare       Subjective:      Review of Systems   Constitutional: Negative for activity change, appetite change, chills, diaphoresis, fatigue, fever and unexpected weight change. HENT: Negative for congestion, dental problem, ear discharge, ear pain, facial swelling, hearing loss, mouth sores, nosebleeds, postnasal drip, rhinorrhea, sinus pressure, sneezing, sore throat, tinnitus, trouble swallowing and voice change. Eyes: Negative for visual disturbance. Respiratory: Negative for apnea, cough, choking, chest tightness, shortness of breath, wheezing and stridor. Cardiovascular: Negative for chest pain, palpitations and leg swelling. Gastrointestinal: Negative for abdominal pain, diarrhea, nausea and vomiting. Endocrine: Negative for cold intolerance, heat intolerance, polydipsia and polyuria. Genitourinary: Negative for dysuria, enuresis and hematuria. Musculoskeletal: Negative for arthralgias, gait problem, neck pain and neck stiffness. Skin: Negative for color change and rash. Allergic/Immunologic: Negative for environmental allergies, food allergies and immunocompromised state. Neurological: Negative for dizziness, syncope, facial asymmetry, speech difficulty, light-headedness and headaches. Hematological: Negative for adenopathy. Does not bruise/bleed easily. Psychiatric/Behavioral: Negative for confusion and sleep disturbance. The patient is not nervous/anxious. Objective:   /66 (Site: Left Upper Arm, Position: Sitting)   Pulse 84   Resp 16   Wt 184 lb 12.8 oz (83.8 kg)   BMI 30.75 kg/m²     Physical Exam   Nose: Nasal dorsum is somewhat flattened. There is some symmetry.   Nasal septum appears to be twisted anteriorly to the left with some

## 2020-03-10 NOTE — LETTER
[address] [facility]  [place] [address] [facility]  Phone: [phone]  Fax: [phone]    Priscilla Fleischer, MD        March 10, 2020    Cooper Ahmadi MD  [address] [facility] [address]    Patient: Portillo Gregorio   MR Number: [id number]   YOB: 1935   Date of Visit: 3/10/2020     Dear Cooper Ahmadi,    I recently saw your patient, Portillo Gregorio, regarding her closed nasal fracture. With all the swelling down she also appears to have a septal deviation which is new. Surgery would involve septoplasty as well as close reduction with splint application. The cardiac clearance indicated that she could not stop her aspirin or Plavix, quite reasonably because of her cardiac stent a month and a half ago, 8 and even with the aspirin and Plavix on board she was at moderate risk for cardiac event. The anticoagulation would prevent me from performing a septoplasty and certainly could interfere with the close reduction. If a patient has taken a single aspirin,  I would normally cancel their scheduled septoplasty. Below are the relevant portions of my assessment and plan of care. Assessment & Plan   Diagnoses and all orders for this visit:     Diagnosis Orders   1. Fall in home, subsequent encounter     2. Closed displaced fracture of nasal bone with routine healing, subsequent encounter, with nasal septal deviation    3. Angina, class III ([address]     4. CHF (congestive heart failure), NYHA class II, unspecified failure chronicity, combined ([address]     5. Chronic combined systolic and diastolic congestive heart failure ([address]         The findings were explained and her questions were answered. We discussed the pros and cons of proceeding with surgery. This would not require just a closed reduction,  but a septoplasty. My feeling is that the risks outweigh the benefits, and they agreed.   Stents usually require a year of anticoagulation to minimize problems with stopping anticoagulation, so they agreed to contact us next February if they want to proceed with surgical correction. If you have questions, please do not hesitate to call me.     Sincerely,          Leopoldo Julian, MD

## 2020-03-11 ENCOUNTER — CARE COORDINATION (OUTPATIENT)
Dept: CASE MANAGEMENT | Age: 85
End: 2020-03-11

## 2020-03-11 LAB
ORGANISM: ABNORMAL
ORGANISM: ABNORMAL
URINE CULTURE REFLEX: ABNORMAL
URINE CULTURE REFLEX: ABNORMAL

## 2020-03-11 NOTE — CARE COORDINATION
[address] [facility] ED Follow Up Call    3/11/2020    Patient: Tray Ashby Patient : 1935   MRN: [id number]  Reason for Admission: Cystitis  Discharge Date: 3/9/20        Care Transitions ED Follow Up    Care Transitions Interventions  No Identified Needs  Do you have any ongoing symptoms?:  No   Did you call your PCP prior to going to the ED?:  No - Did not call PCP   Are there any other complaints/concerns that you wish to tell your provider?:  Called & spoke with Eva Celeste for the ED F/U call. Sabrina Aldana stated the pt C/O of a \"sting\" when she voided the day before she went to the ED. Pt started the Bactrim DS, Sabrina Villafuertees stated the pt has denied any other S/S. Pt has not C/O odor, urgency or pain. Sabrina Katya reported she called the PCP for a F/U appt & is to call PCP at a later date after taking meds for awhile, it will be decided at that time when to schedule appt. Pt denied any needs or concerns. CTC will sign off.    Do you have a copy of your discharge instructions?:  Yes   Do you understand what to report and when to return?:  Yes   Are you following your discharge instructions?:  Yes   Do you have all of your prescriptions and are they filled?:  Yes   Have you scheduled your follow up appointment?:  No   Were you discharged with any Home Care or Post Acute Services or do you currently have any active services?:  No            [address] Transition Nurse  [phone]

## 2020-03-12 NOTE — PROGRESS NOTES
Pharmacy Note  ED Culture Follow-up    Nupur Berman is a 80 y.o. female. Allergies: Ciprofloxacin hcl; Food; and Penicillins     Labs:  Lab Results   Component Value Date    BUN 29 (H) 03/09/2020    CREATININE 1.4 (H) 03/09/2020    WBC 8.4 03/09/2020     Estimated Creatinine Clearance: 32 mL/min (A) (based on SCr of 1.4 mg/dL (H)). Current antimicrobials:   Bactrim x 7 days     ASSESSMENT:  Micro results:   Urine culture: positive for E. Coli, strep group B      PLAN:  Need for intervention: No 2/2 sensitive to Bactrim    Discussed with:   Chava Narvaez PA-C  Chosen treatment:    Patient already on appropriate treatment as above    Patient response:   No need to contact patient    Called/sent in prescription to: Not applicable    Please call with any questions.  Rebecca Gonzalez, PharmD, BCPS  3/12/2020 5:06 PM

## 2020-05-05 ENCOUNTER — TELEPHONE (OUTPATIENT)
Dept: CARDIOLOGY CLINIC | Age: 85
End: 2020-05-05

## 2020-05-05 NOTE — TELEPHONE ENCOUNTER
[facility] sent fax over with patient's recent BP readings. BP has been consistently low and pt is symptomatic with dizziness. Recommendations?

## 2020-05-05 NOTE — TELEPHONE ENCOUNTER
Called NH and spoke to Tere and she voiced understanding and will update us in a few days with BP readings. Keep open to follow.

## 2020-05-14 ENCOUNTER — TELEPHONE (OUTPATIENT)
Dept: CARDIOLOGY CLINIC | Age: 85
End: 2020-05-14

## 2020-05-28 ENCOUNTER — TELEPHONE (OUTPATIENT)
Dept: CARDIOLOGY CLINIC | Age: 85
End: 2020-05-28

## 2020-05-28 NOTE — TELEPHONE ENCOUNTER
Pt POA requesting virtual visit to discuss ongoing low BP readings and SOB--see previous phone encounters. Okay to schedule VV?

## 2020-05-28 NOTE — TELEPHONE ENCOUNTER
Called and talked to daughter Jessica Kyle and she states she will bring her in for appt. Appt scheduled for 6/3/2020 at 1245. She confirmed appt date and time.

## 2020-06-03 ENCOUNTER — OFFICE VISIT (OUTPATIENT)
Dept: CARDIOLOGY CLINIC | Age: 85
End: 2020-06-03
Payer: MEDICARE

## 2020-06-03 VITALS
SYSTOLIC BLOOD PRESSURE: 116 MMHG | BODY MASS INDEX: 31.32 KG/M2 | HEART RATE: 95 BPM | WEIGHT: 188 LBS | DIASTOLIC BLOOD PRESSURE: 60 MMHG | HEIGHT: 65 IN

## 2020-06-03 PROCEDURE — 99214 OFFICE O/P EST MOD 30 MIN: CPT | Performed by: NUCLEAR MEDICINE

## 2020-07-21 ENCOUNTER — OFFICE VISIT (OUTPATIENT)
Dept: CARDIOLOGY CLINIC | Age: 85
End: 2020-07-21
Payer: MEDICARE

## 2020-07-21 VITALS
DIASTOLIC BLOOD PRESSURE: 56 MMHG | WEIGHT: 188.4 LBS | HEART RATE: 100 BPM | HEIGHT: 65 IN | BODY MASS INDEX: 31.39 KG/M2 | SYSTOLIC BLOOD PRESSURE: 114 MMHG | OXYGEN SATURATION: 92 %

## 2020-07-21 PROCEDURE — 99214 OFFICE O/P EST MOD 30 MIN: CPT | Performed by: NURSE PRACTITIONER

## 2020-07-21 RX ORDER — LANOLIN ALCOHOL/MO/W.PET/CERES
3 CREAM (GRAM) TOPICAL NIGHTLY
COMMUNITY

## 2020-07-21 RX ORDER — METOLAZONE 2.5 MG/1
2.5 TABLET ORAL DAILY PRN
Status: ON HOLD | COMMUNITY
End: 2021-01-01 | Stop reason: HOSPADM

## 2020-07-21 ASSESSMENT — ENCOUNTER SYMPTOMS
SHORTNESS OF BREATH: 0
COUGH: 0
ABDOMINAL DISTENTION: 0

## 2020-07-21 NOTE — PROGRESS NOTES
Heart Failure Clinic       Visit Date: 7/21/2020  Cardiologist:  Dr. Edith Beaulieu  Primary Care Physician: Dr. Prakash Montanez MD    Alondra Castellon is a 80 y.o. female who presents today for:  No chief complaint on file. HPI:   Alondra Castellon is a 80 y.o. female who presents to the office for a follow up patient visit in the heart failure clinic. Lives at [address] (EF 12-20%), Mitral stenosis, HTN, HLD, DM, PVD    Hospitalization: March 2019 - CHF  OV Edith Beaulieu Dec 2019 - worsening SOB. Ordered [facility]  1/13/20 [facility] - PCI to ostium LAD  1/24 OV Edith Beaulieu - pt feeling no better - changed meds - Lasix to Bumex, increased Metoprolol. 2/13 OV - not feeling better, palpitations, increased SOB. Talked w/ [facility] - increased Metoprolol. ECHO ordered to eval Pericardial effusion- negative. CXR negative. BNP slightly elevated. Ordered Metolazone x 3 days. 2/17 = Fell at A/L = admitted to [facility] = nasal fx, low BP  HOLTER = NSR w/ predominant ST. Avg     Concerns today: Feeling good! Very upbeat today. Denies SOB, LE edema, palpitations. Looks great. Laughing, joking t/o visit - grand son and dtr present  Activity: Wheelchair, can walk to/from dining room. Diet: Per facility, does not add salt.   Drinks a lot - likely over 2L    Patient has:  Chest Pain: No  SOB: No  Orthopnea/PND: No  YASMIN: No  Edema: mild   Fatigue: Improved  Abdominal bloating: No  Cough: No  Appetite: Good  Any extra diuretic use: No  Weight gain: No  Home weight: stable   Home blood pressure: 110-130, HR     Past Medical History:   Diagnosis Date    Anxiety     nervous breakdown 9/2016    [facility] ([facility] [address]     CAD (coronary artery disease)     cardiomegaly    COPD (chronic obstructive pulmonary disease) (HCC)     Diverticulitis     DVT (deep venous thrombosis) (HCC)     GERD (gastroesophageal reflux disease)     irritable bowel disease    Hyperlipidemia     Hypertension     Osteoarthritis     Pancreatic cancer [facility])     Family    Psychiatric problem     alzheimers start    [facility] [address]     S/P knee replacement     Type 2 diabetes mellitus ([facility] [address] 2009     Past Surgical History:   Procedure Laterality Date    BREAST SURGERY      right lumpectomy    CHOLECYSTECTOMY  4-15-16    cholangiogram    COLONOSCOPY      EYE SURGERY      macular hole left eye    JOINT REPLACEMENT      left knee & right hip    SKIN BIOPSY      right arm     Family History   Problem Relation Age of Onset    Cancer Mother         pancreatic    Heart Disease Father 80    Other Sister         infant death    High Cholesterol Brother      Social History     Tobacco Use    Smoking status: Never Smoker    Smokeless tobacco: Never Used   Substance Use Topics    Alcohol use: Yes     Comment: rare     Current Outpatient Medications   Medication Sig Dispense Refill    melatonin 3 MG TABS tablet Take 3 mg by mouth nightly      metOLazone (ZAROXOLYN) 2.5 MG tablet Take 2.5 mg by mouth daily as needed (wt gain, edema, sob)      metoprolol (LOPRESSOR) 100 MG tablet Take 100 mg by mouth 100mg am 50 mg pm      magnesium hydroxide (MILK OF MAGNESIA) 400 MG/5ML suspension Take by mouth daily as needed for Constipation      clopidogrel (PLAVIX) 75 MG tablet Pt takes 300 mg tonight then 75 mg every day starting on 2/4/2020 34 tablet 0    bumetanide (BUMEX) 2 MG tablet Take 1 tablet by mouth daily 30 tablet 3    metFORMIN (GLUCOPHAGE-XR) 750 MG extended release tablet Take 1 tablet by mouth Daily with supper Restart this medication Friday am 30 tablet 3    nitroGLYCERIN (NITROSTAT) 0.4 MG SL tablet up to max of 3 total doses.  If no relief after 1 dose, call 911. 25 tablet 1    atorvastatin (LIPITOR) 40 MG tablet Take 1 tablet by mouth daily 30 tablet 3    potassium chloride (KLOR-CON) 10 MEQ extended release tablet Take 1 tablet by mouth daily 30 tablet 0    D-Mannose 500 MG CAPS Take 1 capsule by mouth daily       DULoxetine (CYMBALTA) 60 MG extended release capsule Take 2 capsules by mouth daily 60 capsule 0    Multiple Vitamins-Minerals (SENIOR MULTIVITAMIN PLUS) TABS Take 1 tablet by mouth daily      glucose blood VI test strips (ONE TOUCH ULTRA TEST) strip Test daily or AD. Dx. 250.00 50 each 11    acetaminophen (TYLENOL) 325 MG tablet Take 650 mg by mouth every 6 hours as needed for Pain       No current facility-administered medications for this visit. Allergies   Allergen Reactions    Ciprofloxacin Hcl     Food Rash     strawberries    Penicillins Swelling and Rash       SUBJECTIVE:   Review of Systems   Constitutional: Positive for fatigue. Negative for activity change and appetite change. Respiratory: Negative for cough and shortness of breath. Cardiovascular: Negative for chest pain, palpitations and leg swelling. Gastrointestinal: Negative for abdominal distention. Musculoskeletal: Positive for gait problem (walker). Neurological: Negative for weakness, light-headedness and headaches. Hematological: Negative for adenopathy. Psychiatric/Behavioral: Negative for sleep disturbance. OBJECTIVE:   Today's Vitals:  BP (!) 114/56   Pulse 100   Ht 5' 5\" (1.651 m)   Wt 188 lb 6.4 oz (85.5 kg)   SpO2 92%   BMI 31.35 kg/m²     Physical Exam  Vitals signs reviewed. Constitutional:       General: She is not in acute distress. Appearance: Normal appearance. She is well-developed. She is not diaphoretic. HENT:      Head: Normocephalic and atraumatic. Eyes:      Conjunctiva/sclera: Conjunctivae normal.   Neck:      Musculoskeletal: Normal range of motion and neck supple. Comments: No JVD  Cardiovascular:      Rate and Rhythm: Normal rate and regular rhythm. Heart sounds: Normal heart sounds. No murmur. Pulmonary:      Effort: Pulmonary effort is normal. No respiratory distress. Breath sounds: Normal breath sounds. No wheezing or rales.    Abdominal:      General: Bowel sounds are normal. There is no distension. Palpations: Abdomen is soft. Tenderness: There is no abdominal tenderness. Musculoskeletal: Normal range of motion. Right lower leg: Edema (scant nonpitting) present. Left lower leg: Edema (scant nonpitting) present. Skin:     General: Skin is warm and dry. Capillary Refill: Capillary refill takes less than 2 seconds. Neurological:      Mental Status: She is alert and oriented to person, place, and time. Coordination: Coordination normal.   Psychiatric:         Behavior: Behavior normal.         Wt Readings from Last 3 Encounters:   07/21/20 188 lb 6.4 oz (85.5 kg)   06/03/20 188 lb (85.3 kg)   03/10/20 184 lb 12.8 oz (83.8 kg)     BP Readings from Last 3 Encounters:   07/21/20 (!) 114/56   06/03/20 116/60   03/10/20 108/66     Pulse Readings from Last 3 Encounters:   07/21/20 100   06/03/20 95   03/10/20 84     Body mass index is 31.35 kg/m². ECHO:   Summary   limited echo   Ejection fraction is visually estimated at 50%.   Overall left ventricular function is normal.   Moderately dilated left atrium.   Myxomatotic degeneration of mitral valve.   Decreased mitral valve mobility noted.   No evidence of any pericardial effusion.      Signature      ----------------------------------------------------------------   Electronically signed by Belinda Carbajal MD (Interpreting   physician) on 02/14/2020 at 04:42 PM   ----------------------------------------------------------------     CATH/STRESS:   SUMMARY:  Successful PCI of the ostium of the LAD.     PLAN:  1.  Bedrest.  2.  Optimal medical therapy. 3.  Risk factor management. 4.  Routine access site care. 5.  Overnight observation. 6.  IV fluids. 7.  DAPT. 8.  Follow up with Dr. Bk Barton in one to two weeks postprocedure.     All the above was explained to the patient and the patient's family.    They are agreeable and amenable to the above plan.     Caryl Tony MD     D: 01/14/2020 6:28:08                   Results reviewed:  BNP: No results found for: BNP  CBC:   Lab Results   Component Value Date    WBC 8.4 03/09/2020    RBC 3.74 03/09/2020    HGB 11.6 03/09/2020    HCT 36.9 03/09/2020     03/09/2020     CMP:    Lab Results   Component Value Date     03/09/2020    K 3.6 03/09/2020    CL 94 03/09/2020    CO2 27 03/09/2020    BUN 29 03/09/2020    CREATININE 1.4 03/09/2020    LABGLOM 36 03/09/2020    GLUCOSE 125 03/09/2020    CALCIUM 9.6 03/09/2020     Hepatic Function Panel:    Lab Results   Component Value Date    ALKPHOS 73 03/09/2020    ALT 20 03/09/2020    AST 19 03/09/2020    PROT 7.1 03/09/2020    BILITOT 0.5 03/09/2020    BILIDIR <0.2 02/17/2020    LABALBU 3.8 03/09/2020     Magnesium:    Lab Results   Component Value Date    MG 1.7 02/18/2020     PT/INR:    Lab Results   Component Value Date    INR 0.98 03/09/2020     Lipids:    Lab Results   Component Value Date    TRIG 359 01/13/2020    HDL 38 01/13/2020    LDLCALC 63 01/13/2020       ASSESSMENT AND PLAN:   The patient's condition/symptoms are Stable: No clinical evidence of fluid overload today. Continue current medical regimen without changes at present time. Diagnosis Orders   1. CHF (congestive heart failure), NYHA class II, chronic, diastolic ([facility] [address]     2. Essential hypertension     3. Tachycardia     4. SOB (shortness of breath)       Continue:  GDMT:   ACE/ARB/ARNI - None    BB - Lopressor 100 AM, 50 PM   Diuretic - Bumex 2/day   Hydralazine/Isos. - None   Aldosterone- None  Continue Current medications:  Plavix  Stable, appears Euvolemic  Looks great today. Labs reviewed 3/9 stable - creat 1.4  BMP next month. No med changes today  HR continues run higher end - slightly improvement  per NH   Breathing improved lately  F/U [facility] in 3 months  F/U clinic in 8 months or sooner if needed.     Greater than 30 minutes has been spent on education of HF symptoms, management, medication, and plan of care; as well as review of chart: labs, ECHO, radiology reports, etc.   I personally spent more then 50% of the appt time face to face with the patient. · Daily weights  · Fluid restriction of 2 Liters per day  · Limit sodium in diet to around 5169-3820 mg/day  · Monitor BP  · Activity as tolerated     Patient was instructed to call the [facility] for any changes in symptoms as noted in AVS.      Return in about 6 months (around 1/21/2021). or sooner if needed     Patient given educational materials - see patient instructions. We discussed the importance of weighing oneself and recording daily. We also discussed the importance of a low sodium diet, higher sodium foods to avoid and better low sodium food options. Patient verbalizes understanding of plan of care using teach back method, and is agreeable to the treatment plan.        Electronically signed by BAILEY Davis CNP on 7/21/2020 at 3:43 PM

## 2020-07-21 NOTE — PATIENT INSTRUCTIONS
You may receive a survey regarding the care you received during your visit. Your input is valuable to us. We encourage you to complete and return your survey. We hope you will choose us in the future for your healthcare needs.     Continue:  · Continue current medications  · Daily weights and record  · Fluid restriction of 2 Liters per day  · Limit sodium in diet to around 1565-1119 mg/day  · Monitor BP  · Activity as tolerated     Call the Heart Failure Clinic for any of the following symptoms: [phone]   Weight gain of 2-3 pounds in 1 day or 5 pounds in 1 week   Increased shortness of breath   Shortness of breath while laying down   Cough   Chest pain   Swelling in feet, ankles or legs   Tenderness or bloating in the abdomen   Fatigue    Decreased appetite or nausea    Confusion

## 2020-08-27 ENCOUNTER — HOSPITAL ENCOUNTER (OUTPATIENT)
Age: 85
Discharge: HOME | End: 2020-08-27
Payer: MEDICARE

## 2020-08-27 PROCEDURE — U0003 INFECTIOUS AGENT DETECTION BY NUCLEIC ACID (DNA OR RNA); SEVERE ACUTE RESPIRATORY SYNDROME CORONAVIRUS 2 (SARS-COV-2) (CORONAVIRUS DISEASE [COVID-19]), AMPLIFIED PROBE TECHNIQUE, MAKING USE OF HIGH THROUGHPUT TECHNOLOGIES AS DESCRIBED BY CMS-2020-01-R: HCPCS

## 2020-08-28 LAB — SARS-COV-2: NOT DETECTED

## 2020-10-21 LAB
BUN BLDV-MCNC: 29 MG/DL
CALCIUM SERPL-MCNC: 8.8 MG/DL
CHLORIDE BLD-SCNC: 97 MMOL/L
CO2: 34 MMOL/L
CREAT SERPL-MCNC: 1.3 MG/DL
GFR CALCULATED: 39
GLUCOSE BLD-MCNC: 253 MG/DL
POTASSIUM SERPL-SCNC: 4.3 MMOL/L
SODIUM BLD-SCNC: 136 MMOL/L

## 2020-11-03 PROBLEM — E11.9 TYPE 2 DIABETES MELLITUS (HCC): Status: RESOLVED | Noted: 2017-01-03 | Resolved: 2020-11-03

## 2020-11-20 ENCOUNTER — APPOINTMENT (OUTPATIENT)
Dept: GENERAL RADIOLOGY | Age: 85
End: 2020-11-20
Payer: MEDICARE

## 2020-11-20 ENCOUNTER — APPOINTMENT (OUTPATIENT)
Dept: CT IMAGING | Age: 85
End: 2020-11-20
Payer: MEDICARE

## 2020-11-20 ENCOUNTER — HOSPITAL ENCOUNTER (EMERGENCY)
Age: 85
Discharge: HOME | End: 2020-11-20
Attending: FAMILY MEDICINE
Payer: MEDICARE

## 2020-11-20 VITALS
HEART RATE: 96 BPM | SYSTOLIC BLOOD PRESSURE: 118 MMHG | WEIGHT: 190 LBS | BODY MASS INDEX: 31.65 KG/M2 | RESPIRATION RATE: 18 BRPM | HEIGHT: 65 IN | DIASTOLIC BLOOD PRESSURE: 69 MMHG | OXYGEN SATURATION: 94 % | TEMPERATURE: 97.8 F

## 2020-11-20 LAB
ANION GAP SERPL CALCULATED.3IONS-SCNC: 14 MEQ/L (ref 8–16)
APTT: 26.4 SECONDS (ref 22–38)
BASOPHILS # BLD: 0.3 %
BASOPHILS ABSOLUTE: 0 THOU/MM3 (ref 0–0.1)
BUN BLDV-MCNC: 23 MG/DL (ref 7–22)
CALCIUM SERPL-MCNC: 8.9 MG/DL (ref 8.5–10.5)
CHLORIDE BLD-SCNC: 95 MEQ/L (ref 98–111)
CO2: 26 MEQ/L (ref 23–33)
CREAT SERPL-MCNC: 1.5 MG/DL (ref 0.4–1.2)
EOSINOPHIL # BLD: 1.5 %
EOSINOPHILS ABSOLUTE: 0.1 THOU/MM3 (ref 0–0.4)
ERYTHROCYTE [DISTWIDTH] IN BLOOD BY AUTOMATED COUNT: 13.8 % (ref 11.5–14.5)
ERYTHROCYTE [DISTWIDTH] IN BLOOD BY AUTOMATED COUNT: 49.7 FL (ref 35–45)
GFR SERPL CREATININE-BSD FRML MDRD: 33 ML/MIN/1.73M2
GLUCOSE BLD-MCNC: 362 MG/DL (ref 70–108)
HCT VFR BLD CALC: 36.9 % (ref 37–47)
HEMOGLOBIN: 11.9 GM/DL (ref 12–16)
IMMATURE GRANS (ABS): 0.03 THOU/MM3 (ref 0–0.07)
IMMATURE GRANULOCYTES: 0.4 %
INR BLD: 0.93 (ref 0.85–1.13)
LYMPHOCYTES # BLD: 44.3 %
LYMPHOCYTES ABSOLUTE: 3.5 THOU/MM3 (ref 1–4.8)
MCH RBC QN AUTO: 32.1 PG (ref 26–33)
MCHC RBC AUTO-ENTMCNC: 32.2 GM/DL (ref 32.2–35.5)
MCV RBC AUTO: 99.5 FL (ref 81–99)
MONOCYTES # BLD: 8.1 %
MONOCYTES ABSOLUTE: 0.6 THOU/MM3 (ref 0.4–1.3)
NUCLEATED RED BLOOD CELLS: 0 /100 WBC
OSMOLALITY CALCULATION: 288.4 MOSMOL/KG (ref 275–300)
PLATELET # BLD: 219 THOU/MM3 (ref 130–400)
PMV BLD AUTO: 9.1 FL (ref 9.4–12.4)
POTASSIUM REFLEX MAGNESIUM: 4.1 MEQ/L (ref 3.5–5.2)
RBC # BLD: 3.71 MILL/MM3 (ref 4.2–5.4)
SEG NEUTROPHILS: 45.4 %
SEGMENTED NEUTROPHILS ABSOLUTE COUNT: 3.6 THOU/MM3 (ref 1.8–7.7)
SODIUM BLD-SCNC: 135 MEQ/L (ref 135–145)
WBC # BLD: 7.9 THOU/MM3 (ref 4.8–10.8)

## 2020-11-20 PROCEDURE — 85610 PROTHROMBIN TIME: CPT

## 2020-11-20 PROCEDURE — APPSS180 APP SPLIT SHARED TIME > 60 MINUTES: Performed by: PHYSICIAN ASSISTANT

## 2020-11-20 PROCEDURE — 6360000004 HC RX CONTRAST MEDICATION: Performed by: FAMILY MEDICINE

## 2020-11-20 PROCEDURE — 71260 CT THORAX DX C+: CPT

## 2020-11-20 PROCEDURE — 85025 COMPLETE CBC W/AUTO DIFF WBC: CPT

## 2020-11-20 PROCEDURE — 76376 3D RENDER W/INTRP POSTPROCES: CPT

## 2020-11-20 PROCEDURE — 72125 CT NECK SPINE W/O DYE: CPT

## 2020-11-20 PROCEDURE — 6820000002 HC L2 INJURY CALL ACTIVATION: Performed by: SURGERY

## 2020-11-20 PROCEDURE — 85730 THROMBOPLASTIN TIME PARTIAL: CPT

## 2020-11-20 PROCEDURE — 6370000000 HC RX 637 (ALT 250 FOR IP): Performed by: FAMILY MEDICINE

## 2020-11-20 PROCEDURE — 96361 HYDRATE IV INFUSION ADD-ON: CPT

## 2020-11-20 PROCEDURE — 72170 X-RAY EXAM OF PELVIS: CPT

## 2020-11-20 PROCEDURE — 36415 COLL VENOUS BLD VENIPUNCTURE: CPT

## 2020-11-20 PROCEDURE — 71045 X-RAY EXAM CHEST 1 VIEW: CPT

## 2020-11-20 PROCEDURE — 96360 HYDRATION IV INFUSION INIT: CPT

## 2020-11-20 PROCEDURE — 80048 BASIC METABOLIC PNL TOTAL CA: CPT

## 2020-11-20 PROCEDURE — 70450 CT HEAD/BRAIN W/O DYE: CPT

## 2020-11-20 PROCEDURE — 99285 EMERGENCY DEPT VISIT HI MDM: CPT

## 2020-11-20 PROCEDURE — 2580000003 HC RX 258: Performed by: FAMILY MEDICINE

## 2020-11-20 PROCEDURE — 74177 CT ABD & PELVIS W/CONTRAST: CPT

## 2020-11-20 RX ORDER — ACETAMINOPHEN 500 MG
1000 TABLET ORAL ONCE
Status: COMPLETED | OUTPATIENT
Start: 2020-11-20 | End: 2020-11-20

## 2020-11-20 RX ORDER — SODIUM CHLORIDE 9 MG/ML
INJECTION, SOLUTION INTRAVENOUS CONTINUOUS
Status: DISCONTINUED | OUTPATIENT
Start: 2020-11-20 | End: 2020-11-20 | Stop reason: HOSPADM

## 2020-11-20 RX ADMIN — IOPAMIDOL 80 ML: 755 INJECTION, SOLUTION INTRAVENOUS at 16:36

## 2020-11-20 RX ADMIN — ACETAMINOPHEN 1000 MG: 500 TABLET ORAL at 18:31

## 2020-11-20 RX ADMIN — SODIUM CHLORIDE: 9 INJECTION, SOLUTION INTRAVENOUS at 16:48

## 2020-11-20 ASSESSMENT — ENCOUNTER SYMPTOMS
SINUS PRESSURE: 0
BACK PAIN: 1
DIARRHEA: 0
SINUS PAIN: 0
STRIDOR: 0
CHEST TIGHTNESS: 0
CONSTIPATION: 0
SORE THROAT: 0
SHORTNESS OF BREATH: 1
VOMITING: 0
ABDOMINAL DISTENTION: 0
NAUSEA: 0
ABDOMINAL PAIN: 0
SHORTNESS OF BREATH: 0
RHINORRHEA: 0
WHEEZING: 0
PHOTOPHOBIA: 0
FACIAL SWELLING: 0
EYE PAIN: 0
COUGH: 0

## 2020-11-20 ASSESSMENT — PAIN SCALES - GENERAL
PAINLEVEL_OUTOF10: 4
PAINLEVEL_OUTOF10: 4

## 2020-11-20 ASSESSMENT — PAIN DESCRIPTION - LOCATION: LOCATION: HEAD

## 2020-11-20 ASSESSMENT — PAIN DESCRIPTION - PAIN TYPE: TYPE: ACUTE PAIN

## 2020-11-20 NOTE — ED NOTES
Patient's daughter calls and states that she is on her way up to the hospital.     Radha Youssef RN  11/20/20 [phone]
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
## 2020-11-20 NOTE — ED NOTES
Patient returns from CT at this time. Tolerated transfers well.      Daljit Wright RN  11/20/20 1640

## 2020-11-20 NOTE — ED NOTES
Bed: 008A  Expected date: 11/20/20  Expected time: 3:10 PM  Means of arrival: [facility]  Comments:     Luz Marina Melendez RN  11/20/20 6543

## 2020-11-20 NOTE — ED PROVIDER NOTES
initial encounter    3. Contusion of scalp, initial encounter    4.  Strain of thoracic back region          DISPOSITION/PLAN  PATIENT REFERRED TO:  Jeanna Cano MD  [address] [place] [address]  [phone]      As needed    DISCHARGE MEDICATIONS:  New Prescriptions    No medications on file         MD Pari Smith MD  11/20/20 9583

## 2020-11-30 ENCOUNTER — TELEPHONE (OUTPATIENT)
Dept: CARDIOLOGY CLINIC | Age: 85
End: 2020-11-30

## 2020-11-30 NOTE — TELEPHONE ENCOUNTER
Received a fax from [facility]isted living stating pt has been hypotensive with weakness in the AM. Been holding her metoprolol often. Remains tachy with some occasional SOB. PO2 95% and up. Vitals below. Is there another med for tachycardia vs hypotension? Also need clarification on how to take metoprolol. Our orders state Take 100 mg by mouth 100mg am 50 mg pm. [facility]isted Living has 50 mg in AM with BP parameters.  Please advise

## 2020-12-01 NOTE — TELEPHONE ENCOUNTER
Spoke with Gabrielle MAYFIELD at [facility]. Pt is only taking Metoprolol Tart 50 mg in the morning only. Our records showed 100 mg am and 50 mg pm.  (Med list updated)  That was change by Dr Bonita Kay 9/29/2020. Bharath Leventhal states after giving meds in the morning her BP drops and she becomes very dizzy. Please advise.

## 2020-12-01 NOTE — TELEPHONE ENCOUNTER
dont hold metoprolol as she will be tachycardic  Keep 50 bid  dont hold unless systolic below 90   Need a 2 week BP and HR report after that

## 2020-12-09 ENCOUNTER — OFFICE VISIT (OUTPATIENT)
Dept: CARDIOLOGY CLINIC | Age: 85
End: 2020-12-09
Payer: MEDICARE

## 2020-12-09 VITALS
WEIGHT: 190.6 LBS | HEIGHT: 65 IN | DIASTOLIC BLOOD PRESSURE: 72 MMHG | SYSTOLIC BLOOD PRESSURE: 130 MMHG | HEART RATE: 96 BPM | BODY MASS INDEX: 31.75 KG/M2

## 2020-12-09 PROBLEM — I73.9 PVD (PERIPHERAL VASCULAR DISEASE) (HCC): Status: ACTIVE | Noted: 2020-12-09

## 2020-12-09 PROCEDURE — 99213 OFFICE O/P EST LOW 20 MIN: CPT | Performed by: NUCLEAR MEDICINE

## 2020-12-09 NOTE — PROGRESS NOTES
[facility],[address] [address]  Dept: [phone]  Dept Fax: [phone]  Loc: [phone]    Visit Date: 12/9/2020    Rupali Matos is a 80 y.o. female who presents todayfor:  Chief Complaint   Patient presents with    6 Month Follow-Up    Coronary Artery Disease    Hypertension    Hyperlipidemia     Some dizziness  Near syncope  Lower BP in the am   Possible arrhythmia  No chest pain   Did have stent last year   Some tremors       HPI:  HPI  Past Medical History:   Diagnosis Date    Anxiety     nervous breakdown 9/2016    Breast cancer ([address]     CAD (coronary artery disease)     cardiomegaly    COPD (chronic obstructive pulmonary disease) ([address]     Diverticulitis     DVT (deep venous thrombosis) ([facility])     GERD (gastroesophageal reflux disease)     irritable bowel disease    Hyperlipidemia     Hypertension     Osteoarthritis     Pancreatic cancer ([address]     Family    Psychiatric problem     alzheimers start    Pulmonary embolism ([address]     S/P knee replacement     Type 2 diabetes mellitus ([address] 2009      Past Surgical History:   Procedure Laterality Date    BREAST SURGERY      right lumpectomy    CHOLECYSTECTOMY  4-15-16    cholangiogram    COLONOSCOPY      EYE SURGERY      macular hole left eye    JOINT REPLACEMENT      left knee & right hip    SKIN BIOPSY      right arm     Family History   Problem Relation Age of Onset    Cancer Mother         pancreatic    Heart Disease Father 80    Other Sister         infant death    High Cholesterol Brother      Social History     Tobacco Use    Smoking status: Never Smoker    Smokeless tobacco: Never Used   Substance Use Topics    Alcohol use: Yes     Comment: rare      Current Outpatient Medications   Medication Sig Dispense Refill    melatonin 3 MG TABS tablet Take 3 mg by mouth nightly      metOLazone (ZAROXOLYN) 2.5 MG tablet Take 2.5 mg by mouth daily as needed (wt gain, edema, sob)      metoprolol tartrate (LOPRESSOR) 25 MG tablet Take 25 mg by mouth 2 times daily       magnesium hydroxide (MILK OF MAGNESIA) 400 MG/5ML suspension Take by mouth daily as needed for Constipation      clopidogrel (PLAVIX) 75 MG tablet Pt takes 300 mg tonight then 75 mg every day starting on 2/4/2020 34 tablet 0    bumetanide (BUMEX) 2 MG tablet Take 1 tablet by mouth daily 30 tablet 3    metFORMIN (GLUCOPHAGE-XR) 750 MG extended release tablet Take 1 tablet by mouth Daily with supper Restart this medication Friday am 30 tablet 3    nitroGLYCERIN (NITROSTAT) 0.4 MG SL tablet up to max of 3 total doses. If no relief after 1 dose, call 911. 25 tablet 1    atorvastatin (LIPITOR) 40 MG tablet Take 1 tablet by mouth daily 30 tablet 3    potassium chloride (KLOR-CON) 10 MEQ extended release tablet Take 1 tablet by mouth daily 30 tablet 0    D-Mannose 500 MG CAPS Take 1 capsule by mouth daily       DULoxetine (CYMBALTA) 60 MG extended release capsule Take 2 capsules by mouth daily 60 capsule 0    Multiple Vitamins-Minerals (SENIOR MULTIVITAMIN PLUS) TABS Take 1 tablet by mouth daily      glucose blood VI test strips (ONE TOUCH ULTRA TEST) strip Test daily or AD. Dx. 250.00 50 each 11    acetaminophen (TYLENOL) 325 MG tablet Take 650 mg by mouth every 6 hours as needed for Pain       No current facility-administered medications for this visit.       Allergies   Allergen Reactions    Ciprofloxacin Hcl     Food Rash     strawberries    Penicillins Swelling and Rash     Health Maintenance   Topic Date Due    DTaP/Tdap/Td vaccine (1 - Tdap) 08/11/1954    DEXA (modify frequency per FRAX score)  08/11/1990    Shingles Vaccine (2 of 3) 12/16/2012    Diabetic foot exam  08/25/2015    Diabetic retinal exam  12/27/2017    Annual Wellness Visit (AWV)  06/23/2019    A1C test (Diabetic or Prediabetic)  09/22/2019    Flu vaccine (1) 09/01/2020    Lipid screen  01/13/2021    Potassium monitoring  11/20/2021    Creatinine monitoring  11/20/2021    Pneumococcal 65+ years Vaccine  Completed    Hepatitis A vaccine  Aged Out    Hib vaccine  Aged Out    Meningococcal (ACWY) vaccine  Aged Out       Subjective:  Review of Systems  General:   No fever, no chills, some fatigue or weight loss  Pulmonary:    some dyspnea, no wheezing  Cardiac:    Denies recent chest pain,   GI:     No nausea or vomiting, no abdominal pain  Neuro:     No dizziness or light headedness,   Musculoskeletal:  No recent active issues  Extremities:   No edema, no obvious claudication       Objective:  Physical Exam  /72   Pulse 96   Ht 5' 5\" (1.651 m)   Wt 190 lb 9.6 oz (86.5 kg)   BMI 31.72 kg/m²   General:   Well developed, well nourished  Lungs:   Clear to auscultation  Heart:    Normal S1 S2, Slight murmur. no rubs, no gallops  Abdomen:   Soft, non tender, no organomegalies, positive bowel sounds  Extremities:   No edema, no cyanosis, good peripheral pulses  Neurological:   Awake, alert, oriented. No obvious focal deficits  Musculoskelatal:  No obvious deformities    Assessment:      Diagnosis Orders   1. Coronary artery disease involving native coronary artery of native heart without angina pectoris     2. PVD (peripheral vascular disease) ([facility]     3. Essential hypertension     4. Familial hypercholesterolemia     as above  ?/ a fib vs other causes       Plan:  No follow-ups on file. Change to toprol 25 daily   Consider digoxin   Monitor the HR  Continue risk factor modification and medical management'  Thank you for allowing me to participate in the care of your patient. Please don't hesitate to contact me regarding any further issues related to the patient care    Orders Placed:  No orders of the defined types were placed in this encounter. Medications Prescribed:  No orders of the defined types were placed in this encounter.          Discussed use, benefit, and side effects of prescribed medications. All patient questions answered. Pt voicedunderstanding. Instructed to continue current medications, diet and exercise. Continue risk factor modification and medical management. Patient agreed with treatment plan. Follow up as directed.     Electronically signedby Shelley Maher MD on 12/9/2020 at 1:03 PM

## 2020-12-17 ENCOUNTER — HOSPITAL ENCOUNTER (OUTPATIENT)
Dept: AUDIOLOGY | Age: 85
Discharge: HOME | End: 2020-12-17
Payer: MEDICARE

## 2020-12-17 PROCEDURE — 92592 HC HEARING AID CHECK, ONE EAR: CPT | Performed by: AUDIOLOGIST

## 2020-12-17 PROCEDURE — 92567 TYMPANOMETRY: CPT | Performed by: AUDIOLOGIST

## 2020-12-17 PROCEDURE — 92593 HC HEARING AID CHECK, BOTH EARS: CPT | Performed by: AUDIOLOGIST

## 2020-12-17 PROCEDURE — 92557 COMPREHENSIVE HEARING TEST: CPT | Performed by: AUDIOLOGIST

## 2020-12-17 NOTE — PROGRESS NOTES
AUDIOLOGICAL EVALUATION      REASON FOR TESTING:  The patient notes gradual decline in hearing for the right ear, but it got \"really bad\" two weeks ago. She denies tinnitus, but reports a \"clacking\" sound with background noise. The patient reports falling last January (2020) and breaking her nose. She has had episodes of vertigo since this fall. She is a longstanding hearing aid patient at this facility and was fit with [facility] hearing aids in 2013. She explains that the right hearing aid has not been working. OTOSCOPY: WNL for both ears. AUDIOGRAM        Reliability: Good  Audiometer Used:  GSI-61    HEARING AID CHECK: A listening check of the right hearing aid revealed that it is weak- 40 dB gain . A listening check of the left hearing aid revealed normal output. Replaced domes and wax filters on both hearing aids. COMMENTS: Mild, sloping to moderately-severe sensorineural hearing loss for the left ear. Moderately-severe flat sensorineural hearing loss for the right ear. Low frequencies have declined for the right ear relative to 9/4/2019 audiometry. Speech discrimination ability is good at 88% in the left ear and very poor at 40% in the right ear (the speech discrimination was 100% bilaterally on 9/4/19 audiogram). Tympanometry revealed normal peak pressure and normal middle ear compliance for both ears. RECOMMENDATION(S):   1- The patient was encouraged to schedule an appointment with Dr. Adi Yi regarding the change in hearing ability for the right ear. She saw Dr. Adi Yi last year for her broken nose after falling. 2- Further testing such as an MRI of the internal auditory canals may be beneficial to rule out a retrocochlear lesion in the asymmetrical sensorineural hearing loss. 3- VNG testing is recommended as well due to the reported vertigo.   4- Following ENT consult, the patient should return to discuss amplification options- sending in right hearing aid for repair

## 2020-12-18 ENCOUNTER — TELEPHONE (OUTPATIENT)
Dept: ENT CLINIC | Age: 85
End: 2020-12-18

## 2020-12-18 NOTE — TELEPHONE ENCOUNTER
I left a message for Jesus Reyes to call [facility] to schedule an MRI and follow up appointment.

## 2021-01-01 ENCOUNTER — APPOINTMENT (OUTPATIENT)
Dept: CT IMAGING | Age: 86
End: 2021-01-01
Payer: MEDICARE

## 2021-01-01 ENCOUNTER — APPOINTMENT (OUTPATIENT)
Dept: GENERAL RADIOLOGY | Age: 86
DRG: 683 | End: 2021-01-01
Payer: MEDICARE

## 2021-01-01 ENCOUNTER — APPOINTMENT (OUTPATIENT)
Dept: GENERAL RADIOLOGY | Age: 86
DRG: 177 | End: 2021-01-01
Payer: MEDICARE

## 2021-01-01 ENCOUNTER — APPOINTMENT (OUTPATIENT)
Dept: GENERAL RADIOLOGY | Age: 86
End: 2021-01-01
Payer: MEDICARE

## 2021-01-01 ENCOUNTER — CARE COORDINATION (OUTPATIENT)
Dept: CASE MANAGEMENT | Age: 86
End: 2021-01-01

## 2021-01-01 ENCOUNTER — OFFICE VISIT (OUTPATIENT)
Dept: CARDIOLOGY CLINIC | Age: 86
End: 2021-01-01
Payer: MEDICARE

## 2021-01-01 ENCOUNTER — HOSPITAL ENCOUNTER (INPATIENT)
Age: 86
LOS: 10 days | Discharge: NURSING, REHAB OR LONG-TERM CARE | DRG: 177 | End: 2021-12-25
Attending: EMERGENCY MEDICINE | Admitting: SURGERY
Payer: MEDICARE

## 2021-01-01 ENCOUNTER — APPOINTMENT (OUTPATIENT)
Dept: INTERVENTIONAL RADIOLOGY/VASCULAR | Age: 86
DRG: 683 | End: 2021-01-01
Payer: MEDICARE

## 2021-01-01 ENCOUNTER — APPOINTMENT (OUTPATIENT)
Dept: CT IMAGING | Age: 86
DRG: 683 | End: 2021-01-01
Payer: MEDICARE

## 2021-01-01 ENCOUNTER — ANCILLARY PROCEDURE (OUTPATIENT)
Dept: EMERGENCY DEPT | Age: 86
DRG: 177 | End: 2021-01-01
Payer: MEDICARE

## 2021-01-01 ENCOUNTER — HOSPITAL ENCOUNTER (OUTPATIENT)
Age: 86
Setting detail: OBSERVATION
Discharge: OTHER INSTITUTION | End: 2021-11-01
Attending: EMERGENCY MEDICINE | Admitting: HOSPITALIST
Payer: MEDICARE

## 2021-01-01 ENCOUNTER — APPOINTMENT (OUTPATIENT)
Dept: CT IMAGING | Age: 86
DRG: 177 | End: 2021-01-01
Payer: MEDICARE

## 2021-01-01 ENCOUNTER — HOSPITAL ENCOUNTER (EMERGENCY)
Age: 86
Discharge: ANOTHER HOSPITAL | End: 2021-10-17
Attending: EMERGENCY MEDICINE
Payer: MEDICARE

## 2021-01-01 ENCOUNTER — HOSPITAL ENCOUNTER (INPATIENT)
Age: 86
LOS: 10 days | Discharge: NURSING, REHAB OR LONG-TERM CARE | DRG: 683 | End: 2021-11-15
Attending: FAMILY MEDICINE | Admitting: STUDENT IN AN ORGANIZED HEALTH CARE EDUCATION/TRAINING PROGRAM
Payer: MEDICARE

## 2021-01-01 VITALS
HEART RATE: 84 BPM | HEIGHT: 64 IN | OXYGEN SATURATION: 96 % | SYSTOLIC BLOOD PRESSURE: 110 MMHG | WEIGHT: 165 LBS | DIASTOLIC BLOOD PRESSURE: 60 MMHG | BODY MASS INDEX: 28.17 KG/M2

## 2021-01-01 VITALS
HEART RATE: 94 BPM | TEMPERATURE: 97.8 F | RESPIRATION RATE: 18 BRPM | OXYGEN SATURATION: 97 % | SYSTOLIC BLOOD PRESSURE: 134 MMHG | DIASTOLIC BLOOD PRESSURE: 73 MMHG | BODY MASS INDEX: 26.99 KG/M2 | WEIGHT: 162 LBS | HEIGHT: 65 IN

## 2021-01-01 VITALS
WEIGHT: 150.9 LBS | OXYGEN SATURATION: 95 % | TEMPERATURE: 98.3 F | HEIGHT: 64 IN | RESPIRATION RATE: 19 BRPM | HEART RATE: 92 BPM | SYSTOLIC BLOOD PRESSURE: 119 MMHG | DIASTOLIC BLOOD PRESSURE: 66 MMHG | BODY MASS INDEX: 25.76 KG/M2

## 2021-01-01 VITALS
SYSTOLIC BLOOD PRESSURE: 122 MMHG | BODY MASS INDEX: 26.99 KG/M2 | RESPIRATION RATE: 16 BRPM | WEIGHT: 162 LBS | OXYGEN SATURATION: 96 % | HEART RATE: 119 BPM | HEIGHT: 65 IN | TEMPERATURE: 98.7 F | DIASTOLIC BLOOD PRESSURE: 65 MMHG

## 2021-01-01 VITALS
RESPIRATION RATE: 12 BRPM | DIASTOLIC BLOOD PRESSURE: 51 MMHG | TEMPERATURE: 97.4 F | WEIGHT: 155 LBS | OXYGEN SATURATION: 96 % | BODY MASS INDEX: 25.83 KG/M2 | SYSTOLIC BLOOD PRESSURE: 93 MMHG | HEIGHT: 65 IN | HEART RATE: 84 BPM

## 2021-01-01 DIAGNOSIS — S00.93XD CONTUSION OF HEAD, UNSPECIFIED PART OF HEAD, SUBSEQUENT ENCOUNTER: ICD-10-CM

## 2021-01-01 DIAGNOSIS — I50.32 CHF (CONGESTIVE HEART FAILURE), NYHA CLASS II, CHRONIC, DIASTOLIC (HCC): Primary | ICD-10-CM

## 2021-01-01 DIAGNOSIS — N17.9 AKI (ACUTE KIDNEY INJURY) (HCC): Primary | ICD-10-CM

## 2021-01-01 DIAGNOSIS — S09.90XA INJURY OF HEAD, INITIAL ENCOUNTER: ICD-10-CM

## 2021-01-01 DIAGNOSIS — R21 RASH AND OTHER NONSPECIFIC SKIN ERUPTION: Primary | ICD-10-CM
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
## 2021-01-01 DIAGNOSIS — N39.0 URINARY TRACT INFECTION WITHOUT HEMATURIA, SITE UNSPECIFIED: ICD-10-CM

## 2021-01-01 DIAGNOSIS — I50.40 CHF (CONGESTIVE HEART FAILURE), NYHA CLASS II, UNSPECIFIED FAILURE CHRONICITY, COMBINED (HCC): ICD-10-CM

## 2021-01-01 DIAGNOSIS — R55 SYNCOPE AND COLLAPSE: ICD-10-CM

## 2021-01-01 DIAGNOSIS — I50.32 CHF (CONGESTIVE HEART FAILURE), NYHA CLASS III, CHRONIC, DIASTOLIC (HCC): ICD-10-CM

## 2021-01-01 DIAGNOSIS — I35.0 MILD AORTIC STENOSIS: ICD-10-CM

## 2021-01-01 DIAGNOSIS — S00.432A CONTUSION OF LEFT EAR, INITIAL ENCOUNTER: Primary | ICD-10-CM

## 2021-01-01 DIAGNOSIS — R77.8 ELEVATED TROPONIN: ICD-10-CM

## 2021-01-01 DIAGNOSIS — I10 ESSENTIAL HYPERTENSION: ICD-10-CM

## 2021-01-01 DIAGNOSIS — N17.9 AKI (ACUTE KIDNEY INJURY) (HCC): ICD-10-CM

## 2021-01-01 DIAGNOSIS — E11.9 TYPE 2 DIABETES MELLITUS WITHOUT COMPLICATION, WITHOUT LONG-TERM CURRENT USE OF INSULIN (HCC): ICD-10-CM

## 2021-01-01 LAB
ABO: NORMAL
ALBUMIN SERPL-MCNC: 3.6 G/DL (ref 3.5–5.1)
ALBUMIN SERPL-MCNC: 3.9 G/DL (ref 3.5–5.1)
ALBUMIN SERPL-MCNC: 4.1 G/DL (ref 3.5–5.1)
ALP BLD-CCNC: 75 U/L (ref 38–126)
ALP BLD-CCNC: 77 U/L (ref 38–126)
ALP BLD-CCNC: 80 U/L (ref 38–126)
ALT SERPL-CCNC: 16 U/L (ref 11–66)
ALT SERPL-CCNC: 26 U/L (ref 11–66)
ALT SERPL-CCNC: 50 U/L (ref 11–66)
ANION GAP SERPL CALCULATED.3IONS-SCNC: 10 MEQ/L (ref 8–16)
ANION GAP SERPL CALCULATED.3IONS-SCNC: 11 MEQ/L (ref 8–16)
ANION GAP SERPL CALCULATED.3IONS-SCNC: 12 MEQ/L (ref 8–16)
ANION GAP SERPL CALCULATED.3IONS-SCNC: 13 MEQ/L (ref 8–16)
ANION GAP SERPL CALCULATED.3IONS-SCNC: 14 MEQ/L (ref 8–16)
ANION GAP SERPL CALCULATED.3IONS-SCNC: 14 MEQ/L (ref 8–16)
ANION GAP SERPL CALCULATED.3IONS-SCNC: 15 MEQ/L (ref 8–16)
ANION GAP SERPL CALCULATED.3IONS-SCNC: 16 MEQ/L (ref 8–16)
ANION GAP SERPL CALCULATED.3IONS-SCNC: 17 MEQ/L (ref 8–16)
ANION GAP SERPL CALCULATED.3IONS-SCNC: 18 MEQ/L (ref 8–16)
ANION GAP SERPL CALCULATED.3IONS-SCNC: 18 MEQ/L (ref 8–16)
ANION GAP SERPL CALCULATED.3IONS-SCNC: 20 MEQ/L (ref 8–16)
ANION GAP SERPL CALCULATED.3IONS-SCNC: 9 MEQ/L (ref 8–16)
ANTIBODY SCREEN: NORMAL
APTT: 29.6 SECONDS (ref 22–38)
AST SERPL-CCNC: 11 U/L (ref 5–40)
AST SERPL-CCNC: 28 U/L (ref 5–40)
AST SERPL-CCNC: 34 U/L (ref 5–40)
AVERAGE GLUCOSE: 261 MG/DL (ref 70–126)
AVERAGE GLUCOSE: 453 MG/DL (ref 70–126)
BACTERIA: ABNORMAL
BACTERIA: ABNORMAL /HPF
BASOPHILS # BLD: 0.1 %
BASOPHILS # BLD: 0.2 %
BASOPHILS # BLD: 0.2 %
BASOPHILS ABSOLUTE: 0 THOU/MM3 (ref 0–0.1)
BILIRUB SERPL-MCNC: 0.3 MG/DL (ref 0.3–1.2)
BILIRUB SERPL-MCNC: 0.4 MG/DL (ref 0.3–1.2)
BILIRUB SERPL-MCNC: 0.6 MG/DL (ref 0.3–1.2)
BILIRUBIN DIRECT: < 0.2 MG/DL (ref 0–0.3)
BILIRUBIN DIRECT: < 0.2 MG/DL (ref 0–0.3)
BILIRUBIN URINE: NEGATIVE
BLOOD, URINE: NEGATIVE
BUN BLDV-MCNC: 12 MG/DL (ref 7–22)
BUN BLDV-MCNC: 13 MG/DL (ref 7–22)
BUN BLDV-MCNC: 14 MG/DL (ref 7–22)
BUN BLDV-MCNC: 16 MG/DL (ref 7–22)
BUN BLDV-MCNC: 17 MG/DL (ref 7–22)
BUN BLDV-MCNC: 18 MG/DL (ref 7–22)
BUN BLDV-MCNC: 20 MG/DL (ref 7–22)
BUN BLDV-MCNC: 21 MG/DL
BUN BLDV-MCNC: 21 MG/DL (ref 7–22)
BUN BLDV-MCNC: 21 MG/DL (ref 7–22)
BUN BLDV-MCNC: 24 MG/DL (ref 7–22)
BUN BLDV-MCNC: 25 MG/DL (ref 7–22)
BUN BLDV-MCNC: 26 MG/DL (ref 7–22)
BUN BLDV-MCNC: 28 MG/DL (ref 7–22)
BUN BLDV-MCNC: 32 MG/DL (ref 7–22)
BUN BLDV-MCNC: 32 MG/DL (ref 7–22)
BUN BLDV-MCNC: 34 MG/DL (ref 7–22)
BUN BLDV-MCNC: 40 MG/DL (ref 7–22)
BUN BLDV-MCNC: 42 MG/DL (ref 7–22)
BUN BLDV-MCNC: 50 MG/DL (ref 7–22)
C-REACTIVE PROTEIN: 1.63 MG/DL (ref 0–1)
C-REACTIVE PROTEIN: < 0.3 MG/DL (ref 0–1)
CALCIUM SERPL-MCNC: 7.6 MG/DL (ref 8.5–10.5)
CALCIUM SERPL-MCNC: 8 MG/DL (ref 8.5–10.5)
CALCIUM SERPL-MCNC: 8.3 MG/DL (ref 8.5–10.5)
CALCIUM SERPL-MCNC: 8.4 MG/DL (ref 8.5–10.5)
CALCIUM SERPL-MCNC: 8.4 MG/DL (ref 8.5–10.5)
CALCIUM SERPL-MCNC: 8.5 MG/DL (ref 8.5–10.5)
CALCIUM SERPL-MCNC: 8.5 MG/DL (ref 8.5–10.5)
CALCIUM SERPL-MCNC: 8.6 MG/DL (ref 8.5–10.5)
CALCIUM SERPL-MCNC: 8.7 MG/DL (ref 8.5–10.5)
CALCIUM SERPL-MCNC: 8.7 MG/DL (ref 8.5–10.5)
CALCIUM SERPL-MCNC: 8.8 MG/DL
CALCIUM SERPL-MCNC: 8.8 MG/DL (ref 8.5–10.5)
CALCIUM SERPL-MCNC: 8.9 MG/DL (ref 8.5–10.5)
CALCIUM SERPL-MCNC: 9 MG/DL (ref 8.5–10.5)
CALCIUM SERPL-MCNC: 9 MG/DL (ref 8.5–10.5)
CALCIUM SERPL-MCNC: 9.1 MG/DL (ref 8.5–10.5)
CALCIUM SERPL-MCNC: 9.2 MG/DL (ref 8.5–10.5)
CALCIUM SERPL-MCNC: 9.3 MG/DL (ref 8.5–10.5)
CALCIUM SERPL-MCNC: 9.4 MG/DL (ref 8.5–10.5)
CALCIUM SERPL-MCNC: 9.7 MG/DL (ref 8.5–10.5)
CASTS 2: ABNORMAL /LPF
CASTS UA: ABNORMAL /LPF
CASTS: ABNORMAL /LPF
CASTS: ABNORMAL /LPF
CHARACTER, URINE: ABNORMAL
CHARACTER, URINE: CLEAR
CHARACTER, URINE: CLEAR
CHLORIDE BLD-SCNC: 101 MEQ/L (ref 98–111)
CHLORIDE BLD-SCNC: 101 MEQ/L (ref 98–111)
CHLORIDE BLD-SCNC: 102 MEQ/L (ref 98–111)
CHLORIDE BLD-SCNC: 103 MEQ/L (ref 98–111)
CHLORIDE BLD-SCNC: 103 MEQ/L (ref 98–111)
CHLORIDE BLD-SCNC: 103 MMOL/L
CHLORIDE BLD-SCNC: 104 MEQ/L (ref 98–111)
CHLORIDE BLD-SCNC: 105 MEQ/L (ref 98–111)
CHLORIDE BLD-SCNC: 105 MEQ/L (ref 98–111)
CHLORIDE BLD-SCNC: 106 MEQ/L (ref 98–111)
CHLORIDE BLD-SCNC: 107 MEQ/L (ref 98–111)
CHLORIDE BLD-SCNC: 108 MEQ/L (ref 98–111)
CHLORIDE BLD-SCNC: 109 MEQ/L (ref 98–111)
CHLORIDE BLD-SCNC: 109 MEQ/L (ref 98–111)
CHLORIDE BLD-SCNC: 91 MEQ/L (ref 98–111)
CHLORIDE BLD-SCNC: 93 MEQ/L (ref 98–111)
CHLORIDE BLD-SCNC: 94 MEQ/L (ref 98–111)
CHLORIDE BLD-SCNC: 95 MEQ/L (ref 98–111)
CHLORIDE BLD-SCNC: 96 MEQ/L (ref 98–111)
CHLORIDE BLD-SCNC: 96 MEQ/L (ref 98–111)
CHLORIDE BLD-SCNC: 97 MEQ/L (ref 98–111)
CHLORIDE BLD-SCNC: 99 MEQ/L (ref 98–111)
CHLORIDE BLD-SCNC: 99 MEQ/L (ref 98–111)
CO2: 18 MEQ/L (ref 23–33)
CO2: 20 MEQ/L (ref 23–33)
CO2: 21 MEQ/L (ref 23–33)
CO2: 23 MEQ/L (ref 23–33)
CO2: 24 MEQ/L (ref 23–33)
CO2: 25 MEQ/L (ref 23–33)
CO2: 26 MEQ/L (ref 23–33)
CO2: 29 MMOL/L
COLOR: YELLOW
CORTISOL COLLECTION INFO: NORMAL
CORTISOL: 17.28 UG/DL
CREAT SERPL-MCNC: 0.8 MG/DL (ref 0.4–1.2)
CREAT SERPL-MCNC: 0.9 MG/DL (ref 0.4–1.2)
CREAT SERPL-MCNC: 1 MG/DL (ref 0.4–1.2)
CREAT SERPL-MCNC: 1.1 MG/DL
CREAT SERPL-MCNC: 1.1 MG/DL (ref 0.4–1.2)
CREAT SERPL-MCNC: 1.2 MG/DL (ref 0.4–1.2)
CREAT SERPL-MCNC: 1.3 MG/DL (ref 0.4–1.2)
CREAT SERPL-MCNC: 1.5 MG/DL (ref 0.4–1.2)
CREAT SERPL-MCNC: 1.7 MG/DL (ref 0.4–1.2)
CREAT SERPL-MCNC: 2.1 MG/DL (ref 0.4–1.2)
CREAT SERPL-MCNC: 2.8 MG/DL (ref 0.4–1.2)
CRYSTALS, UA: ABNORMAL
CRYSTALS: ABNORMAL
EKG ATRIAL RATE: 104 BPM
EKG ATRIAL RATE: 111 BPM
EKG ATRIAL RATE: 122 BPM
EKG ATRIAL RATE: 126 BPM
EKG ATRIAL RATE: 130 BPM
EKG P AXIS: 33 DEGREES
EKG P AXIS: 41 DEGREES
EKG P AXIS: 44 DEGREES
EKG P AXIS: 50 DEGREES
EKG P AXIS: 57 DEGREES
EKG P-R INTERVAL: 134 MS
EKG P-R INTERVAL: 136 MS
EKG P-R INTERVAL: 168 MS
EKG Q-T INTERVAL: 306 MS
EKG Q-T INTERVAL: 318 MS
EKG Q-T INTERVAL: 330 MS
EKG Q-T INTERVAL: 338 MS
EKG Q-T INTERVAL: 348 MS
EKG QRS DURATION: 80 MS
EKG QRS DURATION: 80 MS
EKG QRS DURATION: 82 MS
EKG QRS DURATION: 86 MS
EKG QRS DURATION: 86 MS
EKG QTC CALCULATION (BAZETT): 448 MS
EKG QTC CALCULATION (BAZETT): 450 MS
EKG QTC CALCULATION (BAZETT): 455 MS
EKG QTC CALCULATION (BAZETT): 460 MS
EKG QTC CALCULATION (BAZETT): 481 MS
EKG R AXIS: 27 DEGREES
EKG R AXIS: 39 DEGREES
EKG R AXIS: 41 DEGREES
EKG R AXIS: 41 DEGREES
EKG R AXIS: 58 DEGREES
EKG T AXIS: 108 DEGREES
EKG T AXIS: 111 DEGREES
EKG T AXIS: 121 DEGREES
EKG T AXIS: 136 DEGREES
EKG T AXIS: 80 DEGREES
EKG VENTRICULAR RATE: 103 BPM
EKG VENTRICULAR RATE: 111 BPM
EKG VENTRICULAR RATE: 122 BPM
EKG VENTRICULAR RATE: 126 BPM
EKG VENTRICULAR RATE: 130 BPM
EOSINOPHIL # BLD: 0.2 %
EOSINOPHIL # BLD: 0.4 %
EOSINOPHIL # BLD: 0.5 %
EOSINOPHILS ABSOLUTE: 0 THOU/MM3 (ref 0–0.4)
EOSINOPHILS ABSOLUTE: 0 THOU/MM3 (ref 0–0.4)
EOSINOPHILS ABSOLUTE: 0.1 THOU/MM3 (ref 0–0.4)
EPITHELIAL CELLS, UA: ABNORMAL /HPF
EPITHELIAL CELLS, UA: ABNORMAL /HPF
ERYTHROCYTE [DISTWIDTH] IN BLOOD BY AUTOMATED COUNT: 13.6 % (ref 11.5–14.5)
ERYTHROCYTE [DISTWIDTH] IN BLOOD BY AUTOMATED COUNT: 13.7 % (ref 11.5–14.5)
ERYTHROCYTE [DISTWIDTH] IN BLOOD BY AUTOMATED COUNT: 13.8 % (ref 11.5–14.5)
ERYTHROCYTE [DISTWIDTH] IN BLOOD BY AUTOMATED COUNT: 14 % (ref 11.5–14.5)
ERYTHROCYTE [DISTWIDTH] IN BLOOD BY AUTOMATED COUNT: 14.1 % (ref 11.5–14.5)
ERYTHROCYTE [DISTWIDTH] IN BLOOD BY AUTOMATED COUNT: 14.1 % (ref 11.5–14.5)
ERYTHROCYTE [DISTWIDTH] IN BLOOD BY AUTOMATED COUNT: 14.3 % (ref 11.5–14.5)
ERYTHROCYTE [DISTWIDTH] IN BLOOD BY AUTOMATED COUNT: 14.4 % (ref 11.5–14.5)
ERYTHROCYTE [DISTWIDTH] IN BLOOD BY AUTOMATED COUNT: 14.5 % (ref 11.5–14.5)
ERYTHROCYTE [DISTWIDTH] IN BLOOD BY AUTOMATED COUNT: 14.5 % (ref 11.5–14.5)
ERYTHROCYTE [DISTWIDTH] IN BLOOD BY AUTOMATED COUNT: 14.6 % (ref 11.5–14.5)
ERYTHROCYTE [DISTWIDTH] IN BLOOD BY AUTOMATED COUNT: 14.6 % (ref 11.5–14.5)
ERYTHROCYTE [DISTWIDTH] IN BLOOD BY AUTOMATED COUNT: 14.7 % (ref 11.5–14.5)
ERYTHROCYTE [DISTWIDTH] IN BLOOD BY AUTOMATED COUNT: 14.8 % (ref 11.5–14.5)
ERYTHROCYTE [DISTWIDTH] IN BLOOD BY AUTOMATED COUNT: 46.4 FL (ref 35–45)
ERYTHROCYTE [DISTWIDTH] IN BLOOD BY AUTOMATED COUNT: 48.1 FL (ref 35–45)
ERYTHROCYTE [DISTWIDTH] IN BLOOD BY AUTOMATED COUNT: 48.6 FL (ref 35–45)
ERYTHROCYTE [DISTWIDTH] IN BLOOD BY AUTOMATED COUNT: 49 FL (ref 35–45)
ERYTHROCYTE [DISTWIDTH] IN BLOOD BY AUTOMATED COUNT: 49.1 FL (ref 35–45)
ERYTHROCYTE [DISTWIDTH] IN BLOOD BY AUTOMATED COUNT: 49.6 FL (ref 35–45)
ERYTHROCYTE [DISTWIDTH] IN BLOOD BY AUTOMATED COUNT: 50.1 FL (ref 35–45)
ERYTHROCYTE [DISTWIDTH] IN BLOOD BY AUTOMATED COUNT: 50.5 FL (ref 35–45)
ERYTHROCYTE [DISTWIDTH] IN BLOOD BY AUTOMATED COUNT: 50.8 FL (ref 35–45)
ERYTHROCYTE [DISTWIDTH] IN BLOOD BY AUTOMATED COUNT: 50.9 FL (ref 35–45)
ERYTHROCYTE [DISTWIDTH] IN BLOOD BY AUTOMATED COUNT: 53.1 FL (ref 35–45)
ERYTHROCYTE [DISTWIDTH] IN BLOOD BY AUTOMATED COUNT: 53.5 FL (ref 35–45)
ERYTHROCYTE [DISTWIDTH] IN BLOOD BY AUTOMATED COUNT: 54.4 FL (ref 35–45)
ERYTHROCYTE [DISTWIDTH] IN BLOOD BY AUTOMATED COUNT: 54.4 FL (ref 35–45)
ERYTHROCYTE [DISTWIDTH] IN BLOOD BY AUTOMATED COUNT: 54.7 FL (ref 35–45)
ERYTHROCYTE [DISTWIDTH] IN BLOOD BY AUTOMATED COUNT: 55.3 FL (ref 35–45)
ERYTHROCYTE [DISTWIDTH] IN BLOOD BY AUTOMATED COUNT: 55.3 FL (ref 35–45)
ERYTHROCYTE [DISTWIDTH] IN BLOOD BY AUTOMATED COUNT: 55.8 FL (ref 35–45)
FOLATE: 15 NG/ML (ref 4.8–24.2)
GFR CALCULATED: 47
GFR SERPL CREATININE-BSD FRML MDRD: 16 ML/MIN/1.73M2
GFR SERPL CREATININE-BSD FRML MDRD: 22 ML/MIN/1.73M2
GFR SERPL CREATININE-BSD FRML MDRD: 28 ML/MIN/1.73M2
GFR SERPL CREATININE-BSD FRML MDRD: 33 ML/MIN/1.73M2
GFR SERPL CREATININE-BSD FRML MDRD: 39 ML/MIN/1.73M2
GFR SERPL CREATININE-BSD FRML MDRD: 43 ML/MIN/1.73M2
GFR SERPL CREATININE-BSD FRML MDRD: 47 ML/MIN/1.73M2
GFR SERPL CREATININE-BSD FRML MDRD: 53 ML/MIN/1.73M2
GFR SERPL CREATININE-BSD FRML MDRD: 59 ML/MIN/1.73M2
GFR SERPL CREATININE-BSD FRML MDRD: 68 ML/MIN/1.73M2
GLUCOSE BLD-MCNC: 101 MG/DL (ref 70–108)
GLUCOSE BLD-MCNC: 110 MG/DL (ref 70–108)
GLUCOSE BLD-MCNC: 115 MG/DL (ref 70–108)
GLUCOSE BLD-MCNC: 115 MG/DL (ref 70–108)
GLUCOSE BLD-MCNC: 117 MG/DL (ref 70–108)
GLUCOSE BLD-MCNC: 118 MG/DL (ref 70–108)
GLUCOSE BLD-MCNC: 123 MG/DL (ref 70–108)
GLUCOSE BLD-MCNC: 125 MG/DL (ref 70–108)
GLUCOSE BLD-MCNC: 127 MG/DL (ref 70–108)
GLUCOSE BLD-MCNC: 128 MG/DL (ref 70–108)
GLUCOSE BLD-MCNC: 131 MG/DL (ref 70–108)
GLUCOSE BLD-MCNC: 131 MG/DL (ref 70–108)
GLUCOSE BLD-MCNC: 132 MG/DL (ref 70–108)
GLUCOSE BLD-MCNC: 134 MG/DL (ref 70–108)
GLUCOSE BLD-MCNC: 136 MG/DL (ref 70–108)
GLUCOSE BLD-MCNC: 138 MG/DL (ref 70–108)
GLUCOSE BLD-MCNC: 138 MG/DL (ref 70–108)
GLUCOSE BLD-MCNC: 140 MG/DL (ref 70–108)
GLUCOSE BLD-MCNC: 141 MG/DL (ref 70–108)
GLUCOSE BLD-MCNC: 141 MG/DL (ref 70–108)
GLUCOSE BLD-MCNC: 142 MG/DL (ref 70–108)
GLUCOSE BLD-MCNC: 143 MG/DL (ref 70–108)
GLUCOSE BLD-MCNC: 143 MG/DL (ref 70–108)
GLUCOSE BLD-MCNC: 144 MG/DL (ref 70–108)
GLUCOSE BLD-MCNC: 145 MG/DL (ref 70–108)
GLUCOSE BLD-MCNC: 147 MG/DL (ref 70–108)
GLUCOSE BLD-MCNC: 147 MG/DL (ref 70–108)
GLUCOSE BLD-MCNC: 148 MG/DL (ref 70–108)
GLUCOSE BLD-MCNC: 148 MG/DL (ref 70–108)
GLUCOSE BLD-MCNC: 149 MG/DL (ref 70–108)
GLUCOSE BLD-MCNC: 149 MG/DL (ref 70–108)
GLUCOSE BLD-MCNC: 150 MG/DL (ref 70–108)
GLUCOSE BLD-MCNC: 151 MG/DL (ref 70–108)
GLUCOSE BLD-MCNC: 152 MG/DL (ref 70–108)
GLUCOSE BLD-MCNC: 154 MG/DL (ref 70–108)
GLUCOSE BLD-MCNC: 154 MG/DL (ref 70–108)
GLUCOSE BLD-MCNC: 156 MG/DL (ref 70–108)
GLUCOSE BLD-MCNC: 160 MG/DL (ref 70–108)
GLUCOSE BLD-MCNC: 164 MG/DL (ref 70–108)
GLUCOSE BLD-MCNC: 166 MG/DL (ref 70–108)
GLUCOSE BLD-MCNC: 166 MG/DL (ref 70–108)
GLUCOSE BLD-MCNC: 167 MG/DL (ref 70–108)
GLUCOSE BLD-MCNC: 168 MG/DL (ref 70–108)
GLUCOSE BLD-MCNC: 169 MG/DL (ref 70–108)
GLUCOSE BLD-MCNC: 170 MG/DL (ref 70–108)
GLUCOSE BLD-MCNC: 171 MG/DL (ref 70–108)
GLUCOSE BLD-MCNC: 171 MG/DL (ref 70–108)
GLUCOSE BLD-MCNC: 172 MG/DL (ref 70–108)
GLUCOSE BLD-MCNC: 173 MG/DL (ref 70–108)
GLUCOSE BLD-MCNC: 174 MG/DL (ref 70–108)
GLUCOSE BLD-MCNC: 176 MG/DL (ref 70–108)
GLUCOSE BLD-MCNC: 176 MG/DL (ref 70–108)
GLUCOSE BLD-MCNC: 178 MG/DL (ref 70–108)
GLUCOSE BLD-MCNC: 179 MG/DL (ref 70–108)
GLUCOSE BLD-MCNC: 180 MG/DL (ref 70–108)
GLUCOSE BLD-MCNC: 181 MG/DL (ref 70–108)
GLUCOSE BLD-MCNC: 184 MG/DL (ref 70–108)
GLUCOSE BLD-MCNC: 184 MG/DL (ref 70–108)
GLUCOSE BLD-MCNC: 185 MG/DL (ref 70–108)
GLUCOSE BLD-MCNC: 186 MG/DL (ref 70–108)
GLUCOSE BLD-MCNC: 189 MG/DL (ref 70–108)
GLUCOSE BLD-MCNC: 189 MG/DL (ref 70–108)
GLUCOSE BLD-MCNC: 192 MG/DL (ref 70–108)
GLUCOSE BLD-MCNC: 195 MG/DL (ref 70–108)
GLUCOSE BLD-MCNC: 197 MG/DL
GLUCOSE BLD-MCNC: 200 MG/DL (ref 70–108)
GLUCOSE BLD-MCNC: 200 MG/DL (ref 70–108)
GLUCOSE BLD-MCNC: 202 MG/DL (ref 70–108)
GLUCOSE BLD-MCNC: 204 MG/DL (ref 70–108)
GLUCOSE BLD-MCNC: 205 MG/DL (ref 70–108)
GLUCOSE BLD-MCNC: 207 MG/DL (ref 70–108)
GLUCOSE BLD-MCNC: 207 MG/DL (ref 70–108)
GLUCOSE BLD-MCNC: 208 MG/DL (ref 70–108)
GLUCOSE BLD-MCNC: 209 MG/DL (ref 70–108)
GLUCOSE BLD-MCNC: 211 MG/DL (ref 70–108)
GLUCOSE BLD-MCNC: 213 MG/DL (ref 70–108)
GLUCOSE BLD-MCNC: 213 MG/DL (ref 70–108)
GLUCOSE BLD-MCNC: 215 MG/DL (ref 70–108)
GLUCOSE BLD-MCNC: 215 MG/DL (ref 70–108)
GLUCOSE BLD-MCNC: 217 MG/DL (ref 70–108)
GLUCOSE BLD-MCNC: 220 MG/DL (ref 70–108)
GLUCOSE BLD-MCNC: 224 MG/DL (ref 70–108)
GLUCOSE BLD-MCNC: 225 MG/DL (ref 70–108)
GLUCOSE BLD-MCNC: 227 MG/DL (ref 70–108)
GLUCOSE BLD-MCNC: 228 MG/DL (ref 70–108)
GLUCOSE BLD-MCNC: 229 MG/DL (ref 70–108)
GLUCOSE BLD-MCNC: 231 MG/DL (ref 70–108)
GLUCOSE BLD-MCNC: 232 MG/DL (ref 70–108)
GLUCOSE BLD-MCNC: 232 MG/DL (ref 70–108)
GLUCOSE BLD-MCNC: 234 MG/DL (ref 70–108)
GLUCOSE BLD-MCNC: 234 MG/DL (ref 70–108)
GLUCOSE BLD-MCNC: 239 MG/DL (ref 70–108)
GLUCOSE BLD-MCNC: 246 MG/DL (ref 70–108)
GLUCOSE BLD-MCNC: 248 MG/DL (ref 70–108)
GLUCOSE BLD-MCNC: 249 MG/DL (ref 70–108)
GLUCOSE BLD-MCNC: 254 MG/DL (ref 70–108)
GLUCOSE BLD-MCNC: 255 MG/DL (ref 70–108)
GLUCOSE BLD-MCNC: 256 MG/DL (ref 70–108)
GLUCOSE BLD-MCNC: 261 MG/DL (ref 70–108)
GLUCOSE BLD-MCNC: 262 MG/DL (ref 70–108)
GLUCOSE BLD-MCNC: 263 MG/DL (ref 70–108)
GLUCOSE BLD-MCNC: 264 MG/DL (ref 70–108)
GLUCOSE BLD-MCNC: 266 MG/DL (ref 70–108)
GLUCOSE BLD-MCNC: 276 MG/DL (ref 70–108)
GLUCOSE BLD-MCNC: 277 MG/DL (ref 70–108)
GLUCOSE BLD-MCNC: 283 MG/DL (ref 70–108)
GLUCOSE BLD-MCNC: 291 MG/DL (ref 70–108)
GLUCOSE BLD-MCNC: 292 MG/DL (ref 70–108)
GLUCOSE BLD-MCNC: 294 MG/DL (ref 70–108)
GLUCOSE BLD-MCNC: 296 MG/DL (ref 70–108)
GLUCOSE BLD-MCNC: 298 MG/DL (ref 70–108)
GLUCOSE BLD-MCNC: 301 MG/DL (ref 70–108)
GLUCOSE BLD-MCNC: 304 MG/DL (ref 70–108)
GLUCOSE BLD-MCNC: 310 MG/DL (ref 70–108)
GLUCOSE BLD-MCNC: 318 MG/DL (ref 70–108)
GLUCOSE BLD-MCNC: 319 MG/DL (ref 70–108)
GLUCOSE BLD-MCNC: 324 MG/DL (ref 70–108)
GLUCOSE BLD-MCNC: 326 MG/DL (ref 70–108)
GLUCOSE BLD-MCNC: 326 MG/DL (ref 70–108)
GLUCOSE BLD-MCNC: 328 MG/DL (ref 70–108)
GLUCOSE BLD-MCNC: 340 MG/DL (ref 70–108)
GLUCOSE BLD-MCNC: 367 MG/DL (ref 70–108)
GLUCOSE BLD-MCNC: 378 MG/DL (ref 70–108)
GLUCOSE BLD-MCNC: 387 MG/DL (ref 70–108)
GLUCOSE BLD-MCNC: 391 MG/DL (ref 70–108)
GLUCOSE BLD-MCNC: 426 MG/DL (ref 70–108)
GLUCOSE BLD-MCNC: 428 MG/DL (ref 70–108)
GLUCOSE BLD-MCNC: 475 MG/DL (ref 70–108)
GLUCOSE BLD-MCNC: 76 MG/DL (ref 70–108)
GLUCOSE BLD-MCNC: 93 MG/DL (ref 70–108)
GLUCOSE BLD-MCNC: 97 MG/DL (ref 70–108)
GLUCOSE BLD-MCNC: 98 MG/DL (ref 70–108)
GLUCOSE URINE: >= 1000 MG/DL
GLUCOSE, URINE: 100 MG/DL
GLUCOSE, URINE: 500 MG/DL
HBA1C MFR BLD: 10.7 % (ref 4.4–6.4)
HBA1C MFR BLD: 17.1 % (ref 4.4–6.4)
HCT VFR BLD CALC: 32.8 % (ref 37–47)
HCT VFR BLD CALC: 34 % (ref 37–47)
HCT VFR BLD CALC: 34.5 % (ref 37–47)
HCT VFR BLD CALC: 35.3 % (ref 37–47)
HCT VFR BLD CALC: 35.7 % (ref 37–47)
HCT VFR BLD CALC: 35.9 % (ref 37–47)
HCT VFR BLD CALC: 36.2 % (ref 37–47)
HCT VFR BLD CALC: 37.1 % (ref 37–47)
HCT VFR BLD CALC: 37.4 % (ref 37–47)
HCT VFR BLD CALC: 37.4 % (ref 37–47)
HCT VFR BLD CALC: 38.9 % (ref 37–47)
HCT VFR BLD CALC: 41 % (ref 37–47)
HCT VFR BLD CALC: 41.5 % (ref 37–47)
HCT VFR BLD CALC: 42 % (ref 37–47)
HCT VFR BLD CALC: 42.4 % (ref 37–47)
HCT VFR BLD CALC: 43.3 % (ref 37–47)
HCT VFR BLD CALC: 44.2 % (ref 37–47)
HCT VFR BLD CALC: 44.5 % (ref 37–47)
HEMOGLOBIN: 10.5 GM/DL (ref 12–16)
HEMOGLOBIN: 10.7 GM/DL (ref 12–16)
HEMOGLOBIN: 10.9 GM/DL (ref 12–16)
HEMOGLOBIN: 11.1 GM/DL (ref 12–16)
HEMOGLOBIN: 11.4 GM/DL (ref 12–16)
HEMOGLOBIN: 11.7 GM/DL (ref 12–16)
HEMOGLOBIN: 11.7 GM/DL (ref 12–16)
HEMOGLOBIN: 11.8 GM/DL (ref 12–16)
HEMOGLOBIN: 12 GM/DL (ref 12–16)
HEMOGLOBIN: 12.1 GM/DL (ref 12–16)
HEMOGLOBIN: 12.1 GM/DL (ref 12–16)
HEMOGLOBIN: 13 GM/DL (ref 12–16)
HEMOGLOBIN: 13.2 GM/DL (ref 12–16)
HEMOGLOBIN: 13.4 GM/DL (ref 12–16)
HEMOGLOBIN: 14 GM/DL (ref 12–16)
HEMOGLOBIN: 14.3 GM/DL (ref 12–16)
HEMOGLOBIN: 14.6 GM/DL (ref 12–16)
HEMOGLOBIN: 15 GM/DL (ref 12–16)
IMMATURE GRANS (ABS): 0.04 THOU/MM3 (ref 0–0.07)
IMMATURE GRANS (ABS): 0.06 THOU/MM3 (ref 0–0.07)
IMMATURE GRANS (ABS): 0.07 THOU/MM3 (ref 0–0.07)
IMMATURE GRANULOCYTES: 0.5 %
INR BLD: 0.99 (ref 0.85–1.13)
KETONES, URINE: ABNORMAL
KETONES, URINE: NEGATIVE
KETONES, URINE: NEGATIVE
LACTIC ACID: 1.2 MMOL/L (ref 0.5–2)
LEGIONELLA PNEUMOPHILIA AG, URINE: NEGATIVE
LEUKOCYTE EST, POC: NEGATIVE
LEUKOCYTE ESTERASE, URINE: NEGATIVE
LEUKOCYTE ESTERASE, URINE: NEGATIVE
LIPASE: 45.8 U/L (ref 5.6–51.3)
LV EF: 45 %
LVEF MODALITY: NORMAL
LYMPHOCYTES # BLD: 27.9 %
LYMPHOCYTES # BLD: 33.3 %
LYMPHOCYTES # BLD: 34.1 %
LYMPHOCYTES ABSOLUTE: 2.8 THOU/MM3 (ref 1–4.8)
LYMPHOCYTES ABSOLUTE: 3.3 THOU/MM3 (ref 1–4.8)
LYMPHOCYTES ABSOLUTE: 4.3 THOU/MM3 (ref 1–4.8)
MAGNESIUM: 1.6 MG/DL (ref 1.6–2.4)
MCH RBC QN AUTO: 31.4 PG (ref 26–33)
MCH RBC QN AUTO: 31.7 PG (ref 26–33)
MCH RBC QN AUTO: 31.8 PG (ref 26–33)
MCH RBC QN AUTO: 31.9 PG (ref 26–33)
MCH RBC QN AUTO: 31.9 PG (ref 26–33)
MCH RBC QN AUTO: 32 PG (ref 26–33)
MCH RBC QN AUTO: 32.1 PG (ref 26–33)
MCH RBC QN AUTO: 32.1 PG (ref 26–33)
MCH RBC QN AUTO: 32.2 PG (ref 26–33)
MCH RBC QN AUTO: 32.3 PG (ref 26–33)
MCH RBC QN AUTO: 32.5 PG (ref 26–33)
MCHC RBC AUTO-ENTMCNC: 31.1 GM/DL (ref 32.2–35.5)
MCHC RBC AUTO-ENTMCNC: 31.4 GM/DL (ref 32.2–35.5)
MCHC RBC AUTO-ENTMCNC: 31.5 GM/DL (ref 32.2–35.5)
MCHC RBC AUTO-ENTMCNC: 31.5 GM/DL (ref 32.2–35.5)
MCHC RBC AUTO-ENTMCNC: 31.6 GM/DL (ref 32.2–35.5)
MCHC RBC AUTO-ENTMCNC: 31.7 GM/DL (ref 32.2–35.5)
MCHC RBC AUTO-ENTMCNC: 31.8 GM/DL (ref 32.2–35.5)
MCHC RBC AUTO-ENTMCNC: 31.8 GM/DL (ref 32.2–35.5)
MCHC RBC AUTO-ENTMCNC: 31.9 GM/DL (ref 32.2–35.5)
MCHC RBC AUTO-ENTMCNC: 32 GM/DL (ref 32.2–35.5)
MCHC RBC AUTO-ENTMCNC: 32.1 GM/DL (ref 32.2–35.5)
MCHC RBC AUTO-ENTMCNC: 32.4 GM/DL (ref 32.2–35.5)
MCHC RBC AUTO-ENTMCNC: 32.4 GM/DL (ref 32.2–35.5)
MCHC RBC AUTO-ENTMCNC: 32.6 GM/DL (ref 32.2–35.5)
MCHC RBC AUTO-ENTMCNC: 32.8 GM/DL (ref 32.2–35.5)
MCHC RBC AUTO-ENTMCNC: 33 GM/DL (ref 32.2–35.5)
MCHC RBC AUTO-ENTMCNC: 33.7 GM/DL (ref 32.2–35.5)
MCHC RBC AUTO-ENTMCNC: 33.7 GM/DL (ref 32.2–35.5)
MCV RBC AUTO: 100 FL (ref 81–99)
MCV RBC AUTO: 100.3 FL (ref 81–99)
MCV RBC AUTO: 100.6 FL (ref 81–99)
MCV RBC AUTO: 101.2 FL (ref 81–99)
MCV RBC AUTO: 101.2 FL (ref 81–99)
MCV RBC AUTO: 101.5 FL (ref 81–99)
MCV RBC AUTO: 101.8 FL (ref 81–99)
MCV RBC AUTO: 102 FL (ref 81–99)
MCV RBC AUTO: 103.2 FL (ref 81–99)
MCV RBC AUTO: 104.6 FL (ref 81–99)
MCV RBC AUTO: 94.9 FL (ref 81–99)
MCV RBC AUTO: 96.4 FL (ref 81–99)
MCV RBC AUTO: 97 FL (ref 81–99)
MCV RBC AUTO: 97.1 FL (ref 81–99)
MCV RBC AUTO: 97.3 FL (ref 81–99)
MCV RBC AUTO: 98.4 FL (ref 81–99)
MCV RBC AUTO: 98.7 FL (ref 81–99)
MCV RBC AUTO: 99.7 FL (ref 81–99)
MISCELLANEOUS 2: ABNORMAL
MISCELLANEOUS LAB TEST RESULT: ABNORMAL
MONOCYTES # BLD: 7 %
MONOCYTES # BLD: 8.4 %
MONOCYTES # BLD: 9.3 %
MONOCYTES ABSOLUTE: 0.8 THOU/MM3 (ref 0.4–1.3)
MONOCYTES ABSOLUTE: 0.8 THOU/MM3 (ref 0.4–1.3)
MONOCYTES ABSOLUTE: 1.1 THOU/MM3 (ref 0.4–1.3)
NITRITE, URINE: NEGATIVE
NITRITE, URINE: NEGATIVE
NITRITE, URINE: POSITIVE
NUCLEATED RED BLOOD CELLS: 0 /100 WBC
ORGANISM: ABNORMAL
ORGANISM: ABNORMAL
OSMOLALITY CALCULATION: 276.3 MOSMOL/KG (ref 275–300)
OSMOLALITY CALCULATION: 286.9 MOSMOL/KG (ref 275–300)
OSMOLALITY CALCULATION: 287.3 MOSMOL/KG (ref 275–300)
OSMOLALITY CALCULATION: 297.7 MOSMOL/KG (ref 275–300)
OSMOLALITY URINE: 558 MOSMOL/KG (ref 250–750)
OSMOLALITY: 310 MOSMOL/KG (ref 275–295)
PH UA: 5 (ref 5–9)
PH UA: 5.5 (ref 5–9)
PH UA: 5.5 (ref 5–9)
PLATELET # BLD: 179 THOU/MM3 (ref 130–400)
PLATELET # BLD: 192 THOU/MM3 (ref 130–400)
PLATELET # BLD: 205 THOU/MM3 (ref 130–400)
PLATELET # BLD: 212 THOU/MM3 (ref 130–400)
PLATELET # BLD: 214 THOU/MM3 (ref 130–400)
PLATELET # BLD: 220 THOU/MM3 (ref 130–400)
PLATELET # BLD: 227 THOU/MM3 (ref 130–400)
PLATELET # BLD: 242 THOU/MM3 (ref 130–400)
PLATELET # BLD: 246 THOU/MM3 (ref 130–400)
PLATELET # BLD: 249 THOU/MM3 (ref 130–400)
PLATELET # BLD: 257 THOU/MM3 (ref 130–400)
PLATELET # BLD: 257 THOU/MM3 (ref 130–400)
PLATELET # BLD: 259 THOU/MM3 (ref 130–400)
PLATELET # BLD: 267 THOU/MM3 (ref 130–400)
PLATELET # BLD: 274 THOU/MM3 (ref 130–400)
PLATELET # BLD: 282 THOU/MM3 (ref 130–400)
PLATELET # BLD: 291 THOU/MM3 (ref 130–400)
PLATELET # BLD: 328 THOU/MM3 (ref 130–400)
PMV BLD AUTO: 8.4 FL (ref 9.4–12.4)
PMV BLD AUTO: 8.5 FL (ref 9.4–12.4)
PMV BLD AUTO: 8.5 FL (ref 9.4–12.4)
PMV BLD AUTO: 8.6 FL (ref 9.4–12.4)
PMV BLD AUTO: 8.7 FL (ref 9.4–12.4)
PMV BLD AUTO: 8.8 FL (ref 9.4–12.4)
PMV BLD AUTO: 8.9 FL (ref 9.4–12.4)
PMV BLD AUTO: 8.9 FL (ref 9.4–12.4)
PMV BLD AUTO: 9 FL (ref 9.4–12.4)
PMV BLD AUTO: 9.1 FL (ref 9.4–12.4)
PMV BLD AUTO: 9.2 FL (ref 9.4–12.4)
POTASSIUM REFLEX MAGNESIUM: 3.7 MEQ/L (ref 3.5–5.2)
POTASSIUM REFLEX MAGNESIUM: 4.1 MEQ/L (ref 3.5–5.2)
POTASSIUM REFLEX MAGNESIUM: 4.4 MEQ/L (ref 3.5–5.2)
POTASSIUM REFLEX MAGNESIUM: 4.4 MEQ/L (ref 3.5–5.2)
POTASSIUM REFLEX MAGNESIUM: 4.6 MEQ/L (ref 3.5–5.2)
POTASSIUM SERPL-SCNC: 3.7 MEQ/L (ref 3.5–5.2)
POTASSIUM SERPL-SCNC: 3.7 MEQ/L (ref 3.5–5.2)
POTASSIUM SERPL-SCNC: 3.9 MEQ/L (ref 3.5–5.2)
POTASSIUM SERPL-SCNC: 3.9 MEQ/L (ref 3.5–5.2)
POTASSIUM SERPL-SCNC: 4.1 MEQ/L (ref 3.5–5.2)
POTASSIUM SERPL-SCNC: 4.1 MEQ/L (ref 3.5–5.2)
POTASSIUM SERPL-SCNC: 4.2 MEQ/L (ref 3.5–5.2)
POTASSIUM SERPL-SCNC: 4.3 MEQ/L (ref 3.5–5.2)
POTASSIUM SERPL-SCNC: 4.4 MEQ/L (ref 3.5–5.2)
POTASSIUM SERPL-SCNC: 4.6 MEQ/L (ref 3.5–5.2)
POTASSIUM SERPL-SCNC: 4.7 MMOL/L
POTASSIUM SERPL-SCNC: 4.8 MEQ/L (ref 3.5–5.2)
POTASSIUM SERPL-SCNC: 4.8 MEQ/L (ref 3.5–5.2)
POTASSIUM SERPL-SCNC: 4.9 MEQ/L (ref 3.5–5.2)
POTASSIUM SERPL-SCNC: 4.9 MEQ/L (ref 3.5–5.2)
POTASSIUM SERPL-SCNC: 5 MEQ/L (ref 3.5–5.2)
POTASSIUM SERPL-SCNC: 6.1 MEQ/L (ref 3.5–5.2)
PRO-BNP: 457.2 PG/ML (ref 0–1800)
PRO-BNP: 911.1 PG/ML (ref 0–1800)
PROCALCITONIN: 0.08 NG/ML (ref 0.01–0.09)
PROCALCITONIN: 0.13 NG/ML (ref 0.01–0.09)
PROTEIN UA: ABNORMAL
PROTEIN UA: NEGATIVE MG/DL
PROTEIN UA: NEGATIVE MG/DL
RBC # BLD: 3.26 MILL/MM3 (ref 4.2–5.4)
RBC # BLD: 3.34 MILL/MM3 (ref 4.2–5.4)
RBC # BLD: 3.42 MILL/MM3 (ref 4.2–5.4)
RBC # BLD: 3.44 MILL/MM3 (ref 4.2–5.4)
RBC # BLD: 3.55 MILL/MM3 (ref 4.2–5.4)
RBC # BLD: 3.65 MILL/MM3 (ref 4.2–5.4)
RBC # BLD: 3.67 MILL/MM3 (ref 4.2–5.4)
RBC # BLD: 3.71 MILL/MM3 (ref 4.2–5.4)
RBC # BLD: 3.72 MILL/MM3 (ref 4.2–5.4)
RBC # BLD: 3.75 MILL/MM3 (ref 4.2–5.4)
RBC # BLD: 3.79 MILL/MM3 (ref 4.2–5.4)
RBC # BLD: 4.04 MILL/MM3 (ref 4.2–5.4)
RBC # BLD: 4.1 MILL/MM3 (ref 4.2–5.4)
RBC # BLD: 4.15 MILL/MM3 (ref 4.2–5.4)
RBC # BLD: 4.37 MILL/MM3 (ref 4.2–5.4)
RBC # BLD: 4.49 MILL/MM3 (ref 4.2–5.4)
RBC # BLD: 4.55 MILL/MM3 (ref 4.2–5.4)
RBC # BLD: 4.69 MILL/MM3 (ref 4.2–5.4)
RBC URINE: ABNORMAL /HPF
RBC URINE: ABNORMAL /HPF
REASON FOR REJECTION: NORMAL
REJECTED TEST: NORMAL
RENAL EPITHELIAL, UA: ABNORMAL
RENAL EPITHELIAL, UA: ABNORMAL
REVIEWED BY: NORMAL
RH FACTOR: NORMAL
SARS-COV-2, NAAT: NOT  DETECTED
SARS-COV-2, NAAT: NOT DETECTED
SARS-COV-2, PCR: DETECTED
SEDIMENTATION RATE, ERYTHROCYTE: 34 MM/HR (ref 0–20)
SEG NEUTROPHILS: 55.4 %
SEG NEUTROPHILS: 57.2 %
SEG NEUTROPHILS: 64.3 %
SEGMENTED NEUTROPHILS ABSOLUTE COUNT: 4.5 THOU/MM3 (ref 1.8–7.7)
SEGMENTED NEUTROPHILS ABSOLUTE COUNT: 7.4 THOU/MM3 (ref 1.8–7.7)
SEGMENTED NEUTROPHILS ABSOLUTE COUNT: 7.7 THOU/MM3 (ref 1.8–7.7)
SMEAR REVIEW: NORMAL
SODIUM BLD-SCNC: 130 MEQ/L (ref 135–145)
SODIUM BLD-SCNC: 132 MEQ/L (ref 135–145)
SODIUM BLD-SCNC: 133 MEQ/L (ref 135–145)
SODIUM BLD-SCNC: 134 MEQ/L (ref 135–145)
SODIUM BLD-SCNC: 134 MEQ/L (ref 135–145)
SODIUM BLD-SCNC: 135 MEQ/L (ref 135–145)
SODIUM BLD-SCNC: 135 MEQ/L (ref 135–145)
SODIUM BLD-SCNC: 136 MEQ/L (ref 135–145)
SODIUM BLD-SCNC: 137 MEQ/L (ref 135–145)
SODIUM BLD-SCNC: 138 MEQ/L (ref 135–145)
SODIUM BLD-SCNC: 138 MEQ/L (ref 135–145)
SODIUM BLD-SCNC: 139 MEQ/L (ref 135–145)
SODIUM BLD-SCNC: 140 MMOL/L
SODIUM BLD-SCNC: 141 MEQ/L (ref 135–145)
SODIUM BLD-SCNC: 142 MEQ/L (ref 135–145)
SODIUM BLD-SCNC: 142 MEQ/L (ref 135–145)
SODIUM BLD-SCNC: 143 MEQ/L (ref 135–145)
SODIUM BLD-SCNC: 144 MEQ/L (ref 135–145)
SODIUM BLD-SCNC: 144 MEQ/L (ref 135–145)
SPECIFIC GRAVITY UA: 1.01 (ref 1–1.03)
SPECIFIC GRAVITY UA: 1.01 (ref 1–1.03)
SPECIFIC GRAVITY, URINE: > 1.03 (ref 1–1.03)
TOTAL CK: 51 U/L (ref 30–135)
TOTAL PROTEIN: 7.1 G/DL (ref 6.1–8)
TOTAL PROTEIN: 7.2 G/DL (ref 6.1–8)
TOTAL PROTEIN: 7.7 G/DL (ref 6.1–8)
TROPONIN T: 0.01 NG/ML
TROPONIN T: 0.02 NG/ML
TROPONIN T: 0.02 NG/ML
TROPONIN T: < 0.01 NG/ML
TROPONIN T: < 0.01 NG/ML
TSH SERPL DL<=0.05 MIU/L-ACNC: 3.24 UIU/ML (ref 0.4–4.2)
URINE CULTURE REFLEX: ABNORMAL
URINE CULTURE, ROUTINE: ABNORMAL
UROBILINOGEN, URINE: 0.2 EU/DL (ref 0–1)
VITAMIN B-12: 442 PG/ML (ref 211–911)
WBC # BLD: 10.3 THOU/MM3 (ref 4.8–10.8)
WBC # BLD: 11.2 THOU/MM3 (ref 4.8–10.8)
WBC # BLD: 11.9 THOU/MM3 (ref 4.8–10.8)
WBC # BLD: 12.9 THOU/MM3 (ref 4.8–10.8)
WBC # BLD: 6 THOU/MM3 (ref 4.8–10.8)
WBC # BLD: 6.1 THOU/MM3 (ref 4.8–10.8)
WBC # BLD: 6.3 THOU/MM3 (ref 4.8–10.8)
WBC # BLD: 6.3 THOU/MM3 (ref 4.8–10.8)
WBC # BLD: 6.6 THOU/MM3 (ref 4.8–10.8)
WBC # BLD: 7.1 THOU/MM3 (ref 4.8–10.8)
WBC # BLD: 7.4 THOU/MM3 (ref 4.8–10.8)
WBC # BLD: 7.5 THOU/MM3 (ref 4.8–10.8)
WBC # BLD: 7.6 THOU/MM3 (ref 4.8–10.8)
WBC # BLD: 8.1 THOU/MM3 (ref 4.8–10.8)
WBC # BLD: 8.1 THOU/MM3 (ref 4.8–10.8)
WBC # BLD: 8.2 THOU/MM3 (ref 4.8–10.8)
WBC # BLD: 8.3 THOU/MM3 (ref 4.8–10.8)
WBC # BLD: 8.5 THOU/MM3 (ref 4.8–10.8)
WBC UA: ABNORMAL /HPF
WBC UA: ABNORMAL /HPF
YEAST: ABNORMAL
YEAST: ABNORMAL

## 2021-01-01 PROCEDURE — 36415 COLL VENOUS BLD VENIPUNCTURE: CPT

## 2021-01-01 PROCEDURE — 6370000000 HC RX 637 (ALT 250 FOR IP): Performed by: PHYSICIAN ASSISTANT

## 2021-01-01 PROCEDURE — 1200000003 HC TELEMETRY R&B

## 2021-01-01 PROCEDURE — 82948 REAGENT STRIP/BLOOD GLUCOSE: CPT

## 2021-01-01 PROCEDURE — 85027 COMPLETE CBC AUTOMATED: CPT

## 2021-01-01 PROCEDURE — 96366 THER/PROPH/DIAG IV INF ADDON: CPT

## 2021-01-01 PROCEDURE — 99285 EMERGENCY DEPT VISIT HI MDM: CPT

## 2021-01-01 PROCEDURE — 82746 ASSAY OF FOLIC ACID SERUM: CPT

## 2021-01-01 PROCEDURE — 97116 GAIT TRAINING THERAPY: CPT

## 2021-01-01 PROCEDURE — 2580000003 HC RX 258: Performed by: STUDENT IN AN ORGANIZED HEALTH CARE EDUCATION/TRAINING PROGRAM

## 2021-01-01 PROCEDURE — 96372 THER/PROPH/DIAG INJ SC/IM: CPT

## 2021-01-01 PROCEDURE — G0378 HOSPITAL OBSERVATION PER HR: HCPCS

## 2021-01-01 PROCEDURE — 87086 URINE CULTURE/COLONY COUNT: CPT

## 2021-01-01 PROCEDURE — 76376 3D RENDER W/INTRP POSTPROCES: CPT

## 2021-01-01 PROCEDURE — 6360000002 HC RX W HCPCS: Performed by: PHYSICIAN ASSISTANT

## 2021-01-01 PROCEDURE — 97110 THERAPEUTIC EXERCISES: CPT

## 2021-01-01 PROCEDURE — 96360 HYDRATION IV INFUSION INIT: CPT

## 2021-01-01 PROCEDURE — 71046 X-RAY EXAM CHEST 2 VIEWS: CPT

## 2021-01-01 PROCEDURE — 2580000003 HC RX 258: Performed by: PHYSICIAN ASSISTANT

## 2021-01-01 PROCEDURE — 80048 BASIC METABOLIC PNL TOTAL CA: CPT

## 2021-01-01 PROCEDURE — 6370000000 HC RX 637 (ALT 250 FOR IP)

## 2021-01-01 PROCEDURE — 99219 PR INITIAL OBSERVATION CARE/DAY 50 MINUTES: CPT | Performed by: SURGERY

## 2021-01-01 PROCEDURE — 71260 CT THORAX DX C+: CPT

## 2021-01-01 PROCEDURE — 6360000002 HC RX W HCPCS: Performed by: HOSPITALIST

## 2021-01-01 PROCEDURE — 99284 EMERGENCY DEPT VISIT MOD MDM: CPT

## 2021-01-01 PROCEDURE — 97530 THERAPEUTIC ACTIVITIES: CPT

## 2021-01-01 PROCEDURE — 6360000002 HC RX W HCPCS: Performed by: STUDENT IN AN ORGANIZED HEALTH CARE EDUCATION/TRAINING PROGRAM

## 2021-01-01 PROCEDURE — 72125 CT NECK SPINE W/O DYE: CPT

## 2021-01-01 PROCEDURE — 6370000000 HC RX 637 (ALT 250 FOR IP): Performed by: STUDENT IN AN ORGANIZED HEALTH CARE EDUCATION/TRAINING PROGRAM

## 2021-01-01 PROCEDURE — 70450 CT HEAD/BRAIN W/O DYE: CPT

## 2021-01-01 PROCEDURE — 99232 SBSQ HOSP IP/OBS MODERATE 35: CPT | Performed by: HOSPITALIST

## 2021-01-01 PROCEDURE — 97535 SELF CARE MNGMENT TRAINING: CPT

## 2021-01-01 PROCEDURE — 99223 1ST HOSP IP/OBS HIGH 75: CPT | Performed by: INTERNAL MEDICINE

## 2021-01-01 PROCEDURE — 2580000003 HC RX 258: Performed by: HOSPITALIST

## 2021-01-01 PROCEDURE — 80076 HEPATIC FUNCTION PANEL: CPT

## 2021-01-01 PROCEDURE — 72170 X-RAY EXAM OF PELVIS: CPT

## 2021-01-01 PROCEDURE — 99233 SBSQ HOSP IP/OBS HIGH 50: CPT

## 2021-01-01 PROCEDURE — 99217 PR OBSERVATION CARE DISCHARGE MANAGEMENT: CPT | Performed by: PHYSICIAN ASSISTANT

## 2021-01-01 PROCEDURE — 93010 ELECTROCARDIOGRAM REPORT: CPT | Performed by: NUCLEAR MEDICINE

## 2021-01-01 PROCEDURE — APPSS180 APP SPLIT SHARED TIME > 60 MINUTES: Performed by: PHYSICIAN ASSISTANT

## 2021-01-01 PROCEDURE — 87635 SARS-COV-2 COVID-19 AMP PRB: CPT

## 2021-01-01 PROCEDURE — 71275 CT ANGIOGRAPHY CHEST: CPT

## 2021-01-01 PROCEDURE — 83735 ASSAY OF MAGNESIUM: CPT

## 2021-01-01 PROCEDURE — 82533 TOTAL CORTISOL: CPT

## 2021-01-01 PROCEDURE — 1200000000 HC SEMI PRIVATE

## 2021-01-01 PROCEDURE — 85730 THROMBOPLASTIN TIME PARTIAL: CPT

## 2021-01-01 PROCEDURE — 84145 PROCALCITONIN (PCT): CPT

## 2021-01-01 PROCEDURE — 84484 ASSAY OF TROPONIN QUANT: CPT

## 2021-01-01 PROCEDURE — 2580000003 HC RX 258: Performed by: INTERNAL MEDICINE

## 2021-01-01 PROCEDURE — APPSS60 APP SPLIT SHARED TIME 46-60 MINUTES: Performed by: PHYSICIAN ASSISTANT

## 2021-01-01 PROCEDURE — 83930 ASSAY OF BLOOD OSMOLALITY: CPT

## 2021-01-01 PROCEDURE — 6370000000 HC RX 637 (ALT 250 FOR IP): Performed by: HOSPITALIST

## 2021-01-01 PROCEDURE — 96374 THER/PROPH/DIAG INJ IV PUSH: CPT

## 2021-01-01 PROCEDURE — 85651 RBC SED RATE NONAUTOMATED: CPT

## 2021-01-01 PROCEDURE — 83605 ASSAY OF LACTIC ACID: CPT

## 2021-01-01 PROCEDURE — 87449 NOS EACH ORGANISM AG IA: CPT

## 2021-01-01 PROCEDURE — 97163 PT EVAL HIGH COMPLEX 45 MIN: CPT

## 2021-01-01 PROCEDURE — 85025 COMPLETE CBC W/AUTO DIFF WBC: CPT

## 2021-01-01 PROCEDURE — 99226 PR SBSQ OBSERVATION CARE/DAY 35 MINUTES: CPT | Performed by: PHYSICIAN ASSISTANT

## 2021-01-01 PROCEDURE — 99214 OFFICE O/P EST MOD 30 MIN: CPT | Performed by: NURSE PRACTITIONER

## 2021-01-01 PROCEDURE — 81001 URINALYSIS AUTO W/SCOPE: CPT

## 2021-01-01 PROCEDURE — 92523 SPEECH SOUND LANG COMPREHEN: CPT

## 2021-01-01 PROCEDURE — 82607 VITAMIN B-12: CPT

## 2021-01-01 PROCEDURE — 83690 ASSAY OF LIPASE: CPT

## 2021-01-01 PROCEDURE — 83880 ASSAY OF NATRIURETIC PEPTIDE: CPT

## 2021-01-01 PROCEDURE — 83036 HEMOGLOBIN GLYCOSYLATED A1C: CPT

## 2021-01-01 PROCEDURE — 97166 OT EVAL MOD COMPLEX 45 MIN: CPT

## 2021-01-01 PROCEDURE — 6360000004 HC RX CONTRAST MEDICATION

## 2021-01-01 PROCEDURE — 99232 SBSQ HOSP IP/OBS MODERATE 35: CPT | Performed by: STUDENT IN AN ORGANIZED HEALTH CARE EDUCATION/TRAINING PROGRAM

## 2021-01-01 PROCEDURE — 82565 ASSAY OF CREATININE: CPT

## 2021-01-01 PROCEDURE — 93005 ELECTROCARDIOGRAM TRACING: CPT | Performed by: EMERGENCY MEDICINE

## 2021-01-01 PROCEDURE — 96361 HYDRATE IV INFUSION ADD-ON: CPT

## 2021-01-01 PROCEDURE — 99239 HOSP IP/OBS DSCHRG MGMT >30: CPT | Performed by: PHYSICIAN ASSISTANT

## 2021-01-01 PROCEDURE — 82550 ASSAY OF CK (CPK): CPT

## 2021-01-01 PROCEDURE — 83935 ASSAY OF URINE OSMOLALITY: CPT

## 2021-01-01 PROCEDURE — 0HQ3XZZ REPAIR LEFT EAR SKIN, EXTERNAL APPROACH: ICD-10-PCS | Performed by: SURGERY

## 2021-01-01 PROCEDURE — 99222 1ST HOSP IP/OBS MODERATE 55: CPT | Performed by: STUDENT IN AN ORGANIZED HEALTH CARE EDUCATION/TRAINING PROGRAM

## 2021-01-01 PROCEDURE — 6820000002 HC L2 INJURY CALL ACTIVATION: Performed by: SURGERY

## 2021-01-01 PROCEDURE — 99239 HOSP IP/OBS DSCHRG MGMT >30: CPT | Performed by: HOSPITALIST

## 2021-01-01 PROCEDURE — 99233 SBSQ HOSP IP/OBS HIGH 50: CPT | Performed by: INTERNAL MEDICINE

## 2021-01-01 PROCEDURE — 99232 SBSQ HOSP IP/OBS MODERATE 35: CPT

## 2021-01-01 PROCEDURE — 6360000002 HC RX W HCPCS: Performed by: INTERNAL MEDICINE

## 2021-01-01 PROCEDURE — 84520 ASSAY OF UREA NITROGEN: CPT

## 2021-01-01 PROCEDURE — 2580000003 HC RX 258: Performed by: EMERGENCY MEDICINE

## 2021-01-01 PROCEDURE — 92610 EVALUATE SWALLOWING FUNCTION: CPT

## 2021-01-01 PROCEDURE — 93306 TTE W/DOPPLER COMPLETE: CPT

## 2021-01-01 PROCEDURE — 81003 URINALYSIS AUTO W/O SCOPE: CPT

## 2021-01-01 PROCEDURE — 99232 SBSQ HOSP IP/OBS MODERATE 35: CPT | Performed by: PHYSICIAN ASSISTANT

## 2021-01-01 PROCEDURE — 86901 BLOOD TYPING SEROLOGIC RH(D): CPT

## 2021-01-01 PROCEDURE — 93005 ELECTROCARDIOGRAM TRACING: CPT | Performed by: STUDENT IN AN ORGANIZED HEALTH CARE EDUCATION/TRAINING PROGRAM

## 2021-01-01 PROCEDURE — 80053 COMPREHEN METABOLIC PANEL: CPT

## 2021-01-01 PROCEDURE — 93970 EXTREMITY STUDY: CPT

## 2021-01-01 PROCEDURE — 93005 ELECTROCARDIOGRAM TRACING: CPT | Performed by: PHYSICIAN ASSISTANT

## 2021-01-01 PROCEDURE — 3209999900 POC US FAST ABDOMEN LIMITED

## 2021-01-01 PROCEDURE — 99222 1ST HOSP IP/OBS MODERATE 55: CPT | Performed by: INTERNAL MEDICINE

## 2021-01-01 PROCEDURE — 97129 THER IVNTJ 1ST 15 MIN: CPT

## 2021-01-01 PROCEDURE — 99225 PR SBSQ OBSERVATION CARE/DAY 25 MINUTES: CPT | Performed by: SURGERY

## 2021-01-01 PROCEDURE — 85610 PROTHROMBIN TIME: CPT

## 2021-01-01 PROCEDURE — 96365 THER/PROPH/DIAG IV INF INIT: CPT

## 2021-01-01 PROCEDURE — 12013 RPR F/E/E/N/L/M 2.6-5.0 CM: CPT | Performed by: PHYSICIAN ASSISTANT

## 2021-01-01 PROCEDURE — 74177 CT ABD & PELVIS W/CONTRAST: CPT

## 2021-01-01 PROCEDURE — 97162 PT EVAL MOD COMPLEX 30 MIN: CPT

## 2021-01-01 PROCEDURE — 99231 SBSQ HOSP IP/OBS SF/LOW 25: CPT | Performed by: STUDENT IN AN ORGANIZED HEALTH CARE EDUCATION/TRAINING PROGRAM

## 2021-01-01 PROCEDURE — 86850 RBC ANTIBODY SCREEN: CPT

## 2021-01-01 PROCEDURE — 86140 C-REACTIVE PROTEIN: CPT

## 2021-01-01 PROCEDURE — 71045 X-RAY EXAM CHEST 1 VIEW: CPT

## 2021-01-01 PROCEDURE — 99283 EMERGENCY DEPT VISIT LOW MDM: CPT | Performed by: SURGERY

## 2021-01-01 PROCEDURE — 97165 OT EVAL LOW COMPLEX 30 MIN: CPT

## 2021-01-01 PROCEDURE — 84132 ASSAY OF SERUM POTASSIUM: CPT

## 2021-01-01 PROCEDURE — 6370000000 HC RX 637 (ALT 250 FOR IP): Performed by: INTERNAL MEDICINE

## 2021-01-01 PROCEDURE — 82947 ASSAY GLUCOSE BLOOD QUANT: CPT

## 2021-01-01 PROCEDURE — 87186 SC STD MICRODIL/AGAR DIL: CPT

## 2021-01-01 PROCEDURE — 6360000004 HC RX CONTRAST MEDICATION: Performed by: STUDENT IN AN ORGANIZED HEALTH CARE EDUCATION/TRAINING PROGRAM

## 2021-01-01 PROCEDURE — 80051 ELECTROLYTE PANEL: CPT

## 2021-01-01 PROCEDURE — 86900 BLOOD TYPING SEROLOGIC ABO: CPT

## 2021-01-01 PROCEDURE — 84443 ASSAY THYROID STIM HORMONE: CPT

## 2021-01-01 PROCEDURE — 73523 X-RAY EXAM HIPS BI 5/> VIEWS: CPT

## 2021-01-01 PROCEDURE — 99231 SBSQ HOSP IP/OBS SF/LOW 25: CPT | Performed by: PHYSICIAN ASSISTANT

## 2021-01-01 RX ORDER — ACETAMINOPHEN 325 MG/1
650 TABLET ORAL EVERY 6 HOURS PRN
Status: DISCONTINUED | OUTPATIENT
Start: 2021-01-01 | End: 2021-01-01 | Stop reason: HOSPADM

## 2021-01-01 RX ORDER — DULOXETIN HYDROCHLORIDE 60 MG/1
120 CAPSULE, DELAYED RELEASE ORAL DAILY
Status: DISCONTINUED | OUTPATIENT
Start: 2021-01-01 | End: 2021-01-01 | Stop reason: HOSPADM

## 2021-01-01 RX ORDER — INSULIN GLARGINE 100 [IU]/ML
10 INJECTION, SOLUTION SUBCUTANEOUS NIGHTLY
Status: DISCONTINUED | OUTPATIENT
Start: 2021-01-01 | End: 2021-01-01

## 2021-01-01 RX ORDER — NICOTINE POLACRILEX 4 MG
15 LOZENGE BUCCAL PRN
Status: DISCONTINUED | OUTPATIENT
Start: 2021-01-01 | End: 2021-01-01 | Stop reason: HOSPADM

## 2021-01-01 RX ORDER — ONDANSETRON 4 MG/1
4 TABLET, ORALLY DISINTEGRATING ORAL EVERY 8 HOURS PRN
Status: DISCONTINUED | OUTPATIENT
Start: 2021-01-01 | End: 2021-01-01 | Stop reason: HOSPADM

## 2021-01-01 RX ORDER — MULTIVIT-MIN/IRON/FOLIC ACID/K 18-600-40
CAPSULE ORAL DAILY
COMMUNITY
Start: 2021-01-01 | End: 2021-01-01

## 2021-01-01 RX ORDER — MIDODRINE HYDROCHLORIDE 10 MG/1
10 TABLET ORAL
Status: DISCONTINUED | OUTPATIENT
Start: 2021-01-01 | End: 2021-01-01 | Stop reason: HOSPADM

## 2021-01-01 RX ORDER — 0.9 % SODIUM CHLORIDE 0.9 %
INTRAVENOUS SOLUTION INTRAVENOUS CONTINUOUS PRN
Status: COMPLETED | OUTPATIENT
Start: 2021-01-01 | End: 2021-01-01

## 2021-01-01 RX ORDER — INSULIN GLARGINE 100 [IU]/ML
5 INJECTION, SOLUTION SUBCUTANEOUS NIGHTLY
Status: DISCONTINUED | OUTPATIENT
Start: 2021-01-01 | End: 2021-01-01 | Stop reason: HOSPADM

## 2021-01-01 RX ORDER — CLOPIDOGREL BISULFATE 75 MG/1
75 TABLET ORAL DAILY
Status: DISCONTINUED | OUTPATIENT
Start: 2021-01-01 | End: 2021-01-01 | Stop reason: SDUPTHER

## 2021-01-01 RX ORDER — METOLAZONE 2.5 MG/1
2.5 TABLET ORAL DAILY PRN
Status: DISCONTINUED | OUTPATIENT
Start: 2021-01-01 | End: 2021-01-01 | Stop reason: HOSPADM

## 2021-01-01 RX ORDER — ASPIRIN 81 MG/1
324 TABLET, CHEWABLE ORAL ONCE
Status: COMPLETED | OUTPATIENT
Start: 2021-01-01 | End: 2021-01-01

## 2021-01-01 RX ORDER — HYDROCODONE BITARTRATE AND ACETAMINOPHEN 5; 325 MG/1; MG/1
1 TABLET ORAL ONCE
Status: COMPLETED | OUTPATIENT
Start: 2021-01-01 | End: 2021-01-01

## 2021-01-01 RX ORDER — BUMETANIDE 1 MG/1
2 TABLET ORAL DAILY
Status: DISCONTINUED | OUTPATIENT
Start: 2021-01-01 | End: 2021-01-01 | Stop reason: HOSPADM

## 2021-01-01 RX ORDER — ONDANSETRON 2 MG/ML
4 INJECTION INTRAMUSCULAR; INTRAVENOUS EVERY 6 HOURS PRN
Status: DISCONTINUED | OUTPATIENT
Start: 2021-01-01 | End: 2021-01-01 | Stop reason: HOSPADM

## 2021-01-01 RX ORDER — CLOPIDOGREL BISULFATE 75 MG/1
75 TABLET ORAL DAILY
Status: DISCONTINUED | OUTPATIENT
Start: 2021-01-01 | End: 2021-01-01 | Stop reason: HOSPADM

## 2021-01-01 RX ORDER — DOCUSATE SODIUM 100 MG/1
100 CAPSULE, LIQUID FILLED ORAL DAILY
Status: DISCONTINUED | OUTPATIENT
Start: 2021-01-01 | End: 2021-01-01 | Stop reason: HOSPADM

## 2021-01-01 RX ORDER — POLYETHYLENE GLYCOL 3350 17 G/17G
17 POWDER, FOR SOLUTION ORAL DAILY PRN
Status: DISCONTINUED | OUTPATIENT
Start: 2021-01-01 | End: 2021-01-01 | Stop reason: HOSPADM

## 2021-01-01 RX ORDER — METOPROLOL TARTRATE 50 MG/1
50 TABLET, FILM COATED ORAL 2 TIMES DAILY
Status: DISCONTINUED | OUTPATIENT
Start: 2021-01-01 | End: 2021-01-01 | Stop reason: SDUPTHER

## 2021-01-01 RX ORDER — HALOPERIDOL 5 MG/ML
2 INJECTION INTRAMUSCULAR EVERY 6 HOURS PRN
Status: DISCONTINUED | OUTPATIENT
Start: 2021-01-01 | End: 2021-01-01 | Stop reason: HOSPADM

## 2021-01-01 RX ORDER — DEXTROSE MONOHYDRATE 50 MG/ML
100 INJECTION, SOLUTION INTRAVENOUS PRN
Status: DISCONTINUED | OUTPATIENT
Start: 2021-01-01 | End: 2021-01-01 | Stop reason: HOSPADM

## 2021-01-01 RX ORDER — ACETAMINOPHEN 325 MG/1
650 TABLET ORAL EVERY 6 HOURS PRN
Status: DISCONTINUED | OUTPATIENT
Start: 2021-01-01 | End: 2021-01-01 | Stop reason: SDUPTHER

## 2021-01-01 RX ORDER — LIDOCAINE HYDROCHLORIDE 10 MG/ML
INJECTION, SOLUTION INFILTRATION; PERINEURAL
Status: DISPENSED
Start: 2021-01-01 | End: 2021-01-01

## 2021-01-01 RX ORDER — HYDROXYZINE HYDROCHLORIDE 10 MG/1
10 TABLET, FILM COATED ORAL ONCE
Status: COMPLETED | OUTPATIENT
Start: 2021-01-01 | End: 2021-01-01

## 2021-01-01 RX ORDER — MIDODRINE HYDROCHLORIDE 5 MG/1
5 TABLET ORAL
Status: DISCONTINUED | OUTPATIENT
Start: 2021-01-01 | End: 2021-01-01

## 2021-01-01 RX ORDER — SODIUM CHLORIDE 0.9 % (FLUSH) 0.9 %
5-40 SYRINGE (ML) INJECTION EVERY 12 HOURS SCHEDULED
Status: DISCONTINUED | OUTPATIENT
Start: 2021-01-01 | End: 2021-01-01 | Stop reason: HOSPADM

## 2021-01-01 RX ORDER — 0.9 % SODIUM CHLORIDE 0.9 %
1000 INTRAVENOUS SOLUTION INTRAVENOUS ONCE
Status: COMPLETED | OUTPATIENT
Start: 2021-01-01 | End: 2021-01-01

## 2021-01-01 RX ORDER — SODIUM CHLORIDE 9 MG/ML
INJECTION, SOLUTION INTRAVENOUS CONTINUOUS
Status: DISCONTINUED | OUTPATIENT
Start: 2021-01-01 | End: 2021-01-01 | Stop reason: HOSPADM

## 2021-01-01 RX ORDER — CHOLECALCIFEROL (VITAMIN D3) 125 MCG
2.5 CAPSULE ORAL NIGHTLY
Status: DISCONTINUED | OUTPATIENT
Start: 2021-01-01 | End: 2021-01-01

## 2021-01-01 RX ORDER — CEFDINIR 300 MG/1
300 CAPSULE ORAL 2 TIMES DAILY
Qty: 14 CAPSULE | Refills: 0
Start: 2021-01-01 | End: 2021-01-01

## 2021-01-01 RX ORDER — ATORVASTATIN CALCIUM 40 MG/1
40 TABLET, FILM COATED ORAL NIGHTLY
Status: DISCONTINUED | OUTPATIENT
Start: 2021-01-01 | End: 2021-01-01 | Stop reason: HOSPADM

## 2021-01-01 RX ORDER — SODIUM CHLORIDE 0.9 % (FLUSH) 0.9 %
10 SYRINGE (ML) INJECTION PRN
Status: DISCONTINUED | OUTPATIENT
Start: 2021-01-01 | End: 2021-01-01 | Stop reason: HOSPADM

## 2021-01-01 RX ORDER — CEFDINIR 300 MG/1
300 CAPSULE ORAL 2 TIMES DAILY
Qty: 6 CAPSULE | Refills: 0 | Status: ON HOLD | OUTPATIENT
Start: 2021-01-01 | End: 2021-01-01 | Stop reason: HOSPADM

## 2021-01-01 RX ORDER — HALOPERIDOL 5 MG/ML
5 INJECTION INTRAMUSCULAR ONCE
Status: COMPLETED | OUTPATIENT
Start: 2021-01-01 | End: 2021-01-01

## 2021-01-01 RX ORDER — HEPARIN SODIUM 5000 [USP'U]/ML
5000 INJECTION, SOLUTION INTRAVENOUS; SUBCUTANEOUS EVERY 8 HOURS
Status: DISCONTINUED | OUTPATIENT
Start: 2021-01-01 | End: 2021-01-01 | Stop reason: HOSPADM

## 2021-01-01 RX ORDER — LACTOBACILLUS RHAMNOSUS GG 10B CELL
1 CAPSULE ORAL 2 TIMES DAILY WITH MEALS
Status: DISCONTINUED | OUTPATIENT
Start: 2021-01-01 | End: 2021-01-01 | Stop reason: HOSPADM

## 2021-01-01 RX ORDER — DEXTROSE MONOHYDRATE 25 G/50ML
12.5 INJECTION, SOLUTION INTRAVENOUS PRN
Status: DISCONTINUED | OUTPATIENT
Start: 2021-01-01 | End: 2021-01-01 | Stop reason: HOSPADM

## 2021-01-01 RX ORDER — 0.9 % SODIUM CHLORIDE 0.9 %
250 INTRAVENOUS SOLUTION INTRAVENOUS ONCE
Status: COMPLETED | OUTPATIENT
Start: 2021-01-01 | End: 2021-01-01

## 2021-01-01 RX ORDER — SODIUM CHLORIDE 9 MG/ML
INJECTION, SOLUTION INTRAVENOUS CONTINUOUS
Status: ACTIVE | OUTPATIENT
Start: 2021-01-01 | End: 2021-01-01

## 2021-01-01 RX ORDER — BENZONATATE 100 MG/1
100 CAPSULE ORAL 3 TIMES DAILY PRN
Status: DISCONTINUED | OUTPATIENT
Start: 2021-01-01 | End: 2021-01-01 | Stop reason: HOSPADM

## 2021-01-01 RX ORDER — POTASSIUM CHLORIDE 750 MG/1
10 TABLET, FILM COATED, EXTENDED RELEASE ORAL DAILY
Qty: 30 TABLET | Refills: 0 | DISCHARGE
Start: 2021-01-01 | End: 2021-01-01 | Stop reason: ALTCHOICE

## 2021-01-01 RX ORDER — ZINC SULFATE 50(220)MG
50 CAPSULE ORAL DAILY
COMMUNITY
Start: 2021-01-01 | End: 2021-01-01

## 2021-01-01 RX ORDER — ASPIRIN 81 MG/1
81 TABLET, CHEWABLE ORAL DAILY
Status: DISCONTINUED | OUTPATIENT
Start: 2021-01-01 | End: 2021-01-01 | Stop reason: HOSPADM

## 2021-01-01 RX ORDER — METOLAZONE 5 MG/1
2.5 TABLET ORAL DAILY
Status: DISCONTINUED | OUTPATIENT
Start: 2021-01-01 | End: 2021-01-01 | Stop reason: HOSPADM

## 2021-01-01 RX ORDER — METOLAZONE 2.5 MG/1
2.5 TABLET ORAL DAILY PRN
Qty: 30 TABLET | Refills: 3 | DISCHARGE
Start: 2021-01-01 | End: 2021-01-01 | Stop reason: ALTCHOICE

## 2021-01-01 RX ORDER — SODIUM CHLORIDE 9 MG/ML
25 INJECTION, SOLUTION INTRAVENOUS PRN
Status: DISCONTINUED | OUTPATIENT
Start: 2021-01-01 | End: 2021-01-01 | Stop reason: HOSPADM

## 2021-01-01 RX ORDER — CEFDINIR 300 MG/1
300 CAPSULE ORAL 2 TIMES DAILY
Status: ACTIVE | OUTPATIENT
Start: 2021-01-01 | End: 2021-01-01

## 2021-01-01 RX ORDER — TRAZODONE HYDROCHLORIDE 50 MG/1
50 TABLET ORAL NIGHTLY
Status: DISCONTINUED | OUTPATIENT
Start: 2021-01-01 | End: 2021-01-01 | Stop reason: HOSPADM

## 2021-01-01 RX ORDER — MIDODRINE HYDROCHLORIDE 2.5 MG/1
2.5 TABLET ORAL
Status: DISCONTINUED | OUTPATIENT
Start: 2021-01-01 | End: 2021-01-01

## 2021-01-01 RX ORDER — HYDROXYZINE HYDROCHLORIDE 10 MG/1
10 TABLET, FILM COATED ORAL 3 TIMES DAILY PRN
Status: DISCONTINUED | OUTPATIENT
Start: 2021-01-01 | End: 2021-01-01 | Stop reason: HOSPADM

## 2021-01-01 RX ORDER — 0.9 % SODIUM CHLORIDE 0.9 %
500 INTRAVENOUS SOLUTION INTRAVENOUS ONCE
Status: COMPLETED | OUTPATIENT
Start: 2021-01-01 | End: 2021-01-01

## 2021-01-01 RX ORDER — LANOLIN ALCOHOL/MO/W.PET/CERES
3 CREAM (GRAM) TOPICAL NIGHTLY
Status: DISCONTINUED | OUTPATIENT
Start: 2021-01-01 | End: 2021-01-01 | Stop reason: SDUPTHER

## 2021-01-01 RX ORDER — SODIUM CHLORIDE 0.9 % (FLUSH) 0.9 %
10 SYRINGE (ML) INJECTION EVERY 12 HOURS SCHEDULED
Status: DISCONTINUED | OUTPATIENT
Start: 2021-01-01 | End: 2021-01-01 | Stop reason: HOSPADM

## 2021-01-01 RX ORDER — INSULIN GLARGINE 100 [IU]/ML
20 INJECTION, SOLUTION SUBCUTANEOUS NIGHTLY
Status: DISCONTINUED | OUTPATIENT
Start: 2021-01-01 | End: 2021-01-01 | Stop reason: HOSPADM

## 2021-01-01 RX ORDER — MIDODRINE HYDROCHLORIDE 10 MG/1
10 TABLET ORAL 3 TIMES DAILY
Qty: 90 TABLET | Refills: 0 | Status: SHIPPED | OUTPATIENT
Start: 2021-01-01

## 2021-01-01 RX ORDER — LANOLIN ALCOHOL/MO/W.PET/CERES
3 CREAM (GRAM) TOPICAL NIGHTLY
Status: DISCONTINUED | OUTPATIENT
Start: 2021-01-01 | End: 2021-01-01 | Stop reason: HOSPADM

## 2021-01-01 RX ORDER — INSULIN GLARGINE 100 [IU]/ML
10 INJECTION, SOLUTION SUBCUTANEOUS NIGHTLY
Status: DISCONTINUED | OUTPATIENT
Start: 2021-01-01 | End: 2021-01-01 | Stop reason: HOSPADM

## 2021-01-01 RX ORDER — NITROGLYCERIN 0.4 MG/1
TABLET SUBLINGUAL
Qty: 25 TABLET | Refills: 3 | Status: SHIPPED | OUTPATIENT
Start: 2021-01-01

## 2021-01-01 RX ORDER — HALOPERIDOL 5 MG/ML
2 INJECTION INTRAMUSCULAR ONCE
Status: COMPLETED | OUTPATIENT
Start: 2021-01-01 | End: 2021-01-01

## 2021-01-01 RX ORDER — ASPIRIN 81 MG/1
81 TABLET, CHEWABLE ORAL DAILY
Qty: 30 TABLET | Refills: 3 | Status: SHIPPED | OUTPATIENT
Start: 2021-01-01

## 2021-01-01 RX ORDER — ACETAMINOPHEN 650 MG/1
650 SUPPOSITORY RECTAL EVERY 6 HOURS PRN
Status: DISCONTINUED | OUTPATIENT
Start: 2021-01-01 | End: 2021-01-01 | Stop reason: HOSPADM

## 2021-01-01 RX ORDER — ATORVASTATIN CALCIUM 40 MG/1
40 TABLET, FILM COATED ORAL DAILY
Status: DISCONTINUED | OUTPATIENT
Start: 2021-01-01 | End: 2021-01-01 | Stop reason: SDUPTHER

## 2021-01-01 RX ORDER — ATORVASTATIN CALCIUM 40 MG/1
40 TABLET, FILM COATED ORAL DAILY
Status: DISCONTINUED | OUTPATIENT
Start: 2021-01-01 | End: 2021-01-01 | Stop reason: HOSPADM

## 2021-01-01 RX ORDER — MULTIVIT-MIN/IRON/FOLIC ACID/K 18-600-40
CAPSULE ORAL 2 TIMES DAILY
COMMUNITY
Start: 2021-01-01 | End: 2021-01-01

## 2021-01-01 RX ORDER — METOPROLOL TARTRATE 50 MG/1
25 TABLET, FILM COATED ORAL 2 TIMES DAILY
Status: DISCONTINUED | OUTPATIENT
Start: 2021-01-01 | End: 2021-01-01 | Stop reason: HOSPADM

## 2021-01-01 RX ORDER — METOPROLOL TARTRATE 50 MG/1
50 TABLET, FILM COATED ORAL 2 TIMES DAILY
Status: DISCONTINUED | OUTPATIENT
Start: 2021-01-01 | End: 2021-01-01

## 2021-01-01 RX ORDER — METOPROLOL TARTRATE 50 MG/1
50 TABLET, FILM COATED ORAL 2 TIMES DAILY
Status: DISCONTINUED | OUTPATIENT
Start: 2021-01-01 | End: 2021-01-01 | Stop reason: HOSPADM

## 2021-01-01 RX ORDER — SODIUM CHLORIDE 9 MG/ML
INJECTION, SOLUTION INTRAVENOUS CONTINUOUS
Status: DISCONTINUED | OUTPATIENT
Start: 2021-01-01 | End: 2021-01-01

## 2021-01-01 RX ORDER — NITROGLYCERIN 0.4 MG/1
0.4 TABLET SUBLINGUAL EVERY 5 MIN PRN
Status: DISCONTINUED | OUTPATIENT
Start: 2021-01-01 | End: 2021-01-01 | Stop reason: HOSPADM

## 2021-01-01 RX ORDER — LACTOBACILLUS RHAMNOSUS GG 10B CELL
1 CAPSULE ORAL 2 TIMES DAILY WITH MEALS
Qty: 30 CAPSULE | Refills: 0 | Status: SHIPPED | OUTPATIENT
Start: 2021-01-01

## 2021-01-01 RX ORDER — FENTANYL CITRATE 50 UG/ML
25 INJECTION, SOLUTION INTRAMUSCULAR; INTRAVENOUS ONCE
Status: COMPLETED | OUTPATIENT
Start: 2021-01-01 | End: 2021-01-01

## 2021-01-01 RX ORDER — SODIUM CHLORIDE 0.9 % (FLUSH) 0.9 %
5-40 SYRINGE (ML) INJECTION PRN
Status: DISCONTINUED | OUTPATIENT
Start: 2021-01-01 | End: 2021-01-01 | Stop reason: HOSPADM

## 2021-01-01 RX ORDER — 0.9 % SODIUM CHLORIDE 0.9 %
1000 INTRAVENOUS SOLUTION INTRAVENOUS ONCE
Status: DISCONTINUED | OUTPATIENT
Start: 2021-01-01 | End: 2021-01-01 | Stop reason: HOSPADM

## 2021-01-01 RX ORDER — POTASSIUM CHLORIDE 750 MG/1
10 TABLET, FILM COATED, EXTENDED RELEASE ORAL DAILY
Status: DISCONTINUED | OUTPATIENT
Start: 2021-01-01 | End: 2021-01-01 | Stop reason: HOSPADM

## 2021-01-01 RX ORDER — SUCRALFATE 1 G/1
TABLET ORAL
Status: DISPENSED
Start: 2021-01-01 | End: 2021-01-01

## 2021-01-01 RX ADMIN — SODIUM CHLORIDE: 9 INJECTION, SOLUTION INTRAVENOUS at 03:33

## 2021-01-01 RX ADMIN — METOPROLOL TARTRATE 50 MG: 50 TABLET, FILM COATED ORAL at 22:17

## 2021-01-01 RX ADMIN — DULOXETINE 120 MG: 60 CAPSULE, DELAYED RELEASE ORAL at 10:41

## 2021-01-01 RX ADMIN — INSULIN LISPRO 8 UNITS: 100 INJECTION, SOLUTION INTRAVENOUS; SUBCUTANEOUS at 12:06

## 2021-01-01 RX ADMIN — MIDODRINE HYDROCHLORIDE 5 MG: 5 TABLET ORAL at 11:46

## 2021-01-01 RX ADMIN — INSULIN LISPRO 2 UNITS: 100 INJECTION, SOLUTION INTRAVENOUS; SUBCUTANEOUS at 16:48

## 2021-01-01 RX ADMIN — ACETAMINOPHEN 650 MG: 325 TABLET ORAL at 20:44

## 2021-01-01 RX ADMIN — TRAZODONE HYDROCHLORIDE 50 MG: 50 TABLET ORAL at 20:45

## 2021-01-01 RX ADMIN — POTASSIUM CHLORIDE 10 MEQ: 750 TABLET, EXTENDED RELEASE ORAL at 08:55

## 2021-01-01 RX ADMIN — HEPARIN SODIUM 5000 UNITS: 5000 INJECTION INTRAVENOUS; SUBCUTANEOUS at 09:20

## 2021-01-01 RX ADMIN — HEPARIN SODIUM 5000 UNITS: 5000 INJECTION INTRAVENOUS; SUBCUTANEOUS at 00:31

## 2021-01-01 RX ADMIN — ACETAMINOPHEN 650 MG: 325 TABLET ORAL at 20:01

## 2021-01-01 RX ADMIN — ACETAMINOPHEN 650 MG: 325 TABLET ORAL at 13:04

## 2021-01-01 RX ADMIN — INSULIN LISPRO 2 UNITS: 100 INJECTION, SOLUTION INTRAVENOUS; SUBCUTANEOUS at 13:40

## 2021-01-01 RX ADMIN — INSULIN HUMAN 10 UNITS: 100 INJECTION, SOLUTION PARENTERAL at 21:31

## 2021-01-01 RX ADMIN — INSULIN GLARGINE 20 UNITS: 100 INJECTION, SOLUTION SUBCUTANEOUS at 22:37

## 2021-01-01 RX ADMIN — ATORVASTATIN CALCIUM 40 MG: 40 TABLET, FILM COATED ORAL at 20:30

## 2021-01-01 RX ADMIN — DOCUSATE SODIUM 100 MG: 100 CAPSULE ORAL at 10:11

## 2021-01-01 RX ADMIN — INSULIN LISPRO 1 UNITS: 100 INJECTION, SOLUTION INTRAVENOUS; SUBCUTANEOUS at 19:46

## 2021-01-01 RX ADMIN — METOPROLOL TARTRATE 25 MG: 50 TABLET, FILM COATED ORAL at 08:30

## 2021-01-01 RX ADMIN — DULOXETINE 120 MG: 60 CAPSULE, DELAYED RELEASE ORAL at 09:29

## 2021-01-01 RX ADMIN — IOPAMIDOL 80 ML: 755 INJECTION, SOLUTION INTRAVENOUS at 16:01

## 2021-01-01 RX ADMIN — Medication 3 MG: at 22:17

## 2021-01-01 RX ADMIN — DULOXETIN HYDROCHLORIDE 120 MG: 60 CAPSULE, DELAYED RELEASE ORAL at 08:26

## 2021-01-01 RX ADMIN — CLOPIDOGREL BISULFATE 75 MG: 75 TABLET ORAL at 10:27

## 2021-01-01 RX ADMIN — ACETAMINOPHEN 650 MG: 325 TABLET ORAL at 14:08

## 2021-01-01 RX ADMIN — Medication 1 CAPSULE: at 09:08

## 2021-01-01 RX ADMIN — DULOXETIN HYDROCHLORIDE 120 MG: 60 CAPSULE, DELAYED RELEASE ORAL at 09:39

## 2021-01-01 RX ADMIN — METOPROLOL TARTRATE 25 MG: 50 TABLET, FILM COATED ORAL at 09:21

## 2021-01-01 RX ADMIN — ACETAMINOPHEN 650 MG: 325 TABLET ORAL at 04:44

## 2021-01-01 RX ADMIN — INSULIN GLARGINE 20 UNITS: 100 INJECTION, SOLUTION SUBCUTANEOUS at 22:18

## 2021-01-01 RX ADMIN — INSULIN LISPRO 6 UNITS: 100 INJECTION, SOLUTION INTRAVENOUS; SUBCUTANEOUS at 12:30

## 2021-01-01 RX ADMIN — DULOXETIN HYDROCHLORIDE 120 MG: 60 CAPSULE, DELAYED RELEASE ORAL at 09:10

## 2021-01-01 RX ADMIN — INSULIN LISPRO 3 UNITS: 100 INJECTION, SOLUTION INTRAVENOUS; SUBCUTANEOUS at 17:25

## 2021-01-01 RX ADMIN — ENOXAPARIN SODIUM 30 MG: 100 INJECTION SUBCUTANEOUS at 17:54

## 2021-01-01 RX ADMIN — DOCUSATE SODIUM 100 MG: 100 CAPSULE ORAL at 09:12

## 2021-01-01 RX ADMIN — POTASSIUM CHLORIDE 10 MEQ: 750 TABLET, EXTENDED RELEASE ORAL at 09:29

## 2021-01-01 RX ADMIN — DULOXETINE 120 MG: 60 CAPSULE, DELAYED RELEASE ORAL at 08:29

## 2021-01-01 RX ADMIN — INSULIN LISPRO 1 UNITS: 100 INJECTION, SOLUTION INTRAVENOUS; SUBCUTANEOUS at 18:09

## 2021-01-01 RX ADMIN — ASPIRIN 81 MG CHEWABLE TABLET 81 MG: 81 TABLET CHEWABLE at 08:55

## 2021-01-01 RX ADMIN — INSULIN LISPRO 4 UNITS: 100 INJECTION, SOLUTION INTRAVENOUS; SUBCUTANEOUS at 18:42

## 2021-01-01 RX ADMIN — SODIUM CHLORIDE, PRESERVATIVE FREE 10 ML: 5 INJECTION INTRAVENOUS at 22:35

## 2021-01-01 RX ADMIN — SODIUM CHLORIDE, PRESERVATIVE FREE 10 ML: 5 INJECTION INTRAVENOUS at 21:02

## 2021-01-01 RX ADMIN — SODIUM CHLORIDE, PRESERVATIVE FREE 10 ML: 5 INJECTION INTRAVENOUS at 09:32

## 2021-01-01 RX ADMIN — ENOXAPARIN SODIUM 30 MG: 100 INJECTION SUBCUTANEOUS at 14:14

## 2021-01-01 RX ADMIN — DULOXETIN HYDROCHLORIDE 120 MG: 60 CAPSULE, DELAYED RELEASE ORAL at 09:27

## 2021-01-01 RX ADMIN — DULOXETINE 120 MG: 60 CAPSULE, DELAYED RELEASE ORAL at 09:26

## 2021-01-01 RX ADMIN — INSULIN LISPRO 8 UNITS: 100 INJECTION, SOLUTION INTRAVENOUS; SUBCUTANEOUS at 07:47

## 2021-01-01 RX ADMIN — INSULIN LISPRO 3 UNITS: 100 INJECTION, SOLUTION INTRAVENOUS; SUBCUTANEOUS at 12:11

## 2021-01-01 RX ADMIN — HEPARIN SODIUM 5000 UNITS: 5000 INJECTION INTRAVENOUS; SUBCUTANEOUS at 09:39

## 2021-01-01 RX ADMIN — CEFTRIAXONE SODIUM 1000 MG: 1 INJECTION, POWDER, FOR SOLUTION INTRAMUSCULAR; INTRAVENOUS at 14:45

## 2021-01-01 RX ADMIN — ATORVASTATIN CALCIUM 40 MG: 40 TABLET, FILM COATED ORAL at 20:24

## 2021-01-01 RX ADMIN — SODIUM CHLORIDE 1000 ML: 9 INJECTION, SOLUTION INTRAVENOUS at 13:38

## 2021-01-01 RX ADMIN — METOPROLOL TARTRATE 50 MG: 50 TABLET, FILM COATED ORAL at 08:52

## 2021-01-01 RX ADMIN — Medication 1 CAPSULE: at 09:10

## 2021-01-01 RX ADMIN — ATORVASTATIN CALCIUM 40 MG: 40 TABLET, FILM COATED ORAL at 08:29

## 2021-01-01 RX ADMIN — CLOPIDOGREL BISULFATE 75 MG: 75 TABLET ORAL at 09:27

## 2021-01-01 RX ADMIN — POTASSIUM CHLORIDE 10 MEQ: 750 TABLET, EXTENDED RELEASE ORAL at 09:10

## 2021-01-01 RX ADMIN — METOPROLOL TARTRATE 50 MG: 50 TABLET, FILM COATED ORAL at 20:44

## 2021-01-01 RX ADMIN — INSULIN LISPRO 2 UNITS: 100 INJECTION, SOLUTION INTRAVENOUS; SUBCUTANEOUS at 16:21

## 2021-01-01 RX ADMIN — SODIUM CHLORIDE, PRESERVATIVE FREE 10 ML: 5 INJECTION INTRAVENOUS at 08:31

## 2021-01-01 RX ADMIN — METOPROLOL TARTRATE 25 MG: 50 TABLET, FILM COATED ORAL at 21:47

## 2021-01-01 RX ADMIN — DULOXETIN HYDROCHLORIDE 120 MG: 60 CAPSULE, DELAYED RELEASE ORAL at 09:20

## 2021-01-01 RX ADMIN — SODIUM CHLORIDE, PRESERVATIVE FREE 10 ML: 5 INJECTION INTRAVENOUS at 07:41

## 2021-01-01 RX ADMIN — ATORVASTATIN CALCIUM 40 MG: 40 TABLET, FILM COATED ORAL at 09:32

## 2021-01-01 RX ADMIN — SODIUM CHLORIDE, PRESERVATIVE FREE 10 ML: 5 INJECTION INTRAVENOUS at 08:28

## 2021-01-01 RX ADMIN — Medication 3 MG: at 20:38

## 2021-01-01 RX ADMIN — INSULIN LISPRO 3 UNITS: 100 INJECTION, SOLUTION INTRAVENOUS; SUBCUTANEOUS at 10:11

## 2021-01-01 RX ADMIN — METOPROLOL TARTRATE 50 MG: 50 TABLET, FILM COATED ORAL at 19:35

## 2021-01-01 RX ADMIN — METOLAZONE 2.5 MG: 5 TABLET ORAL at 09:21

## 2021-01-01 RX ADMIN — CLOPIDOGREL BISULFATE 75 MG: 75 TABLET ORAL at 09:28

## 2021-01-01 RX ADMIN — Medication 1 CAPSULE: at 17:09

## 2021-01-01 RX ADMIN — Medication 3 MG: at 21:47

## 2021-01-01 RX ADMIN — Medication 1 CAPSULE: at 17:42

## 2021-01-01 RX ADMIN — MIDODRINE HYDROCHLORIDE 10 MG: 10 TABLET ORAL at 11:24

## 2021-01-01 RX ADMIN — INSULIN LISPRO 2 UNITS: 100 INJECTION, SOLUTION INTRAVENOUS; SUBCUTANEOUS at 09:31

## 2021-01-01 RX ADMIN — DULOXETINE 120 MG: 60 CAPSULE, DELAYED RELEASE ORAL at 09:20

## 2021-01-01 RX ADMIN — CLOPIDOGREL BISULFATE 75 MG: 75 TABLET ORAL at 09:39

## 2021-01-01 RX ADMIN — HEPARIN SODIUM 5000 UNITS: 5000 INJECTION INTRAVENOUS; SUBCUTANEOUS at 09:36

## 2021-01-01 RX ADMIN — ACETAMINOPHEN 650 MG: 325 TABLET ORAL at 22:38

## 2021-01-01 RX ADMIN — SODIUM CHLORIDE, PRESERVATIVE FREE 10 ML: 5 INJECTION INTRAVENOUS at 21:09

## 2021-01-01 RX ADMIN — INSULIN LISPRO 4 UNITS: 100 INJECTION, SOLUTION INTRAVENOUS; SUBCUTANEOUS at 14:09

## 2021-01-01 RX ADMIN — MIDODRINE HYDROCHLORIDE 5 MG: 5 TABLET ORAL at 07:40

## 2021-01-01 RX ADMIN — SODIUM CHLORIDE 1000 ML: 9 INJECTION, SOLUTION INTRAVENOUS at 16:13

## 2021-01-01 RX ADMIN — INSULIN GLARGINE 5 UNITS: 100 INJECTION, SOLUTION SUBCUTANEOUS at 22:39

## 2021-01-01 RX ADMIN — SODIUM CHLORIDE, PRESERVATIVE FREE 10 ML: 5 INJECTION INTRAVENOUS at 20:22

## 2021-01-01 RX ADMIN — INSULIN GLARGINE 5 UNITS: 100 INJECTION, SOLUTION SUBCUTANEOUS at 22:30

## 2021-01-01 RX ADMIN — Medication 1 CAPSULE: at 23:34

## 2021-01-01 RX ADMIN — METOPROLOL TARTRATE 50 MG: 50 TABLET, FILM COATED ORAL at 10:05

## 2021-01-01 RX ADMIN — SODIUM CHLORIDE, PRESERVATIVE FREE 10 ML: 5 INJECTION INTRAVENOUS at 20:44

## 2021-01-01 RX ADMIN — Medication 3 MG: at 21:28

## 2021-01-01 RX ADMIN — MIDODRINE HYDROCHLORIDE 10 MG: 10 TABLET ORAL at 17:49

## 2021-01-01 RX ADMIN — HALOPERIDOL LACTATE 2 MG: 5 INJECTION, SOLUTION INTRAMUSCULAR at 08:49

## 2021-01-01 RX ADMIN — METOPROLOL TARTRATE 25 MG: 50 TABLET, FILM COATED ORAL at 21:12

## 2021-01-01 RX ADMIN — DULOXETINE 120 MG: 60 CAPSULE, DELAYED RELEASE ORAL at 08:57

## 2021-01-01 RX ADMIN — METOPROLOL TARTRATE 50 MG: 50 TABLET, FILM COATED ORAL at 22:47

## 2021-01-01 RX ADMIN — POTASSIUM CHLORIDE 10 MEQ: 750 TABLET, EXTENDED RELEASE ORAL at 08:32

## 2021-01-01 RX ADMIN — CEFTRIAXONE SODIUM 1000 MG: 1 INJECTION, POWDER, FOR SOLUTION INTRAMUSCULAR; INTRAVENOUS at 13:42

## 2021-01-01 RX ADMIN — ENOXAPARIN SODIUM 30 MG: 100 INJECTION SUBCUTANEOUS at 15:03

## 2021-01-01 RX ADMIN — DULOXETINE 120 MG: 60 CAPSULE, DELAYED RELEASE ORAL at 07:40

## 2021-01-01 RX ADMIN — POTASSIUM CHLORIDE 10 MEQ: 750 TABLET, EXTENDED RELEASE ORAL at 09:38

## 2021-01-01 RX ADMIN — ASPIRIN 81 MG CHEWABLE TABLET 81 MG: 81 TABLET CHEWABLE at 10:11

## 2021-01-01 RX ADMIN — METOPROLOL TARTRATE 25 MG: 50 TABLET, FILM COATED ORAL at 20:15

## 2021-01-01 RX ADMIN — Medication 3 MG: at 20:23

## 2021-01-01 RX ADMIN — METOLAZONE 2.5 MG: 5 TABLET ORAL at 09:39

## 2021-01-01 RX ADMIN — MIDODRINE HYDROCHLORIDE 10 MG: 10 TABLET ORAL at 15:51

## 2021-01-01 RX ADMIN — METOPROLOL TARTRATE 25 MG: 50 TABLET, FILM COATED ORAL at 08:58

## 2021-01-01 RX ADMIN — ASPIRIN 81 MG CHEWABLE TABLET 81 MG: 81 TABLET CHEWABLE at 16:36

## 2021-01-01 RX ADMIN — INSULIN LISPRO 8 UNITS: 100 INJECTION, SOLUTION INTRAVENOUS; SUBCUTANEOUS at 14:44

## 2021-01-01 RX ADMIN — HALOPERIDOL LACTATE 5 MG: 5 INJECTION, SOLUTION INTRAMUSCULAR at 12:25

## 2021-01-01 RX ADMIN — METOPROLOL TARTRATE 25 MG: 50 TABLET, FILM COATED ORAL at 20:04

## 2021-01-01 RX ADMIN — Medication 1 CAPSULE: at 16:39

## 2021-01-01 RX ADMIN — METOPROLOL TARTRATE 25 MG: 50 TABLET, FILM COATED ORAL at 08:29

## 2021-01-01 RX ADMIN — SODIUM CHLORIDE, PRESERVATIVE FREE 10 ML: 5 INJECTION INTRAVENOUS at 20:21

## 2021-01-01 RX ADMIN — SODIUM CHLORIDE, PRESERVATIVE FREE 10 ML: 5 INJECTION INTRAVENOUS at 21:05

## 2021-01-01 RX ADMIN — INSULIN LISPRO 6 UNITS: 100 INJECTION, SOLUTION INTRAVENOUS; SUBCUTANEOUS at 11:37

## 2021-01-01 RX ADMIN — CLOPIDOGREL BISULFATE 75 MG: 75 TABLET ORAL at 09:08

## 2021-01-01 RX ADMIN — DULOXETINE 120 MG: 60 CAPSULE, DELAYED RELEASE ORAL at 08:48

## 2021-01-01 RX ADMIN — SODIUM CHLORIDE, PRESERVATIVE FREE 10 ML: 5 INJECTION INTRAVENOUS at 08:27

## 2021-01-01 RX ADMIN — Medication 1 CAPSULE: at 07:41

## 2021-01-01 RX ADMIN — METOPROLOL TARTRATE 25 MG: 50 TABLET, FILM COATED ORAL at 20:22

## 2021-01-01 RX ADMIN — METOPROLOL TARTRATE 50 MG: 50 TABLET, FILM COATED ORAL at 09:33

## 2021-01-01 RX ADMIN — ATORVASTATIN CALCIUM 40 MG: 40 TABLET, FILM COATED ORAL at 09:30

## 2021-01-01 RX ADMIN — Medication 3 MG: at 20:37

## 2021-01-01 RX ADMIN — DULOXETIN HYDROCHLORIDE 120 MG: 60 CAPSULE, DELAYED RELEASE ORAL at 09:33

## 2021-01-01 RX ADMIN — ATORVASTATIN CALCIUM 40 MG: 40 TABLET, FILM COATED ORAL at 22:17

## 2021-01-01 RX ADMIN — INSULIN GLARGINE 5 UNITS: 100 INJECTION, SOLUTION SUBCUTANEOUS at 19:58

## 2021-01-01 RX ADMIN — METOPROLOL TARTRATE 25 MG: 50 TABLET, FILM COATED ORAL at 22:34

## 2021-01-01 RX ADMIN — HALOPERIDOL LACTATE 2 MG: 5 INJECTION, SOLUTION INTRAMUSCULAR at 19:19

## 2021-01-01 RX ADMIN — INSULIN LISPRO 8 UNITS: 100 INJECTION, SOLUTION INTRAVENOUS; SUBCUTANEOUS at 10:40

## 2021-01-01 RX ADMIN — CLOPIDOGREL BISULFATE 75 MG: 75 TABLET ORAL at 09:35

## 2021-01-01 RX ADMIN — ASPIRIN 81 MG CHEWABLE TABLET 81 MG: 81 TABLET CHEWABLE at 08:48

## 2021-01-01 RX ADMIN — SODIUM CHLORIDE, PRESERVATIVE FREE 10 ML: 5 INJECTION INTRAVENOUS at 22:17

## 2021-01-01 RX ADMIN — DOCUSATE SODIUM 100 MG: 100 CAPSULE ORAL at 08:57

## 2021-01-01 RX ADMIN — METOPROLOL TARTRATE 25 MG: 50 TABLET, FILM COATED ORAL at 09:04

## 2021-01-01 RX ADMIN — ASPIRIN 81 MG CHEWABLE TABLET 81 MG: 81 TABLET CHEWABLE at 08:29

## 2021-01-01 RX ADMIN — POTASSIUM CHLORIDE 10 MEQ: 750 TABLET, EXTENDED RELEASE ORAL at 09:08

## 2021-01-01 RX ADMIN — ATORVASTATIN CALCIUM 40 MG: 40 TABLET, FILM COATED ORAL at 22:37

## 2021-01-01 RX ADMIN — SODIUM CHLORIDE: 9 INJECTION, SOLUTION INTRAVENOUS at 20:55

## 2021-01-01 RX ADMIN — MIDODRINE HYDROCHLORIDE 5 MG: 5 TABLET ORAL at 18:06

## 2021-01-01 RX ADMIN — INSULIN LISPRO 9 UNITS: 100 INJECTION, SOLUTION INTRAVENOUS; SUBCUTANEOUS at 12:35

## 2021-01-01 RX ADMIN — Medication 3 MG: at 21:01

## 2021-01-01 RX ADMIN — Medication 1 CAPSULE: at 18:13

## 2021-01-01 RX ADMIN — Medication 1 CAPSULE: at 18:44

## 2021-01-01 RX ADMIN — MIDODRINE HYDROCHLORIDE 2.5 MG: 2.5 TABLET ORAL at 09:20

## 2021-01-01 RX ADMIN — METOLAZONE 2.5 MG: 5 TABLET ORAL at 09:33

## 2021-01-01 RX ADMIN — INSULIN LISPRO 2 UNITS: 100 INJECTION, SOLUTION INTRAVENOUS; SUBCUTANEOUS at 09:12

## 2021-01-01 RX ADMIN — CLOPIDOGREL BISULFATE 75 MG: 75 TABLET ORAL at 09:51

## 2021-01-01 RX ADMIN — METOPROLOL TARTRATE 50 MG: 50 TABLET, FILM COATED ORAL at 07:44

## 2021-01-01 RX ADMIN — Medication 1 CAPSULE: at 08:32

## 2021-01-01 RX ADMIN — DULOXETIN HYDROCHLORIDE 120 MG: 60 CAPSULE, DELAYED RELEASE ORAL at 09:08

## 2021-01-01 RX ADMIN — INSULIN LISPRO 6 UNITS: 100 INJECTION, SOLUTION INTRAVENOUS; SUBCUTANEOUS at 12:35

## 2021-01-01 RX ADMIN — INSULIN LISPRO 1 UNITS: 100 INJECTION, SOLUTION INTRAVENOUS; SUBCUTANEOUS at 10:24

## 2021-01-01 RX ADMIN — DOCUSATE SODIUM 100 MG: 100 CAPSULE ORAL at 08:29

## 2021-01-01 RX ADMIN — ACETAMINOPHEN 650 MG: 325 TABLET ORAL at 12:00

## 2021-01-01 RX ADMIN — INSULIN LISPRO 2 UNITS: 100 INJECTION, SOLUTION INTRAVENOUS; SUBCUTANEOUS at 20:13

## 2021-01-01 RX ADMIN — SODIUM CHLORIDE: 9 INJECTION, SOLUTION INTRAVENOUS at 09:36

## 2021-01-01 RX ADMIN — MIDODRINE HYDROCHLORIDE 10 MG: 10 TABLET ORAL at 12:02

## 2021-01-01 RX ADMIN — ATORVASTATIN CALCIUM 40 MG: 40 TABLET, FILM COATED ORAL at 19:35

## 2021-01-01 RX ADMIN — ASPIRIN 81 MG CHEWABLE TABLET 81 MG: 81 TABLET CHEWABLE at 09:20

## 2021-01-01 RX ADMIN — INSULIN GLARGINE 5 UNITS: 100 INJECTION, SOLUTION SUBCUTANEOUS at 20:44

## 2021-01-01 RX ADMIN — METOPROLOL TARTRATE 25 MG: 50 TABLET, FILM COATED ORAL at 09:11

## 2021-01-01 RX ADMIN — INSULIN LISPRO 1 UNITS: 100 INJECTION, SOLUTION INTRAVENOUS; SUBCUTANEOUS at 09:12

## 2021-01-01 RX ADMIN — INSULIN LISPRO 3 UNITS: 100 INJECTION, SOLUTION INTRAVENOUS; SUBCUTANEOUS at 12:43

## 2021-01-01 RX ADMIN — Medication 3 MG: at 23:13

## 2021-01-01 RX ADMIN — CLOPIDOGREL BISULFATE 75 MG: 75 TABLET ORAL at 10:11

## 2021-01-01 RX ADMIN — ATORVASTATIN CALCIUM 40 MG: 40 TABLET, FILM COATED ORAL at 08:56

## 2021-01-01 RX ADMIN — Medication 3 MG: at 22:37

## 2021-01-01 RX ADMIN — HEPARIN SODIUM 5000 UNITS: 5000 INJECTION INTRAVENOUS; SUBCUTANEOUS at 18:19

## 2021-01-01 RX ADMIN — ENOXAPARIN SODIUM 30 MG: 100 INJECTION SUBCUTANEOUS at 14:46

## 2021-01-01 RX ADMIN — SODIUM CHLORIDE: 9 INJECTION, SOLUTION INTRAVENOUS at 22:35

## 2021-01-01 RX ADMIN — HEPARIN SODIUM 5000 UNITS: 5000 INJECTION INTRAVENOUS; SUBCUTANEOUS at 10:13

## 2021-01-01 RX ADMIN — Medication 3 MG: at 22:38

## 2021-01-01 RX ADMIN — INSULIN LISPRO 3 UNITS: 100 INJECTION, SOLUTION INTRAVENOUS; SUBCUTANEOUS at 09:04

## 2021-01-01 RX ADMIN — CLOPIDOGREL BISULFATE 75 MG: 75 TABLET ORAL at 09:40

## 2021-01-01 RX ADMIN — METOPROLOL TARTRATE 25 MG: 50 TABLET, FILM COATED ORAL at 20:50

## 2021-01-01 RX ADMIN — DULOXETIN HYDROCHLORIDE 120 MG: 60 CAPSULE, DELAYED RELEASE ORAL at 09:38

## 2021-01-01 RX ADMIN — MIDODRINE HYDROCHLORIDE 10 MG: 10 TABLET ORAL at 11:51

## 2021-01-01 RX ADMIN — ACETAMINOPHEN 650 MG: 325 TABLET ORAL at 04:12

## 2021-01-01 RX ADMIN — HEPARIN SODIUM 5000 UNITS: 5000 INJECTION INTRAVENOUS; SUBCUTANEOUS at 00:06

## 2021-01-01 RX ADMIN — SODIUM CHLORIDE: 9 INJECTION, SOLUTION INTRAVENOUS at 01:43

## 2021-01-01 RX ADMIN — DOCUSATE SODIUM 100 MG: 100 CAPSULE ORAL at 08:48

## 2021-01-01 RX ADMIN — MIDODRINE HYDROCHLORIDE 10 MG: 10 TABLET ORAL at 08:29

## 2021-01-01 RX ADMIN — INSULIN LISPRO 3 UNITS: 100 INJECTION, SOLUTION INTRAVENOUS; SUBCUTANEOUS at 17:35

## 2021-01-01 RX ADMIN — DULOXETIN HYDROCHLORIDE 120 MG: 60 CAPSULE, DELAYED RELEASE ORAL at 09:40

## 2021-01-01 RX ADMIN — DULOXETIN HYDROCHLORIDE 120 MG: 60 CAPSULE, DELAYED RELEASE ORAL at 08:53

## 2021-01-01 RX ADMIN — INSULIN LISPRO 2 UNITS: 100 INJECTION, SOLUTION INTRAVENOUS; SUBCUTANEOUS at 21:27

## 2021-01-01 RX ADMIN — DULOXETIN HYDROCHLORIDE 120 MG: 60 CAPSULE, DELAYED RELEASE ORAL at 09:52

## 2021-01-01 RX ADMIN — Medication 3 MG: at 19:36

## 2021-01-01 RX ADMIN — SODIUM CHLORIDE, PRESERVATIVE FREE 10 ML: 5 INJECTION INTRAVENOUS at 10:27

## 2021-01-01 RX ADMIN — SODIUM CHLORIDE, PRESERVATIVE FREE 10 ML: 5 INJECTION INTRAVENOUS at 07:54

## 2021-01-01 RX ADMIN — METOPROLOL TARTRATE 25 MG: 50 TABLET, FILM COATED ORAL at 08:48

## 2021-01-01 RX ADMIN — CLOPIDOGREL BISULFATE 75 MG: 75 TABLET ORAL at 08:48

## 2021-01-01 RX ADMIN — MIDODRINE HYDROCHLORIDE 10 MG: 10 TABLET ORAL at 10:11

## 2021-01-01 RX ADMIN — ATORVASTATIN CALCIUM 40 MG: 40 TABLET, FILM COATED ORAL at 20:44

## 2021-01-01 RX ADMIN — INSULIN LISPRO 3 UNITS: 100 INJECTION, SOLUTION INTRAVENOUS; SUBCUTANEOUS at 11:27

## 2021-01-01 RX ADMIN — METOPROLOL TARTRATE 50 MG: 50 TABLET, FILM COATED ORAL at 09:19

## 2021-01-01 RX ADMIN — INSULIN GLARGINE 20 UNITS: 100 INJECTION, SOLUTION SUBCUTANEOUS at 21:11

## 2021-01-01 RX ADMIN — SODIUM CHLORIDE, PRESERVATIVE FREE 10 ML: 5 INJECTION INTRAVENOUS at 08:29

## 2021-01-01 RX ADMIN — DOCUSATE SODIUM 100 MG: 100 CAPSULE ORAL at 10:24

## 2021-01-01 RX ADMIN — DOCUSATE SODIUM 100 MG: 100 CAPSULE ORAL at 09:28

## 2021-01-01 RX ADMIN — ATORVASTATIN CALCIUM 40 MG: 40 TABLET, FILM COATED ORAL at 21:47

## 2021-01-01 RX ADMIN — METOPROLOL TARTRATE 25 MG: 50 TABLET, FILM COATED ORAL at 19:57

## 2021-01-01 RX ADMIN — Medication 1 CAPSULE: at 09:40

## 2021-01-01 RX ADMIN — CLOPIDOGREL BISULFATE 75 MG: 75 TABLET ORAL at 08:57

## 2021-01-01 RX ADMIN — ATORVASTATIN CALCIUM 40 MG: 40 TABLET, FILM COATED ORAL at 07:52

## 2021-01-01 RX ADMIN — Medication 1 CAPSULE: at 16:48

## 2021-01-01 RX ADMIN — MIDODRINE HYDROCHLORIDE 7.5 MG: 5 TABLET ORAL at 12:11

## 2021-01-01 RX ADMIN — SODIUM CHLORIDE, PRESERVATIVE FREE 10 ML: 5 INJECTION INTRAVENOUS at 20:15

## 2021-01-01 RX ADMIN — METOPROLOL TARTRATE 25 MG: 50 TABLET, FILM COATED ORAL at 10:41

## 2021-01-01 RX ADMIN — CLOPIDOGREL BISULFATE 75 MG: 75 TABLET ORAL at 09:21

## 2021-01-01 RX ADMIN — CLOPIDOGREL BISULFATE 75 MG: 75 TABLET ORAL at 14:17

## 2021-01-01 RX ADMIN — SODIUM CHLORIDE: 9 INJECTION, SOLUTION INTRAVENOUS at 22:27

## 2021-01-01 RX ADMIN — CLOPIDOGREL BISULFATE 75 MG: 75 TABLET ORAL at 09:04

## 2021-01-01 RX ADMIN — INSULIN LISPRO 8 UNITS: 100 INJECTION, SOLUTION INTRAVENOUS; SUBCUTANEOUS at 11:39

## 2021-01-01 RX ADMIN — SODIUM CHLORIDE, PRESERVATIVE FREE 10 ML: 5 INJECTION INTRAVENOUS at 09:21

## 2021-01-01 RX ADMIN — INSULIN LISPRO 4 UNITS: 100 INJECTION, SOLUTION INTRAVENOUS; SUBCUTANEOUS at 18:13

## 2021-01-01 RX ADMIN — DOCUSATE SODIUM 100 MG: 100 CAPSULE ORAL at 09:19

## 2021-01-01 RX ADMIN — MIDODRINE HYDROCHLORIDE 2.5 MG: 2.5 TABLET ORAL at 12:43

## 2021-01-01 RX ADMIN — METOPROLOL TARTRATE 50 MG: 50 TABLET, FILM COATED ORAL at 21:25

## 2021-01-01 RX ADMIN — INSULIN GLARGINE 10 UNITS: 100 INJECTION, SOLUTION SUBCUTANEOUS at 20:44

## 2021-01-01 RX ADMIN — ASPIRIN 81 MG CHEWABLE TABLET 81 MG: 81 TABLET CHEWABLE at 09:04

## 2021-01-01 RX ADMIN — Medication 1 CAPSULE: at 18:18

## 2021-01-01 RX ADMIN — ATORVASTATIN CALCIUM 40 MG: 40 TABLET, FILM COATED ORAL at 09:27

## 2021-01-01 RX ADMIN — ENOXAPARIN SODIUM 30 MG: 100 INJECTION SUBCUTANEOUS at 16:19

## 2021-01-01 RX ADMIN — SODIUM CHLORIDE, PRESERVATIVE FREE 10 ML: 5 INJECTION INTRAVENOUS at 21:42

## 2021-01-01 RX ADMIN — SODIUM CHLORIDE: 9 INJECTION, SOLUTION INTRAVENOUS at 08:28

## 2021-01-01 RX ADMIN — MIDODRINE HYDROCHLORIDE 10 MG: 10 TABLET ORAL at 08:58

## 2021-01-01 RX ADMIN — INSULIN LISPRO 6 UNITS: 100 INJECTION, SOLUTION INTRAVENOUS; SUBCUTANEOUS at 17:41

## 2021-01-01 RX ADMIN — DULOXETINE 120 MG: 60 CAPSULE, DELAYED RELEASE ORAL at 07:52

## 2021-01-01 RX ADMIN — SODIUM CHLORIDE 500 ML: 9 INJECTION, SOLUTION INTRAVENOUS at 09:40

## 2021-01-01 RX ADMIN — Medication 1 CAPSULE: at 09:33

## 2021-01-01 RX ADMIN — Medication 1 CAPSULE: at 17:55

## 2021-01-01 RX ADMIN — ENOXAPARIN SODIUM 30 MG: 100 INJECTION SUBCUTANEOUS at 14:08

## 2021-01-01 RX ADMIN — METOPROLOL TARTRATE 50 MG: 50 TABLET, FILM COATED ORAL at 21:36

## 2021-01-01 RX ADMIN — POTASSIUM CHLORIDE 10 MEQ: 750 TABLET, EXTENDED RELEASE ORAL at 09:40

## 2021-01-01 RX ADMIN — METOPROLOL TARTRATE 50 MG: 50 TABLET, FILM COATED ORAL at 09:39

## 2021-01-01 RX ADMIN — POTASSIUM CHLORIDE 10 MEQ: 750 TABLET, EXTENDED RELEASE ORAL at 07:41

## 2021-01-01 RX ADMIN — SODIUM CHLORIDE: 9 INJECTION, SOLUTION INTRAVENOUS at 22:56

## 2021-01-01 RX ADMIN — DULOXETINE 120 MG: 60 CAPSULE, DELAYED RELEASE ORAL at 09:03

## 2021-01-01 RX ADMIN — DOCUSATE SODIUM 100 MG: 100 CAPSULE ORAL at 07:52

## 2021-01-01 RX ADMIN — ATORVASTATIN CALCIUM 40 MG: 40 TABLET, FILM COATED ORAL at 10:29

## 2021-01-01 RX ADMIN — MIDODRINE HYDROCHLORIDE 10 MG: 10 TABLET ORAL at 08:48

## 2021-01-01 RX ADMIN — CEFTRIAXONE SODIUM 1000 MG: 1 INJECTION, POWDER, FOR SOLUTION INTRAMUSCULAR; INTRAVENOUS at 13:41

## 2021-01-01 RX ADMIN — INSULIN LISPRO 4 UNITS: 100 INJECTION, SOLUTION INTRAVENOUS; SUBCUTANEOUS at 09:34

## 2021-01-01 RX ADMIN — CLOPIDOGREL BISULFATE 75 MG: 75 TABLET ORAL at 09:19

## 2021-01-01 RX ADMIN — SODIUM CHLORIDE, PRESERVATIVE FREE 10 ML: 5 INJECTION INTRAVENOUS at 07:51

## 2021-01-01 RX ADMIN — Medication 1 CAPSULE: at 09:20

## 2021-01-01 RX ADMIN — METOPROLOL TARTRATE 25 MG: 50 TABLET, FILM COATED ORAL at 10:11

## 2021-01-01 RX ADMIN — INSULIN LISPRO 6 UNITS: 100 INJECTION, SOLUTION INTRAVENOUS; SUBCUTANEOUS at 14:17

## 2021-01-01 RX ADMIN — ENOXAPARIN SODIUM 30 MG: 100 INJECTION SUBCUTANEOUS at 16:28

## 2021-01-01 RX ADMIN — METOLAZONE 2.5 MG: 5 TABLET ORAL at 16:20

## 2021-01-01 RX ADMIN — METOPROLOL TARTRATE 25 MG: 50 TABLET, FILM COATED ORAL at 09:30

## 2021-01-01 RX ADMIN — SODIUM CHLORIDE, PRESERVATIVE FREE 10 ML: 5 INJECTION INTRAVENOUS at 10:11

## 2021-01-01 RX ADMIN — Medication 1 CAPSULE: at 08:27

## 2021-01-01 RX ADMIN — ASPIRIN 81 MG CHEWABLE TABLET 81 MG: 81 TABLET CHEWABLE at 09:28

## 2021-01-01 RX ADMIN — INSULIN LISPRO 4 UNITS: 100 INJECTION, SOLUTION INTRAVENOUS; SUBCUTANEOUS at 11:57

## 2021-01-01 RX ADMIN — CLOPIDOGREL BISULFATE 75 MG: 75 TABLET ORAL at 09:38

## 2021-01-01 RX ADMIN — SODIUM CHLORIDE, PRESERVATIVE FREE 10 ML: 5 INJECTION INTRAVENOUS at 20:30

## 2021-01-01 RX ADMIN — ACETAMINOPHEN 650 MG: 325 TABLET ORAL at 05:15

## 2021-01-01 RX ADMIN — SODIUM CHLORIDE, PRESERVATIVE FREE 10 ML: 5 INJECTION INTRAVENOUS at 08:51

## 2021-01-01 RX ADMIN — SODIUM CHLORIDE, PRESERVATIVE FREE 10 ML: 5 INJECTION INTRAVENOUS at 20:48

## 2021-01-01 RX ADMIN — INSULIN LISPRO 6 UNITS: 100 INJECTION, SOLUTION INTRAVENOUS; SUBCUTANEOUS at 13:29

## 2021-01-01 RX ADMIN — METOPROLOL TARTRATE 50 MG: 50 TABLET, FILM COATED ORAL at 21:33

## 2021-01-01 RX ADMIN — INSULIN LISPRO 4 UNITS: 100 INJECTION, SOLUTION INTRAVENOUS; SUBCUTANEOUS at 11:54

## 2021-01-01 RX ADMIN — INSULIN LISPRO 3 UNITS: 100 INJECTION, SOLUTION INTRAVENOUS; SUBCUTANEOUS at 13:14

## 2021-01-01 RX ADMIN — CLOPIDOGREL BISULFATE 75 MG: 75 TABLET ORAL at 07:40

## 2021-01-01 RX ADMIN — ACETAMINOPHEN 650 MG: 325 TABLET ORAL at 08:48

## 2021-01-01 RX ADMIN — Medication 1 CAPSULE: at 09:38

## 2021-01-01 RX ADMIN — INSULIN LISPRO 2 UNITS: 100 INJECTION, SOLUTION INTRAVENOUS; SUBCUTANEOUS at 18:18

## 2021-01-01 RX ADMIN — SODIUM CHLORIDE, PRESERVATIVE FREE 10 ML: 5 INJECTION INTRAVENOUS at 20:53

## 2021-01-01 RX ADMIN — MIDODRINE HYDROCHLORIDE 10 MG: 10 TABLET ORAL at 10:24

## 2021-01-01 RX ADMIN — Medication 3 MG: at 20:15

## 2021-01-01 RX ADMIN — INSULIN GLARGINE 20 UNITS: 100 INJECTION, SOLUTION SUBCUTANEOUS at 21:15

## 2021-01-01 RX ADMIN — Medication 1 CAPSULE: at 09:53

## 2021-01-01 RX ADMIN — INSULIN LISPRO 1 UNITS: 100 INJECTION, SOLUTION INTRAVENOUS; SUBCUTANEOUS at 23:46

## 2021-01-01 RX ADMIN — INSULIN GLARGINE 5 UNITS: 100 INJECTION, SOLUTION SUBCUTANEOUS at 21:11

## 2021-01-01 RX ADMIN — Medication 3 MG: at 20:45

## 2021-01-01 RX ADMIN — INSULIN GLARGINE 10 UNITS: 100 INJECTION, SOLUTION SUBCUTANEOUS at 21:30

## 2021-01-01 RX ADMIN — SODIUM CHLORIDE: 9 INJECTION, SOLUTION INTRAVENOUS at 16:07

## 2021-01-01 RX ADMIN — HYDROCODONE BITARTRATE AND ACETAMINOPHEN 1 TABLET: 5; 325 TABLET ORAL at 20:23

## 2021-01-01 RX ADMIN — ACETAMINOPHEN 650 MG: 325 TABLET ORAL at 23:40

## 2021-01-01 RX ADMIN — INSULIN GLARGINE 10 UNITS: 100 INJECTION, SOLUTION SUBCUTANEOUS at 23:36

## 2021-01-01 RX ADMIN — CLOPIDOGREL BISULFATE 75 MG: 75 TABLET ORAL at 08:27

## 2021-01-01 RX ADMIN — MIDODRINE HYDROCHLORIDE 10 MG: 10 TABLET ORAL at 09:27

## 2021-01-01 RX ADMIN — INSULIN LISPRO 3 UNITS: 100 INJECTION, SOLUTION INTRAVENOUS; SUBCUTANEOUS at 16:31

## 2021-01-01 RX ADMIN — MIDODRINE HYDROCHLORIDE 2.5 MG: 2.5 TABLET ORAL at 16:03

## 2021-01-01 RX ADMIN — INSULIN LISPRO 2 UNITS: 100 INJECTION, SOLUTION INTRAVENOUS; SUBCUTANEOUS at 09:39

## 2021-01-01 RX ADMIN — SODIUM CHLORIDE, PRESERVATIVE FREE 10 ML: 5 INJECTION INTRAVENOUS at 08:59

## 2021-01-01 RX ADMIN — METOPROLOL TARTRATE 25 MG: 50 TABLET, FILM COATED ORAL at 20:43

## 2021-01-01 RX ADMIN — Medication 1 CAPSULE: at 16:54

## 2021-01-01 RX ADMIN — METOPROLOL TARTRATE 25 MG: 50 TABLET, FILM COATED ORAL at 21:10

## 2021-01-01 RX ADMIN — METFORMIN HYDROCHLORIDE 750 MG: 500 TABLET ORAL at 09:26

## 2021-01-01 RX ADMIN — SODIUM CHLORIDE, PRESERVATIVE FREE 10 ML: 5 INJECTION INTRAVENOUS at 09:29

## 2021-01-01 RX ADMIN — INSULIN LISPRO 3 UNITS: 100 INJECTION, SOLUTION INTRAVENOUS; SUBCUTANEOUS at 20:43

## 2021-01-01 RX ADMIN — ACETAMINOPHEN 650 MG: 325 TABLET ORAL at 22:57

## 2021-01-01 RX ADMIN — INSULIN LISPRO 6 UNITS: 100 INJECTION, SOLUTION INTRAVENOUS; SUBCUTANEOUS at 11:56

## 2021-01-01 RX ADMIN — CLOPIDOGREL BISULFATE 75 MG: 75 TABLET ORAL at 08:55

## 2021-01-01 RX ADMIN — HEPARIN SODIUM 5000 UNITS: 5000 INJECTION INTRAVENOUS; SUBCUTANEOUS at 18:44

## 2021-01-01 RX ADMIN — INSULIN LISPRO 3 UNITS: 100 INJECTION, SOLUTION INTRAVENOUS; SUBCUTANEOUS at 20:31

## 2021-01-01 RX ADMIN — Medication 3 MG: at 20:30

## 2021-01-01 RX ADMIN — ATORVASTATIN CALCIUM 40 MG: 40 TABLET, FILM COATED ORAL at 21:42

## 2021-01-01 RX ADMIN — INSULIN LISPRO 4 UNITS: 100 INJECTION, SOLUTION INTRAVENOUS; SUBCUTANEOUS at 11:15

## 2021-01-01 RX ADMIN — SODIUM CHLORIDE: 9 INJECTION, SOLUTION INTRAVENOUS at 06:29

## 2021-01-01 RX ADMIN — SODIUM CHLORIDE: 9 INJECTION, SOLUTION INTRAVENOUS at 17:24

## 2021-01-01 RX ADMIN — MIDODRINE HYDROCHLORIDE 7.5 MG: 5 TABLET ORAL at 17:08

## 2021-01-01 RX ADMIN — METOPROLOL TARTRATE 50 MG: 50 TABLET, FILM COATED ORAL at 09:21

## 2021-01-01 RX ADMIN — METOPROLOL TARTRATE 25 MG: 50 TABLET, FILM COATED ORAL at 10:24

## 2021-01-01 RX ADMIN — ATORVASTATIN CALCIUM 40 MG: 40 TABLET, FILM COATED ORAL at 08:48

## 2021-01-01 RX ADMIN — SODIUM CHLORIDE 1000 ML: 9 INJECTION, SOLUTION INTRAVENOUS at 10:34

## 2021-01-01 RX ADMIN — METOPROLOL TARTRATE 50 MG: 50 TABLET, FILM COATED ORAL at 09:53

## 2021-01-01 RX ADMIN — Medication 3 MG: at 20:47

## 2021-01-01 RX ADMIN — ATORVASTATIN CALCIUM 40 MG: 40 TABLET, FILM COATED ORAL at 21:10

## 2021-01-01 RX ADMIN — INSULIN LISPRO 12 UNITS: 100 INJECTION, SOLUTION INTRAVENOUS; SUBCUTANEOUS at 11:58

## 2021-01-01 RX ADMIN — ATORVASTATIN CALCIUM 40 MG: 40 TABLET, FILM COATED ORAL at 10:42

## 2021-01-01 RX ADMIN — Medication 3 MG: at 19:57

## 2021-01-01 RX ADMIN — DULOXETINE 120 MG: 60 CAPSULE, DELAYED RELEASE ORAL at 10:24

## 2021-01-01 RX ADMIN — METFORMIN HYDROCHLORIDE 750 MG: 500 TABLET ORAL at 08:57

## 2021-01-01 RX ADMIN — METOLAZONE 2.5 MG: 5 TABLET ORAL at 09:19

## 2021-01-01 RX ADMIN — INSULIN GLARGINE 10 UNITS: 100 INJECTION, SOLUTION SUBCUTANEOUS at 20:31

## 2021-01-01 RX ADMIN — ATORVASTATIN CALCIUM 40 MG: 40 TABLET, FILM COATED ORAL at 21:25

## 2021-01-01 RX ADMIN — Medication 3 MG: at 20:04

## 2021-01-01 RX ADMIN — METOPROLOL TARTRATE 25 MG: 50 TABLET, FILM COATED ORAL at 08:57

## 2021-01-01 RX ADMIN — SODIUM CHLORIDE 500 ML: 9 INJECTION, SOLUTION INTRAVENOUS at 20:17

## 2021-01-01 RX ADMIN — ASPIRIN 81 MG CHEWABLE TABLET 81 MG: 81 TABLET CHEWABLE at 07:52

## 2021-01-01 RX ADMIN — ASPIRIN 81 MG CHEWABLE TABLET 81 MG: 81 TABLET CHEWABLE at 10:23

## 2021-01-01 RX ADMIN — METOPROLOL TARTRATE 25 MG: 50 TABLET, FILM COATED ORAL at 08:27

## 2021-01-01 RX ADMIN — Medication 3 MG: at 21:42

## 2021-01-01 RX ADMIN — ACETAMINOPHEN 650 MG: 325 TABLET ORAL at 01:44

## 2021-01-01 RX ADMIN — INSULIN LISPRO 2 UNITS: 100 INJECTION, SOLUTION INTRAVENOUS; SUBCUTANEOUS at 08:35

## 2021-01-01 RX ADMIN — INSULIN LISPRO 2 UNITS: 100 INJECTION, SOLUTION INTRAVENOUS; SUBCUTANEOUS at 09:36

## 2021-01-01 RX ADMIN — SODIUM CHLORIDE 250 ML: 9 INJECTION, SOLUTION INTRAVENOUS at 18:24

## 2021-01-01 RX ADMIN — INSULIN LISPRO 6 UNITS: 100 INJECTION, SOLUTION INTRAVENOUS; SUBCUTANEOUS at 11:46

## 2021-01-01 RX ADMIN — DOCUSATE SODIUM 100 MG: 100 CAPSULE ORAL at 08:55

## 2021-01-01 RX ADMIN — MIDODRINE HYDROCHLORIDE 10 MG: 10 TABLET ORAL at 12:34

## 2021-01-01 RX ADMIN — ACETAMINOPHEN 650 MG: 325 TABLET ORAL at 03:44

## 2021-01-01 RX ADMIN — INSULIN LISPRO 4 UNITS: 100 INJECTION, SOLUTION INTRAVENOUS; SUBCUTANEOUS at 17:54

## 2021-01-01 RX ADMIN — SODIUM CHLORIDE, PRESERVATIVE FREE 10 ML: 5 INJECTION INTRAVENOUS at 20:38

## 2021-01-01 RX ADMIN — METOPROLOL TARTRATE 50 MG: 50 TABLET, FILM COATED ORAL at 20:30

## 2021-01-01 RX ADMIN — DULOXETINE 120 MG: 60 CAPSULE, DELAYED RELEASE ORAL at 10:11

## 2021-01-01 RX ADMIN — ATORVASTATIN CALCIUM 40 MG: 40 TABLET, FILM COATED ORAL at 20:37

## 2021-01-01 RX ADMIN — DULOXETIN HYDROCHLORIDE 120 MG: 60 CAPSULE, DELAYED RELEASE ORAL at 08:33

## 2021-01-01 RX ADMIN — ATORVASTATIN CALCIUM 40 MG: 40 TABLET, FILM COATED ORAL at 20:04

## 2021-01-01 RX ADMIN — ASPIRIN 81 MG CHEWABLE TABLET 81 MG: 81 TABLET CHEWABLE at 07:40

## 2021-01-01 RX ADMIN — ATORVASTATIN CALCIUM 40 MG: 40 TABLET, FILM COATED ORAL at 07:40

## 2021-01-01 RX ADMIN — DULOXETIN HYDROCHLORIDE 120 MG: 60 CAPSULE, DELAYED RELEASE ORAL at 07:41

## 2021-01-01 RX ADMIN — DOCUSATE SODIUM 100 MG: 100 CAPSULE ORAL at 10:28

## 2021-01-01 RX ADMIN — INSULIN LISPRO 2 UNITS: 100 INJECTION, SOLUTION INTRAVENOUS; SUBCUTANEOUS at 08:29

## 2021-01-01 RX ADMIN — INSULIN LISPRO 4 UNITS: 100 INJECTION, SOLUTION INTRAVENOUS; SUBCUTANEOUS at 08:00

## 2021-01-01 RX ADMIN — METOPROLOL TARTRATE 25 MG: 50 TABLET, FILM COATED ORAL at 09:08

## 2021-01-01 RX ADMIN — INSULIN LISPRO 4 UNITS: 100 INJECTION, SOLUTION INTRAVENOUS; SUBCUTANEOUS at 08:51

## 2021-01-01 RX ADMIN — POTASSIUM CHLORIDE 10 MEQ: 750 TABLET, EXTENDED RELEASE ORAL at 09:54

## 2021-01-01 RX ADMIN — SODIUM CHLORIDE: 9 INJECTION, SOLUTION INTRAVENOUS at 10:13

## 2021-01-01 RX ADMIN — INSULIN LISPRO 6 UNITS: 100 INJECTION, SOLUTION INTRAVENOUS; SUBCUTANEOUS at 12:24

## 2021-01-01 RX ADMIN — Medication 3 MG: at 21:11

## 2021-01-01 RX ADMIN — METOPROLOL TARTRATE 50 MG: 50 TABLET, FILM COATED ORAL at 20:37

## 2021-01-01 RX ADMIN — SODIUM CHLORIDE, PRESERVATIVE FREE 10 ML: 5 INJECTION INTRAVENOUS at 19:36

## 2021-01-01 RX ADMIN — Medication 3 MG: at 20:21

## 2021-01-01 RX ADMIN — SODIUM CHLORIDE, PRESERVATIVE FREE 10 ML: 5 INJECTION INTRAVENOUS at 20:12

## 2021-01-01 RX ADMIN — Medication 3 MG: at 20:43

## 2021-01-01 RX ADMIN — METOPROLOL TARTRATE 25 MG: 50 TABLET, FILM COATED ORAL at 21:42

## 2021-01-01 RX ADMIN — ACETAMINOPHEN 650 MG: 325 TABLET ORAL at 12:40

## 2021-01-01 RX ADMIN — ACETAMINOPHEN 650 MG: 325 TABLET ORAL at 13:53

## 2021-01-01 RX ADMIN — Medication 3 MG: at 21:12

## 2021-01-01 RX ADMIN — CLOPIDOGREL BISULFATE 75 MG: 75 TABLET ORAL at 08:29

## 2021-01-01 RX ADMIN — CEFTRIAXONE SODIUM 1000 MG: 1 INJECTION, POWDER, FOR SOLUTION INTRAMUSCULAR; INTRAVENOUS at 17:24

## 2021-01-01 RX ADMIN — SODIUM CHLORIDE 1000 ML: 9 INJECTION, SOLUTION INTRAVENOUS at 17:39

## 2021-01-01 RX ADMIN — SODIUM CHLORIDE, PRESERVATIVE FREE 10 ML: 5 INJECTION INTRAVENOUS at 09:38

## 2021-01-01 RX ADMIN — ATORVASTATIN CALCIUM 40 MG: 40 TABLET, FILM COATED ORAL at 10:11

## 2021-01-01 RX ADMIN — Medication 1 CAPSULE: at 08:52

## 2021-01-01 RX ADMIN — INSULIN GLARGINE 5 UNITS: 100 INJECTION, SOLUTION SUBCUTANEOUS at 20:43

## 2021-01-01 RX ADMIN — SODIUM CHLORIDE, PRESERVATIVE FREE 10 ML: 5 INJECTION INTRAVENOUS at 22:13

## 2021-01-01 RX ADMIN — CLOPIDOGREL BISULFATE 75 MG: 75 TABLET ORAL at 07:42

## 2021-01-01 RX ADMIN — SODIUM CHLORIDE, PRESERVATIVE FREE 10 ML: 5 INJECTION INTRAVENOUS at 09:34

## 2021-01-01 RX ADMIN — SODIUM CHLORIDE, PRESERVATIVE FREE 10 ML: 5 INJECTION INTRAVENOUS at 09:08

## 2021-01-01 RX ADMIN — Medication 1 CAPSULE: at 09:27

## 2021-01-01 RX ADMIN — ACETAMINOPHEN 650 MG: 325 TABLET ORAL at 23:08

## 2021-01-01 RX ADMIN — CLOPIDOGREL BISULFATE 75 MG: 75 TABLET ORAL at 10:23

## 2021-01-01 RX ADMIN — INSULIN LISPRO 3 UNITS: 100 INJECTION, SOLUTION INTRAVENOUS; SUBCUTANEOUS at 12:27

## 2021-01-01 RX ADMIN — INSULIN LISPRO 2 UNITS: 100 INJECTION, SOLUTION INTRAVENOUS; SUBCUTANEOUS at 09:21

## 2021-01-01 RX ADMIN — MIDODRINE HYDROCHLORIDE 10 MG: 10 TABLET ORAL at 15:56

## 2021-01-01 RX ADMIN — ENOXAPARIN SODIUM 30 MG: 100 INJECTION SUBCUTANEOUS at 15:43

## 2021-01-01 RX ADMIN — HEPARIN SODIUM 5000 UNITS: 5000 INJECTION INTRAVENOUS; SUBCUTANEOUS at 00:01

## 2021-01-01 RX ADMIN — ATORVASTATIN CALCIUM 40 MG: 40 TABLET, FILM COATED ORAL at 08:57

## 2021-01-01 RX ADMIN — CLOPIDOGREL BISULFATE 75 MG: 75 TABLET ORAL at 09:12

## 2021-01-01 RX ADMIN — MIDODRINE HYDROCHLORIDE 7.5 MG: 5 TABLET ORAL at 07:51

## 2021-01-01 RX ADMIN — METOPROLOL TARTRATE 50 MG: 50 TABLET, FILM COATED ORAL at 09:26

## 2021-01-01 RX ADMIN — Medication 1 CAPSULE: at 16:50

## 2021-01-01 RX ADMIN — CLOPIDOGREL BISULFATE 75 MG: 75 TABLET ORAL at 09:10

## 2021-01-01 RX ADMIN — SODIUM CHLORIDE, PRESERVATIVE FREE 10 ML: 5 INJECTION INTRAVENOUS at 09:20

## 2021-01-01 RX ADMIN — CLOPIDOGREL BISULFATE 75 MG: 75 TABLET ORAL at 07:52

## 2021-01-01 RX ADMIN — CLOPIDOGREL BISULFATE 75 MG: 75 TABLET ORAL at 08:33

## 2021-01-01 RX ADMIN — MIDODRINE HYDROCHLORIDE 10 MG: 10 TABLET ORAL at 12:31

## 2021-01-01 RX ADMIN — FENTANYL CITRATE 25 MCG: 50 INJECTION, SOLUTION INTRAMUSCULAR; INTRAVENOUS at 11:16

## 2021-01-01 RX ADMIN — SODIUM CHLORIDE: 9 INJECTION, SOLUTION INTRAVENOUS at 13:40

## 2021-01-01 RX ADMIN — INSULIN GLARGINE 20 UNITS: 100 INJECTION, SOLUTION SUBCUTANEOUS at 20:04

## 2021-01-01 RX ADMIN — TRAZODONE HYDROCHLORIDE 50 MG: 50 TABLET ORAL at 20:36

## 2021-01-01 RX ADMIN — ATORVASTATIN CALCIUM 40 MG: 40 TABLET, FILM COATED ORAL at 09:04

## 2021-01-01 ASSESSMENT — PAIN DESCRIPTION - PAIN TYPE
TYPE: ACUTE PAIN;CHRONIC PAIN
TYPE: ACUTE PAIN

## 2021-01-01 ASSESSMENT — ENCOUNTER SYMPTOMS
RESPIRATORY NEGATIVE: 1
BLOOD IN STOOL: 0
CONSTIPATION: 0
CONSTIPATION: 0
WHEEZING: 0
EYE PAIN: 0
ABDOMINAL PAIN: 0
FACIAL SWELLING: 0
VOMITING: 0
FACIAL SWELLING: 0
DIARRHEA: 0
ANAL BLEEDING: 0
PHOTOPHOBIA: 0
ABDOMINAL PAIN: 1
VOMITING: 0
CHEST TIGHTNESS: 0
ABDOMINAL PAIN: 0
CONSTIPATION: 0
APNEA: 0
ABDOMINAL DISTENTION: 0
CHOKING: 0
SHORTNESS OF BREATH: 0
BLOOD IN STOOL: 0
CHEST TIGHTNESS: 0
EYES NEGATIVE: 1
BACK PAIN: 0
SORE THROAT: 0
EYE ITCHING: 0
SHORTNESS OF BREATH: 0
SHORTNESS OF BREATH: 0
BACK PAIN: 0
STRIDOR: 0
BLOOD IN STOOL: 0
SINUS PRESSURE: 0
EYES NEGATIVE: 1
CHEST TIGHTNESS: 0
COUGH: 0
WHEEZING: 0
ABDOMINAL PAIN: 0
PHOTOPHOBIA: 0
CHEST TIGHTNESS: 0
EYE REDNESS: 0
RECTAL PAIN: 0
COUGH: 0
EYE PAIN: 0
EYE DISCHARGE: 0
ABDOMINAL PAIN: 0
NAUSEA: 0
CHEST TIGHTNESS: 0
SHORTNESS OF BREATH: 0
DIARRHEA: 0
SORE THROAT: 0
GASTROINTESTINAL NEGATIVE: 1
RHINORRHEA: 0
BACK PAIN: 0
EYE PAIN: 0
DIARRHEA: 0
VOICE CHANGE: 0
TROUBLE SWALLOWING: 0
COUGH: 0
VOICE CHANGE: 0
SHORTNESS OF BREATH: 0
WHEEZING: 0
ABDOMINAL DISTENTION: 0
BLOOD IN STOOL: 0
EYE PAIN: 0
COUGH: 0
SINUS PRESSURE: 0
ABDOMINAL PAIN: 0
SORE THROAT: 0
SINUS PAIN: 0
NAUSEA: 0
NAUSEA: 0
CONSTIPATION: 0
COLOR CHANGE: 0
VOMITING: 0
BACK PAIN: 0
COLOR CHANGE: 1
SHORTNESS OF BREATH: 0
SINUS PAIN: 0
DIARRHEA: 0
VOMITING: 0
VOMITING: 0
NAUSEA: 0
SINUS PAIN: 0
COUGH: 0
BACK PAIN: 0
TROUBLE SWALLOWING: 0
COLOR CHANGE: 0
BACK PAIN: 0
DIARRHEA: 0
SHORTNESS OF BREATH: 0
NAUSEA: 0
STRIDOR: 0
VOMITING: 0
EYE REDNESS: 0
NAUSEA: 0

## 2021-01-01 ASSESSMENT — PAIN SCALES - GENERAL
PAINLEVEL_OUTOF10: 0
PAINLEVEL_OUTOF10: 4
PAINLEVEL_OUTOF10: 0
PAINLEVEL_OUTOF10: 8
PAINLEVEL_OUTOF10: 0
PAINLEVEL_OUTOF10: 5
PAINLEVEL_OUTOF10: 4
PAINLEVEL_OUTOF10: 0
PAINLEVEL_OUTOF10: 4
PAINLEVEL_OUTOF10: 0
PAINLEVEL_OUTOF10: 5
PAINLEVEL_OUTOF10: 0
PAINLEVEL_OUTOF10: 0
PAINLEVEL_OUTOF10: 5
PAINLEVEL_OUTOF10: 0
PAINLEVEL_OUTOF10: 3
PAINLEVEL_OUTOF10: 0
PAINLEVEL_OUTOF10: 3
PAINLEVEL_OUTOF10: 4
PAINLEVEL_OUTOF10: 0
PAINLEVEL_OUTOF10: 0
PAINLEVEL_OUTOF10: 5
PAINLEVEL_OUTOF10: 0
PAINLEVEL_OUTOF10: 3
PAINLEVEL_OUTOF10: 0
PAINLEVEL_OUTOF10: 6
PAINLEVEL_OUTOF10: 0
PAINLEVEL_OUTOF10: 8
PAINLEVEL_OUTOF10: 3
PAINLEVEL_OUTOF10: 0
PAINLEVEL_OUTOF10: 4
PAINLEVEL_OUTOF10: 0
PAINLEVEL_OUTOF10: 2
PAINLEVEL_OUTOF10: 0
PAINLEVEL_OUTOF10: 5
PAINLEVEL_OUTOF10: 1
PAINLEVEL_OUTOF10: 8
PAINLEVEL_OUTOF10: 0

## 2021-01-01 ASSESSMENT — PAIN SCALES - WONG BAKER
WONGBAKER_NUMERICALRESPONSE: 0

## 2021-01-01 ASSESSMENT — PAIN DESCRIPTION - ONSET
ONSET: GRADUAL
ONSET: SUDDEN

## 2021-01-01 ASSESSMENT — PAIN DESCRIPTION - LOCATION
LOCATION: CHEST
LOCATION: CHEST
LOCATION: SHOULDER;NECK
LOCATION: BACK
LOCATION: CHEST

## 2021-01-01 ASSESSMENT — PAIN DESCRIPTION - FREQUENCY
FREQUENCY: INTERMITTENT
FREQUENCY: INTERMITTENT
FREQUENCY: CONTINUOUS

## 2021-01-01 ASSESSMENT — PAIN DESCRIPTION - PROGRESSION
CLINICAL_PROGRESSION: NOT CHANGED
CLINICAL_PROGRESSION: GRADUALLY WORSENING

## 2021-01-01 ASSESSMENT — PAIN - FUNCTIONAL ASSESSMENT: PAIN_FUNCTIONAL_ASSESSMENT: ACTIVITIES ARE NOT PREVENTED

## 2021-01-01 ASSESSMENT — PAIN DESCRIPTION - DESCRIPTORS
DESCRIPTORS: ACHING
DESCRIPTORS: OTHER (COMMENT)

## 2021-01-01 ASSESSMENT — PAIN DESCRIPTION - ORIENTATION
ORIENTATION: MID
ORIENTATION: LEFT
ORIENTATION: LEFT

## 2021-01-07 ENCOUNTER — HOSPITAL ENCOUNTER (OUTPATIENT)
Dept: MRI IMAGING | Age: 86
Discharge: HOME | End: 2021-01-07
Payer: MEDICARE

## 2021-01-07 DIAGNOSIS — Z87.820 HISTORY OF CLOSED HEAD INJURY: ICD-10-CM

## 2021-01-07 PROCEDURE — 70553 MRI BRAIN STEM W/O & W/DYE: CPT

## 2021-01-07 PROCEDURE — 6360000004 HC RX CONTRAST MEDICATION: Performed by: OTOLARYNGOLOGY

## 2021-01-07 PROCEDURE — A9579 GAD-BASE MR CONTRAST NOS,1ML: HCPCS | Performed by: OTOLARYNGOLOGY

## 2021-01-07 RX ADMIN — GADOTERIDOL 15 ML: 279.3 INJECTION, SOLUTION INTRAVENOUS at 13:22

## 2021-01-19 ENCOUNTER — OFFICE VISIT (OUTPATIENT)
Dept: ENT CLINIC | Age: 86
End: 2021-01-19
Payer: MEDICARE

## 2021-01-19 VITALS
RESPIRATION RATE: 14 BRPM | HEART RATE: 96 BPM | BODY MASS INDEX: 30.8 KG/M2 | HEIGHT: 64 IN | WEIGHT: 180.4 LBS | TEMPERATURE: 97.1 F | SYSTOLIC BLOOD PRESSURE: 132 MMHG | DIASTOLIC BLOOD PRESSURE: 70 MMHG

## 2021-01-19 DIAGNOSIS — H91.21 SUDDEN RIGHT HEARING LOSS: ICD-10-CM

## 2021-01-19 DIAGNOSIS — S02.2XXD CLOSED DISPLACED FRACTURE OF NASAL BONE WITH ROUTINE HEALING, SUBSEQUENT ENCOUNTER: ICD-10-CM

## 2021-01-19 DIAGNOSIS — Z87.820 HISTORY OF CLOSED HEAD INJURY: ICD-10-CM

## 2021-01-19 DIAGNOSIS — J34.2 NASAL SEPTAL DEVIATION: ICD-10-CM

## 2021-01-19 DIAGNOSIS — R93.0 ABNORMAL MRI OF HEAD: ICD-10-CM

## 2021-01-19 DIAGNOSIS — Z79.01 ON CONTINUOUS ORAL ANTICOAGULATION: ICD-10-CM

## 2021-01-19 PROCEDURE — 99214 OFFICE O/P EST MOD 30 MIN: CPT | Performed by: OTOLARYNGOLOGY

## 2021-01-19 ASSESSMENT — ENCOUNTER SYMPTOMS
WHEEZING: 0
VOMITING: 0
ABDOMINAL PAIN: 0
STRIDOR: 0
COLOR CHANGE: 0
RHINORRHEA: 0
APNEA: 0
NAUSEA: 0
DIARRHEA: 0
TROUBLE SWALLOWING: 0
CHOKING: 0
FACIAL SWELLING: 0
SINUS PRESSURE: 0
COUGH: 0
VOICE CHANGE: 0
SHORTNESS OF BREATH: 0
CHEST TIGHTNESS: 0
SORE THROAT: 0

## 2021-01-19 NOTE — PROGRESS NOTES
[address] EAR, NOSE AND THROAT  [address] [place] [address] [facility]  [address] [place] [address]  Dept: [phone]  Dept Fax: [phone]  Loc: [phone]    Cristina Minaya is a 80 y.o. female who was referred byNo ref. provider found for:  Chief Complaint   Patient presents with    Follow-up     Patient is here for a follow up after MRI. patient would like her nose looked at she broke it a year ago    . HPI:     Cristina Minaya is a 80 y.o. female who presents today for follow-up on her MRI. She had a significant sudden right sensorineural hearing loss with only slight recovery. MRI was negative for acoustic but had significant abnormalities and other studies were requested:    PROCEDURE: MRI BRAIN W WO CONTRAST       CLINICAL INFORMATIONAsymmetrical hearing loss of right ear, History of closed head injury. Right hearing loss after a fall 6 weeks ago.       COMPARISON: Head CT 11/20/2020. Brain MRI 1/3/2017.       TECHNIQUE: Multiplanar and multiple spin echo T1 and T2-weighted images were obtained through the brain before and after the administration of intravenous contrast. Dedicated images were obtained through the IACs including high-resolution T2-weighted    images and pre-and postcontrast T1-weighted images with fat saturation.       FINDINGS:               The diffusion-weighted images are normal. The brain volume is mildly reduced. .There are no intra-or extra-axial collections.  There is no hydrocephalus, midline shift or mass effect.           On the FLAIR and T2-weighted sequences, there is mild signal hyperintensity scattered in the white matter of the brain. This is most consistent with mild severity chronic small vessel ischemic changes. These have mildly progressed compared to 2017.  There    are small old infarcts in the medial cerebellar hemispheres bilaterally.       On the gradient echo T2-weighted images, there is an old microhemorrhage in the deep white matter of the right parietal lobe. There is mineralization in the basal ganglia bilaterally.       There is no abnormal enhancement in the brain. The major intracranial vascular flow voids are present.  The midline craniocervical junction structures are normal.  The brainstem and pituitary gland are normal.       There is a possible aneurysm seen dorsal to the medulla. This is seen on the sagittal postcontrast images. This could also represent a tortuous vein. This is near the right vertebral artery. This is also near a vein.       Dedicated images through the IACs demonstrate a normal appearance to the cerebellopontine angles. The 7th and 8th cranial nerves appear normal. The cochlea and semicircular canals have normal signal. On the postcontrast images, there is no abnormal    enhancement.       There is a small amount of fluid signal left mastoid air cells. The cavernous sinuses are within acceptable limits.               Impression       1. Possible aneurysm versus abnormal flow-void dorsal to the medulla. A CTA of the head and neck is recommended for further evaluation. 2. 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 3. Mild severity chronic small vessel ischemic changes. These have mildly progressed. 4. Normal appearing IACs.             **This report has been created using voice recognition software. It may contain minor errors which are inherent in voice recognition technology. **           Final report electronically signed by Dr. Olayinka Retana on 1/7/2021 3:27 PM     .    History:      Allergies   Allergen Reactions    Ciprofloxacin Hcl     Food Rash     strawberries    Penicillins Swelling and Rash     Current Outpatient Medications   Medication Sig Dispense Refill    polyethyl glycol-propyl glycol 0.4-0.3 % (SYSTANE) 0.4-0.3 % ophthalmic solution 1 drop as needed for Dry Eyes      melatonin 3 MG TABS tablet Take 3 mg by mouth nightly      metOLazone (ZAROXOLYN) 2.5 MG tablet Take 2.5 mg by mouth daily as needed (wt gain, edema, sob)      metoprolol tartrate (LOPRESSOR) 25 MG tablet Take 25 mg by mouth 2 times daily       magnesium hydroxide (MILK OF MAGNESIA) 400 MG/5ML suspension Take by mouth daily as needed for Constipation      clopidogrel (PLAVIX) 75 MG tablet Pt takes 300 mg tonight then 75 mg every day starting on 2/4/2020 34 tablet 0    bumetanide (BUMEX) 2 MG tablet Take 1 tablet by mouth daily 30 tablet 3    metFORMIN (GLUCOPHAGE-XR) 750 MG extended release tablet Take 1 tablet by mouth Daily with supper Restart this medication Friday am 30 tablet 3    nitroGLYCERIN (NITROSTAT) 0.4 MG SL tablet up to max of 3 total doses. If no relief after 1 dose, call 911. 25 tablet 1    atorvastatin (LIPITOR) 40 MG tablet Take 1 tablet by mouth daily 30 tablet 3    potassium chloride (KLOR-CON) 10 MEQ extended release tablet Take 1 tablet by mouth daily 30 tablet 0    D-Mannose 500 MG CAPS Take 1 capsule by mouth daily       DULoxetine (CYMBALTA) 60 MG extended release capsule Take 2 capsules by mouth daily 60 capsule 0    Multiple Vitamins-Minerals (SENIOR MULTIVITAMIN PLUS) TABS Take 1 tablet by mouth daily      glucose blood VI test strips (ONE TOUCH ULTRA TEST) strip Test daily or AD. Dx. 250.00 50 each 11    acetaminophen (TYLENOL) 325 MG tablet Take 650 mg by mouth every 6 hours as needed for Pain       No current facility-administered medications for this visit.       Past Medical History:   Diagnosis Date    Anxiety     nervous breakdown 9/2016    [facility]     CAD (coronary artery disease)     cardiomegaly    COPD (chronic obstructive pulmonary disease) (HCC)     Diverticulitis     DVT (deep venous thrombosis) (HCC)     GERD (gastroesophageal reflux disease)     irritable bowel disease    Hyperlipidemia     Hypertension     Osteoarthritis     Pancreatic cancer ([facility]     Family    Psychiatric problem     alzheimers start    [facility]ism ([facility]     S/P knee replacement     Type 2 diabetes mellitus ([facility] 2009      Past Surgical History:   Procedure Laterality Date    BREAST SURGERY      right lumpectomy    CHOLECYSTECTOMY  4-15-16    cholangiogram    COLONOSCOPY      EYE SURGERY      macular hole left eye    JOINT REPLACEMENT      left knee & right hip    SKIN BIOPSY      right arm     Family History   Problem Relation Age of Onset    Cancer Mother         pancreatic    Heart Disease Father 80    Other Sister         infant death    High Cholesterol Brother      Social History     Tobacco Use    Smoking status: Never Smoker    Smokeless tobacco: Never Used   Substance Use Topics    Alcohol use: Yes     Comment: rare       Subjective:      Review of Systems   Constitutional: Negative for activity change, appetite change, chills, diaphoresis, fatigue, fever and unexpected weight change. HENT: Negative for congestion, dental problem, ear discharge, ear pain, facial swelling, hearing loss, mouth sores, nosebleeds, postnasal drip, rhinorrhea, sinus pressure, sneezing, sore throat, tinnitus, trouble swallowing and voice change. Eyes: Negative for visual disturbance. Respiratory: Negative for apnea, cough, choking, chest tightness, shortness of breath, wheezing and stridor. Cardiovascular: Negative for chest pain, palpitations and leg swelling. Gastrointestinal: Negative for abdominal pain, diarrhea, nausea and vomiting. Endocrine: Negative for cold intolerance, heat intolerance, polydipsia and polyuria. Genitourinary: Negative for dysuria, enuresis and hematuria. Musculoskeletal: Negative for arthralgias, gait problem, neck pain and neck stiffness. Skin: Negative for color change and rash. Allergic/Immunologic: Negative for environmental allergies, food allergies and immunocompromised state. Neurological: Negative for dizziness, syncope, facial asymmetry, speech difficulty, light-headedness and headaches. Hematological: Negative for adenopathy. Does not bruise/bleed easily. Psychiatric/Behavioral: Negative for confusion and sleep disturbance. The patient is not nervous/anxious. Objective:   /70 (Site: Left Upper Arm, Position: Sitting)   Pulse 96   Temp 97.1 °F (36.2 °C) (Infrared)   Resp 14   Ht 5' 4\" (1.626 m)   Wt 180 lb 6.4 oz (81.8 kg)   BMI 30.97 kg/m²     Physical Exam   Complex nasal septal deviation    Data:  All of the past medical history, past surgical history, family history,social history, allergies and current medications were reviewed with the patient. Assessment & Plan   Diagnoses and all orders for this visit:     Diagnosis Orders   1. Asymmetrical hearing loss of right ear  [facility] COMPREHENSIVE HEARING TEST   2. Sudden right hearing loss  [facility] COMPREHENSIVE HEARING TEST   3. Closed displaced fracture of nasal bone with routine healing, subsequent encounter     4. Nasal septal deviation     5. History of closed head injury  [facility] COMPREHENSIVE HEARING TEST   6. On continuous oral anticoagulation     7. Abnormal MRI of head         The findings were explained and her questions were answered. She had a stent placed in her heart March 2020. She sees her cardiologist soon and will ask regarding clearance for septoplasty. Nasal obstruction is bothering her a great deal.  This would interfere with sleep and oxygenation during sleep etc.    The MRI report was quite worrisome with possible aneurysm. This would need to be investigated prior to any surgery. I would of course leave decisions regarding referral for the MRI abnormalities up to Dr. Lupis Mason. At least she does not have an acoustic neuroma as the cause of her sudden hearing loss. Luz Monroe. Mustapha Shetty MD    **This report has been created using voice recognition software. It may contain minor errors which are inherent in voicerecognition technology. **

## 2021-01-19 NOTE — LETTER
[address] and Throat  Elzbieta Garcia [address] [facility]  Phone: [phone]  Fax: [address], [place]        January 31, 2021    Lima Jernigan, [address] [address]    Patient: Rajiv Rogers   MR Number: [id number]   YOB: 1935   Date of Visit: 1/19/2021     Dear Lima Jernigan,    I recently saw your patient, Rajiv Rogers, regarding her recent MRI. There was no acoustic neuroma to explain her sudden hearing loss, but other findings were somewhat alarming. This was read by Dr. Alberta Camarena. Impression       1. Possible aneurysm versus abnormal flow-void dorsal to the medulla. A CTA of the head and neck is recommended for further evaluation. 2. 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 3. Mild severity chronic small vessel ischemic changes. These have mildly progressed. 4. Normal appearing IACs. Below are the relevant portions of my assessment and plan of care. Assessment & Plan   Diagnoses and all orders for this visit:     Diagnosis Orders   1. Asymmetrical hearing loss of right ear  [facility] COMPREHENSIVE HEARING TEST   2. Sudden right hearing loss  [facility] COMPREHENSIVE HEARING TEST   3. Closed displaced fracture of nasal bone with routine healing, subsequent encounter     4. Nasal septal deviation     5. History of closed head injury  [facility] COMPREHENSIVE HEARING TEST   6. On continuous oral anticoagulation     7. Abnormal MRI of head         The findings were explained and her questions were answered. She had a cardiac stent placed March 2020. She sees her cardiologist soon and will ask regarding clearance for septoplasty.   Nasal obstruction is bothering her a great deal.  This would interfere with sleep and oxygenation during sleep etc. The MRI report was quite worrisome with possible aneurysm. This would need to be investigated, especially prior to any surgery. I would of course leave decisions regarding referral for the MRI abnormalities up to Dr. Brandan Paul. At least she does not have an acoustic neuroma as the cause of her sudden hearing loss. If you have questions, please do not hesitate to call me.       Sincerely,          Jayme Key MD

## 2021-01-22 ENCOUNTER — HOSPITAL ENCOUNTER (OUTPATIENT)
Dept: AUDIOLOGY | Age: 86
Discharge: HOME | End: 2021-01-22
Payer: MEDICARE

## 2021-01-22 PROCEDURE — 92557 COMPREHENSIVE HEARING TEST: CPT | Performed by: AUDIOLOGIST

## 2021-02-01 ENCOUNTER — TELEPHONE (OUTPATIENT)
Dept: ENT CLINIC | Age: 86
End: 2021-02-01

## 2021-02-01 NOTE — TELEPHONE ENCOUNTER
----- Message from Meg Evans MD sent at 1/31/2021 10:43 PM EST -----  Regarding: Schedule visit 2/5  I believe this visit was set up last spring. It does not fit with her current problems and probably could be deferred. She needs to check in with her PCP regarding her abnormal MRI result before seeing me again. She was going to speak with her cardiologist regarding cardiac clearance for septoplasty to correct her results of her nasal fracture. She had a stent 3/2020. She has not spoken with her PCP  regarding her MRI results, please call that office and fax a copy of the MRI, to the attention of Dr Mckenzie Figueroa. They are not with [facility].     Thank you

## 2021-02-23 LAB
A/G RATIO: 1.1
ALBUMIN SERPL-MCNC: 3.4 G/DL
ALP BLD-CCNC: 66 U/L
ALT SERPL-CCNC: 15 U/L
AST SERPL-CCNC: 14 U/L
BASOPHILS ABSOLUTE: ABNORMAL
BASOPHILS RELATIVE PERCENT: 0.04 %
BILIRUB SERPL-MCNC: 0.4 MG/DL (ref 0.1–1.4)
BILIRUBIN DIRECT: 0.1 MG/DL
BILIRUBIN, INDIRECT: ABNORMAL
BUN BLDV-MCNC: 21 MG/DL
CALCIUM SERPL-MCNC: 9 MG/DL
CHLORIDE BLD-SCNC: 94 MMOL/L
CO2: 33 MMOL/L
CREAT SERPL-MCNC: 1.3 MG/DL
EOSINOPHILS ABSOLUTE: 0.2 /ΜL
EOSINOPHILS RELATIVE PERCENT: 2.9 %
GFR CALCULATED: 39
GLOBULIN: ABNORMAL
GLUCOSE BLD-MCNC: 298 MG/DL
HCT VFR BLD CALC: 36.1 % (ref 36–46)
HEMOGLOBIN: 11.9 G/DL (ref 12–16)
LYMPHOCYTES ABSOLUTE: 2.6 /ΜL
LYMPHOCYTES RELATIVE PERCENT: 36.8 %
MCH RBC QN AUTO: 31.8 PG
MCHC RBC AUTO-ENTMCNC: 32.9 G/DL
MCV RBC AUTO: 96.8 FL
MONOCYTES ABSOLUTE: 0.8 /ΜL
MONOCYTES RELATIVE PERCENT: 11.3 %
NEUTROPHILS ABSOLUTE: 3.5 /ΜL
NEUTROPHILS RELATIVE PERCENT: 48.6 %
PLATELET # BLD: 249 K/ΜL
PMV BLD AUTO: 7.3 FL
POTASSIUM SERPL-SCNC: 4.1 MMOL/L
PROTEIN TOTAL: 6.6 G/DL
RBC # BLD: 3.73 10^6/ΜL
SODIUM BLD-SCNC: 135 MMOL/L
WBC # BLD: 7.1 10^3/ML

## 2021-03-04 ENCOUNTER — OFFICE VISIT (OUTPATIENT)
Dept: CARDIOLOGY CLINIC | Age: 86
End: 2021-03-04
Payer: MEDICARE

## 2021-03-04 VITALS
OXYGEN SATURATION: 97 % | HEART RATE: 100 BPM | WEIGHT: 182 LBS | BODY MASS INDEX: 31.07 KG/M2 | SYSTOLIC BLOOD PRESSURE: 128 MMHG | DIASTOLIC BLOOD PRESSURE: 76 MMHG | HEIGHT: 64 IN

## 2021-03-04 DIAGNOSIS — I50.32 CHF (CONGESTIVE HEART FAILURE), NYHA CLASS II, CHRONIC, DIASTOLIC (HCC): ICD-10-CM

## 2021-03-04 DIAGNOSIS — I25.10 CORONARY ARTERY DISEASE INVOLVING NATIVE CORONARY ARTERY OF NATIVE HEART WITHOUT ANGINA PECTORIS: ICD-10-CM

## 2021-03-04 DIAGNOSIS — R00.0 SINUS TACHYCARDIA: Primary | ICD-10-CM

## 2021-03-04 DIAGNOSIS — I95.9 HYPOTENSION, UNSPECIFIED HYPOTENSION TYPE: ICD-10-CM

## 2021-03-04 PROCEDURE — 93000 ELECTROCARDIOGRAM COMPLETE: CPT | Performed by: NURSE PRACTITIONER

## 2021-03-04 PROCEDURE — 99214 OFFICE O/P EST MOD 30 MIN: CPT | Performed by: NURSE PRACTITIONER

## 2021-03-04 RX ORDER — IVABRADINE 5 MG/1
5 TABLET, FILM COATED ORAL 2 TIMES DAILY WITH MEALS
Qty: 60 TABLET | Refills: 5 | Status: SHIPPED | OUTPATIENT
Start: 2021-03-04 | End: 2021-03-16

## 2021-03-04 ASSESSMENT — ENCOUNTER SYMPTOMS
SHORTNESS OF BREATH: 1
ABDOMINAL DISTENTION: 0
COUGH: 0

## 2021-03-04 NOTE — PATIENT INSTRUCTIONS
You may receive a survey regarding the care you received during your visit. Your input is valuable to us. We encourage you to complete and return your survey. We hope you will choose us in the future for your healthcare needs. Continue:  · Continue current medications  · Daily weights and record  · Fluid restriction of 2 Liters per day  · Limit sodium in diet to around 4907-4000 mg/day  · Monitor BP  · Activity as tolerated     Call the [facility] for any of the following symptoms: [phone]  ? Weight gain of 2-3 pounds in 1 day or 5 pounds in 1 week  ? Increased shortness of breath  ? Shortness of breath while laying down  ? Cough  ? Chest pain  ? Swelling in feet, ankles or legs  ? Tenderness or bloating in the abdomen  ? Fatigue   ? Decreased appetite or nausea   ? Confusion   ?

## 2021-03-04 NOTE — PROGRESS NOTES
Hypertension     Osteoarthritis     Pancreatic cancer [facility])     Family    Psychiatric problem     alzheimers start    Pulmonary embolism ([facility]     S/P knee replacement     Type 2 diabetes mellitus ([facility] 2009     Past Surgical History:   Procedure Laterality Date    BREAST SURGERY      right lumpectomy    CHOLECYSTECTOMY  4-15-16    cholangiogram    COLONOSCOPY      EYE SURGERY      macular hole left eye    JOINT REPLACEMENT      left knee & right hip    SKIN BIOPSY      right arm     Family History   Problem Relation Age of Onset    Cancer Mother         pancreatic    Heart Disease Father 80    Other Sister         infant death    High Cholesterol Brother      Social History     Tobacco Use    Smoking status: Never Smoker    Smokeless tobacco: Never Used   Substance Use Topics    Alcohol use: Yes     Comment: rare     Current Outpatient Medications   Medication Sig Dispense Refill    ivabradine (CORLANOR) 5 MG TABS tablet Take 1 tablet by mouth 2 times daily (with meals) 60 tablet 5    polyethyl glycol-propyl glycol 0.4-0.3 % (SYSTANE) 0.4-0.3 % ophthalmic solution 1 drop as needed for Dry Eyes      melatonin 3 MG TABS tablet Take 3 mg by mouth nightly      metOLazone (ZAROXOLYN) 2.5 MG tablet Take 2.5 mg by mouth daily as needed (wt gain, edema, sob)      metoprolol tartrate (LOPRESSOR) 25 MG tablet Take 25 mg by mouth 2 times daily       magnesium hydroxide (MILK OF MAGNESIA) 400 MG/5ML suspension Take by mouth daily as needed for Constipation      clopidogrel (PLAVIX) 75 MG tablet Pt takes 300 mg tonight then 75 mg every day starting on 2/4/2020 34 tablet 0    bumetanide (BUMEX) 2 MG tablet Take 1 tablet by mouth daily 30 tablet 3    metFORMIN (GLUCOPHAGE-XR) 750 MG extended release tablet Take 1 tablet by mouth Daily with supper Restart this medication Friday am 30 tablet 3    nitroGLYCERIN (NITROSTAT) 0.4 MG SL tablet up to max of 3 total doses. If no relief after 1 dose, call 911. normal. No respiratory distress. Breath sounds: Normal breath sounds. No wheezing or rales. Abdominal:      General: Bowel sounds are normal. There is no distension. Palpations: Abdomen is soft. Tenderness: There is no abdominal tenderness. Musculoskeletal: Normal range of motion. Right lower leg: No edema. Left lower leg: No edema. Skin:     General: Skin is warm and dry. Capillary Refill: Capillary refill takes less than 2 seconds. Neurological:      Mental Status: She is alert and oriented to person, place, and time. Coordination: Coordination normal.   Psychiatric:         Behavior: Behavior normal.         Wt Readings from Last 3 Encounters:   03/04/21 182 lb (82.6 kg)   01/19/21 180 lb 6.4 oz (81.8 kg)   12/09/20 190 lb 9.6 oz (86.5 kg)     BP Readings from Last 3 Encounters:   03/04/21 128/76   01/19/21 132/70   12/09/20 130/72     Pulse Readings from Last 3 Encounters:   03/04/21 100   01/19/21 96   12/09/20 96     Body mass index is 31.24 kg/m². ECHO:   Summary   limited echo   Ejection fraction is visually estimated at 50%.   Overall left ventricular function is normal.   Moderately dilated left atrium.   Myxomatotic degeneration of mitral valve.   Decreased mitral valve mobility noted.   No evidence of any pericardial effusion.      Signature      ----------------------------------------------------------------   Electronically signed by Sunil Desai MD (Interpreting   physician) on 02/14/2020 at 04:42 PM   ----------------------------------------------------------------     CATH/STRESS:   SUMMARY:  Successful PCI of the ostium of the LAD.     PLAN:  1.  Bedrest.  2.  Optimal medical therapy. 3.  Risk factor management. 4.  Routine access site care. 5.  Overnight observation. 6.  IV fluids. 7.  DAPT. 8.  Follow up with Dr. Josafat Guzmán in one to two weeks postprocedure.     All the above was explained to the patient and the patient's family.    They are agreeable and amenable to the above plan.     Ina Webster MD     D: 01/14/2020 6:28:08           Results reviewed:  BNP: No results found for: BNP  CBC:   Lab Results   Component Value Date    WBC 7.9 11/20/2020    RBC 3.71 11/20/2020    HGB 11.9 11/20/2020    HCT 36.9 11/20/2020     11/20/2020     CMP:    Lab Results   Component Value Date     11/20/2020    K 4.1 11/20/2020    CL 95 11/20/2020    CO2 26 11/20/2020    BUN 23 11/20/2020    CREATININE 1.5 11/20/2020    LABGLOM 33 11/20/2020    GLUCOSE 362 11/20/2020    CALCIUM 8.9 11/20/2020     Hepatic Function Panel:    Lab Results   Component Value Date    ALKPHOS 73 03/09/2020    ALT 20 03/09/2020    AST 19 03/09/2020    PROT 7.1 03/09/2020    BILITOT 0.5 03/09/2020    BILIDIR <0.2 02/17/2020    LABALBU 3.8 03/09/2020     Magnesium:    Lab Results   Component Value Date    MG 1.7 02/18/2020     PT/INR:    Lab Results   Component Value Date    INR 0.93 11/20/2020     Lipids:    Lab Results   Component Value Date    TRIG 359 01/13/2020    HDL 38 01/13/2020    [address] 63 01/13/2020       ASSESSMENT AND PLAN:   The patient's condition/symptoms are Stable: No clinical evidence of fluid overload today. Continue current medical regimen without changes at present time. Diagnosis Orders   1. Sinus tachycardia  ivabradine (CORLANOR) 5 MG TABS tablet    EKG 12 Lead   2. CHF (congestive heart failure), NYHA class II, chronic, diastolic ([facility]     3. Coronary artery disease involving native coronary artery of native heart without angina pectoris     4. Hypotension, unspecified hypotension type       Continue:  GDMT:    ACE/ARB/ARNI - None d/t Low BP   BB - Lopressor 25 BID   Diuretic - Bumex 2/day  AA - no  SGLT2 -  no  Vasodilator - no  Continue Current medications:  Plavix  Stable, appears Euvolemic  Chronic GALLO. Declined Pulmonary/sleep eval in past - again declines  BMP, Mg next lab day.    Sinus tach - appears to be contributing to GALLO/fatigue  Spoke w/ Dr Jayne Gray - recommends Corlanor trial.    Will send RX and do PA. Check [employer]  EKG today - verify Sinus tach  F/U w/ Grace in May/June  F/U in clinic in Nov.    Total visit time of 40 minutes has been spent with patient on education of symptoms, management, medication, and plan of care; as well as review of chart: labs, ECHO, radiology reports, etc.   I personally spent more then 50% of the appt time face to face with the patient. Daily weights  Fluid restriction of 2 Liters per day  Limit sodium in diet to around 1466-2633 mg/day  Monitor BP  Activity as tolerated     Patient was instructed to call the [facility] for any changes in symptoms as noted in AVS.      Return in about 8 months (around 11/4/2021). or sooner if needed     Patient given educational materials - see patient instructions. We discussed the importance of weighing oneself and recording daily. We also discussed the importance of a low sodium diet, higher sodium foods to avoid and better low sodium food options. Patient verbalizes understanding of plan of care using teach back method, and is agreeable to the treatment plan.        Electronically signed by BAILEY Alvares CNP on 3/4/2021 at 3:33 PM

## 2021-03-05 ENCOUNTER — TELEPHONE (OUTPATIENT)
Dept: CARDIOLOGY CLINIC | Age: 86
End: 2021-03-05

## 2021-03-05 NOTE — TELEPHONE ENCOUNTER
Incoming fax and [facility] regarding patient labs. Patient did have BMP, Heaptic, and CBC done 2/23/21. Eric Corbinr, VIRGILIO ordered BMP and Mag on next blood draw day (3/5/21). ECF wants to know if BMP needs done since it was done on 2/232/21.         Please advise

## 2021-03-15 NOTE — TELEPHONE ENCOUNTER
Noted - will continue to monitor. Spoke w/ Leena Alex, not much more to try to help HR. Symptoms stable.

## 2021-03-16 NOTE — TELEPHONE ENCOUNTER
Called to CHAPARRO Guo, at [facility] and updated Corlanor was denied.     Corlanor taken off home med list.

## 2021-03-22 NOTE — TELEPHONE ENCOUNTER
Incoming fax from [facility] regarding Corlanor medication still being on list and price is too high. Spoke with daughter, Ree Olson, who said she was there to fill med box and the medication was still on her med list even though she had not yet received it. Called to [facility], Laura MAYFIELD, who verified it was to be off med list and she had not been taking it.  Prem Linares daughter could have had old med list.

## 2021-05-13 ENCOUNTER — OFFICE VISIT (OUTPATIENT)
Dept: CARDIOLOGY CLINIC | Age: 86
End: 2021-05-13
Payer: MEDICARE

## 2021-05-13 VITALS
DIASTOLIC BLOOD PRESSURE: 73 MMHG | HEART RATE: 117 BPM | BODY MASS INDEX: 30.22 KG/M2 | SYSTOLIC BLOOD PRESSURE: 125 MMHG | WEIGHT: 177 LBS | HEIGHT: 64 IN

## 2021-05-13 DIAGNOSIS — E78.01 FAMILIAL HYPERCHOLESTEROLEMIA: ICD-10-CM

## 2021-05-13 DIAGNOSIS — I10 ESSENTIAL HYPERTENSION: ICD-10-CM

## 2021-05-13 DIAGNOSIS — I25.10 CORONARY ARTERY DISEASE INVOLVING NATIVE CORONARY ARTERY OF NATIVE HEART WITHOUT ANGINA PECTORIS: Primary | ICD-10-CM

## 2021-05-13 DIAGNOSIS — R06.02 SHORTNESS OF BREATH: ICD-10-CM

## 2021-05-13 PROCEDURE — 99214 OFFICE O/P EST MOD 30 MIN: CPT | Performed by: NUCLEAR MEDICINE

## 2021-05-13 RX ORDER — METOPROLOL TARTRATE 50 MG/1
50 TABLET, FILM COATED ORAL 2 TIMES DAILY
COMMUNITY
End: 2021-05-13 | Stop reason: SDUPTHER

## 2021-05-13 RX ORDER — METOPROLOL TARTRATE 50 MG/1
50 TABLET, FILM COATED ORAL 2 TIMES DAILY
Qty: 180 TABLET | Refills: 3 | Status: ON HOLD | OUTPATIENT
Start: 2021-05-13 | End: 2021-01-01 | Stop reason: HOSPADM

## 2021-05-13 NOTE — PROGRESS NOTES
[address] [facility]  [address]  [address]  [facility] [address]  Dept: [phone]  Dept Fax: [phone]  Loc: [phone]    Visit Date: 5/13/2021    Rodríguez Rodriguez is a 80 y.o. female who presents todayfor:  Chief Complaint   Patient presents with    Check-Up    Coronary Artery Disease    Hypertension    Shortness of Breath     Has had more dyspnea  More than usual   Gradual worsening   Limited by that   Does have mitral stenosis  Also has LAD stent from 2020  No chest pain   No use of nitro   BS is not followed by her  BP is fair   No dizziness  No syncope  On statins for hyperlipidemia  Seems to be well tolerated        HPI:  HPI  Past Medical History:   Diagnosis Date    Anxiety     nervous breakdown 9/2016    Breast cancer ([address]     CAD (coronary artery disease)     cardiomegaly    COPD (chronic obstructive pulmonary disease) ([address]     Diverticulitis     DVT (deep venous thrombosis) (HCC)     GERD (gastroesophageal reflux disease)     irritable bowel disease    Hyperlipidemia     Hypertension     Osteoarthritis     Pancreatic cancer ([address]     Family    Psychiatric problem     alzheimers start    Pulmonary embolism ([address]     S/P knee replacement     Type 2 diabetes mellitus ([address] 2009      Past Surgical History:   Procedure Laterality Date    BREAST SURGERY      right lumpectomy    CHOLECYSTECTOMY  4-15-16    cholangiogram    COLONOSCOPY      EYE SURGERY      macular hole left eye    JOINT REPLACEMENT      left knee & right hip    SKIN BIOPSY      right arm     Family History   Problem Relation Age of Onset    Cancer Mother         pancreatic    Heart Disease Father 80    Other Sister         infant death    High Cholesterol Brother      Social History     Tobacco Use    Smoking status: Never Smoker    Smokeless tobacco: Never Used   Substance Use Topics    Alcohol use: Yes     Comment: rare      Current Outpatient Please don't hesitate to contact me regarding any further issues related to the patient care    Orders Placed:  No orders of the defined types were placed in this encounter. Medications Prescribed:  No orders of the defined types were placed in this encounter. Discussed use, benefit, and side effects of prescribed medications. All patient questions answered. Pt voicedunderstanding. Instructed to continue current medications, diet and exercise. Continue risk factor modification and medical management. Patient agreed with treatment plan. Follow up as directed.     Electronically signedby Kaylee Jo MD on 5/13/2021 at 11:40 AM

## 2021-05-20 ENCOUNTER — HOSPITAL ENCOUNTER (OUTPATIENT)
Dept: NON INVASIVE DIAGNOSTICS | Age: 86
Discharge: HOME | End: 2021-05-20
Payer: MEDICARE

## 2021-05-20 DIAGNOSIS — I25.10 CORONARY ARTERY DISEASE INVOLVING NATIVE CORONARY ARTERY OF NATIVE HEART WITHOUT ANGINA PECTORIS: ICD-10-CM

## 2021-05-20 DIAGNOSIS — I10 ESSENTIAL HYPERTENSION: ICD-10-CM

## 2021-05-20 DIAGNOSIS — R06.02 SHORTNESS OF BREATH: ICD-10-CM

## 2021-05-20 DIAGNOSIS — E78.01 FAMILIAL HYPERCHOLESTEROLEMIA: ICD-10-CM

## 2021-05-20 LAB
LV EF: 55 %
LVEF MODALITY: NORMAL

## 2021-05-20 PROCEDURE — 93306 TTE W/DOPPLER COMPLETE: CPT

## 2021-10-17 NOTE — ED NOTES
Bed: 041A  Expected date:   Expected time:   [facility]  Comments:     Luda Alfred RN  10/17/21 3945

## 2021-10-17 NOTE — ED TRIAGE NOTES
Pt presents to the ED with complaints of rash. Pt has rash all over neck and chest. Pt states pain was 10/10 prior to EMS placing sterile water on towels on rash. Pt denies any pain at this time. Pt is applying topical ointment that physican prescribed.

## 2021-10-17 NOTE — ED NOTES
RN updated patient on POC. Pt denies any needs at this time. Pt respirations even and unlabored.      Ronnie Garcia RN  10/17/21 8125

## 2021-10-17 NOTE — ED PROVIDER NOTES
[facility] ENCOUNTER          Pt Name: Nayeli Batista  MRN: [id number]  [place] 1935  Date of evaluation: 10/17/2021  Treating Resident Physician: Agatha Sarmiento MD  Supervising Physician: Dr. Damir Reyes       Chief Complaint   Patient presents with    Rash    Other     PAIN     History obtained from the patient and her daughter. Alyssais Broaden HISTORY OF PRESENT ILLNESS    HPI  Nayeli Batista is a 80 y.o. female who presents to the emergency department for evaluation of rash    Patient states that she was seen by dermatologist several weeks ago with concerns of a premalignant lesion believed to be actinic keratosis which she was prescribed fluorouracil salicylic acid cream for treatment. Patient says shortly after starting the cream when she developed severe burning sensation in the areas that was applied to the skin began to slowly slough off. Patient was instructed to stop using that medication 1 week ago and was started on triamcinolone cream.  Patient says that the rash seems to still be spreading and is not getting any better. Patient says at its worst the pain is 6 out of 10 in intensity and is a deep burning pain. Patient states that she has lesions on her chest neck face arms and mouth. Patient also expresses burning in her eyes with yellow crusty discharge that she believes is related as well. Patient does have significant past medical history including type 2 diabetes, several TIAs, CHF, and peripheral vascular disease. Patient currently denies any tobacco alcohol or recreational drug use. The patient has no other acute complaints at this time. REVIEW OF SYSTEMS   Review of Systems   Constitutional: Negative for chills, fatigue, fever and unexpected weight change. HENT: Negative for ear pain and hearing loss. Eyes: Negative for photophobia, pain and visual disturbance.    Respiratory: Negative for chest tightness and shortness of breath. Cardiovascular: Positive for chest pain. Negative for leg swelling. Gastrointestinal: Negative for blood in stool, constipation, diarrhea, nausea and vomiting. Endocrine: Negative for cold intolerance and heat intolerance. Genitourinary: Negative for difficulty urinating, dysuria, hematuria and vaginal bleeding. Musculoskeletal: Negative for arthralgias and back pain. Neurological: Negative for dizziness, light-headedness, numbness and headaches. Psychiatric/Behavioral: Negative for agitation, confusion and sleep disturbance. PAST MEDICAL AND SURGICAL HISTORY     Past Medical History:   Diagnosis Date    Anxiety     nervous breakdown 9/2016    [facility]     CAD (coronary artery disease)     cardiomegaly    [facility]     Diverticulitis     DVT (deep venous thrombosis) ([facility]     GERD (gastroesophageal reflux disease)     irritable bowel disease    Hyperlipidemia     Hypertension     Osteoarthritis     Panc[facility]ic cancer ([facility]     Family    Psychiatric problem     alzheimers start    Pulmonary embolism ([facility]     S/P knee replacement     Type [facility] 2009     Past Surgical History:   Procedure Laterality Date    BREAST SURGERY      right lumpectomy    CHOLECYSTECTOMY  4-15-16    cholangiogram    COLONOSCOPY      EYE SURGERY      macular hole left eye    JOINT REPLACEMENT      left knee & right hip    SKIN BIOPSY      right arm         MEDICATIONS   No current facility-administered medications for this encounter.     Current Outpatient Medications:     metoprolol tartrate (LOPRESSOR) 50 MG tablet, Take 1 tablet by mouth 2 times daily, Disp: 180 tablet, Rfl: 3    polyethyl glycol-propyl glycol 0.4-0.3 % (SYSTANE) 0.4-0.3 % ophthalmic solution, 1 drop as needed for Dry Eyes, Disp: , Rfl:     melatonin 3 MG TABS tablet, Take 3 mg by mouth nightly, Disp: , Rfl:     metOLazone (ZAROXOLYN) 2.5 MG tablet, Take 2.5 mg by mouth daily as needed (wt gain, edema, sob), Disp: , Rfl:     magnesium hydroxide (MILK OF MAGNESIA) 400 MG/5ML suspension, Take by mouth daily as needed for Constipation, Disp: , Rfl:     clopidogrel (PLAVIX) 75 MG tablet, Pt takes 300 mg tonight then 75 mg every day starting on 2/4/2020, Disp: 34 tablet, Rfl: 0    bumetanide (BUMEX) 2 MG tablet, Take 1 tablet by mouth daily, Disp: 30 tablet, Rfl: 3    metFORMIN (GLUCOPHAGE-XR) 750 MG extended release tablet, Take 1 tablet by mouth Daily with supper Restart this medication Friday am, Disp: 30 tablet, Rfl: 3    nitroGLYCERIN (NITROSTAT) 0.4 MG SL tablet, up to max of 3 total doses. If no relief after 1 dose, call 911., Disp: 25 tablet, Rfl: 1    atorvastatin (LIPITOR) 40 MG tablet, Take 1 tablet by mouth daily, Disp: 30 tablet, Rfl: 3    potassium chloride (KLOR-CON) 10 MEQ extended release tablet, Take 1 tablet by mouth daily, Disp: 30 tablet, Rfl: 0    D-Mannose 500 MG CAPS, Take 1 capsule by mouth daily , Disp: , Rfl:     DULoxetine (CYMBALTA) 60 MG extended release capsule, Take 2 capsules by mouth daily, Disp: 60 capsule, Rfl: 0    glucose blood VI test strips (ONE TOUCH ULTRA TEST) strip, Test daily or AD.   Dx. 250.00, Disp: 50 each, Rfl: 11    acetaminophen (TYLENOL) 325 MG tablet, Take 650 mg by mouth every 6 hours as needed for Pain, Disp: , Rfl:       SOCIAL HISTORY     Social History     Social History Narrative    Not on file     Social History     Tobacco Use    Smoking status: Never Smoker    Smokeless tobacco: Never Used   Vaping Use    Vaping Use: Never used   Substance Use Topics    Alcohol use: Yes     Comment: rare    Drug use: No         ALLERGIES     Allergies   Allergen Reactions    Ciprofloxacin Hcl     Food Rash     strawberries    Penicillins Swelling and Rash         FAMILY HISTORY     Family History   Problem Relation Age of Onset    Cancer Mother         pancreatic    Heart Disease Father 80    Other Sister         infant death   [employer] High Cholesterol Brother          PREVIOUS RECORDS   Previous records reviewed: Last seen in the emergency department 11/20/2020 for occipital hematoma. .        PHYSICAL EXAM     ED Triage Vitals [10/17/21 1750]   BP Temp Temp Source Pulse Resp SpO2 Height Weight   122/81 98.7 °F (37.1 °C) Oral 118 18 97 % 5' 5\" (1.651 m) 162 lb (73.5 kg)     Initial vital signs and nursing assessment reviewed and normal. Body mass index is 26.96 kg/m². Pulsoximetry is normal per my interpretation. Additional Vital Signs:  Vitals:    10/17/21 2130   BP: 122/65   Pulse: 119   Resp: 16   Temp:    SpO2: 96%       Physical Exam  Skin:               Media Information       Document Information    Wound      10/17/2021 20:05   Attached To: Hospital Encounter on 10/17/21   Source [address] [facility] Emergency Dept     Media Information     Document Information    Wound      10/17/2021 20:04   Attached To: Hospital Encounter on 10/17/21   Source [address] [facility] Emergency Dept     Media Information     Document Information    Wound      10/17/2021 20:05   Attached To: Hospital Encounter on 10/17/21   Source [address] [facility] Emergency Dept     Media Information     Document Information    Wound      10/17/2021 20:05   Attached To: Hospital Encounter on 10/17/21   Source Information    Mani Crane MD  [facility] Emergency Dept         MEDICAL DECISION MAKING   Initial Assessment:   1. Patient is an 80-year-old female with no significant past medical history presenting to the emergency department for abnormal rash. Patient states that she was diagnosed with a premalignant lesion believed to be actinic keratosis several weeks ago and was prescribed fluorouracil salicylic acid. After taking the medication for a few days the rash started develop.   The medication was promptly stopped and started on triamcinolone cream.  Patient has not had any improvement. Rash is erythematous with desquamation and Klinsky positive spread out across the upper trunk and face and oral mucosa. Differential diagnosis includes acid burn, SJS, toxic epidermal necrolysis,  Plan:   Significant chest x-ray, CBC, BMP, ESR, CRP. ED RESULTS   Laboratory results:  Labs Reviewed   CBC WITH AUTO DIFFERENTIAL - Abnormal; Notable for the following components:       Result Value    RDW-SD 48.1 (*)     MPV 9.0 (*)     All other components within normal limits   BASIC METABOLIC PANEL W/ REFLEX TO MG FOR LOW K - Abnormal; Notable for the following components:    Chloride 94 (*)     Glucose 475 (*)     BUN 32 (*)     CREATININE 1.3 (*)     All other components within normal limits   SEDIMENTATION RATE - Abnormal; Notable for the following components:    Sed Rate 34 (*)     All other components within normal limits   ANION GAP - Abnormal; Notable for the following components:    Anion Gap 18.0 (*)     All other components within normal limits   GLOMERULAR FILTRATION RATE, ESTIMATED - Abnormal; Notable for the following components:    Est, Glom Filt Rate 39 (*)     All other components within normal limits   POCT GLUCOSE - Abnormal; Notable for the following components:    POC Glucose 428 (*)     All other components within normal limits   C-REACTIVE PROTEIN       Radiologic studies results:  XR CHEST (2 VW)   Final Result   1. Mild reticular linear opacities are seen at the perihilar regions bilaterally which may represent mild focal atelectasis or scarring. This has a similar appearance when compared to prior examination from November 2020. No other focal consolidation is    deftly seen. **This report has been created using voice recognition software. It may contain minor errors which are inherent in voice recognition technology. **      Final report electronically signed by Dr. Bernie Hopson on 10/17/2021 7:22 PM          ED Medications administered this visit:   Medications   HYDROcodone-acetaminophen (NORCO) 5-325 MG per tablet 1 tablet (1 tablet Oral Given 10/17/21 2023)   insulin regular (HUMULIN R;NOVOLIN R) injection 10 Units (10 Units SubCUTAneous Given 10/17/21 2131)         ED COURSE     ED Course as of Oct 17 2219   Sun Oct 17, 2021   2023 Scroten mortality score 4/7     [CHARLOTTE]   2026 CBC Auto Differential(!):    WBC 8.2   RBC 4.55   Hemoglobin Quant 14.3   Hematocrit 44.2   MCV 97.1   MCH 31.4   MCHC 32.4   RDW-CV 13.6   RDW-SD 48.1(!)   Platelet Count 707   MPV 9.0(!)   Seg Neutrophils 55.4   Lymphocytes 34.1   Monocytes 9.3   Eosinophils 0.5   Basophils 0.2   Immature Granulocytes 0.5   Segs Absolute 4.5   Lymphocytes Absolute 2.8   Monocytes Absolute 0.8   Eosinophils Absolute 0.0   Basophils Absolute 0.0   Immature Grans (Abs) 0.04   Nucleated Red Blood Cells 0 [CHARLOTTE]   7828 Basic Metabolic Panel w/ Reflex to MG(!):    Sodium 135   Potassium 4.1   Chloride 94(!)   CO2 23   Glucose 475(!)   BUN 32(!)   Creatinine 1.3(!)   Calcium 9.0 [CHARLOTTE]   2030 Glomerular Filtration Rate, Estimated(!):    Est, Glom Filt Rate 39(!) [CHARLOTTE]   2030 [facility] transfer center contacted with concern the patient may need escalated level of care. Sedimentation Rate(!):    Sed Rate 34(!) [CHARLOTTE]   2117 [facility] decided to admit the patient as a transfer with concern of SJS. Transfer paperwork is filled out. Patient is informed. Patient estimated to leave the department about 1030 tonight. [CHARLOTTE]   2119 Patient hyperglycemic at 475. [facility] requests we treat the hyperglycemia prior to transport. Patient given 10 units of insulin and blood glucose will be rechecked.     [CHARLOTTE]      ED Course User Index  [CHARLOTTE] Tee Dickens MD           MEDICATION CHANGES     New Prescriptions    No medications on file         FINAL DISPOSITION     Final diagnoses:   Rash and other nonspecific skin eruption     Condition: condition: stable  Dispo: Transfer to [address] [place] [address]      This transcription was electronically signed. Parts of this transcriptions may have been dictated by use of voice recognition software and electronically transcribed, and parts may have been transcribed with the assistance of an ED scribe. The transcription may contain errors not detected in proofreading. Please refer to my supervising physician's documentation if my documentation differs.     Electronically Signed: Jo Ann Donahue MD, 10/17/21, 10:19 PM         Nader Chang MD  Resident  10/17/21 0298

## 2021-10-18 NOTE — ED NOTES
RN medicated per MAR. Pt respirations even and unlabored. Provided patient with oral hydration.       Hector Martinez RN  10/17/21 2025
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
## 2021-10-27 NOTE — ED NOTES
Patient to ED via EMS from [address] [place] [address] for falls. patient fell twice today first time was earlier today which patient did hit her head. After second fall today patient was complaining of back and hip pain.      Chace Kent RN  10/27/21 0875

## 2021-10-27 NOTE — ED NOTES
Bed: 018A  Expected date:   Expected time:   [facility]  Comments:     Jaime Delatorre RN  10/27/21 8557

## 2021-10-27 NOTE — ED PROVIDER NOTES
[facility]    EMERGENCY MEDICINE     Pt Name: Sia Gu  MRN: [id number]  [place] 1935  Date of evaluation: 10/27/2021  Provider: Carol Portillo DO, [address]       Chief Complaint   Patient presents with   Ludmila Alex Fall    Back Pain       HISTORY OF PRESENT ILLNESS    Sia Gu is a pleasant 80 y.o. female   Presents to the emergency department from assisted living where she resides  2 falls today  First 1 this morning not syncopal  Tonight, patient was walking to another room when suddenly ended up falling, she is unsure of the etiology. States \"I am not sure if I passed out or what\". She did strike her head earlier today, but has not been having headaches or nausea or vomiting or any other complaints or concerns  Tonight, denies any specific injury, initially told the staff there that she was having some back discomfort but currently denies any complaints or concerns at all. Some lightheadedness and generalized fatigue and weakness over the last several days. No chest pain, shortness of breath, lower extremity pain or swelling. No CT LS pain      Triage notes and Nursing notes were reviewed by myself. Any discrepancies are addressed above.     PAST MEDICAL HISTORY     Past Medical History:   Diagnosis Date    Anxiety     nervous breakdown 9/2016    Breast cancer ([address]     CAD (coronary artery disease)     cardiomegaly    COPD (chronic obstructive pulmonary disease) (HCC)     Diverticulitis     DVT (deep venous thrombosis) (HCC)     GERD (gastroesophageal reflux disease)     irritable bowel disease    Hyperlipidemia     Hypertension     Osteoarthritis     Pancreatic cancer ([address]     Family    Psychiatric problem     alzheimers start    Pulmonary embolism ([address]     S/P knee replacement     Type 2 diabetes mellitus ([address] 2009       SURGICAL HISTORY       Past Surgical History:   Procedure Laterality Date    BREAST SURGERY      right lumpectomy    CHOLECYSTECTOMY  4-15-16    cholangiogram    COLONOSCOPY      EYE SURGERY      macular hole left eye    JOINT REPLACEMENT      left knee & right hip    SKIN BIOPSY      right arm       CURRENT MEDICATIONS       Previous Medications    ACETAMINOPHEN (TYLENOL) 325 MG TABLET    Take 650 mg by mouth every 6 hours as needed for Pain    ATORVASTATIN (LIPITOR) 40 MG TABLET    Take 1 tablet by mouth daily    BUMETANIDE (BUMEX) 2 MG TABLET    Take 1 tablet by mouth daily    CLOPIDOGREL (PLAVIX) 75 MG TABLET    Pt takes 300 mg tonight then 75 mg every day starting on 2/4/2020    D-MANNOSE 500 MG CAPS    Take 1 capsule by mouth daily     DULOXETINE (CYMBALTA) 60 MG EXTENDED RELEASE CAPSULE    Take 2 capsules by mouth daily    GLUCOSE BLOOD VI TEST STRIPS (ONE TOUCH ULTRA TEST) STRIP    Test daily or AD. Dx. 250.00    MAGNESIUM HYDROXIDE (MILK OF MAGNESIA) 400 MG/5ML SUSPENSION    Take by mouth daily as needed for Constipation    MELATONIN 3 MG TABS TABLET    Take 3 mg by mouth nightly    METFORMIN (GLUCOPHAGE-XR) 750 MG EXTENDED RELEASE TABLET    Take 1 tablet by mouth Daily with supper Restart this medication Friday am    METOLAZONE (ZAROXOLYN) 2.5 MG TABLET    Take 2.5 mg by mouth daily as needed (wt gain, edema, sob)    METOPROLOL TARTRATE (LOPRESSOR) 50 MG TABLET    Take 1 tablet by mouth 2 times daily    NITROGLYCERIN (NITROSTAT) 0.4 MG SL TABLET    up to max of 3 total doses. If no relief after 1 dose, call 911.     POLYETHYL GLYCOL-PROPYL GLYCOL 0.4-0.3 % (SYSTANE) 0.4-0.3 % OPHTHALMIC SOLUTION    1 drop as needed for Dry Eyes    POTASSIUM CHLORIDE (KLOR-CON) 10 MEQ EXTENDED RELEASE TABLET    Take 1 tablet by mouth daily       ALLERGIES     Ciprofloxacin hcl, Food, and Penicillins    FAMILY HISTORY       Family History   Problem Relation Age of Onset    Cancer Mother         pancreatic    Heart Disease Father 80    Other Sister         infant death    High Cholesterol Brother         SOCIAL HISTORY       Social History     Socioeconomic History    Marital status:      Spouse name: Melody Moncada Number of children: 11    Years of education: 15    Highest education level: None   Occupational History    Occupation: retired   Tobacco Use    Smoking status: Never Smoker    Smokeless tobacco: Never Used   Vaping Use    Vaping Use: Never used   Substance and Sexual Activity    Alcohol use: Yes     Comment: rare    Drug use: No    Sexual activity: Not Currently   Other Topics Concern    None   Social History Narrative    None     Social Determinants of Health     Financial Resource Strain:     Difficulty of Paying Living Expenses:    Food Insecurity:     Worried About Running Out of Food in the Last Year:     [address] in the Last Year:    Transportation Needs:     Lack of Transportation (Medical):  Lack of Transportation (Non-Medical):    Physical Activity:     Days of Exercise per Week:     Minutes of Exercise per Session:    Stress:     Feeling of Stress :    Social Connections:     Frequency of Communication with Friends and Family:     Frequency of Social Gatherings with Friends and Family:     Attends Rastafari Services:     Active Member of Clubs or Organizations:     Attends Club or Organization Meetings:     Marital Status:    Intimate Partner Violence:     Fear of Current or Ex-Partner:     Emotionally Abused:     Physically Abused:     Sexually Abused:        REVIEW OF SYSTEMS     Review of Systems   Constitutional: Positive for fatigue. Negative for chills, diaphoresis and fever. HENT: Negative for trouble swallowing and voice change. Eyes: Negative for visual disturbance. Respiratory: Negative for cough, chest tightness and shortness of breath. Cardiovascular: Negative for chest pain and leg swelling. Gastrointestinal: Negative for abdominal pain, blood in stool, diarrhea, nausea and vomiting.    Genitourinary: Negative for dysuria, frequency and hematuria. Musculoskeletal: Negative for back pain and neck pain. Skin: Negative for rash and wound. Neurological: Positive for light-headedness. Negative for speech difficulty, weakness, numbness and headaches. Psychiatric/Behavioral: Negative for confusion. Except as noted above the remainder of the review of systems was reviewed and is. PHYSICAL EXAM    (up to 7 for level 4, 8 or more for level 5)     ED Triage Vitals [10/27/21 1741]   BP Temp Temp Source Pulse Resp SpO2 Height Weight   128/88 98.1 °F (36.7 °C) Oral 112 18 94 % 5' 5\" (1.651 m) 162 lb (73.5 kg)       Physical Exam  Vitals and nursing note reviewed. Constitutional:       General: She is not in acute distress. Appearance: She is well-developed. She is not ill-appearing, toxic-appearing or diaphoretic. HENT:      Head: Normocephalic. Comments: Bruise to the right forehead, no crepitus, deformity no bleeding  Eyes:      General: No scleral icterus. Conjunctiva/sclera: Conjunctivae normal.      Right eye: Right conjunctiva is not injected. Left eye: Left conjunctiva is not injected. Pupils: Pupils are equal, round, and reactive to light. Neck:      Thyroid: No thyromegaly. Trachea: No tracheal deviation. Cardiovascular:      Rate and Rhythm: Normal rate and regular rhythm. Heart sounds: Normal heart sounds. No murmur heard. No friction rub. No gallop. Comments: Tachycardic, heart rate of 112 at rest  Pulmonary:      Effort: Pulmonary effort is normal. No respiratory distress. Breath sounds: Normal breath sounds. No stridor. No wheezing or rales. Abdominal:      General: Bowel sounds are normal. There is no distension. Palpations: Abdomen is soft. There is no mass. Tenderness: There is no abdominal tenderness. There is no guarding or rebound.       Comments: Negative Zafar's sign  Nontender McBurney's Point  Negative Rovsig's sign  No bruising or echymosis of abdomen Musculoskeletal:         General: No tenderness. Cervical back: Normal range of motion and neck supple. Comments: Negative Gerardo's Sign bilaterally  No CT LS spine tenderness   Lymphadenopathy:      Cervical: No cervical adenopathy. Skin:     General: Skin is warm and dry. Coloration: Skin is not pale. Findings: No erythema or rash. Comments: Multiple skin lesions appear to be healing as compared to pictures in the E HR from her last visit 10 days ago   Neurological:      Mental Status: She is alert and oriented to person, place, and time. Cranial Nerves: No cranial nerve deficit. Motor: No abnormal muscle tone. Coordination: Coordination normal.      Comments: No nystagmus   Psychiatric:         Behavior: Behavior normal.         Thought Content: Thought content normal.         DIAGNOSTIC RESULTS     EKG:(none if blank)  All EKG's are interpreted [facility] Phys[facility] who either signs or Co-signs this chart in the absence of a cardiologist.    No ischemia    RADIOLOGY: (none if blank)   Interpretation per the Radiologistbelow, if available at the time of this note:    CT HEAD WO CONTRAST    (Results Pending)       LABS:  Labs Reviewed   COVID-19, RAPID   CBC WITH AUTO DIFFERENTIAL   BASIC METABOLIC PANEL W/ REFLEX TO MG FOR LOW K   HEPATIC FUNCTION PANEL   TROPONIN       All other labs were within normal range or not returned as of this dictation. Please note, any cultures that may have been sent were not resulted at the time of this patient visit. EMERGENCY DEPARTMENT COURSE andMedical Decision Making:     MDM/   Presents tachycardic having sustained 2 falls today, ?passed out  +lightheaded/fatigued past few days  Noted ARTIE in the setting of nitrite + urinalysis  IV Abx  IV fluids  No evidence of traumatic injury (on reassessement patietn continues to deny any complaints).   +hx of PE noted; considered this today however unable to do CTA due to poor renal funxn and pt has alternate diagnoses that is likely the source of her symptoms. (also no LE findings)      ED Medications administered this visit:    Medications   0.9 % sodium chloride bolus (has no administration in time range)         Procedures: (None if blank)       CLINICAL       1. ARTIE (acute kidney injury) ([facility]    2. Urinary tract infection without hematuria, site unspecified          DISPOSITION/PLAN   DISPOSITION        PATIENT REFERRED TO:  No follow-up provider specified.     DISCHARGE MEDICATIONS:  New Prescriptions    No medications on file              (Please note that portions of this note were completed with a voice recognition program.  Efforts were made to edit the dictations but occasionallywords are mis-transcribed.)      Keshawn Gavin, ,FACEP (electronically signed)  Attending Physician, Emergency [facility] DO Shawn  10/28/21 0181

## 2021-10-28 NOTE — ED NOTES
Pt resting in room with eyes closed. No needs expressed at this time.      Radha Patten, RN  10/28/21 1479

## 2021-10-28 NOTE — ED NOTES
Pt found at end of bed with gown half off and depend off. RNs returned pt to bed. Gown and depends placed back on pt. Side rails x2, call light in reach, bed lowest position, posey alarm on, curtain half open. No other needs voiced at this time.       Key Benson, RN  10/28/21 4749

## 2021-10-28 NOTE — ED NOTES
ED nurse-to-nurse bedside report    Chief Complaint   Patient presents with    Fall    Back Pain      LOC: alert and orientated to name, place, date  Vital signs   Vitals:    10/27/21 1900 10/27/21 2124 10/28/21 0132 10/28/21 0619   BP: (!) 152/81 108/63 (!) 116/57 125/75   Pulse: 126 111 123 132   Resp:  18  18   Temp:       TempSrc:       SpO2: 96% 93%  97%   Weight:       Height:          Pain:    Pain Interventions: none  Pain Goal: 2  Oxygen: No    Current needs required room air   Telemetry: Yes  LDAs:   Peripheral IV 10/17/21 Left Antecubital (Active)     Continuous Infusions:    sodium chloride       Mobility: Requires assistance * 1  Magana Fall Risk Score: No flowsheet data found.   Fall Interventions: socks, side rails, call light  Report given to: Colette Zhu RN  10/28/21 9209

## 2021-10-28 NOTE — PROGRESS NOTES
[facility]  SPEECH THERAPY  STRZ MED SURG 8A  Clinical Swallow Evaluation      SLP Individual Minutes  Time In: 5100  Time Out: 5043  Minutes: 14  Timed Code Treatment Minutes: 0 Minutes       Date: 10/28/2021  Patient Name: Efraín Blackmon      CSN: [id number]   : 1935  (80 y.o.)  Gender: female   Referring Physician:  SHELBIE Mckenzie  Diagnosis: ARTIE  Secondary Diagnosis: dysphagia    History of Present Illness/Injury: Patient admitted to [facility] with above dx. Per chart review: Efraín Blackmon is a 80 y.o. female with PMHx of breast cancer, diabetes mellitus type 2, coronary artery disease, and prior TIA, who was brought into the hospital after being found down to the ground at her home. Patient lives in an assisted living facility. 30 minutes prior to dinner, patient was walking in her home, and held onto a rocking chair. When the rocking chair tipped backwards, patient lost her balance and fell between the coffee table and the couch. She denied head trauma, denied palpitations, denied lightheadedness, denied loss of balance. Patient reports that she slowly slid herself down, and it was described more as a controlled fall, but she was unable to get herself back up. Staff from the assisted living facility came up to get her to go to dinner 30 minutes later, and found her on the ground. They therefore had EMS bring her to the ED.     Patient was initially described as having a syncope, but patient denied symptoms consistent with syncope. CT of the head was negative for acute intracranial abnormalities. CT C-spine negative for acute cervical spine fractures. Patient's electrolytes otherwise appears unremarkable. She does have slight hyponatremia, but patient denied any symptoms. Patient was found to have a slight elevated troponin 0 0.013, but denied any chest pain. EKG demonstrated sinus tachycardia. No ST wave elevation.   Urinary analysis demonstrated positive nitrites, but negative leukocyte esterase. Few bacteria was seen. Patient also was found to have ARTIE with a creatinine of 2.8. Her baseline creatinine is 1.3. Patient was empirically covered with IV antibiotics in the ED. ST consulted to assess swallow integrity. Past Medical History:   Diagnosis Date    Anxiety     nervous breakdown 9/2016    [facility] cancer ([facility]     CAD (coronary artery disease)     cardiomegaly    COPD (chronic obstructive pulmonary disease) (HCC)     Diverticulitis     [facility]     GERD (gastroesophageal reflux disease)     irritable bowel disease    Hyperlipidemia     Hypertension     Osteoarthritis     Pancreatic cancer ([facility]     Family    Psychiatric problem     alzheimers start    Pulmonary embolism ([facility]     S/P knee replacement     Type 2 diabetes mellitus ([facility] 2009       SUBJECTIVE:  Patient sitting up in armchair upon ST arrival. Pt pleasant, but confused. Agreeable to participate in ST session    OBJECTIVE:    Pain:  No pain reported. Current Diet: Regular/thin    Respiratory Status:  Independent    Behavioral Observation:  Alert and Confused    Oral Mechanism Evaluation:      Facial / Labial WFL    Lingual WFL    Dentition WFL    Velum Not Tested    Vocal Quality WFL    Sensation Not Tested    Cough WFL      Patient Evaluated Using:  Hard solid, puree, and thin liquid via cup and straw    Oral Phase:  Impaired:  Impaired Mastication    Pharyngeal Phase: WFL:  Pharyngeal phase appears WFL but cannot rule out pharyngeal phase deficits from a bedside swallowing evaluation alone. Signs and Symptoms of Laryngeal Penetration/Aspiration: No signs/symptoms of aspiration evident in this evaluation, but cannot rule out silent aspiration. Impresssions: Per clinical findings outlined above, patient presents with at least mild oral dysphagia.  Unable to assess pharyngeal integrity at bedside; however no suspected pharyngeal invasion present at bedside. PO intake of hard solids revealed prolonged mastication, diffuse bolus formation resulting in mild lingual residue, suspected timely AP transit and delayed swallow initiation. PO intake of puree unremarkable. PO intake of thin liquids via cup and straw largely unremarkable; however, during last trial of thin via straw patient stating \"there it is! It's a little bubble that makes me cough - sometimes I cough so much I bring my food up\". Patient immediately belched following statement. Patient admits belching occurs with \"bubble\" that causes coughing episodes. NO overt s/s of penetration/aspiration present at this time; however, certainly cannot r/o without formal instrumental assessment, which currently is not medically indicated. At this time, ST recommends soft and bite sized diet with thin liquids and skilled ST intervention to assist in dysphagia management. ST also recommends GI consult. RECOMMENDATIONS/ASSESSMENT:  Instrumental Evaluation: Instrumental evaluation not indicated at this time. Diet Recommendations:  Soft and bite sized/thin  Strategies:  Full Upright Position, Small Bite/Sip, Supervision and Limit Distractions   Rehabilitation Potential: fair    EDUCATION:  Learner: Patient  Education:  Reviewed results and recommendations of this evaluation, Reviewed diet and strategies and Reviewed signs, symptoms and risks of aspiration  Evaluation of Education: Verbalizes understanding and Family not present    PLAN:  Skilled SLP intervention on acute care 3-5 x per week or until goals met and/or pt plateaus in function. Specific interventions for next session may include: dysphagia management, cognitive eval.    PATIENT GOAL:    Did not state. Will further assess during treatment.     SHORT TERM GOALS:  Short-term Goals  Timeframe for Short-term Goals: 2 weeks  Goal 1: Patient will tolerate soft and bite sized diet with thin liquids wihtout s/s of penetration/aspiration to maintain adequate nutrition/hydration  Goal 2: Patient will completed advanced PO trials to determine potential readiness for dietary upgrade  Goal 3: Patient will complete cognitive assessment (MOCA)    LONG TERM GOALS:  No LTGs established d/t short [address], [address] [place] [address] Street

## 2021-10-28 NOTE — H&P
History & Physical        Patient:  Rosamaria Jarvis  YOB: 1935    MRN: [id number]     Acct: [de-identified]    PCP: Neena Falcon MD    Date of Admission: 10/27/2021    Date of Service: Pt seen/examined on 10/28/21  and Admitted to outpatient with expected LOS less than two midnights due to medical therapy. Chief Complaint: Status post fall    History Of Present Illness:  Rosamaria Jarvis is a 80 y.o. female with PMHx of breast cancer, diabetes mellitus type 2, coronary artery disease, and prior TIA, who was brought into the hospital after being found down to the ground at her home. Patient lives in an assisted living facility. 30 minutes prior to dinner, patient was walking in her home, and held onto a rocking chair. When the rocking chair tipped backwards, patient lost her balance and fell between the coffee table and the couch. She denied head trauma, denied palpitations, denied lightheadedness, denied loss of balance. Patient reports that she slowly slid herself down, and it was described more as a controlled fall, but she was unable to get herself back up. Staff from the assisted living facility came up to get her to go to dinner 30 minutes later, and found her on the ground. They therefore had EMS bring her to the ED. Patient was initially described as having a syncope, but patient denied symptoms consistent with syncope. CT of the head was negative for acute intracranial abnormalities. CT C-spine negative for acute cervical spine fractures. Patient's electrolytes otherwise appears unremarkable. She does have slight hyponatremia, but patient denied any symptoms. Patient was found to have a slight elevated troponin 0 0.013, but denied any chest pain. EKG demonstrated sinus tachycardia. No ST wave elevation. Urinary analysis demonstrated positive nitrites, but negative leukocyte esterase. Few bacteria was seen.   Patient also was found to have ARTIE with a creatinine of 2.8. Her baseline creatinine is 1.3. Patient was empirically covered with IV antibiotics in the ED. We were asked to admit this patient for further management. Past Medical History:          Diagnosis Date    Anxiety     nervous breakdown 9/2016    [facility] ([facility]     CAD (coronary artery disease)     cardiomegaly    COPD (chronic obstructive pulmonary disease) (HCC)     Diverticulitis     DVT (deep venous thrombosis) (HCC)     GERD (gastroesophageal reflux disease)     irritable bowel disease    Hyperlipidemia     Hypertension     Osteoarthritis     Pancreatic cancer ([facility]     Family    Psychiatric problem     alzheimers start    [facility]     S/P knee replacement     Type 2 diabetes mellitus ([facility] 2009       Past Surgical History:          Procedure Laterality Date    BREAST SURGERY      right lumpectomy    CHOLECYSTECTOMY  4-15-16    cholangiogram    COLONOSCOPY      EYE SURGERY      macular hole left eye    JOINT REPLACEMENT      left knee & right hip    SKIN BIOPSY      right arm       Medications Prior to Admission:      Prior to Admission medications    Medication Sig Start Date End Date Taking?  Authorizing Provider   metoprolol tartrate (LOPRESSOR) 50 MG tablet Take 1 tablet by mouth 2 times daily 5/13/21   Clem Lloyd MD   polyethyl glycol-propyl glycol 0.4-0.3 % (SYSTANE) 0.4-0.3 % ophthalmic solution 1 drop as needed for Dry Eyes    Historical Provider, MD   melatonin 3 MG TABS tablet Take 3 mg by mouth nightly    Historical Provider, MD   metOLazone (ZAROXOLYN) 2.5 MG tablet Take 2.5 mg by mouth daily as needed (wt gain, edema, sob)    Historical Provider, MD   magnesium hydroxide (MILK OF MAGNESIA) 400 MG/5ML suspension Take by mouth daily as needed for Constipation    Historical Provider, MD   clopidogrel (PLAVIX) 75 MG tablet Pt takes 300 mg tonight then 75 mg every day starting on 2/4/2020 2/3/20   Juliette Marks PA-C bumetanide (BUMEX) 2 MG tablet Take 1 tablet by mouth daily 20   [address], MD   metFORMIN (GLUCOPHAGE-XR) 750 MG extended release tablet Take 1 tablet by mouth Daily with supper Restart this medication Friday am 20   [address], MD   nitroGLYCERIN (NITROSTAT) 0.4 MG SL tablet up to max of 3 total doses. If no relief after 1 dose, call 911. 20   BAILEY Perkins CNP   atorvastatin (LIPITOR) 40 MG tablet Take 1 tablet by mouth daily 20   BAILEY Perkins CNP   potassium chloride (KLOR-CON) 10 MEQ extended release tablet Take 1 tablet by mouth daily 9/10/19   BAILEY Hoffmann CNP   D-Mannose 500 MG CAPS Take 1 capsule by mouth daily     Historical Provider, MD   DULoxetine (CYMBALTA) 60 MG extended release capsule Take 2 capsules by mouth daily 10/10/17   BAILEY Casper CNP   glucose blood VI test strips (ONE TOUCH ULTRA TEST) strip Test daily or AD. Dx. 250.00 16   BAILEY Rowan CNP   acetaminophen (TYLENOL) 325 MG tablet Take 650 mg by mouth every 6 hours as needed for Pain    Historical Provider, MD       Allergies:  Ciprofloxacin hcl, Food, and Penicillins    Social History:      The patient currently lives in a assisted living facility. TOBACCO:   reports that she has never smoked. She has never used smokeless tobacco.  ETOH:   reports current alcohol use. DRUG USE HISTORY: Denies. EMPLOYMENT HISTORY: Retired. Patient used to work as a  here at [employer]. Family History: Mother:  from pancreatic cancer. Father: Unknown cause of death. Siblings: 1 brother without reported past medical histories. Children: Patient had 5 children, 1 who  from hypothermia. REVIEW OF SYSTEMS:   10 points of systems reviewed and otherwise negative except for that which already was noted above.      PHYSICAL EXAM:    BP (!) 116/57   Pulse 123   Temp 98.1 °F (36.7 °C) (Oral)   Resp 18   Ht 5' 5\" (1.651 m)   Wt 162 lb (73.5 kg)   SpO2 93%   BMI 26.96 kg/m²     General appearance: Alert and oriented x3, no apparent distress, well developed, appears stated age. Eyes:  Pupils equal, round, and reactive to light. Conjunctivae/corneas clear. HENT: Head normal in appearance. External nares normal.  Oral mucosa moist without lesions. Hearing grossly intact. Neck: Supple, with full range of motion. Trachea midline. No gross JVD appreciated. Respiratory:  Normal respiratory effort. Clear to auscultation, bilaterally without rales or wheezes or rhonchi. Cardiovascular: Normal rate, regular rhythm with normal S1/S2 without murmurs. No lower extremity edema. Abdomen: Soft, non-tender, non-distended with normal bowel sounds. Musculoskeletal: There is no joint swelling or tenderness. Normal tone. No abnormal movements. Skin: Warm and dry. Patient has a rash in the anterior chest with a small central eschar. This rash also extends into her face. No pruritus. Patient has deformity on the right breast secondary to prior lumpectomy. Neurologic:  No focal sensory/motor deficits in the upper and lower extremities. Cranial nerves:  grossly non-focal 2-12. Psychiatric: Normal insight, mood pleasant, and linear thought content. Capillary Refill: Brisk,< 3 seconds. Peripheral Pulses: +2 palpable, equal bilaterally. Labs:     Recent Labs     10/27/21  1810   WBC 11.9*   HGB 14.6   HCT 43.3        Recent Labs     10/27/21  1810   *   K 4.4   CL 91*   CO2 21*   BUN 50*   CREATININE 2.8*   CALCIUM 9.4     Recent Labs     10/27/21  1810   AST 11   ALT 16   BILIDIR <0.2   BILITOT 0.4   ALKPHOS 77     No results for input(s): INR in the last 72 hours. No results for input(s): Wen Risk in the last 72 hours.     Urinalysis:      Lab Results   Component Value Date    NITRU POSITIVE 10/27/2021    WBCUA 5-9 10/27/2021    BACTERIA FEW 10/27/2021    RBCUA 3-5 10/27/2021 BLOODU NEGATIVE 10/27/2021    SPECGRAV 1.013 10/27/2021    GLUCOSEU NEGATIVE 03/09/2020       Intake & Output:  No intake/output data recorded. No intake/output data recorded. CT HEAD WO CONTRAST   Final Result   1. No acute intracranial abnormality. **This report has been created using voice recognition software. It may contain minor errors which are inherent in voice recognition technology. **      Final report electronically signed by Dr Russell Barnett on 10/27/2021 9:07 PM      CT CERVICAL SPINE WO CONTRAST   Final Result   1. No acute cervical spine fracture. 2. Reversal of the cervical lordosis. 3. Multilevel degenerative changes of the cervical spine. **This report has been created using voice recognition software. It may contain minor errors which are inherent in voice recognition technology. **      Final report electronically signed by Dr Russell Barnett on 10/27/2021 9:12 PM               Assessment And Plan:    1. Status post fall: As patient describes it, she fell due to mechanical fall, not due to syncope. Imaging studies did not demonstrate any acute findings. We will have patient be seen by physical therapy. 2.  ARTIE: Baseline creatinine is 1.3. It is currently 2.8. Possibly secondary to volume depletion, as patient remained on the ground for a short period of time prior to being found. We will add a CK level. Patient does take metolazone at home, therefore will render fractional excretion of sodium evaluation inaccurate. We will continue fluid resuscitation, and monitor patient's renal function. 3.  Elevated troponin: Patient denied any chest pain. EKG did not demonstrate any abnormalities and ST lead. Suspected secondary to demand ischemia. We will continue to monitor on telemetry, and trend troponin studies.     4.  Asymptomatic cystitis: As patient denied any dysuria or other symptoms, we will defer antibiotic management for now as treatment is

## 2021-10-29 NOTE — PROGRESS NOTES
Brielle Long RN called both Martha Virk and Lisa Paul (patient's daughters)  To give them an update on plan of care for patient.

## 2021-10-29 NOTE — CARE COORDINATION
Discharge Planning Update: Following for fall (at AL) and ARTIE. Pt became with confusion last night and needed live sitter to be placed in room. Given Haldol as well. ST/PT/OT ordered but unable to see earlier today due to effects of Haldol and due to repeated aggression/combativeness earlier. From [address] [place]. SW following for continuity of services and new/changing needs.

## 2021-10-29 NOTE — PROGRESS NOTES
Hospitalist Progress Note      Patient:  Nayeli Batista    Unit/Bed:8A-09/009-A  YOB: 1935  MRN: [id number]   Acct: [de-identified]   PCP: Zeenat Gonzalez MD  Date of Admission: 10/27/2021    Assessment/Plan:    1. Acute encephalopathy: Likely infectious in nature secondary to #3 in addition to poor sleep hygiene. CT head negative. Melatonin as needed nightly in addition to trazodone. Monitor. 2. Mechanical fall: pt describes the event as mechanical in nature, no preceding dizziness/lightheadedness. 3. UTI: UA showed few bacteria on microscopic. Pt denied any symptoms, and initially deferred abx, however pt is noted to have increased confusion, even prompting a Code Violet. Will check urine cx and start Rocephin. De-escalate per final results and sensitivities. 4. ARTIE on CKD stage IV: Baseline creatinine is 1.3. Was 2.8 on admission, down to 1.5 today. Continue with IVFs. Likely pre-renal, possibly secondary to volume depletion, as patient remained on the ground for a short period of time prior to being found and additionally reports some emesis prior to admission. Hold Metolazone for now. 5. Hyponatremia: resolved, monitor with BMP. 6. Suspected dysphagia: per daughter pt has difficulty swallowing and will at times cough during meal times. SLP to evaluate. 7. Elevated troponin: Initial troponin noted at 0.013, recheck within normal limits. Patient denies any chest pain. No concerning EKG changes. 8. AG metabolic acidosis: Secondary to GI loss, improving. Monitor daily BMP. 9. NIDDM II: check Hgb A1c-> 17.1. We will start 10 units Lantus nightly and monitor. Continue with SSI and ACCU. Hold home Metformin. Hypoglycemia protocol in place.   Patient will benefit from diabetes management clinic referral at discharge    Chief Complaint: Status post fall    Initial H and P:-    Nayeli Batista is a 80 y.o. female with PMHx of breast cancer, diabetes mellitus type 2, coronary artery disease, and prior TIA, who was brought into the hospital after being found down to the ground at her home. Patient lives in an assisted living facility. 30 minutes prior to dinner, patient was walking in her home, and held onto a rocking chair. When the rocking chair tipped backwards, patient lost her balance and fell between the coffee table and the couch. She denied head trauma, denied palpitations, denied lightheadedness, denied loss of balance. Patient reports that she slowly slid herself down, and it was described more as a controlled fall, but she was unable to get herself back up. Staff from the assisted living facility came up to get her to go to dinner 30 minutes later, and found her on the ground. They therefore had EMS bring her to the ED.     Patient was initially described as having a syncope, but patient denied symptoms consistent with syncope. CT of the head was negative for acute intracranial abnormalities. CT C-spine negative for acute cervical spine fractures. Patient's electrolytes otherwise appears unremarkable. She does have slight hyponatremia, but patient denied any symptoms. Patient was found to have a slight elevated troponin 0 0.013, but denied any chest pain. EKG demonstrated sinus tachycardia. No ST wave elevation. Urinary analysis demonstrated positive nitrites, but negative leukocyte esterase. Few bacteria was seen. Patient also was found to have ARTIE with a creatinine of 2.8. Her baseline creatinine is 1.3. Patient was empirically covered with IV antibiotics in the ED.     We were asked to admit this patient for further management. \"    Subjective (past 24 hours):   10/29: Pt was noted to have increased confusion overnight. Upon initial evaluation today patient was doing well, lying in bed with daughter at bedside. At that time she denied any urinary symptoms, chest pain or shortness of breath. Patient was A&Ox4. Will Durant Later on in the day a Code Vikki was called as patient was greatly confused and upset that there was a male tech in her room. Pt was striking at the staff and throwing things at them. She was assisted to her bed. Abx started. Past medical history, family history, social history and allergies reviewed again and is unchanged since admission. ROS (All review of systems completed. Pertinent positives noted. Otherwise All other systems reviewed and negative.)     Medications:  Reviewed    Infusion Medications    sodium chloride      sodium chloride 100 mL/hr at 10/28/21 2227     Scheduled Medications    traZODone  50 mg Oral Nightly    melatonin ER  2 mg Oral Nightly    sodium chloride flush  5-40 mL IntraVENous 2 times per day    heparin (porcine)  5,000 Units SubCUTAneous Q8H    insulin lispro  0-12 Units SubCUTAneous TID WC    insulin lispro  0-6 Units SubCUTAneous Nightly    metoprolol tartrate  50 mg Oral BID    metOLazone  2.5 mg Oral Daily    clopidogrel  75 mg Oral Daily    atorvastatin  40 mg Oral Nightly     PRN Meds: sodium chloride flush, sodium chloride, ondansetron **OR** ondansetron, polyethylene glycol, acetaminophen **OR** acetaminophen      Intake/Output Summary (Last 24 hours) at 10/29/2021 0825  Last data filed at 10/28/2021 1525  Gross per 24 hour   Intake 269 ml   Output    Net 269 ml       Diet:  ADULT DIET; Dysphagia - Soft and Bite Sized    Exam:  /71   Pulse 99   Temp 97.6 °F (36.4 °C) (Oral)   Resp 17   Ht 5' 5\" (1.651 m)   Wt 162 lb (73.5 kg)   SpO2 94%   BMI 26.96 kg/m²   General appearance: Elderly, no apparent distress, appears stated age and cooperative. HEENT: Pupils equal, round, and reactive to light. Conjunctivae/corneas clear. Neck: Supple, with full range of motion. No jugular venous distention. Trachea midline. Respiratory:  Normal respiratory effort. Clear to auscultation, bilaterally without Rales/Wheezes/Rhonchi.   Cardiovascular: Regular rate and rhythm with normal S1/S2 without murmurs, rubs or gallops. Abdomen: Soft, non-tender, non-distended with normal bowel sounds. Musculoskeletal: passive and active ROM x 4 extremities. Skin: Erythematous and flaking rash noted anteriorly to chest, TTP  Neurologic:  Neurovascularly intact without any focal sensory/motor deficits. Cranial nerves: II-XII intact, grossly non-focal.  Psychiatric: Alert and oriented x4 (initial evaluation), thought content appropriate, normal insight  Capillary Refill: Brisk,< 3 seconds   Peripheral Pulses: +2 palpable, equal bilaterally     Labs:   Recent Labs     10/27/21  1810 10/28/21  0726   WBC 11.9* 11.2*   HGB 14.6 14.0   HCT 43.3 42.4    282     Recent Labs     10/27/21  1810 10/28/21  0726   * 134*   K 4.4 4.6   CL 91* 99   CO2 21* 18*   BUN 50* 42*   CREATININE 2.8* 2.1*   CALCIUM 9.4 9.3     Recent Labs     10/27/21  1810   AST 11   ALT 16   BILIDIR <0.2   BILITOT 0.4   ALKPHOS 77     No results for input(s): INR in the last 72 hours. Recent Labs     10/28/21  0726   CKTOTAL 46       Microbiology:    Blood culture #1: No results found for: [facility]    Blood culture #2:No results found for: BLOODCULT2    Organism:  Lab Results   Component Value Date    ORG Acinetobacter baumannii 10/12/2021    ORG Pantoea spp. 10/12/2021         Lab Results   Component Value Date    LABGRAM  10/12/2021     No segmented neutrophils observed. Rare epithelial cells observed. No bacteria seen. MRSA culture only:No results found for: [address] [place] [address]    Urine culture: No results found for: LABURIN    Respiratory culture: No results found for: CULTRESP    Aerobic and Anaerobic :  Lab Results   Component Value Date    LABAERO  10/12/2021     No growth-preliminary Culture also yielded light growth of Staphylococcus species (coagulase negative).      LABAERO light growth  10/12/2021    LABAERO  10/12/2021     very light growth Pantoea species which may be initially susceptible to third generation cephalosporins may develop resistance within three to four days of initiation of this antimicrobial therapy. No results found for: LABANAE    Urinalysis:      Lab Results   Component Value Date    NITRU POSITIVE 10/27/2021    WBCUA 5-9 10/27/2021    BACTERIA FEW 10/27/2021    RBCUA 3-5 10/27/2021    BLOODU NEGATIVE 10/27/2021    SPECGRAV 1.013 10/27/2021    GLUCOSEU NEGATIVE 03/09/2020       Radiology:  CT HEAD WO CONTRAST   Final Result   1. No acute intracranial abnormality. **This report has been created using voice recognition software. It may contain minor errors which are inherent in voice recognition technology. **      Final report electronically signed by Dr Romana Romney on 10/27/2021 9:07 PM      CT CERVICAL SPINE WO CONTRAST   Final Result   1. No acute cervical spine fracture. 2. Reversal of the cervical lordosis. 3. Multilevel degenerative changes of the cervical spine. **This report has been created using voice recognition software. It may contain minor errors which are inherent in voice recognition technology. **      Final report electronically signed by Dr Romana Romney on 10/27/2021 9:12 PM        CT HEAD WO CONTRAST    Result Date: 10/27/2021  PROCEDURE: CT HEAD WO CONTRAST CLINICAL INFORMATION:fall, head injury COMPARISON: Head CT 11/20/2020 TECHNIQUE: 5 mm axial imaging through the head without IV contrast. All CT scans at this facility use dose modulation, iterative reconstruction, and/or weight based dosing when appropriate to reduce the radiation dose to as low as reasonably achievable. FINDINGS: Diffuse cerebral volume loss. Ex vacuo dilatation of the ventricles. Intracranial atherosclerotic calcifications. Periventricular  and subcortical white matter hypodensities that likely relate to chronic microangiopathic disease. No midline shift or mass effect. No acute intracranial hemorrhage. No intracranial collection.   Gray-white differentiation is unremarkable. The posterior fossa is unremarkable. The craniocervical junction is unremarkable. No acute bony abnormality. The  paranasal sinuses are clear. The  mastoid air cells are clear. The orbits are unremarkable. 1. No acute intracranial abnormality. **This report has been created using voice recognition software. It may contain minor errors which are inherent in voice recognition technology. ** Final report electronically signed by Dr Brandan Mardid on 10/27/2021 9:07 PM    CT CERVICAL SPINE WO CONTRAST    Result Date: 10/27/2021  PROCEDURE: CT CERVICAL SPINE WO CONTRAST CLINICAL INFORMATION: Trauma COMPARISON: 11/20/2020 TECHNIQUE: 3 mm axial imaging through the cervical spine without contrast.  Sagittal and coronal reconstructions were performed. All CT scans at this facility use dose modulation, iterative reconstruction, and/or weight based dosing when appropriate to reduce the radiation dose to as low as reasonably achievable. FINDINGS: ALIGNMENT: There is reversal of the cervical lordosis. BONES: The bones are demineralized. Multilevel degenerative changes of the cervical spine. Marked narrowing of the disc space at C6-7. Multilevel facet arthrosis and uncovertebral degeneration. No acute cervical spine fracture is seen. SOFT TISSUES: Carotid calcifications are seen. 1. No acute cervical spine fracture. 2. Reversal of the cervical lordosis. 3. Multilevel degenerative changes of the cervical spine. **This report has been created using voice recognition software. It may contain minor errors which are inherent in voice recognition technology. ** Final report electronically signed by Dr Brandan Madrid on 10/27/2021 9:12 PM      Electronically signed by Lisa Tinoco PA-C on 10/29/2021 at 8:25 AM

## 2021-10-29 NOTE — PROGRESS NOTES
[facility] THERAPY MISSED TREATMENT NOTE  [facility] MED SURG 8A      Date: 10/29/2021  Patient Name: Kari Yee        MRN: [id number]    : 1935  (80 y.o.)    REASON FOR MISSED TREATMENT:  ST re-attempted to see patient at 36; however, RN, Pastora White, reports episode of aggression and combativeness earlier on this date resulting in administration of Haldol. RN requesting ST hold for this date.     Jessica Vasquez, [address] [place] [address], [address] [place] [address]

## 2021-10-29 NOTE — PROGRESS NOTES
10/29/21 12:00pm - SHELBIE Red at bedside. Patient up out of bed and being very combative (hitting physically and with gait belt). Patient refusing to get back into bed. Patient yelling profanities at sitter and nursing staff and refusing to put hospital gown back on. Viji MAYFILED and Sarita Wallace PA remain at bedside to ensure patient's safety and try to get her back to bed. Patient used bedside commode and agreed to put robe back on. Jordy Gaming called daughter Serg Garcia to give an update and ask if it was okay to give her medication to help her calm down, and Serg Garcia gave the ok for RN to administer. Haldol IM ordered and RN will give when available. 10/29/21  14:52 -- Patient still sleeping and appears comfortable. Alexsander(female) remains at bedside. RN called both Kenney Hackett and Serg Garcia (patient's daughters) and gave update on plan of care and mother's status.

## 2021-10-29 NOTE — PROGRESS NOTES
Pt set bed exit alarm off. Pt confused and not aware of surroundings. This RN trying to orient pt. Pt states \" Get away from me, leave me alone\". Pt attempting to get out of bed. This RN still trying to orient pt and explain safety risks to pt. Pt trying to hit this  RN and states \" the girls are ruining people\". This RN helped pt get back to bed. Charge nurse notified and campus security called. Bed in lowest position and call light within reach. Will continue to monitor.

## 2021-10-29 NOTE — PROGRESS NOTES
[facility] 60  OCCUPATIONAL THERAPY MISSED TREATMENT NOTE  [facility] SURG 8A  [id number]      Date: 10/29/2021  Patient Name: Debi Ponce        CSN: [id number]   : 1935  (80 y.o.)  Gender: female   Referring Practitioner: SHELBIE Saxena  Diagnosis: ARTIE    REASON FOR MISSED TREATMENT: Hold Treatment per Nursing   Pt with episode of aggression and combativeness earlier on this date resulting in administration of Haldol. RN requesting to hold this date, will check back for OT evaluation tomorrow or at next available treatment time as able.

## 2021-10-29 NOTE — PROGRESS NOTES
[facility] THERAPY MISSED TREATMENT NOTE  [facility] MED SURG 8A      Date: 10/29/2021  Patient Name: Wolf Kwon        MRN: [id number]    : 1935  (80 y.o.)    REASON FOR MISSED TREATMENT:    ST attempted to see patient at [address] [place] [address] for cognitive evaluation and dysphagia tx; however, patient in with physical therapy. ST will re-attempt on this date as schedule permits.     Lydia Kilgore, [facility], [address] [place] [address]

## 2021-10-29 NOTE — PROGRESS NOTES
[address] [address]  INPATIENT PHYSICAL THERAPY  EVALUATION  [facility] MED SURG 8A - 8A-09/009-A    Time In: 0831  Time Out: 8192  Timed Code Treatment Minutes: 16 Minutes  Minutes: 24          Date: 10/29/2021  Patient Name: Ulises Galvez,  Gender:  female        MRN: [id number]  : 1935  (80 y.o.)      Referring Practitioner: Mohini Shaver MD  Diagnosis: ARTIE  Additional Pertinent Hx: per chart Ulises Galvez is a 80 y.o. female with PMHx of breast cancer, diabetes mellitus type 2, coronary artery disease, and prior TIA, who was brought into the hospital after being found down to the ground at her home. Patient lives in an assisted living facility. 30 minutes prior to dinner, patient was walking in her home, and held onto a rocking chair. When the rocking chair tipped backwards, patient lost her balance and fell between the coffee table and the couch. She denied head trauma, denied palpitations, denied lightheadedness, denied loss of balance. Patient reports that she slowly slid herself down, and it was described more as a controlled fall, but she was unable to get herself back up. Staff from the assisted living facility came up to get her to go to dinner 30 minutes later, and found her on the ground. They therefore had EMS bring her to the ED. \"     Restrictions/Precautions:  Restrictions/Precautions: Fall Risk, General Precautions  Position Activity Restriction  Other position/activity restrictions: confusion, reduced safety, impulsive    Subjective:  Chart Reviewed: Yes  Patient assessed for rehabilitation services?: Yes  Family / Caregiver Present: No  Subjective: OK to see pt per nursing. Pt getting reach tos transfer to bedside commode with sitter present. Pt agreeable to PT session, asking what time it is during session. Pt required frequent re-direction of tasks due to increased distraction and confusion during session.     General:  Overall Orientation Status: Impaired  Orientation Level: Disoriented to situation, Oriented to person, Disoriented to time  Follows Commands: Within Functional Limits    Vision: Within Functional Limits    Hearing: Within functional limits         Pain: denies    Vitals: Vitals not assessed per clinical judgement, see nursing flowsheet    Social/Functional History:    Lives With: Alone  Type of Home: Assisted living  Home Layout: One level  Home Access: Level entry  Home Equipment: 4 wheeled walker, Cane     Bathroom Shower/Tub: Walk-in shower  Bathroom Toilet: Handicap height  Bathroom Equipment: Grab bars in shower, Built-in shower seat    Receives Help From: Other (comment) (facility staff)  ADL Assistance: Independent  Homemaking Assistance: Needs assistance  Homemaking Responsibilities: No  Ambulation Assistance: Independent  Transfer Assistance: Independent    Active : No     Additional Comments: Pt reports she uses a RW. Pt reports she was able to complete light cooking. Facility is [facility]. Pt lives on first floor with elevator access to other floors. OBJECTIVE:  Range of Motion:  Bilateral Lower Extremity: WNL    Strength:  Bilateral Lower Extremity: unable to accrately assess MMT due to increased confusion    Balance:  Static Sitting Balance:  Stand By Assistance, with cues for safety, with verbal cues   Dynamic Sitting Balance: Stand By Assistance, Contact Guard Assistance, X 1, with cues for safety, with verbal cues   Static Standing Balance: Contact Guard Assistance, Minimal Assistance, X 1, with cues for safety, with verbal cues   Dynamic Standing Balance: Contact Guard Assistance, Minimal Assistance, X 1, with cues for safety, with verbal cues   RW for support in standing to increase overall mobility. Pt required increased cues for safety due to reduced cognition and distraction and poor safety noted. Pt completed sanjay care in sitting and able to norma socks in sitting.  Pt completed reaching for items in standing, LOB noted with cues for min A for correction to decrease falls and education on use of RW for support to increase safety, fair demo  Bed Mobility:  Supine to Sit: Stand By Assistance, X 1, with head of bed flat, with rail, with verbal cues   Sit to Supine: Stand By Assistance, X 1, with head of bed flat, with rail, with verbal cues    Cues for technique with fair demo noted  Transfers:  Sit to Stand: Contact Guard Assistance, Minimal Assistance, X 1, cues for hand placement, with verbal cues  Stand to Sit:Contact Guard Assistance, Minimal Assistance, X 1, cues for hand placement, with verbal cues  Cues for safety and controlling descent into sitting to increase safety and decrease fall, poor safety noted with education on use of RW to decrease falls. Pt completed from various surfaces and heights with fair demo. Ambulation:  Contact Guard Assistance, X 1, with cues for safety, with verbal cues   Distance: 140 ft  Surface: Level Tile  Device:Rolling Walker  Gait Deviations: Forward Flexed Posture, Slow Brittny, Decreased Step Length Bilaterally, Decreased Gait Speed, Mild Path Deviations, Unsteady Gait and Decreased Terminal Knee Extension, reduced overall safety noted with cues for improved safety with AD as pt wanted to  RW when amb initially. Min A required for AD management to decrease falls, fair demo, no LOB noted, however, cues for maintaining RW in close proximity to self all times, fair demo. Functional Outcome Measures: Completed  AM-PAC Inpatient Mobility Raw Score : 17  AM-PAC Inpatient T-Scale Score : 42.13    ASSESSMENT:  Activity Tolerance:  Patient tolerance of  treatment: fair. Safety concerns, reduced cognition      Treatment Initiated: Treatment and education initiated within context of evaluation.   Evaluation time included review of current medical information, gathering information related to past medical, social and functional history, completion of standardized testing, formal and informal observation of tasks, assessment of data and development of plan of care and goals. Treatment time included skilled education and facilitation of tasks to increase safety and independence with functional mobility for improved independence and quality of life. Assessment: Body structures, Functions, Activity limitations: Decreased functional mobility , Decreased ADL status, Decreased balance, Decreased posture, Decreased strength, Decreased safe awareness, Decreased cognition, Decreased endurance  Assessment: pt cont to require skilled PT services to progress with strength, progress with endurance and balance with safest AD to increase safety and decrease risk for falls. Prognosis: Good    REQUIRES PT FOLLOW UP: Yes    Discharge Recommendations:  Discharge Recommendations: Continue to assess pending progress, Subacute/Skilled Nursing Facility, ECF with PT, Patient would benefit from continued therapy after discharge    Patient Education:  PT Education: Goals, General Safety, Gait Training, PT Role, Plan of Care, Transfer Training, Equipment, Functional Mobility Training  Patient Education: safety    Equipment Recommendations:  Equipment Needed: No    Plan:  Times per week: 3-5x GM  Current Treatment Recommendations: Strengthening, Neuromuscular Re-education, Home Exercise Program, Safety Education & Training, Balance Training, Endurance Training, Patient/Caregiver Education & Training, Functional Mobility Training, Equipment Evaluation, Education, & procurement, Transfer Training, Gait Training, Positioning    Goals:  Patient goals : did not state  Short term goals  Time Frame for Short term goals: by discharge  Short term goal 1: Pt will amb with RW with S for >200 feet to progress towards PLOF safely. Short term goal 2: Pt will complete transfers with S for safety with RW for support to progress towards PLOF.   Short term goal 3: Pt will demo IND with bed mobility tasks with good technique to progress towards PLOF safely. Short term goal 4: Pt will complete 10-20 reps of ther ex to increase overall mobility. Long term goals  Time Frame for Long term goals : NA due to short ELOS    Following session, patient left in safe position with all fall risk precautions in place. Pt left in bedside chair with all needs and call light in reach following session with sitter present, and breakfast in place.

## 2021-10-30 NOTE — PROGRESS NOTES
[address] [address]  SPEECH THERAPY  [facility] MED SURG 8A  Speech  Language  Cognitive Evaluation    SLP Individual Minutes  Time In: 1025  Time Out: 1140  Minutes: 12  Timed Code Treatment Minutes: 0 Minutes       Date: 10/30/2021  Patient Name: Walter Mccauley      CSN: [id number]   : 1935  (80 y.o.)  Gender: female   Referring Physician: SHELBIE Armstrong  Diagnosis: ARTIE  Secondary Diagnosis: Cognitive deficits  Precautions: Fall risk, aspiration precautions  History of Present Illness/Injury: Patient admitted to [facility] with above med dx; please refer to physician H&P for full details. Per chart review, \"80 y.o. female with PMHx of breast cancer, diabetes mellitus type 2, coronary artery disease, and prior TIA, who was brought into the hospital after being found down to the ground at her home. Patient lives in an assisted living facility. 30 minutes prior to dinner, patient was walking in her home, and held onto a rocking chair. When the rocking chair tipped backwards, patient lost her balance and fell between the coffee table and the couch. She denied head trauma, denied palpitations, denied lightheadedness, denied loss of balance. Patient reports that she slowly slid herself down, and it was described more as a controlled fall, but she was unable to get herself back up. Staff from the assisted living facility came up to get her to go to dinner 30 minutes later, and found her on the ground. They therefore had EMS bring her to the ED. \" CT head negative for acute intracranial abnormalities. ST consulted to complete cognitive linguistic evaluation s/t variable cognition throughout hospital course and to assist with POC development as appropriate.    Past Medical History:   Diagnosis Date    Anxiety     nervous breakdown 2016    [facility]     CAD (coronary artery disease)     cardiomegaly    COPD (chronic obstructive pulmonary disease) [facility]     Diverticulitis     soft and bite sized diet with thin liquids wihtout s/s of penetration/aspiration to maintain adequate nutrition/hydration  Goal 2: Patient will completed advanced PO trials to determine potential readiness for dietary upgrade  Goal 3: Patient will complete functional carryover tasks (orientation, biographical data, call light, staff relations, 2-3 units) with 60% accuracy, mod cues to improve awareness to immediate surroundings. Goal 4: Patient will complete safety awareness, sequencing, problem solving, and verba/visual reasoning tasks (hospital, home-related) with 60% accuracy, mod cues to improve contributions within ADLs.     LONG TERM GOALS:  No LTG established given short CHERRY Contreras M.A., [address]

## 2021-10-30 NOTE — PROGRESS NOTES
Hospitalist Progress Note      Patient:  Rosamaria Jarvis    Unit/Bed:8A-09/009-A  YOB: 1935  MRN: [id number]   Acct: [de-identified]   PCP: Neena Falcon MD  Date of Admission: 10/27/2021    Assessment/Plan:    1. Acute encephalopathy, resolved: Likely infectious in nature secondary to #3 in addition to poor sleep hygiene. CT head negative. Melatonin as needed nightly in addition to trazodone. Monitor. 2. Mechanical fall: pt describes the event as mechanical in nature, no preceding dizziness/lightheadedness. 3. UTI: UA showed few bacteria on microscopic. Pt denied any symptoms, and initially deferred abx, however pt is noted to have increased confusion, even prompting a Code Violet. Will check urine cx and start Rocephin. De-escalate per final results and sensitivities. 4. ARTIE on CKD stage IV, resolved: Baseline creatinine is 1.3. Was 2.8 on admission, down to 1.5 today. Continue with IVFs. Likely pre-renal, possibly secondary to volume depletion, as patient remained on the ground for a short period of time prior to being found and additionally reports some emesis prior to admission. Hold Metolazone for now. 10/30: Creatinine down to 1.0, stop IV fluids. Resume metolazone. 5. Hyponatremia: resolved, monitor with BMP. 6. Suspected dysphagia: per daughter pt has difficulty swallowing and will at times cough during meal times. SLP to evaluate. 7. Elevated troponin: Initial troponin noted at 0.013, recheck within normal limits. Patient denies any chest pain. No concerning EKG changes. 8. AG metabolic acidosis: Secondary to GI loss, improving. Monitor daily BMP. 9. NIDDM II: check Hgb A1c-> 17.1. We will start 10 units Lantus nightly and monitor. Continue with SSI and ACCU. Hold home Metformin. Hypoglycemia protocol in place.   Patient will benefit from diabetes management clinic referral at discharge    Chief Complaint: Status post fall    Initial H and P:-    Suni Chandler is a 80 y.o. female with PMHx of breast cancer, diabetes mellitus type 2, coronary artery disease, and prior TIA, who was brought into the hospital after being found down to the ground at her home. Patient lives in an assisted living facility. 30 minutes prior to dinner, patient was walking in her home, and held onto a rocking chair. When the rocking chair tipped backwards, patient lost her balance and fell between the coffee table and the couch. She denied head trauma, denied palpitations, denied lightheadedness, denied loss of balance. Patient reports that she slowly slid herself down, and it was described more as a controlled fall, but she was unable to get herself back up. Staff from the assisted living facility came up to get her to go to dinner 30 minutes later, and found her on the ground. They therefore had EMS bring her to the ED.     Patient was initially described as having a syncope, but patient denied symptoms consistent with syncope. CT of the head was negative for acute intracranial abnormalities. CT C-spine negative for acute cervical spine fractures. Patient's electrolytes otherwise appears unremarkable. She does have slight hyponatremia, but patient denied any symptoms. Patient was found to have a slight elevated troponin 0 0.013, but denied any chest pain. EKG demonstrated sinus tachycardia. No ST wave elevation. Urinary analysis demonstrated positive nitrites, but negative leukocyte esterase. Few bacteria was seen. Patient also was found to have ARTIE with a creatinine of 2.8. Her baseline creatinine is 1.3. Patient was empirically covered with IV antibiotics in the ED.     We were asked to admit this patient for further management. \"    10/29: Pt was noted to have increased confusion overnight. Upon initial evaluation today patient was doing well, lying in bed with daughter at bedside.  At that time she denied any urinary symptoms, chest pain or shortness of breath. Patient was A&Ox4. .  Later on in the day a Code Vikki was called as patient was greatly confused and upset that there was a male tech in her room. Pt was striking at the staff and throwing things at them. She was assisted to her bed. Abx started. Subjective (past 24 hours):   10/30: Patient doing okay, lying in bed. Sitter is at bedside. Patient is completely alert and oriented x4 today. She even relates \"I have never felt like that with anyone in regards to yesterday. Sitter states that patient has been giving her details of the event. Pt denies fever/chills. No n/v, some loose stools. No CP/SOB. Past medical history, family history, social history and allergies reviewed again and is unchanged since admission. ROS (All review of systems completed. Pertinent positives noted.  Otherwise All other systems reviewed and negative.)     Medications:  Reviewed    Infusion Medications    dextrose      sodium chloride      sodium chloride 100 mL/hr at 10/30/21 1340     Scheduled Medications    traZODone  50 mg Oral Nightly    melatonin  3 mg Oral Nightly    cefTRIAXone (ROCEPHIN) IV  1,000 mg IntraVENous Q24H    DULoxetine  120 mg Oral Daily    insulin glargine  10 Units SubCUTAneous Nightly    sodium chloride flush  5-40 mL IntraVENous 2 times per day    heparin (porcine)  5,000 Units SubCUTAneous Q8H    insulin lispro  0-12 Units SubCUTAneous TID WC    insulin lispro  0-6 Units SubCUTAneous Nightly    metoprolol tartrate  50 mg Oral BID    metOLazone  2.5 mg Oral Daily    clopidogrel  75 mg Oral Daily    atorvastatin  40 mg Oral Nightly     PRN Meds: glucose, dextrose, glucagon (rDNA), dextrose, sodium chloride flush, sodium chloride, ondansetron **OR** ondansetron, polyethylene glycol, acetaminophen **OR** acetaminophen      Intake/Output Summary (Last 24 hours) at 10/30/2021 1512  Last data filed at 10/30/2021 0439  Gross per 24 hour   Intake    Output 2150 ml   Net -2150 ml       Diet:  ADULT DIET; Dysphagia - Soft and Bite Sized; 4 carb choices (60 gm/meal)    Exam:  BP (!) 151/80   Pulse 116   Temp 97.9 °F (36.6 °C) (Oral)   Resp 18   Ht 5' 5\" (1.651 m)   Wt 162 lb (73.5 kg)   SpO2 96%   BMI 26.96 kg/m²   General appearance: Elderly, no apparent distress, appears stated age and cooperative. HEENT: Pupils equal, round, and reactive to light. Conjunctivae/corneas clear. Neck: Supple, with full range of motion. No jugular venous distention. Trachea midline. Respiratory:  Normal respiratory effort. Clear to auscultation, bilaterally without Rales/Wheezes/Rhonchi. Cardiovascular: Regular rate and rhythm with normal S1/S2 without murmurs, rubs or gallops. Abdomen: Soft, non-tender, non-distended with normal bowel sounds. Musculoskeletal: passive and active ROM x 4 extremities. Skin: Erythematous and flaking rash noted anteriorly to chest, TTP  Neurologic:  Neurovascularly intact without any focal sensory/motor deficits. Cranial nerves: II-XII intact, grossly non-focal.  Psychiatric: Alert and oriented x4 (initial evaluation), thought content appropriate, normal insight  Capillary Refill: Brisk,< 3 seconds   Peripheral Pulses: +2 palpable, equal bilaterally     Labs:   Recent Labs     10/27/21  1810 10/28/21  0726 10/30/21  1126   WBC 11.9* 11.2* 8.1   HGB 14.6 14.0 13.0   HCT 43.3 42.4 41.0    282 249     Recent Labs     10/28/21  0726 10/29/21  0857 10/30/21  1126   * 136 139   K 4.6 4.9 3.7   CL 99 99 106   CO2 18* 21* 21*   BUN 42* 32* 18   CREATININE 2.1* 1.5* 1.0   CALCIUM 9.3 9.2 8.8     Recent Labs     10/27/21  1810   AST 11   ALT 16   BILIDIR <0.2   BILITOT 0.4   ALKPHOS 77     No results for input(s): INR in the last 72 hours.   Recent Labs     10/28/21  0726   CKTOTAL 46       Microbiology:    Blood culture #1: No results found for: [facility]    Blood culture #2:No results found for: BLOODCULT2    Organism:  Lab Results   Component Value Date    ORG gram negative bacilli 10/29/2021         Lab Results   Component Value Date    LABGRAM  10/12/2021     No segmented neutrophils observed. Rare epithelial cells observed. No bacteria seen. MRSA culture only:No results found for: [facility]    Urine culture:   Lab Results   Component Value Date    LABURIN [place] count: >100,000 CFU/mL  10/29/2021       Respiratory culture: No results found for: CULTRESP    Aerobic and Anaerobic :  Lab Results   Component Value Date    LABAERO  10/12/2021     No growth-preliminary Culture also yielded light growth of Staphylococcus species (coagulase negative). LABAERO light growth  10/12/2021    LABAERO  10/12/2021     very light growth Pantoea species which may be initially susceptible to third generation cephalosporins may develop resistance within three to four days of initiation of this antimicrobial therapy. No results found for: LABANAE    Urinalysis:      Lab Results   Component Value Date    NITRU POSITIVE 10/27/2021    WBCUA 5-9 10/27/2021    BACTERIA FEW 10/27/2021    RBCUA 3-5 10/27/2021    BLOODU NEGATIVE 10/27/2021    SPECGRAV 1.013 10/27/2021    GLUCOSEU NEGATIVE 03/09/2020       Radiology:  CT HEAD WO CONTRAST   Final Result   1. No acute intracranial abnormality. **This report has been created using voice recognition software. It may contain minor errors which are inherent in voice recognition technology. **      Final report electronically signed by Dr Mary Ventura on 10/27/2021 9:07 PM      CT CERVICAL SPINE WO CONTRAST   Final Result   1. No acute cervical spine fracture. 2. Reversal of the cervical lordosis. 3. Multilevel degenerative changes of the cervical spine. **This report has been created using voice recognition software. It may contain minor errors which are inherent in voice recognition technology. **      Final report electronically signed by Dr Mary Ventura on 10/27/2021 9:12 PM        CT HEAD WO CONTRAST    Result Date: 10/27/2021  PROCEDURE: CT HEAD WO CONTRAST CLINICAL INFORMATION:fall, head injury COMPARISON: Head CT 11/20/2020 TECHNIQUE: 5 mm axial imaging through the head without IV contrast. All CT scans at this facility use dose modulation, iterative reconstruction, and/or weight based dosing when appropriate to reduce the radiation dose to as low as reasonably achievable. FINDINGS: Diffuse cerebral volume loss. Ex vacuo dilatation of the ventricles. Intracranial atherosclerotic calcifications. Periventricular  and subcortical white matter hypodensities that likely relate to chronic microangiopathic disease. No midline shift or mass effect. No acute intracranial hemorrhage. No intracranial collection. Gray-white differentiation is unremarkable. The posterior fossa is unremarkable. The craniocervical junction is unremarkable. No acute bony abnormality. The  paranasal sinuses are clear. The  mastoid air cells are clear. The orbits are unremarkable. 1. No acute intracranial abnormality. **This report has been created using voice recognition software. It may contain minor errors which are inherent in voice recognition technology. ** Final report electronically signed by Dr Tres Oquendo on 10/27/2021 9:07 PM    CT CERVICAL SPINE WO CONTRAST    Result Date: 10/27/2021  PROCEDURE: CT CERVICAL SPINE WO CONTRAST CLINICAL INFORMATION: Trauma COMPARISON: 11/20/2020 TECHNIQUE: 3 mm axial imaging through the cervical spine without contrast.  Sagittal and coronal reconstructions were performed. All CT scans at this facility use dose modulation, iterative reconstruction, and/or weight based dosing when appropriate to reduce the radiation dose to as low as reasonably achievable. FINDINGS: ALIGNMENT: There is reversal of the cervical lordosis. BONES: The bones are demineralized. Multilevel degenerative changes of the cervical spine. Marked narrowing of the disc space at C6-7.  Multilevel facet arthrosis and uncovertebral degeneration. No acute cervical spine fracture is seen. SOFT TISSUES: Carotid calcifications are seen. 1. No acute cervical spine fracture. 2. Reversal of the cervical lordosis. 3. Multilevel degenerative changes of the cervical spine. **This report has been created using voice recognition software. It may contain minor errors which are inherent in voice recognition technology. ** Final report electronically signed by Dr Bob Jacobson on 10/27/2021 9:12 PM      Electronically signed by Jesse Patterson PA-C on 10/30/2021 at 3:12 PM

## 2021-10-31 NOTE — PROGRESS NOTES
Hospitalist Progress Note      Patient:  Chikis Trinidad    Unit/Bed:8A-09/009-A  YOB: 1935  MRN: [id number]   Acct: [de-identified]   PCP: Sami Childress MD  Date of Admission: 10/27/2021    Assessment/Plan:    1. Acute encephalopathy: Likely infectious in nature secondary to #3 in addition to poor sleep hygiene. CT head negative. Melatonin as needed nightly in addition to trazodone. Monitor. 10/30: resolved, A&Ox4  10/31: again confused, alert to self only   2. UTI: UA showed few bacteria on microscopic. Pt denied any symptoms, and initially deferred abx, however pt is noted to have increased confusion, even prompting a Code Violet. Will check urine cx and start Rocephin. De-escalate per final results and sensitivities. 10/31: pt growing gram neg bacilli, await final sensitivities   3. Mechanical fall: pt describes the event as mechanical in nature, no preceding dizziness/lightheadedness. 4. ARTIE on CKD stage IV, resolved: Baseline creatinine is 1.3. Was 2.8 on admission, down to 1.5 today. Continue with IVFs. Likely pre-renal, possibly secondary to volume depletion, as patient remained on the ground for a short period of time prior to being found and additionally reports some emesis prior to admission. Hold Metolazone for now. 10/30: Creatinine down to 1.0, stop IV fluids. Resume metolazone. 5. Hyponatremia: resolved, monitor with BMP. 6. Suspected dysphagia: per daughter pt has difficulty swallowing and will at times cough during meal times. SLP to evaluate. 7. Elevated troponin: Initial troponin noted at 0.013, recheck within normal limits. Patient denies any chest pain. No concerning EKG changes. 8. AG metabolic acidosis: Secondary to GI loss, improving. Monitor daily BMP. 9. NIDDM II: check Hgb A1c-> 17.1. We will start 10 units Lantus nightly and monitor. Continue with SSI and ACCU. Hold home Metformin.  Hypoglycemia management. \"    10/29: Pt was noted to have increased confusion overnight. Upon initial evaluation today patient was doing well, lying in bed with daughter at bedside. At that time she denied any urinary symptoms, chest pain or shortness of breath. Patient was A&Ox4. .  Later on in the day a Code Vikki was called as patient was greatly confused and upset that there was a male tech in her room. Pt was striking at the staff and throwing things at them. She was assisted to her bed. Abx started. 10/30: Patient doing okay, lying in bed. Sitter is at bedside. Patient is completely alert and oriented x4 today. She even relates \"I have never felt like that with anyone in regards to yesterday. Sitter states that patient has been giving her details of the event. Pt denies fever/chills. No n/v, some loose stools. No CP/SOB. Subjective (past 24 hours):   10/31: pt lying in bed this am. She again is exhibiting confusion and mild agitation. She keeps repeating that her  has left her here and she is worried and that she needs to get some clothes so she can \"get up and work\". Pt does not know where she is, and she refuses to answer the date or her birthday. Noted to be very tachycardic. Pt states \"that's cause I'm worried\". Denies CP/SOB. No abd pain, n/v/d. Past medical history, family history, social history and allergies reviewed again and is unchanged since admission. ROS (All review of systems completed. Pertinent positives noted.  Otherwise All other systems reviewed and negative.)     Medications:  Reviewed    Infusion Medications    dextrose      sodium chloride       Scheduled Medications    lactobacillus  1 capsule Oral BID WC    traZODone  50 mg Oral Nightly    melatonin  3 mg Oral Nightly    cefTRIAXone (ROCEPHIN) IV  1,000 mg IntraVENous Q24H    DULoxetine  120 mg Oral Daily    insulin glargine  10 Units SubCUTAneous Nightly    sodium chloride flush  5-40 mL IntraVENous 2 times per day    heparin (porcine)  5,000 Units SubCUTAneous Q8H    insulin lispro  0-12 Units SubCUTAneous TID WC    insulin lispro  0-6 Units SubCUTAneous Nightly    metoprolol tartrate  50 mg Oral BID    metOLazone  2.5 mg Oral Daily    clopidogrel  75 mg Oral Daily    atorvastatin  40 mg Oral Nightly     PRN Meds: hydrOXYzine, glucose, dextrose, glucagon (rDNA), dextrose, sodium chloride flush, sodium chloride, ondansetron **OR** ondansetron, polyethylene glycol, acetaminophen **OR** acetaminophen      Intake/Output Summary (Last 24 hours) at 10/31/2021 0818  Last data filed at 10/30/2021 2230  Gross per 24 hour   Intake 4127.6 ml   Output 200 ml   Net 3927.6 ml       Diet:  ADULT DIET; Dysphagia - Soft and Bite Sized; 4 carb choices (60 gm/meal)    Exam:  /72   Pulse 116   Temp 97.6 °F (36.4 °C) (Oral)   Resp 18   Ht 5' 5\" (1.651 m)   Wt 162 lb (73.5 kg)   SpO2 97%   BMI 26.96 kg/m²   General appearance: Elderly, confused, no apparent distress, appears stated age and cooperative. HEENT: Pupils equal, round, and reactive to light. Conjunctivae/corneas clear. Neck: Supple, with full range of motion. No jugular venous distention. Trachea midline. Respiratory:  Normal respiratory effort. Clear to auscultation, bilaterally without Rales/Wheezes/Rhonchi. Cardiovascular: tachycardic rate and rhythm with normal S1/S2 without murmurs, rubs or gallops. Abdomen: Soft, non-tender, non-distended with normal bowel sounds. Musculoskeletal: passive and active ROM x 4 extremities. Skin: Erythematous and flaking rash noted anteriorly to chest, TTP  Neurologic:  Neurovascularly intact without any focal sensory/motor deficits.  Cranial nerves: II-XII intact, grossly non-focal.  Psychiatric: Alert and oriented x1 (self only), thought content inappropriate, poor insight  Capillary Refill: Brisk,< 3 seconds   Peripheral Pulses: +2 palpable, equal bilaterally     Labs:   Recent Labs     10/30/21  1126   WBC 8.1 HGB 13.0   HCT 41.0        Recent Labs     10/29/21  0857 10/30/21  1126 10/31/21  0528    139 141   K 4.9 3.7 4.1   CL 99 106 107   CO2 21* 21* 23   BUN 32* 18 21   CREATININE 1.5* 1.0 1.2   CALCIUM 9.2 8.8 9.0     No results for input(s): AST, ALT, BILIDIR, BILITOT, ALKPHOS in the last 72 hours. No results for input(s): INR in the last 72 hours. No results for input(s): Vidhi Parisian in the last 72 hours. Microbiology:    Blood culture #1: No results found for: [facility]    Blood culture #2:No results found for: BLOODCULT2    Organism:  Lab Results   Component Value Date    ORG gram negative bacilli 10/29/2021         Lab Results   Component Value Date    LABGRAM  10/12/2021     No segmented neutrophils observed. Rare epithelial cells observed. No bacteria seen. MRSA culture only:No results found for: [facility]    Urine culture:   Lab Results   Component Value Date    LABURIN [place] count: >100,000 CFU/mL  10/29/2021       Respiratory culture: No results found for: CULTRESP    Aerobic and Anaerobic :  Lab Results   Component Value Date    LABAERO  10/12/2021     No growth-preliminary Culture also yielded light growth of Staphylococcus species (coagulase negative). LABAERO light growth  10/12/2021    LABAERO  10/12/2021     very light growth Pantoea species which may be initially susceptible to third generation cephalosporins may develop resistance within three to four days of initiation of this antimicrobial therapy. No results found for: LABANAE    Urinalysis:      Lab Results   Component Value Date    NITRU POSITIVE 10/27/2021    WBCUA 5-9 10/27/2021    BACTERIA FEW 10/27/2021    RBCUA 3-5 10/27/2021    BLOODU NEGATIVE 10/27/2021    SPECGRAV 1.013 10/27/2021    GLUCOSEU NEGATIVE 03/09/2020       Radiology:  CT HEAD WO CONTRAST   Final Result   1. No acute intracranial abnormality. **This report has been created using voice recognition software.   It may contain minor errors which are inherent in voice recognition technology. **      Final report electronically signed by Dr Natan Gonzales on 10/27/2021 9:07 PM      CT CERVICAL SPINE WO CONTRAST   Final Result   1. No acute cervical spine fracture. 2. Reversal of the cervical lordosis. 3. Multilevel degenerative changes of the cervical spine. **This report has been created using voice recognition software. It may contain minor errors which are inherent in voice recognition technology. **      Final report electronically signed by Dr Natan Gonzales on 10/27/2021 9:12 PM        CT HEAD WO CONTRAST    Result Date: 10/27/2021  PROCEDURE: CT HEAD WO CONTRAST CLINICAL INFORMATION:fall, head injury COMPARISON: Head CT 11/20/2020 TECHNIQUE: 5 mm axial imaging through the head without IV contrast. All CT scans at this facility use dose modulation, iterative reconstruction, and/or weight based dosing when appropriate to reduce the radiation dose to as low as reasonably achievable. FINDINGS: Diffuse cerebral volume loss. Ex vacuo dilatation of the ventricles. Intracranial atherosclerotic calcifications. Periventricular  and subcortical white matter hypodensities that likely relate to chronic microangiopathic disease. No midline shift or mass effect. No acute intracranial hemorrhage. No intracranial collection. Gray-white differentiation is unremarkable. The posterior fossa is unremarkable. The craniocervical junction is unremarkable. No acute bony abnormality. The  paranasal sinuses are clear. The  mastoid air cells are clear. The orbits are unremarkable. 1. No acute intracranial abnormality. **This report has been created using voice recognition software. It may contain minor errors which are inherent in voice recognition technology. ** Final report electronically signed by Dr Natan Gonzales on 10/27/2021 9:07 PM    CT CERVICAL SPINE WO CONTRAST    Result Date: 10/27/2021  PROCEDURE: CT CERVICAL SPINE WO CONTRAST CLINICAL INFORMATION: Trauma COMPARISON: 11/20/2020 TECHNIQUE: 3 mm axial imaging through the cervical spine without contrast.  Sagittal and coronal reconstructions were performed. All CT scans at this facility use dose modulation, iterative reconstruction, and/or weight based dosing when appropriate to reduce the radiation dose to as low as reasonably achievable. FINDINGS: ALIGNMENT: There is reversal of the cervical lordosis. BONES: The bones are demineralized. Multilevel degenerative changes of the cervical spine. Marked narrowing of the disc space at C6-7. Multilevel facet arthrosis and uncovertebral degeneration. No acute cervical spine fracture is seen. SOFT TISSUES: Carotid calcifications are seen. 1. No acute cervical spine fracture. 2. Reversal of the cervical lordosis. 3. Multilevel degenerative changes of the cervical spine. **This report has been created using voice recognition software. It may contain minor errors which are inherent in voice recognition technology. ** Final report electronically signed by Dr Reyes Panning on 10/27/2021 9:12 PM      Electronically signed by Johnathan Marvin PA-C on 10/31/2021 at 8:18 AM

## 2021-11-01 NOTE — PROGRESS NOTES
[address] [address]  INPATIENT PHYSICAL THERAPY  DAILY NOTE  [facility] MED SURG 8A - 8A-09/009-A    Time In: 0951  Time Out: 1335  Timed Code Treatment Minutes: 30 Minutes  Minutes: 30          Date: 2021  Patient Name: Art Sterling,  Gender:  female        MRN: [id number]  : 1935  (80 y.o.)     Referring Practitioner: Dayron Donahue MD  Diagnosis: ARTIE  Additional Pertinent Hx: per chart Art Sterling is a 80 y.o. female with PMHx of breast cancer, diabetes mellitus type 2, coronary artery disease, and prior TIA, who was brought into the hospital after being found down to the ground at her home. Patient lives in an assisted living facility. 30 minutes prior to dinner, patient was walking in her home, and held onto a rocking chair. When the rocking chair tipped backwards, patient lost her balance and fell between the coffee table and the couch. She denied head trauma, denied palpitations, denied lightheadedness, denied loss of balance. Patient reports that she slowly slid herself down, and it was described more as a controlled fall, but she was unable to get herself back up. Staff from the assisted living facility came up to get her to go to dinner 30 minutes later, and found her on the ground. They therefore had EMS bring her to the ED. \"     Prior Level of Function:  Lives With: Alone  Type of Home: Assisted living  Home Layout: One level  Home Access: Level entry  Home Equipment: 4 wheeled walker, Cane   Bathroom Shower/Tub: Walk-in shower  Bathroom Toilet: Handicap height  Bathroom Equipment: Grab bars in shower, Built-in shower seat  Bathroom Accessibility: Accessible    Receives Help From: Other (comment) (staff as available)  ADL Assistance: Independent  Homemaking Assistance: Needs assistance  Homemaking Responsibilities: No  Ambulation Assistance: Independent  Transfer Assistance: Independent  Active : No  Additional Comments: Pt reports she uses a 4 wheeled walker.  Pt reports she was able to complete light cooking for breakfast and lunch. The facility provides her with a supper. She walks to/from the dining room. Facility is 3 stories. Pt lives on first floor with elevator access to other floors. Family member reports that the nurses help her with mediations and with monitoring her blood sugars. Restrictions/Precautions:  Restrictions/Precautions: Fall Risk, General Precautions  Position Activity Restriction  Other position/activity restrictions: confusion at times     SUBJECTIVE: RN approved session. Pt in bed upon arrival. Pt pleasant and agreeable to session. Pt was alert and oriented this date and able to answer all of therapists questions about home and PLOF. Much time spent on education about safety in home. Discussed with pt about placement on skilled side of home with pt stating she would manage in assisted living. Visual demonstration with education about getting close to objects reaching on the ground or using reacher as well as having walker with her at all times. Pt verbalized understanding. Pt educated on body mechanics and safety around home especially with obstacles. Pt asked for HEP to complete when discharged. Pt in bed upon leaving with all needs within reach with HEP given. Nursing and case management updated about pt return to assisted living. PAIN: none indicated    Vitals: Vitals not assessed per clinical judgement, see nursing flowsheet    OBJECTIVE:  Bed Mobility:  Supine to Sit: Stand By Assistance  Sit to Supine: Stand By Assistance     Transfers:  Sit to Stand: Stand By Assistance  Stand to Sit:Stand By Assistance  With cues for hand placement and always making sure of walker placement to decrease fall risk     Ambulation:  Stand By Assistance, [address], with verbal cues   Distance: 100'  Surface: Level Tile  Device:Rolling Walker  Gait Deviations:   Forward Flexed Posture, Slow Brittny, Decreased Step Length Bilaterally and Decreased Heel Strike Bilaterally  Cues for posture, increased step length and focus to task to prevent unsteadiness    Balance:  Dynamic Standing Balance: Stand By Assistance Pt picked up object from floor with walker with cues and education on proper body mechanics and safest technique. Informed pt to always be close to object prior to retrieving object and use reacher if able. Informed if bending down and knowing not close enough to ambulate closer vs reaching fwd outside WEST too far to prevent falls    Exercise:  Patient was guided in 1 set(s) 10 reps of exercise to both lower extremities. Ankle pumps, Glut sets, Quad sets, Heelslides, Hip abduction/adduction, Seated marches, Long arc quads, Seated isometric hip adduction and Seated abduction/adduction. Exercises were completed for increased independence with functional mobility. Functional Outcome Measures: Completed  AM-PAC Inpatient Mobility Raw Score : 18  AM-PAC Inpatient T-Scale Score : 43.63    ASSESSMENT:  Assessment: Patient progressing toward established goals. Activity Tolerance:  Patient tolerance of  treatment: good. Pt tolerated session well. Pt demos difficulty with overall strength, endurance, and balance. Pt demos decreased safety awareness at times. Pt required increased education on balance and gait. Pt would benefit from continued PT for improved mobility and independence.  Pt would benefit from supervision in home initially      Equipment Recommendations:Equipment Needed: No  Discharge Recommendations: Continue to assess pending progress, Home with Assist as Needed and Home with [place] would benefit from assist from another person although pt able to return to Assisted living as long as confusion does not return  Plan: Times per week: 3-5x GM  Current Treatment Recommendations: Strengthening, Neuromuscular Re-education, Home Exercise Program, Safety Education & Training, Balance Training, Endurance Training, Patient/Caregiver Education & Training, Functional Mobility Training, Equipment Evaluation, Education, & procurement, Transfer Training, Gait Training, Positioning    Patient Education  Patient Education: Plan of Care, Precautions/Restrictions, [employer] Mobility, Equipment Education, Transfers, Reviewed Prior Education, Gait, Use of Gait Belt, Verbal Exercise Instruction, education    Goals:  Patient goals : did not state  Short term goals  Time Frame for Short term goals: by discharge  Short term goal 1: Pt will amb with RW with S for >200 feet to progress towards PLOF safely. Short term goal 2: Pt will complete transfers with S for safety with RW for support to progress towards PLOF. Short term goal 3: Pt will demo IND with bed mobility tasks with good technique to progress towards PLOF safely. Short term goal 4: Pt will complete 10-20 reps of ther ex to increase overall mobility. Long term goals  Time Frame for Long term goals : NA due to short ELOS    Following session, patient left in safe position with all fall risk precautions in place.

## 2021-11-01 NOTE — PROGRESS NOTES
[facility]  INPATIENT OCCUPATIONAL THERAPY  STR MED SURG 8A  EVALUATION    Time:   Time In: 6408  Time Out: 1200  Timed Code Treatment Minutes: 30 Minutes  Minutes: 45          Date: 2021  Patient Name: Shekhar Aaron,   Gender: female      MRN: [id number]  : 1935  (80 y.o.)  Referring Practitioner: SHELBIE John  Diagnosis: ARTIE  Additional Pertinent Hx: Pt with PMHx of breast cancer, diabetes mellitus type 2, coronary artery disease, and prior TIA, who was brought into the hospital after being found down to the ground at her home. Patient lives in an assisted living facility. 30 minutes prior to dinner, patient was walking in her home, and held onto a rocking chair. When the rocking chair tipped backwards, patient lost her balance and fell between the coffee table and the couch. She denied head trauma, denied palpitations, denied lightheadedness, denied loss of balance. Patient reports that she slowly slid herself down, and it was described more as a controlled fall, but she was unable to get herself back up. Staff from the assisted living facility came up to get her to go to dinner 30 minutes later, and found her on the ground. They therefore had EMS bring her to the ED. Restrictions/Precautions:  Restrictions/Precautions: Fall Risk, General Precautions  Position Activity Restriction  Other position/activity restrictions: confusion at times    Subjective  Chart Reviewed: Yes, Orders, History and Physical  Patient assessed for rehabilitation services?: Yes    Subjective: Pleasant and cooperative  Comments: RN approved session. Pt agreed to do her self care this AM.  She stood at the sink for most of the spongebath. No reports of having any pain. She C/O feeling tired with the activity.     Pain:  Pain Assessment  Patient Currently in Pain: Denies    Vitals: Vitals not assessed per clinical judgement, see nursing flowsheet    Social/Functional History:  Lives With: Alone  Type of Home: Assisted living  Home Layout: One level  Home Access: Level entry  Home Equipment: 4 wheeled walker, Cane   Bathroom Shower/Tub: Walk-in shower  Bathroom Toilet: Handicap height  Bathroom Equipment: Grab bars in shower, Built-in shower seat  Bathroom Accessibility: Accessible    Receives Help From: Other (comment) (staff as available)  ADL Assistance: Independent  Homemaking Assistance: Needs assistance  Homemaking Responsibilities: No  Ambulation Assistance: Independent  Transfer Assistance: Independent    Active : No  Patient's  Info: family provides her with transportation  Occupation: Retired  Leisure & Hobbies: Following the sports of her grandkids  Additional Comments: Pt reports she uses a 4 wheeled walker. Pt reports she was able to complete light cooking for breakfast and lunch. The facility provides her with a supper. She walks to/from the dining room. Facility is 3 stories. Pt lives on first floor with elevator access to other floors. Family member reports that the nurses help her with mediations and with monitoring her blood sugars. VISION:Corrected- pt has poor central vision in her L eye only    HEARING:  WFL    COGNITION: Decreased Safety Awareness    RANGE OF MOTION:  Bilateral Upper Extremity:  WFL    STRENGTH:  Bilateral Upper Extremity:  WFL    SENSATION:   WFL    ADL:   Grooming: Supervision. washing her hands and face at the sink  Bathing: Stand By Assistance. Doing a spongebath while at the sink  Upper Extremity Dressing: Stand By Assistance. donning a pullover top with cues for safety  Lower Extremity Dressing: Stand By Assistance. donning pull ups and drawstring shorts while sitting to get them started at her feet  Toilet Transfer: Supervision. using the bedrail. BALANCE:  Sitting Balance:  Supervision. donning her Depends and shorts  Standing Balance: Stand By Assistance.  dynamic reaching tasks    BED MOBILITY:  Rolling to Right: Supervision, with head of bed flat, with rail    Supine to Sit: Stand By Assistance using the bedrail and  Head of bed elevated  Scooting: Stand By Assistance  with use of the bedrail    TRANSFERS:  Sit to Stand:  Stand By Assistance. from the edge of bed  Stand to Sit: Stand By Assistance. to the recliner chair    FUNCTIONAL MOBILITY:  Assistive Device: Rolling Walker  Assist Level:  Stand By Assistance. Distance: To and from bathroom  Pt had even step with no LOB noted. Activity Tolerance:  Patient tolerance of  treatment: fair. Pt stood for over 12 minutes while doing her self care. She did report feeling very tired following the ADL. Cues provided for taking rests. Assessment:  Assessment: Pt presents with acute kidney injury after having fallen while at the [facility]. She was dehydrated when she had been admitted. Pt uses a 4 wheeled walker. Pt reports doing her own self care and fixing herself both breakfast and lunch but having help with supper and also with her medications. She has no longer been monitoring her own blood sugars. Staff assist her with that, per family report. Pt demonstrated doing her self care and needed verbal cues for safety such as holding onto the sink for help stabilizing. She needed SBA for the spongebathing and dressing including donning a pullover top. She walked with a rolling walker with SBA a short distance in her room. Performance deficits / Impairments: Decreased functional mobility , Decreased ADL status, Decreased endurance, Decreased safe awareness, Decreased balance  Prognosis: Good  REQUIRES OT FOLLOW UP: Yes  Decision Making: Medium Complexity    Treatment Initiated: Treatment and education initiated within context of evaluation.   Evaluation time included review of current medical information, gathering information related to past medical, social and functional history, completion of standardized testing, formal and informal observation of tasks, assessment of data and development of plan of care and goals. Treatment time included skilled education and facilitation of tasks to increase safety and independence with ADL's for improved functional independence and quality of life. Discussed her normal routine and how she may need to make adjustments to much much of her own cooking she does secondary to the fatigue she had with doing her self care. Discussed with pt holding onto something which is stable when she is needing help with stabilization. Pt verbalized that she felt like the walker was stable. Pt was education regarding holding onto the sink or something else with his heavier than her walker would be more safe to be holding onto. Pt verbalized understanding. When it was pointed out that her rocking chair is not the most stable thing to reach out for stabilization, pt agreed. She stated that she might move that chair someplace else in the apartment. Discharge Recommendations:  Patient would benefit from continued therapy after discharge    Patient Education:  OT Education: OT Role, Plan of Care  Patient Education: Safety awareness while doing dynamic standing activity and reaching out to hold onto something for stabilization    Equipment Recommendations:  Equipment Needed: No    Plan:  Times per week: 3-5x  Current Treatment Recommendations: Endurance Training, Safety Education & Training, Self-Care / ADL, Functional Mobility Training  Plan Comment: Pt would be able to return to [facility] with help as prior to admission. She may benefit from follow up OT for endurance training and safety awareness. Specific instructions for Next Treatment: Functional mobility; ADLs and safety awareness; endurance training. See long-term goal time frame for expected duration of plan of care. If no long-term goals established, a short length of stay is anticipated.     Goals:  Patient goals : \"I want to return to doing my routine at the apartment. \" pt states. Short term goals  Time Frame for Short term goals: By discharge  Short term goal 1: Pt will complete lower body ADLs while following good safety awareness with Supervision and min cues if needed to increase her independence and safety with doing her morning routine. Short term goal 2: Pt will complete her toileting routine including transfers to/from a standard toilet seat with a grabbar and Supervision to increase her independence with self care. Short term goal 3: Pt will complete BUE ROM/moderate resistance exercises while following any handout or demonstration to increase her endurance and strength for ease of doing ADLs and walking to/from the dining room. Short term goal 4: Pt will demonstrate dynamic reaching tasks while using 1 or 2 hand release with Supervision and 1-2 verbal cues for safety to increase her independence and safety with self care and simple meal preparation. Following session, patient left in safe position with all fall risk precautions in place.

## 2021-11-01 NOTE — FLOWSHEET NOTE
[facility]  PHYSICAL THERAPY MISSED TREATMENT NOTE  ACUTE CARE  [facility] MED SURG 8A              Missed Treatment  Pt working with OT at time of attempt, will try back later if able or per POC

## 2021-11-01 NOTE — PLAN OF CARE
Problem: Discharge Planning:  Goal: Discharged to appropriate level of care  Description: Discharged to appropriate level of care  Outcome: Completed   SW consult received and completed. See  note 11/1.

## 2021-11-01 NOTE — PROGRESS NOTES
Patient educated on discharge instructions, follow up appointments and new medications. No concerns voiced at this time. To be discharged back to [facility] with all belongings.

## 2021-11-01 NOTE — PROGRESS NOTES
Report called to [facility] assisted living to nurse in charge at this time. All questions and concerns addressed at this time.

## 2021-11-01 NOTE — DISCHARGE SUMMARY
Hospitalist Discharge Summary        Patient: Mela Vargas  YOB: 1935  MRN: [id number]   Acct: [de-identified]    Primary Care Physician: Roselyn Boles MD    Admit date  10/27/2021    Discharge date:  11/1/2021 10:13 AM    Chief Complaint on presentation :-S/p fall    Discharge Assessment and Plan:-   1. Acute encephalopathy, resolved: Likely infectious in nature secondary to #3 in addition to poor sleep hygiene. CT head negative. Melatonin as needed nightly in addition to trazodone. Monitor. 10/30: resolved, A&Ox4  10/31: again confused, alert to self only   2. UTI: UA showed few bacteria on microscopic. Pt denied any symptoms, and initially deferred abx, however pt is noted to have increased confusion, even prompting a Code Violet. Will check urine cx and start Rocephin. De-escalate per final results and sensitivities. 10/31: pt growing gram neg bacilli, await final sensitivities   3. Mechanical fall: pt describes the event as mechanical in nature, no preceding dizziness/lightheadedness. 4. ARTIE on CKD stage IV, resolved: Baseline creatinine is 1.3. Was 2.8 on admission, down to 1.5 today. Continue with IVFs. Likely pre-renal, possibly secondary to volume depletion, as patient remained on the ground for a short period of time prior to being found and additionally reports some emesis prior to admission. Hold Metolazone for now. 10/30: Creatinine down to 1.0, stop IV fluids. Resume metolazone. 5. Hyponatremia: resolved, monitor with BMP. 6. Suspected dysphagia: per daughter pt has difficulty swallowing and will at times cough during meal times. SLP to evaluate. 7. Elevated troponin: Initial troponin noted at 0.013, recheck within normal limits. Patient denies any chest pain. No concerning EKG changes. 8. AG metabolic acidosis: Secondary to GI loss, improving. Monitor daily BMP. 9. NIDDM II: check Hgb A1c-> 17.1. We will start 10 units Lantus nightly and monitor.   Continue with SSI and ACCU. Hold home Metformin. Hypoglycemia protocol in place. Patient will benefit from diabetes management clinic referral at discharge  10. Disposition: from AL, RUSSELL consultation for potential skilled / ECF.  PT/OT to see.        Initial H and P and Hospital course:-  \"\"Rosa Maria Rasheed is a 80 y.o. female with PMHx of breast cancer, diabetes mellitus type 2, coronary artery disease, and prior TIA, who was brought into the hospital after being found down to the ground at her home.  Patient lives in an assisted living facility.  30 minutes prior to dinner, patient was walking in her home, and held onto a rocking chair.  When the rocking chair tipped backwards, patient lost her balance and fell between the coffee table and the couch.  She denied head trauma, denied palpitations, denied lightheadedness, denied loss of balance.  Patient reports that she slowly slid herself down, and it was described more as a controlled fall, but she was unable to get herself back up.  Staff from the assisted living facility came up to get her to go to dinner 30 minutes later, and found her on the ground.  They therefore had EMS bring her to the ED.     Patient was initially described as having a syncope, but patient denied symptoms consistent with syncope.  CT of the head was negative for acute intracranial abnormalities.  CT C-spine negative for acute cervical spine fractures.  Patient's electrolytes otherwise appears unremarkable.  She does have slight hyponatremia, but patient denied any symptoms.  Patient was found to have a slight elevated troponin 0 0.013, but denied any chest pain.  EKG demonstrated sinus tachycardia.  No ST wave elevation.  Urinary analysis demonstrated positive nitrites, but negative leukocyte esterase.  Few bacteria was seen.  Patient also was found to have ARTIE with a creatinine of 2.8.  Her baseline creatinine is 1.3.  Patient was empirically covered with IV antibiotics in the ED.     We were living and was evaluated by PT/OT during her admission and improved to do so. At time of discharge all VSS and suitable for discharge to AL and follow-up with PCP. Physical Exam:-  Vitals:   Patient Vitals for the past 24 hrs:   BP Temp Temp src Pulse Resp SpO2   11/01/21 0900 (!) 143/80 98 °F (36.7 °C) Oral 105 18 95 %   11/01/21 0330 (!) 102/59   93 16 93 %   10/31/21 2000 113/64 98 °F (36.7 °C) Oral 101 16 92 %   10/31/21 1548 (!) 144/81 98.1 °F (36.7 °C) Oral 108 18 93 %   10/31/21 1113 117/62 98.5 °F (36.9 °C) Axillary 113 17 94 %     Weight:   Weight: 162 lb (73.5 kg)   24 hour intake/output:     Intake/Output Summary (Last 24 hours) at 11/1/2021 1013  Last data filed at 11/1/2021 0526  Gross per 24 hour   Intake 450 ml   Output 1350 ml   Net -900 ml       General appearance: Elderly, confused, no apparent distress, appears stated age and cooperative. HEENT: Pupils equal, round, and reactive to light. Conjunctivae/corneas clear. Neck: Supple, with full range of motion. No jugular venous distention. Trachea midline. Respiratory:  Normal respiratory effort. Clear to auscultation, bilaterally without Rales/Wheezes/Rhonchi. Cardiovascular: tachycardic rate and rhythm with normal S1/S2 without murmurs, rubs or gallops. Abdomen: Soft, non-tender, non-distended with normal bowel sounds. Musculoskeletal: passive and active ROM x 4 extremities. Skin: Erythematous and flaking rash noted anteriorly to chest, TTP  Neurologic:  Neurovascularly intact without any focal sensory/motor deficits.  Cranial nerves: II-XII intact, grossly non-focal.  Psychiatric: Alert and oriented x4 , content inappropriate, normal insight  Capillary Refill: Brisk,< 3 seconds   Peripheral Pulses: +2 palpable, equal bilaterally       Discharge Medications:-      Medication List      START taking these medications    cefdinir 300 MG capsule  Commonly known as: OMNICEF  Take 1 capsule by mouth 2 times daily for 3 days  Start taking on: November 2, 2021     lactobacillus capsule  Take 1 capsule by mouth 2 times daily (with meals)        CONTINUE taking these medications    acetaminophen 325 MG tablet  Commonly known as: TYLENOL     atorvastatin 40 MG tablet  Commonly known as: LIPITOR  Take 1 tablet by mouth daily     blood glucose test strips strip  Commonly known as: ONE TOUCH ULTRA TEST  Test daily or AD. Dx. 250.00     bumetanide 2 MG tablet  Commonly known as: Bumex  Take 1 tablet by mouth daily     clopidogrel 75 MG tablet  Commonly known as: PLAVIX  Pt takes 300 mg tonight then 75 mg every day starting on 2/4/2020     D-Mannose 500 MG Caps     DULoxetine 60 MG extended release capsule  Commonly known as: Cymbalta  Take 2 capsules by mouth daily     magnesium hydroxide 400 MG/5ML suspension  Commonly known as: MILK OF MAGNESIA     melatonin 3 MG Tabs tablet     metFORMIN 750 MG extended release tablet  Commonly known as: GLUCOPHAGE-XR  Take 1 tablet by mouth Daily with supper Restart this medication Friday am     metOLazone 2.5 MG tablet  Commonly known as: ZAROXOLYN     metoprolol tartrate 50 MG tablet  Commonly known as: LOPRESSOR  Take 1 tablet by mouth 2 times daily     nitroGLYCERIN 0.4 MG SL tablet  Commonly known as: NITROSTAT  up to max of 3 total doses. If no relief after 1 dose, call 911.      polyethyl glycol-propyl glycol 0.4-0.3 % 0.4-0.3 % ophthalmic solution  Commonly known as: SYSTANE     potassium chloride 10 MEQ extended release tablet  Commonly known as: KLOR-CON  Take 1 tablet by mouth daily           Where to Get Your Medications      These medications were sent to [address], [address] [address] [address] [place] [address]    Phone: [phone]   · cefdinir 300 MG capsule  · lactobacillus capsule          Labs :-  Recent Results (from the past 72 hour(s))   POCT Glucose    Collection Time: 10/29/21 12:44 PM   Result Value Ref Range    POC Glucose 207 (H) 70 - 108 mg/dl   Culture, Urine    Collection Time: 10/29/21  4:30 PM    Specimen: Urine, clean catch   Result Value Ref Range    Organism Acinetobacter species (A)     Urine Culture, Routine [place] count: >100,000 CFU/mL         Susceptibility    Acinetobacter species - BACTERIAL SUSCEPTIBILITY PANEL BY DAQUAN     cefTRIAXone <=1 Sensitive mcg/mL     cefepime <=1 Sensitive mcg/mL     gentamicin <=1 Sensitive mcg/mL     piperacillin-tazobactam <=4 Sensitive mcg/mL     ciprofloxacin >=4 Resistant mcg/mL     trimethoprim-sulfamethoxazole >=320 Resistant mcg/mL     levofloxacin >=8 Resistant mcg/mL     tetracycline >=16 Resistant mcg/mL   POCT Glucose    Collection Time: 10/29/21  6:35 PM   Result Value Ref Range    POC Glucose 261 (H) 70 - 108 mg/dl   POCT Glucose    Collection Time: 10/29/21  7:59 PM   Result Value Ref Range    POC Glucose 249 (H) 70 - 108 mg/dl   POCT Glucose    Collection Time: 10/30/21  6:24 AM   Result Value Ref Range    POC Glucose 174 (H) 70 - 108 mg/dl   POCT Glucose    Collection Time: 10/30/21  8:06 AM   Result Value Ref Range    POC Glucose 176 (H) 70 - 108 mg/dl   Basic Metabolic Panel    Collection Time: 10/30/21 11:26 AM   Result Value Ref Range    Sodium 139 135 - 145 meq/L    Potassium 3.7 3.5 - 5.2 meq/L    Chloride 106 98 - 111 meq/L    CO2 21 (L) 23 - 33 meq/L    Glucose 171 (H) 70 - 108 mg/dL    BUN 18 7 - 22 mg/dL    CREATININE 1.0 0.4 - 1.2 mg/dL    Calcium 8.8 8.5 - 10.5 mg/dL   CBC    Collection Time: 10/30/21 11:26 AM   Result Value Ref Range    WBC 8.1 4.8 - 10.8 thou/mm3    RBC 4.04 (L) 4.20 - 5.40 mill/mm3    Hemoglobin 13.0 12.0 - 16.0 gm/dl    Hematocrit 41.0 37.0 - 47.0 %    .5 (H) 81.0 - 99.0 fL    MCH 32.2 26.0 - 33.0 pg    MCHC 31.7 (L) 32.2 - 35.5 gm/dl    RDW-CV 13.8 11.5 - 14.5 %    RDW-SD 50.8 (H) 35.0 - 45.0 fL    Platelets 671 027 - 551 thou/mm3    MPV 8.6 (L) 9.4 - 12.4 fL   Anion Gap    Collection Time: 10/30/21 11:26 AM Result Value Ref Range    Anion Gap 12.0 8.0 - 16.0 meq/L   Glomerular Filtration Rate, Estimated    Collection Time: 10/30/21 11:26 AM   Result Value Ref Range    Est, Glom Filt Rate 53 (A) ml/min/1.73m2   POCT Glucose    Collection Time: 10/30/21 11:57 AM   Result Value Ref Range    POC Glucose 170 (H) 70 - 108 mg/dl   POCT Glucose    Collection Time: 10/30/21  5:39 PM   Result Value Ref Range    POC Glucose 142 (H) 70 - 108 mg/dl   POCT Glucose    Collection Time: 10/30/21  7:38 PM   Result Value Ref Range    POC Glucose 276 (H) 70 - 108 mg/dl   Basic Metabolic Panel    Collection Time: 10/31/21  5:28 AM   Result Value Ref Range    Sodium 141 135 - 145 meq/L    Potassium 4.1 3.5 - 5.2 meq/L    Chloride 107 98 - 111 meq/L    CO2 23 23 - 33 meq/L    Glucose 200 (H) 70 - 108 mg/dL    BUN 21 7 - 22 mg/dL    CREATININE 1.2 0.4 - 1.2 mg/dL    Calcium 9.0 8.5 - 10.5 mg/dL   Anion Gap    Collection Time: 10/31/21  5:28 AM   Result Value Ref Range    Anion Gap 11.0 8.0 - 16.0 meq/L   Glomerular Filtration Rate, Estimated    Collection Time: 10/31/21  5:28 AM   Result Value Ref Range    Est, Glom Filt Rate 43 (A) ml/min/1.73m2   POCT Glucose    Collection Time: 10/31/21  8:29 AM   Result Value Ref Range    POC Glucose 160 (H) 70 - 108 mg/dl   POCT Glucose    Collection Time: 10/31/21 12:13 PM   Result Value Ref Range    POC Glucose 319 (H) 70 - 108 mg/dl   POCT Glucose    Collection Time: 10/31/21  4:59 PM   Result Value Ref Range    POC Glucose 224 (H) 70 - 108 mg/dl   POCT Glucose    Collection Time: 10/31/21  7:36 PM   Result Value Ref Range    POC Glucose 256 (H) 70 - 108 mg/dl   POCT Glucose    Collection Time: 11/01/21  8:40 AM   Result Value Ref Range    POC Glucose 189 (H) 70 - 108 mg/dl        Microbiology:    Blood culture #1: No results found for: BC    Blood culture #2:No results found for: BLOODCULT2    Organism:    Lab Results   Component Value Date    LABGRAM  10/12/2021     No segmented neutrophils observed. Rare epithelial cells observed. No bacteria seen. MRSA culture only:No results found for: [address]     Urine culture:   Lab Results   Component Value Date    LABURIN [place] count: >100,000 CFU/mL  10/29/2021     Lab Results   Component Value Date    ORG Acinetobacter species 10/29/2021        Respiratory culture: No results found for: CULTRESP    Aerobic and Anaerobic :  Lab Results   Component Value Date    LABAERO  10/12/2021     No growth-preliminary Culture also yielded light growth of Staphylococcus species (coagulase negative). LABAERO light growth  10/12/2021    LABAERO  10/12/2021     very light growth Pantoea species which may be initially susceptible to third generation cephalosporins may develop resistance within three to four days of initiation of this antimicrobial therapy. No results found for: LABANAE    Urinalysis:      Lab Results   Component Value Date    NITRU POSITIVE 10/27/2021    WBCUA 5-9 10/27/2021    BACTERIA FEW 10/27/2021    RBCUA 3-5 10/27/2021    BLOODU NEGATIVE 10/27/2021    SPECGRAV 1.013 10/27/2021    GLUCOSEU NEGATIVE 03/09/2020       Radiology:-  CT HEAD WO CONTRAST    Result Date: 10/27/2021  PROCEDURE: CT HEAD WO CONTRAST CLINICAL INFORMATION:fall, head injury COMPARISON: Head CT 11/20/2020 TECHNIQUE: 5 mm axial imaging through the head without IV contrast. All CT scans at this facility use dose modulation, iterative reconstruction, and/or weight based dosing when appropriate to reduce the radiation dose to as low as reasonably achievable. FINDINGS: Diffuse cerebral volume loss. Ex vacuo dilatation of the ventricles. Intracranial atherosclerotic calcifications. Periventricular  and subcortical white matter hypodensities that likely relate to chronic microangiopathic disease. No midline shift or mass effect. No acute intracranial hemorrhage. No intracranial collection. Gray-white differentiation is unremarkable. The posterior fossa is unremarkable.  The craniocervical junction is unremarkable. No acute bony abnormality. The  paranasal sinuses are clear. The  mastoid air cells are clear. The orbits are unremarkable. 1. No acute intracranial abnormality. **This report has been created using voice recognition software. It may contain minor errors which are inherent in voice recognition technology. ** Final report electronically signed by Dr Rhoda Chandler on 10/27/2021 9:07 PM    CT CERVICAL SPINE WO CONTRAST    Result Date: 10/27/2021  PROCEDURE: CT CERVICAL SPINE WO CONTRAST CLINICAL INFORMATION: Trauma COMPARISON: 11/20/2020 TECHNIQUE: 3 mm axial imaging through the cervical spine without contrast.  Sagittal and coronal reconstructions were performed. All CT scans at this facility use dose modulation, iterative reconstruction, and/or weight based dosing when appropriate to reduce the radiation dose to as low as reasonably achievable. FINDINGS: ALIGNMENT: There is reversal of the cervical lordosis. BONES: The bones are demineralized. Multilevel degenerative changes of the cervical spine. Marked narrowing of the disc space at C6-7. Multilevel facet arthrosis and uncovertebral degeneration. No acute cervical spine fracture is seen. SOFT TISSUES: Carotid calcifications are seen. 1. No acute cervical spine fracture. 2. Reversal of the cervical lordosis. 3. Multilevel degenerative changes of the cervical spine. **This report has been created using voice recognition software. It may contain minor errors which are inherent in voice recognition technology. ** Final report electronically signed by Dr Rhoda Chandler on 10/27/2021 9:12 PM       Follow-up scheduled after discharge :-    in the next few days with Panda Bush MD    Consultations during this hospital stay:-  [] NONE [] Cardiology  [] Nephrology  [] Hemo onco   [] GI   [] ID  [] Endocrine  [] Pulm    [] Neuro    [] Psych   [] Urology  [] ENT   [] G SURGERY   []Ortho    []CV surg    [] Palliative  [] Hospice [] Pain management   [x]    []TCU   [x] PT/OT  OTHERS:-    Disposition: Assisted Living - [facility]  Condition at Discharge: Stable    Time Spent:- 45 minutes    Electronically signed by Bj Rudd PA-C on 11/1/21 at 10:13 AM EDT   Discharging Hospitalist

## 2021-11-01 NOTE — CARE COORDINATION
11/1/21, 3:02 PM EDT    Patient goals/plan/ treatment preferences discussed by  and . Patient goals/plan/ treatment preferences reviewed with patient/ family. Patient/ family verbalize understanding of discharge plan and are in agreement with goal/plan/treatment preferences. Understanding was demonstrated using the teach back method. AVS provided by RN at time of discharge, which includes all necessary medical information pertaining to the patients current course of illness, treatment, post-discharge goals of care, and treatment preferences. Services After Discharge  Services At/After Discharge: OT, PT German Mittal)       Call to Khadar Mccray with [facility], they did review pt's chart, feels they can care for pt at [place] [address]. SW did let him know pt have HH order for therapy, they can work with this. SW called helio Benson, reports she can transport at [address]. Notified nurse and [facility]. Call from daughter Marcelino again, reports they can transport at 3pm now. Nurse was notified. Pt discharged to [place] [address] with continued [place] PT/OT. DISCHARGE/PLANNING EVALUATION  11/1/21, 10:20 AM EDT    Reason for Referral: discharge planning  Mental Status: alert, appropriate in conversation  Decision Making: self   Family/Social/Home Environment: resident of [facility] assisted living  Current Services including food security, transportation and housekeeping: patient has assisted with medications, meals, checking blood sugars  Current Equipment: walker, cane  Payment Source:[employer] Medicare  Concerns or Barriers to Discharge: none  Post acute provider list with quality measures, geographic area and applicable managed care information provided. Questions regarding selection process answered:    Teach Back Method used with patient and daughter Kiet Bridget regarding care plan and needs  Patient and daughter Kiet Bridget verbalize understanding of the plan of care and contribute to goal setting.        Patient goals, treatment preferences and discharge plan: Return to [facility] assisted living    Call to [facility], nurse will call SW back. Call to daughter Carla Falcon, reports pt has assistance with her blood sugars, medications, and meals. Pt has had home health in the past, but not currently. SW did let daughter know possible discharge today. Daughter reports if pt unable to go back to assisted living, they would want her in [place] for rehab. Daughter reports likely [facility] would be transporting at discharge, but reports depends on all their schedules. SW did visit with pt this morning, she was up at bedside, eating breakfast. Pt reports she is pretty independent normally. Pt reports she is living at [facility] and hopes she can go home today. Call from Barbara Terrazas with [facility], confirms above information. Reports they can take pt back if she would be safe in her apartment. R[facility]SELL dicussed pt's assist levels from therapy note on Friday. RUSSELL did call Sheryl Collazo with [facility], he will have nursing look into pt's chart further to determine plans for ECF vs AL and get back with RUSSELL.    RUSSELL spoke with nurse, pt does have discharge orders now,, aware determination for AL vs SNF still to be made.      Electronically signed by Pauline Guillen on 11/1/2021 at 10:20 AM

## 2021-11-02 NOTE — CARE COORDINATION
[facility] Transitions Initial Follow Up Call    Call within 2 business days of discharge: Yes    Patient: Kelvin Miramontes Patient : 1935   MRN: [id number]  Reason for Admission: ARTIE, Fall, Back Pain  Discharge Date: 21 RARS: No data recorded    Last Discharge [facility]       Complaint Diagnosis Description Type Department Provider    10/27/21 Fall; Back Pain ARTIE (acute kidney injury) ([facility] [address] . .. ED to Hosp-Admission (Discharged) (ADMITTED) DOTTIE Patterson PA-C; Ernesto Kapoor... Facility:  [facility]    1st attempt to contact [address] [facility] for initial care transition follow up. Spoke with Lj Parsons who transferred call to nurse. Lj Parsons returned to the phone and informed CTN that the nurse is passing meds right now. Left message with reason for call, CTN contact information and request for call back. She will give the message to the nurse.         Follow Up  Future Appointments   Date Time Provider Demetrius Stone   2021 11:30 AM BAILEY Pizarro - CNP N SRPX [facility] [place]   2022 12:30 PM Aleksandra Whalen RN

## 2021-11-03 NOTE — PROGRESS NOTES
CLINICAL PHARMACY NOTE: MEDS TO BEDS    Total # of Prescriptions Filled: 2   The following medications were delivered to the patient:  [facility]  Cefdinir 300mg    Additional Documentation:

## 2021-11-03 NOTE — CARE COORDINATION
[address] Transitions Initial Follow Up Call    Call within 2 business days of discharge: Yes    Patient: Shital Conklin Patient : 1935   MRN: [id number]  Reason for Admission: ARTIE, Fall, Back Pain  Discharge Date: 21 RARS: No data recorded    Last Discharge [facility]       Complaint Diagnosis Description Type Department Provider    10/27/21 Fall; Back Pain ARTIE (acute kidney injury) ([facility] [address] . .. ED to Hosp-Admission (Discharged) (ADMITTED) DOTTIE Russell PA-C; Desirae Montes... Facility: [address] incoming call from patient's nurse Barbra Artis. She left a message stating that other than a fall Renetta Lainez just had, she is doing okay. No new or worsening symptoms. Any further questions you can call us back. Attempted to contact [address] [facility] for initial care transition follow up. Spoke with Brandt Byrd who states the nurse is with another resident right now. Left message with reason for call, contact information and request for call back.       Follow Up  Future Appointments   Date Time Provider Demetrius Stone   2021 11:30 AM BAILEY Hoffmann - CNP N [facility]PX [facility] [place]   2021  3:00 PM Chucho Tamayo RD, LD SRPX Phys[facility] - [place] [place]   2022 12:30 PM [address]
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
## 2021-11-05 NOTE — ED NOTES
Bedside report taken from [address]  This nurse assuming care.    VSS will continue to monitor     Cecille Driscoll RN  11/05/21 1121

## 2021-11-05 NOTE — H&P
Medicine Admission History & Physical      Patient:  Zhen Michaels  YOB: 1935  Date of Service: 11/6/2021  MRN: [id number]   Acct: [de-identified]   Primary Care Physician: Tiffanie Henry MD    Admitting Faculty MD: Yamilka Lala DO    Code Status: DNR-CCA     Date of Service: Pt seen/examined in consultation on 11/6/2021   Assessment / Plan     Briefly, pt Zhen Michaels is a 80 y.o. female with a PMH of breast cancer, coronary artery disease, congestive heart failure, diabetes mellitus type 2, hyperlipidemia, hypertension, PE and DVT who presented with a mechanical fall and was found to have an ARTIE and elevated troponin. Patient was cleared by Trauma surgery before being admitted to the floors. 1. ARTIE on CKD Stage 4:  Likely pre-renal, secondary to volume depletion as patient states she has not been drinking enough water in the last few days. Baseline creatine is 1.3. On admission 1.7 today. BUN 40. Patient had 2 liters of NaCl in ED. Plan:  -Holding Bumex 2 mg and Metolazone   -Monitor with BMP in the morning  -Patient does not follow with a nephrologist and does not wish to be on hemodialysis     2. Elevated Troponin/Chest pain:  Initial troponin at admission elevated at 0.017. .1. At last admission troponin was 0.013. Patient started having chest pain this morning when she coughs and takes a deep breath. No chest tenderness upon palpation. Patient's chest has a rash de to skin patches placed on her chest three weeks ago, she went to ED for that as well before. -EKG shows Sinus tachycardia, Nonspecific ST and T wave abnormality, vent rate 122, .  -CT Chest with contrast shows stable CT scan of the chest, no interval change since previous study dated 11/20/21, no displaced rib fracture or evidence of pneumothorax, thickening off the interstitial lung markings. Scarring in the right upper lobe, cardiomegaly with mitral valve calcification.  Coronary artery calcification, Calcification in the thoracic aorta, and thoracic spondylosis. Plan:  -Troponin recheck  -Patient on tele    3. Mechanical Fall:  Patient states she had a fall this morning at 4:30 am. She hit her head and states she was on the floor for 4 hours before she was found. She denies losing consciousness. -CT head showed stable CT scan of the brain, no interval change since 10/27/21  -XR Hip with Pelvis shows no acute fracture. -CT Abdomen Pelvis shows no evidence of acute intra-abdominal or intrapelvic abnormality, no evidence of acute osseous injury of the lumbar spine or pelvis, moderate retained stool, colonic diverticulosis, Small focus of air in the bladder likely on the basis of previous instrumentation. In the absence of instrumentation, this is concerning for cystitis. Plan:  -Restart home Plavix as no evidence that patient is bleeding  -Fall precautions  -Ordered PT and OT    4. High Anion gap: At admission patient found to have AG of 20. Possibly secondary to her kidney function and dehydration  Plan:  -Monitor AG in AM  -Lactic acid pending    5. History of DVT and PE:  Patient has a family history of blood clots. She states her son passed away from a blood clot. She had a PE about 20 years ago. On presentation today patient's left calf slightly bigger in size than right calf. Plan:  -Venous doppler lower extremity bilateral to check for DVTs    6. Possible Orthostatic hypotension:  Patient states that when she stands up she feels her blood rushing to her brain. She denies having any syncopal events. Plan:  -Get Orthostatic Vital signs   -Reevaluate in AM    7. Diabetes Type 2:  Patient A1c 17.9 on 10/29/21. On admission glucose 387. Plan:  -Holding home Metformin   -Started Lantus 10 units  -Started Medium SSI  -Will monitor glucose on BMP in AM    8. Hx of Coronary Artery Disease:  S/p stent in LAD in 2020. Patient follows with Dr. Raad Eaton.    Plan:  -Continue home Plavix as patient does not have any signs of active bleeding after the fall. CT head was negative. 9. Hx of Hypertension:  Patient had stable BP on initial presentation, 124/84. Plan:  -Continue home Lopressor 50 mg    9. Hx of Hyperlipidemia:  Plan:  -Continue home Lipitor 40 mg    Fluids: As above  Electrolyte: Replete as needed   Nutrition: Adult diet; Dysphagia Soft and Bite sized  DVT ppx:   PT/OT/SLP: PT and OT foloowing    Admit to: 6k-21    General Medicine History and Physical     History provided by: Patient and her daughter  History limited by: N/A  Patient presents from: Nursing home    Chief Complaint with Duration:  ARTIE, elevated troponin, and mechanical fall    History of Present Illness:  Art Sterling is a 80 y.o. female with a with a PMH of breast cancer, coronary artery disease, congestive heart failure, diabetes mellitus type 2, hyperlipidemia, hypertension, PE and DVT who presented with a mechanical fall and was found to have an ARTIE and elevated troponin. Patient is on Plavix at home an therefore this was called a level 2 trauma in ED. Patient comes from a nursing home where this morning at 4:30 am she fell while moving from her bed to her recliner. She states she was using her walker and is not sure if she tripped or if the walker was not locked properly. She remembers falling and laying on the floor for 4 hours before she was found. She denies losing consciousness, head pain, or neck pain. She states she had some lightheadedness and dizziness for the last month. Patient mentions that when she initially stood up she feel like \"all the blood was rushing to my head\". She also has had chest pain when she coughs or takes a deep breath. Patient is accompanied with her daughter who states her mother was recently discharged from [facility] on 11/1/21 with similar symptoms and that she has had a fall everyday for the last 6 days. Daughter also states that patient is normally tachy at baseline.  Patient also had acute encephalopathy at last admission. Today she is A&Ox3. She states she is having some abdominal discomfort. Patient denies having any fevers, chills, nausea, vomiting, palpitations, shortness of breath, diarrhea, urinary frequency, urgency or dysuria. Summarized ED course: Initial vital signs included Temp 97.5, RR 16, Pulse 126, /84, SpO2 97. Patient was tachy and found to have elevated creatine therefore was given 2 liters of NaCl. She was given  for elevated troponin. Admission Labs:   CBC- WBC 12.9, Hemoglobin 15, Hematocrit 44.5, Platelets 955   CMP-  Glucose 387, Cr 1.7, BUN 40, Na 136, K 4.6, Cl 93, CO2 23, Ca 9.7, AST 28, ALT 50, Alk Phos 80, Total Protein 7.1, Albumin 4.1, Total Bilirubin 0.6   AG- 20   GFR- 28   Osmolality- 297.7    Troponin- 0.017   BNP- 911.1   UA- pending    Admission Diagnostics/Imaging:  -XR HIP W PELVIS MIN 5 VWS BILATERAL: No acute fracture  -CT ABDOMEN PELVIS W IV CONTRAST:   1. No evidence of acute intra-abdominal or intrapelvic abnormality  2. No evidence of acute osseous injury of the lumbar spine or pelvis  3. Moderate retained stool  4. Colonic diverticulosis  5. Small focus of air in the bladder likely on the basis of previous instrumentation. In the absence of instrumentation, this is concerning for cystitis. -CT CERVICAL SPINE WO CONTRAST: Stable appearance no acute fracture. -CT CHEST W CONTRAST:  1. Stable CT scan of the chest, no interval change since previous study dated 20 November 2020  2. No displaced rib fracture or evidence of pneumothorax. 3. Thickening off the interstitial lung markings. Scarring in the right upper lobe. 4. Cardiomegaly with mitral valve calcification. Coronary artery calcification  5. Calcification in the thoracic aorta  6. Thoracic spondylosis  -CT HEAD WO CONTRAST: Stable CT scan of the brain, no interval change 27th of October 2021  -CT LUMBAR RECONSTRUCTION WO POST PROCESS:   1.  No evidence of acute intra-abdominal or intrapelvic abnormality  2. No evidence of acute osseous injury of the lumbar spine or pelvis  3. Moderate retained stool  4. Colonic diverticulosis  5. Small focus of air in the bladder likely on the basis of previous instrumentation. In the absence of instrumentation, this is concerning for cystitis. -CT THORACIC RECONSTRUCTION WO POST PROCESS: No acute abnormality    EKG: Done twice on 11/5/21 as patient remained tachy in ED  1. Sinus tachycardia. Nonspecific ST and T wave abnormality. Vent rate 126, SC interval 136, .  2. Sinus tachycardia. Nonspecific ST and T wave abnormality. Vent rate 122, SC interval 134, . Past Medical History Past Surgical History    has a past medical history of Anxiety, Breast cancer ([facility] [address] CAD (coronary artery disease), COPD (chronic obstructive pulmonary disease) ([address] Diverticulitis, DVT (deep venous thrombosis) ([address] GERD (gastroesophageal reflux disease), Hyperlipidemia, Hypertension, Osteoarthritis, Pancreatic cancer ([facility] [address] Psychiatric problem, Pulmonary embolism ([facility] [address] S/P knee replacement, and Type 2 diabetes mellitus ([facility] [address] has a past surgical history that includes Breast surgery; Colonoscopy; eye surgery; skin biopsy; Cholecystectomy (4-15-16); and joint replacement. Social History Family History    reports that she has never smoked. She has never used smokeless tobacco. She reports current alcohol use. She reports that she does not use drugs. family history includes Cancer in her mother; Heart Disease (age of onset: 80) in her father; High Cholesterol in her brother; Other in her sister.      Outpatient Medications   Current Outpatient Medications   Medication Instructions    acetaminophen (TYLENOL) 650 mg, Oral, EVERY 6 HOURS PRN    atorvastatin (LIPITOR) 40 mg, Oral, DAILY    bumetanide (BUMEX) 2 mg, Oral, DAILY    clopidogrel (PLAVIX) 75 MG tablet Pt takes 300 mg tonight then 75 mg every day starting on 2/4/2020   Lolita Rendon D-Mannose 500 MG CAPS 1 capsule, Oral, DAILY    DULoxetine (CYMBALTA) 120 mg, Oral, DAILY    glucose blood VI test strips (ONE TOUCH ULTRA TEST) strip Test daily or AD. Dx. 250.00    lactobacillus (CULTURELLE) capsule 1 capsule, Oral, 2 TIMES DAILY WITH MEALS    magnesium hydroxide (MILK OF MAGNESIA) 400 MG/5ML suspension Oral, DAILY PRN    melatonin 3 mg, Oral, NIGHTLY    metFORMIN (GLUCOPHAGE-XR) 750 mg, Oral, DAILY WITH DINNER, Restart this medication Friday am    metOLazone (ZAROXOLYN) 2.5 mg, Oral, DAILY PRN    metoprolol tartrate (LOPRESSOR) 50 mg, Oral, 2 TIMES DAILY    nitroGLYCERIN (NITROSTAT) 0.4 MG SL tablet up to max of 3 total doses. If no relief after 1 dose, call 911.  polyethyl glycol-propyl glycol 0.4-0.3 % (SYSTANE) 0.4-0.3 % ophthalmic solution 1 drop, PRN    potassium chloride (KLOR-CON) 10 MEQ extended release tablet 10 mEq, Oral, DAILY        Allergies   Ciprofloxacin hcl, Food, and Penicillins     Immunizations   Immunization History   Administered Date(s) Administered    Influenza A ([id number]) Vaccine PF IM 01/11/2010    Influenza Virus Vaccine 10/02/2013, 10/13/2015, 09/13/2016, 10/22/2018    Influenza, High Dose (Fluzone 65 yrs and older) 10/01/2014, 10/13/2015    Influenza, Quadv, IM, (6 mo and older Fluzone, Flulaval, Fluarix and 3 yrs and older Afluria) 09/13/2016    Pneumococcal Conjugate 7-valent (Prevnar7) 10/01/2007    Pneumococcal Polysaccharide ([id number]) 03/05/2015    Zoster Live (Zostavax) 10/21/2012        Review of Systems   Constitutional: Negative for chills and fever. HENT: Negative for sore throat and tinnitus. Eyes: Negative. Respiratory: Negative for shortness of breath. Cardiovascular: Positive for chest pain. Gastrointestinal: Negative for abdominal pain, blood in stool, constipation, diarrhea, nausea and vomiting. Endocrine: Negative. Genitourinary: Negative for difficulty urinating, dysuria, frequency and urgency.    Skin: Positive for color change and rash. Redness of chest   Neurological: Positive for dizziness and light-headedness. Hematological: Negative. Psychiatric/Behavioral: Negative. - negative except for the aforementioned    Objective     Vitals:  /65   Pulse 104   Temp 97.5 °F (36.4 °C) (Oral)   Resp 20   Ht 5' 5\" (1.651 m)   Wt 166 lb (75.3 kg)   SpO2 92%   BMI 27.62 kg/m²     Physical Exam  Constitutional:       Appearance: Normal appearance. She is normal weight. HENT:      Head: Normocephalic and atraumatic. Nose: Nose normal.      Mouth/Throat:      Mouth: Mucous membranes are moist.      Pharynx: Oropharynx is clear. Eyes:      Pupils: Pupils are equal, round, and reactive to light. Cardiovascular:      Rate and Rhythm: Normal rate and regular rhythm. Pulses: Normal pulses. Heart sounds: Normal heart sounds. Pulmonary:      Effort: Pulmonary effort is normal.      Breath sounds: Normal breath sounds. Abdominal:      General: Bowel sounds are normal.      Palpations: Abdomen is soft. Musculoskeletal:         General: Normal range of motion. Cervical back: Normal range of motion and neck supple. No tenderness. Left lower leg: Edema present. Comments: Patient has slightly more edema in LLE. LLE also tender compared to RLE. Skin:     General: Skin is warm and dry. Capillary Refill: Capillary refill takes less than 2 seconds. Neurological:      General: No focal deficit present. Mental Status: She is alert and oriented to person, place, and time.    Psychiatric:         Mood and Affect: Mood normal.         Behavior: Behavior normal.       Labs:   Results for orders placed or performed during the hospital encounter of 11/05/21   CBC Auto Differential   Result Value Ref Range    WBC 12.9 (H) 4.8 - 10.8 thou/mm3    RBC 4.69 4.20 - 5.40 mill/mm3    Hemoglobin 15.0 12.0 - 16.0 gm/dl    Hematocrit 44.5 37.0 - 47.0 %    MCV 94.9 81.0 - 99.0 fL MCH 32.0 26.0 - 33.0 pg    MCHC 33.7 32.2 - 35.5 gm/dl    RDW-CV 13.7 11.5 - 14.5 %    RDW-SD 46.4 (H) 35.0 - 45.0 fL    Platelets 082 389 - 808 thou/mm3    MPV 8.7 (L) 9.4 - 12.4 fL    Seg Neutrophils 57.2 %    Lymphocytes 33.3 %    Monocytes 8.4 %    Eosinophils 0.4 %    Basophils 0.2 %    Immature Granulocytes 0.5 %    Segs Absolute 7.4 1.8 - 7.7 thou/mm3    Lymphocytes Absolute 4.3 1.0 - 4.8 thou/mm3    Monocytes Absolute 1.1 0.4 - 1.3 thou/mm3    Eosinophils Absolute 0.1 0.0 - 0.4 thou/mm3    Basophils Absolute 0.0 0.0 - 0.1 thou/mm3    Immature Grans (Abs) 0.07 0.00 - 0.07 thou/mm3    nRBC 0 /100 wbc   Comprehensive Metabolic Panel w/ Reflex to MG   Result Value Ref Range    Glucose 387 (H) 70 - 108 mg/dL    CREATININE 1.7 (H) 0.4 - 1.2 mg/dL    BUN 40 (H) 7 - 22 mg/dL    Sodium 136 135 - 145 meq/L    Potassium reflex Magnesium 4.6 3.5 - 5.2 meq/L    Chloride 93 (L) 98 - 111 meq/L    CO2 23 23 - 33 meq/L    Calcium 9.7 8.5 - 10.5 mg/dL    AST 28 5 - 40 U/L    Alkaline Phosphatase 80 38 - 126 U/L    Total Protein 7.1 6.1 - 8.0 g/dL    Albumin 4.1 3.5 - 5.1 g/dL    Total Bilirubin 0.6 0.3 - 1.2 mg/dL    ALT 50 11 - 66 U/L   Brain Natriuretic Peptide   Result Value Ref Range    Pro-.1 0.0 - 1800.0 pg/mL   Troponin   Result Value Ref Range    Troponin T 0.017 (A) ng/ml   Anion Gap   Result Value Ref Range    Anion Gap 20.0 (H) 8.0 - 16.0 meq/L   Glomerular Filtration Rate, Estimated   Result Value Ref Range    Est, Glom Filt Rate 28 (A) ml/min/1.73m2   Osmolality   Result Value Ref Range    Osmolality Calc 297.7 275.0 - 300.0 mOsmol/kg   Troponin   Result Value Ref Range    Troponin T 0.017 (A) ng/ml   Lactic acid, plasma   Result Value Ref Range    Lactic Acid 1.2 0.5 - 2.0 mmol/L   Urine with Reflexed Micro   Result Value Ref Range    Glucose, Ur >= 1000 (A) NEGATIVE mg/dl    Bilirubin Urine NEGATIVE NEGATIVE    Ketones, Urine TRACE (A) NEGATIVE    Specific Gravity, Urine > 1.030 (A) 1.002 - 1.030 Blood, Urine NEGATIVE NEGATIVE    pH, UA 5.5 5.0 - 9.0    Protein, UA TRACE (A) NEGATIVE    Urobilinogen, Urine 0.2 0.0 - 1.0 eu/dl    Nitrite, Urine NEGATIVE NEGATIVE    Leukocyte Esterase, Urine NEGATIVE NEGATIVE    Color, UA YELLOW STRAW-YELLOW    Character, Urine CLOUDY (A) CLEAR-SL CLOUD    RBC, UA NONE 0-2/hpf /hpf    WBC, UA 15-25 0-4/hpf /hpf    Epithelial Cells, UA 3-5 3-5/hpf /hpf    Bacteria, UA NONE SEEN FEW/NONE SEEN /hpf    Casts UA NONE SEEN NONE SEEN /lpf    Crystals, UA NONE SEEN NONE SEEN    Renal Epithelial, UA NONE SEEN NONE SEEN    Yeast, UA MANY BUDDING NONE SEEN    CASTS 2 NONE SEEN NONE SEEN /lpf    MISCELLANEOUS 2 NONE SEEN    Basic Metabolic Panel w/ Reflex to MG x3   Result Value Ref Range    Sodium 138 135 - 145 meq/L    Potassium reflex Magnesium 3.7 3.5 - 5.2 meq/L    Chloride 101 98 - 111 meq/L    CO2 23 23 - 33 meq/L    Glucose 318 (H) 70 - 108 mg/dL    BUN 34 (H) 7 - 22 mg/dL    CREATININE 1.3 (H) 0.4 - 1.2 mg/dL    Calcium 8.5 8.5 - 10.5 mg/dL   CBC x3   Result Value Ref Range    WBC 10.3 4.8 - 10.8 thou/mm3    RBC 3.75 (L) 4.20 - 5.40 mill/mm3    Hemoglobin 12.0 12.0 - 16.0 gm/dl    Hematocrit 37.4 37.0 - 47.0 %    MCV 99.7 (H) 81.0 - 99.0 fL    MCH 32.0 26.0 - 33.0 pg    MCHC 32.1 (L) 32.2 - 35.5 gm/dl    RDW-CV 14.0 11.5 - 14.5 %    RDW-SD 50.1 (H) 35.0 - 45.0 fL    Platelets 757 572 - 681 thou/mm3    MPV 8.6 (L) 9.4 - 12.4 fL   Anion Gap   Result Value Ref Range    Anion Gap 14.0 8.0 - 16.0 meq/L   Glomerular Filtration Rate, Estimated   Result Value Ref Range    Est, Glom Filt Rate 39 (A) ml/min/1.73m2   POCT glucose   Result Value Ref Range    POC Glucose 367 (H) 70 - 108 mg/dl   POCT glucose   Result Value Ref Range    POC Glucose 304 (H) 70 - 108 mg/dl   EKG 12 Lead   Result Value Ref Range    Ventricular Rate 126 BPM    Atrial Rate 126 BPM    P-R Interval 136 ms    QRS Duration 80 ms    Q-T Interval 318 ms    QTc Calculation (Bazett) 460 ms    P Axis 57 degrees    R Axis 58 degrees    T Axis 136 degrees   EKG 12 Lead   Result Value Ref Range    Ventricular Rate 122 BPM    Atrial Rate 122 BPM    P-R Interval 134 ms    QRS Duration 86 ms    Q-T Interval 338 ms    QTc Calculation (Bazett) 481 ms    P Axis 33 degrees    R Axis 41 degrees    T Axis 121 degrees       Diagnostics:  CT HEAD WO CONTRAST    Result Date: 11/5/2021  PROCEDURE: CT HEAD WO CONTRAST CLINICAL INFORMATION: Trauma, Trauma. COMPARISON: CT scan of the brain dated 27 October 2021. . TECHNIQUE: Noncontrast 5 mm axial images were obtained through the brain. Sagittal and coronal reconstructions were obtained. All CT scans at this facility use dose modulation, iterative reconstruction, and/or weight-based dosing when appropriate to reduce radiation dose to as low as reasonably achievable. FINDINGS: There is mild atrophy and dilatation of the lateral ventricles. There is diminished attenuation in the white matter most likely representing ischemic changes. This a lacunar infarct in the right cerebellar hemisphere. There is calcification in the pineal  gland and choroid plexus of both lateral ventricles. There is calcification the cavernous segments of both internal carotid arteries. There is no hemorrhage. There are no intra-or extra-axial collections. There is no  midline shift or mass effect. The paranasal sinuses and mastoid air cells are normally aerated. There is no suspicious calvarial abnormality. 1. Stable CT scan of the brain, no interval change 27th of October 2021. Nehemias Harmon **This report has been created using voice recognition software. It may contain minor errors which are inherent in voice recognition technology. ** Final report electronically signed by DR Noman Eisenberg on 11/5/2021 11:20 AM    CT CHEST W CONTRAST    Result Date: 11/5/2021  PROCEDURE: CT CHEST W CONTRAST CLINICAL INFORMATION: Trauma, Trauma. COMPARISON: CT scan of the chest dated 20 th of November 2020. Nehemias Harmon  TECHNIQUE: 5 mm axial images were obtained through the chest after the administration of intravenous contrast. Sagittal and coronal reconstructions were obtained. All CT scans at this facility use dose modulation, iterative reconstruction, and/or weight-based dosing when appropriate to reduce radiation dose to as low as reasonably achievable. FINDINGS: There is mild cardiomegaly with mitral valve calcification. . There is atherosclerotic calcification in the aortic root and aortic arch. There is no evidence for aortic dissection or mediastinal hematoma. . There is no gross abnormality of the pulmonary artery and its proximal branches. There is no mediastinal, axillary or hilar adenopathy. There is no pericardial or pleural effusion. There is diffuse COPD and thickening off the interstitial lung markings. There is scarring in the right upper lobe. . There is thoracic spondylosis. There is no acute fracture. The ribs appear to be intact. There is no displaced rib fracture. There is no pneumothorax. . There is a hiatal hernia. 1. Stable CT scan of the chest, no interval change since previous study dated 20 November 2020. 2. No displaced rib fracture or evidence of pneumothorax. 3. Thickening off the interstitial lung markings. Scarring in the right upper lobe. 4. Cardiomegaly with mitral valve calcification. Coronary artery calcification. 5. Calcification in the thoracic aorta. 6. Thoracic spondylosis. **This report has been created using voice recognition software. It may contain minor errors which are inherent in voice recognition technology. ** Final report electronically signed by DR Georgina Cohen on 11/5/2021 11:29 AM    CT CERVICAL SPINE WO CONTRAST    Result Date: 11/5/2021  PROCEDURE: CT CERVICAL SPINE WO CONTRAST CLINICAL INFORMATION: Trauma, Trauma .  TECHNIQUE: 2-D multiplanar noncontrast CT cervical spine All CT scans at this facility use dose modulation, iterative reconstruction, and/or weight-based dosing when appropriate to reduce radiation dose to as low as reasonably achievable. COMPARISON: 10/27/2021 FINDINGS: No acute fracture or acute bony malalignment. Diffuse degenerative changes are detailed on the previous exam. Straightening of the normal cervical lordosis which may be due to positioning. No precervical soft tissue swelling. Stable appearance no acute fracture. **This report has been created using voice recognition software. It may contain minor errors which are inherent in voice recognition technology. ** Final report electronically signed by Dr. Maritza Rogers on 11/5/2021 11:21 AM    CT ABDOMEN PELVIS W IV CONTRAST Additional Contrast? Radiologist Recommendation    Result Date: 11/5/2021  PROCEDURE: CT ABDOMEN PELVIS W IV CONTRAST, CT LUMBAR RECONSTRUCTION WO POST PROCESS CLINICAL INFORMATION: Trauma, Trauma . Fall today with generalized pain. COMPARISON: CT abdomen pelvis dated 11/20/2020. TECHNIQUE: Helical CT of the abdomen and pelvis following intravenous administration of 80 mL [id number]-[id number] injected in the right AC with sagittal and coronal reconstructions. Reformatted images of the lumbar spine with axial, sagittal and coronal reconstructions were also created. All CT scans at this facility use dose modulation, iterative reconstruction, and/or weight-based dosing when appropriate to reduce radiation dose to as low as reasonably achievable. FINDINGS: There is mild interstitial prominence at the lung bases, similar to prior exam with superimposed mild posterior dependent change. The visualized portion of the heart is unremarkable. There is a small hiatal hernia, stable compared to prior exam. Liver is unremarkable. The gallbladder is surgically absent. Adrenal glands are unremarkable. The kidneys are mildly atrophied with cortical thinning, similar to prior exam. The spleen is unremarkable. The pancreas is partially fatty replaced. No retroperitoneal or mesenteric lymphadenopathy is identified.  There is moderately prominent stool throughout the large bowel. There are scattered diverticula without adjacent inflammation to suggest diverticulitis. The pelvis is partially obscured from streak artifact from right hip arthroplasty. There is a small focus of air in the bladder. Unopacified bladder is otherwise unremarkable. The uterus and adnexa are within normal limits. No free fluid is identified in the abdomen or pelvis. There are stable degenerative changes of the lumbar spine. There is a stable dextrocurvature of the lumbar spine. There is otherwise anatomic vertebral body height and alignment. No fracture of the lumbar vertebral column is identified. No fracture of the pelvis is identified. There are stable degenerative changes of the sacral iliac joints. 1. No evidence of acute intra-abdominal or intrapelvic abnormality. 2. No evidence of acute osseous injury of the lumbar spine or pelvis. 3. Moderate retained stool. 4. Colonic diverticulosis. 5. Small focus of air in the bladder likely on the basis of previous instrumentation. In the absence of instrumentation, this is concerning for cystitis. **This report has been created using voice recognition software. It may contain minor errors which are inherent in voice recognition technology. ** Final report electronically signed by Dr. Juve Cooper MD on 11/5/2021 11:34 AM    VL DUP LOWER EXTREMITY VENOUS BILATERAL    Result Date: 11/5/2021  PROCEDURE: VL DUP LOWER EXTREMITY VENOUS BILATERAL CLINICAL INFORMATION: History of blood clots, PE. TECHNIQUE: High-resolution duplex ultrasound with spectral analysis of the bilateral lower extremities was performed. The common femoral, femoral, popliteal and calf vein segments were studied to the ankles. COMPARISON: Bilateral lower extremity venous duplex ultrasound 2/17/2020 FINDINGS: There is normal compressibility with no evidence of thrombus. Flow study shows normal color flow, augmentation and phasic response.      No evidence of deep venous thrombosis in either lower extremity. **This report has been created using voice recognition software. It may contain minor errors which are inherent in voice recognition technology. **  Final report electronically signed by Dr. Presley Bernal on 11/5/2021 5:43 PM    CT LUMBAR RECONSTRUCTION WO POST PROCESS    Result Date: 11/5/2021  PROCEDURE: CT ABDOMEN PELVIS W IV CONTRAST, CT LUMBAR RECONSTRUCTION WO POST PROCESS CLINICAL INFORMATION: Trauma, Trauma . Fall today with generalized pain. COMPARISON: CT abdomen pelvis dated 11/20/2020. TECHNIQUE: Helical CT of the abdomen and pelvis following intravenous administration of 80 mL Isovue-370 injected in the right AC with sagittal and coronal reconstructions. Reformatted images of the lumbar spine with axial, sagittal and coronal reconstructions were also created. All CT scans at this facility use dose modulation, iterative reconstruction, and/or weight-based dosing when appropriate to reduce radiation dose to as low as reasonably achievable. FINDINGS: There is mild interstitial prominence at the lung bases, similar to prior exam with superimposed mild posterior dependent change. The visualized portion of the heart is unremarkable. There is a small hiatal hernia, stable compared to prior exam. Liver is unremarkable. The gallbladder is surgically absent. Adrenal glands are unremarkable. The kidneys are mildly atrophied with cortical thinning, similar to prior exam. The spleen is unremarkable. The pancreas is partially fatty replaced. No retroperitoneal or mesenteric lymphadenopathy is identified. There is moderately prominent stool throughout the large bowel. There are scattered diverticula without adjacent inflammation to suggest diverticulitis. The pelvis is partially obscured from streak artifact from right hip arthroplasty. There is a small focus of air in the bladder. Unopacified bladder is otherwise unremarkable. The uterus and adnexa are within normal limits.  No free fluid is identified in the abdomen or pelvis. There are stable degenerative changes of the lumbar spine. There is a stable dextrocurvature of the lumbar spine. There is otherwise anatomic vertebral body height and alignment. No fracture of the lumbar vertebral column is identified. No fracture of the pelvis is identified. There are stable degenerative changes of the sacral iliac joints. 1. No evidence of acute intra-abdominal or intrapelvic abnormality. 2. No evidence of acute osseous injury of the lumbar spine or pelvis. 3. Moderate retained stool. 4. Colonic diverticulosis. 5. Small focus of air in the bladder likely on the basis of previous instrumentation. In the absence of instrumentation, this is concerning for cystitis. **This report has been created using voice recognition software. It may contain minor errors which are inherent in voice recognition technology. ** Final report electronically signed by Dr. Oksana Stephens MD on 11/5/2021 11:34 AM    CT THORACIC RECONSTRUCTION WO POST PROCESS    Result Date: 11/5/2021  PROCEDURE: CT THORACIC RECONSTRUCTION WO POST PROCESS CLINICAL INFORMATION: Trauma . TECHNIQUE: 2-D multiplanar reconstructed images of the thoracic spine All CT scans at this facility use dose modulation, iterative reconstruction, and/or weight-based dosing when appropriate to reduce radiation dose to as low as reasonably achievable. COMPARISON: 11/20/2020 FINDINGS: No acute fracture or acute bony malalignment. Degenerative changes in the lower thoracic level in particular. Several images are missing from the T6 vertebral body level. These are seen there is prominent calcification in the posterior longitudinal ligament at T11-12 no paraspinous soft tissue abnormality is seen. No significant foraminal encroachment is seen. On the soft tissue images. No acute abnormality **This report has been created using voice recognition software.   It may contain minor errors which are inherent in voice recognition technology. ** Final report electronically signed by Dr. Jamshid Woodard on 11/5/2021 11:27 AM    XR HIP W PELVIS MIN 5 VWS BILATERAL    Result Date: 11/5/2021  PROCEDURE: XR HIP W PELVIS MIN 5 VWS BILATERAL CLINICAL INFORMATION: fall hip pain . TECHNIQUE: AP pelvis and AP and attempted frog-leg projections of both hips. COMPARISON: No prior study. FINDINGS: Right hip replacement. No acute fracture or bone destruction. Exam limited by bone density and technique. If clinical concern remains for fracture CT would be recommended. No acute fracture **This report has been created using voice recognition software. It may contain minor errors which are inherent in voice recognition technology. ** Final report electronically signed by Dr. Jamshid Woodard on 11/5/2021 11:04 AM    EKG:   As above    Micro:  No active infections    Signed:  Gabriella Arias DO  Internal Medicine, PGY-1  11/06/21  7:56 AM    Staff: Gabby Monreal DO

## 2021-11-05 NOTE — ED NOTES
Patient resting in bed. Respirations easy and unlabored. No distress noted. Call light within reach.        Javan Cardona RN  11/05/21 [id number]

## 2021-11-05 NOTE — ED NOTES
Pt resting in bed with family at bedside. PT denies any pain. Siderails x2. Call light within reach.       Irma Tadeo [address]  11/05/21 [id number]

## 2021-11-05 NOTE — ED NOTES
Pt presents to the ED via EMS from [facility] after a fall at 0430. Pt states she was walking with her walker and it got away from her causing her to fall. Pt unsure if she hit her head. Pt denies LOC. Pt is alert and oriented. EMS states pt has fell 3 times in the last 2 weeks.  Pt states that when she takes a deep breath or coughs she has pain in her chest.     Irvin Benson RN  11/05/21 [id number]

## 2021-11-05 NOTE — ED PROVIDER NOTES
[facility] ENCOUNTER          Pt Name: Kari Yee  MRN: [id number]  [place] 1935  Date of evaluation: 11/5/2021  Treating Resident Physician: Izetta Lombard, MD  Supervising Physician: Dr. Michelle Henry MD    [address] [place] [address]       Chief Complaint   Patient presents with    Fall     History obtained from the patient. HISTORY OF PRESENT ILLNESS    HPI  Kari Yee is a 80 y.o. female who presents to the emergency department for evaluation of fall. Patient states she is a resident at the nursing home patient around 430 this morning she was using her walker when it was not locked and then got away from her and she fell. She states she hit her head. She states that she remembers the fall until she hit the ground and she does not remember that. Patient denies any head or neck pain. Patient denies any nausea or vomiting. Patient denies any headache. Patient denies any visual disturbances. Patient states that she has fallen 3 times in the past 2 weeks. Patient is on Plavix. During examination patient intermittently complains of abdominal pain chest pain. Patient also mentions right hip pain and there is bruising. Patient denies any new aches fever chills cough shortness of breath nausea vomiting diarrhea constipation leg swelling    The patient has no other acute complaints at this time. REVIEW OF SYSTEMS   Review of Systems   Constitutional: Negative for chills and fever. HENT: Negative for ear pain and sinus pain. Eyes: Negative for pain. Respiratory: Negative for cough and shortness of breath. Cardiovascular: Positive for chest pain. Negative for leg swelling. Gastrointestinal: Positive for abdominal pain. Negative for constipation, diarrhea, nausea and vomiting. Genitourinary: Negative for dysuria and flank pain. Musculoskeletal: Negative for back pain and neck pain.    Skin: Positive for color change and wound. Neurological: Negative for headaches. Psychiatric/Behavioral: Negative for confusion.          PAST MEDICAL AND SURGICAL HISTORY     Past Medical History:   Diagnosis Date    Anxiety     nervous breakdown 9/2016    [facility]     CAD (coronary artery disease)     cardiomegaly    COPD (chronic obstructive pulmonary disease) (HCC)     Diverticulitis     DVT (deep venous thrombosis) (HCC)     GERD (gastroesophageal reflux disease)     irritable bowel disease    Hyperlipidemia     Hypertension     Osteoarthritis     Pancreatic cancer ([facility]     Family    Psychiatric problem     alzheimers start    Pulmonary embolism ([facility]     S/P knee replacement     Type 2 diabetes mellitus ([facility] 2009     Past Surgical History:   Procedure Laterality Date    BREAST SURGERY      right lumpectomy    CHOLECYSTECTOMY  4-15-16    cholangiogram    COLONOSCOPY      EYE SURGERY      macular hole left eye    JOINT REPLACEMENT      left knee & right hip    SKIN BIOPSY      right arm         MEDICATIONS     Current Facility-Administered Medications:     0.9 % sodium chloride bolus, 1,000 mL, IntraVENous, Once, Sol Carr DO    sucralfate (CARAFATE) 1 GM tablet, , , , Sol Crar DO    [Held by provider] bumetanide (BUMEX) tablet 2 mg, 2 mg, Oral, Daily, Sol Carr DO    [Held by provider] cefdinir (OMNICEF) capsule 300 mg, 300 mg, Oral, BID, Sol Carr DO    clopidogrel (PLAVIX) tablet 75 mg, 75 mg, Oral, Daily, Sol Carr DO    DULoxetine (CYMBALTA) extended release capsule 120 mg, 120 mg, Oral, Daily, Sol Carr DO    metoprolol tartrate (LOPRESSOR) tablet 50 mg, 50 mg, Oral, BID, Sol Carr DO    nitroGLYCERIN (NITROSTAT) SL tablet 0.4 mg, 0.4 mg, SubLINGual, Q5 Min PRN, Sol Carr DO    potassium chloride (KLOR-CON) extended release tablet 10 mEq, 10 mEq, Oral, Daily, Sol Carr DO    [Held by provider] metOLazone (ZAROXOLYN) tablet 2.5 mg, 2.5 mg, Heart Disease Father 80    Other Sister         infant death   [employer] High Cholesterol Brother          PREVIOUS RECORDS   Previous records reviewed: Patient was seen last on 10/27/2021 for ARTIE. PHYSICAL EXAM     ED Triage Vitals   BP Temp Temp src Pulse Resp SpO2 Height Weight   -- -- -- -- -- -- -- --     Initial vital signs and nursing assessment reviewed and vitals are/show: abnormal from Tachycardic. Pulsoximetry is normal per my interpretation. Additional Vital Signs:  Vitals:    11/05/21 1645   BP: (!) 131/99   Pulse: 134   Resp: 16   Temp: 97.7 °F (36.5 °C)   SpO2: 95%       Physical Exam  Constitutional:       General: She is not in acute distress. Appearance: Normal appearance. She is not ill-appearing, toxic-appearing or diaphoretic. HENT:      Head: Normocephalic. Right Ear: Tympanic membrane, ear canal and external ear normal.      Left Ear: Tympanic membrane, ear canal and external ear normal.   Eyes:      General: No scleral icterus. Right eye: No discharge. Left eye: No discharge. Extraocular Movements: Extraocular movements intact. Pupils: Pupils are equal, round, and reactive to light. Neck:      Comments: No midline cervical spine tenderness palpation  Cardiovascular:      Rate and Rhythm: Regular rhythm. Tachycardia present. Pulmonary:      Effort: Pulmonary effort is normal. No respiratory distress. Breath sounds: Normal breath sounds. No stridor. No wheezing, rhonchi or rales. Chest:      Chest wall: No tenderness. Abdominal:      General: Abdomen is flat. There is no distension. Palpations: Abdomen is soft. Tenderness: There is abdominal tenderness. There is no guarding or rebound. Comments: Intermittent tenderness palpation abdomen   Musculoskeletal:         General: Tenderness present. Cervical back: Neck supple. No tenderness. Right lower leg: No edema. Left lower leg: No edema.       Comments: Tenderness palpation right hip   Skin:     General: Skin is warm and dry. Findings: Bruising and lesion present. Comments: Bruising to right hip  Abrasions to left temporal region as well as bridge of nose   Neurological:      Mental Status: She is alert and oriented to person, place, and time. Mental status is at baseline. Psychiatric:         Mood and Affect: Mood normal.         Behavior: Behavior normal.         Thought Content: Thought content normal.         Judgment: Judgment normal.         ATLS Physical Exam:  Physical exam limited due to acuity of condition and possibility for severe decompensation due to life-threatening process       Primary Survey:  A (Airway): Airway intact  B (Breathing): Bilateral equal breath sounds  C (Circulation): Palpable bilateral radial pulses, palpable bilateral DP pulses  D (Disability): GCS 15, Moves all four extremities, follows commands  E (Exposure): Patient exposed    Vital Signs: P / BP / RR / SpO2 / T   ED Triage Vitals [11/05/21 1012]   BP Temp Temp Source Pulse Resp SpO2 Height Weight   124/84 97.5 °F (36.4 °C) Oral 126 16 97 % 5' 5\" (1.651 m) 162 lb (73.5 kg)     I have reviewed the triage vital signs.     Secondary Survey:  Bedside ultrasound FAST examination indeterminate, unable to adequately evaluate left upper quadrant  Right pupil 3mm RRLA  Left pupil 3mm RRLA  GCS 15  Does not have [address] on right  Does not have [address] on left  Does not have right hemotympanum  Does not have left hemotympanum  Does not have Miranda sign on right  Does not have Miranda sign on left  Has visible abrasion(s) to face at bridge of nose and near left eye  Does not have visible laceration(s) to face   Trachea midline  Does not have obvious clavicular deformity  Does not have anterior chest wall tenderness  Does not have anterior chest wall crepitus  Abdomen soft  Abdomen non-distended  Abdomen tender  Pelvis stable  Does not have obvious right anterior thigh deformity Does not have obvious left anterior thigh deformity  Does not have obvious right lower extremity deformity  Does not have obvious left lower extremity deformity  Does not have obvious right upper extremity deformity  Does not have obvious left upper extremity deformity  Does not have midline cervical spine tenderness to palpation  Does not have palpable cervical spine step-offs or deformities   Does not have midline thoracic spine tenderness to palpation  Does not have palpable thoracic spine step-offs or deformities   Does not have midline lumbar spine tenderness to palpation  Does not have palpable lumbar spine step-offs or deformities   Has visible abrasion(s) noted above  Does not have visible laceration(s)      MEDICAL DECISION MAKING   Initial Assessment:     42-year-old female on Plavix presenting for mechanical fall with possible head injury    Differential diagnoses include but not limited to: Laceration abrasion fracture dislocation epidural subdural subarachnoid intraparenchymal intracranial bleed ACS arrhythmia ARTIE infection Covid pneumonia UTI arrhythmia electrolyte abnormality CHF COPD        Plan:     Level 2 trauma called  Cervical collar placed  Bedside ultrasound fast examination performed: Inconclusive, unable to completely assess left upper quadrant  EKG  Labs  Imaging: XR Hip, CT Head, CT Cervical Spine, CT Chest, CT Abd/Pel, CT Thoracic Spine and CT Lumbar Spine  Pan scan per trauma team Vane Buck  IV fluids  Analgesia  Atarax for anxiety per family and patient  Additional IV fluids ordered  We will repeat EKG for persistent tachycardia  Aspirin 324 ordered  Admission          Patient is a 80 y.o. female who was seen and evaluated in the emergency department for fall. Patient is on Plavix and has abrasions to her face. I paged this as a level 2 trauma. Patient otherwise had no outward gross signs of intracranial injury.   She had no midline cervical spine tenderness to palpation but we did apply cervical collar. I performed a primary secondary survey. I performed bedside ultrasound fast examination which was indeterminant as we are unable to adequately evaluate the left upper quadrant. Patient had intermittent tenderness to the abdomen as well as chest pain so in conjunction with the trauma service they recommended to pan scan the patient. Patient was tachycardic so I provided IV fluids and analgesia. CT imaging was negative for acute significant pathology  Lab work showed an elevated creatinine as well as troponin. Patient denies any chest pain but given her elevated troponin and her persistent tachycardia I did obtain an repeat EKG which still showed sinus tachycardia. I also ordered aspirin 324 given her elevated troponin and tachycardia. Given her elevated creatinine I ordered additional IV fluids. I discussed the findings with family and patient and we agreed that she should be admitted for further work-up management treatment evaluation of her ARTIE as well as elevated troponin. Critical Care:    Upon my evaluation, this patient had a high probability of imminent or life-threatening deterioration due to traumatic injury, which required my direct attention, intervention, and personal management. I have personally provided 30 minutes of critical care time exclusive of time spent on separately billable procedures. Time includes review of laboratory data, radiology results, discussion with consultants, and monitoring for potential decompensation. Interventions were performed as documented above.       Yvonne Cordova MD, 11/05/21, 6:13 PM      ED RESULTS   Laboratory results:  Labs Reviewed   CBC WITH AUTO DIFFERENTIAL - Abnormal; Notable for the following components:       Result Value    WBC 12.9 (*)     RDW-SD 46.4 (*)     MPV 8.7 (*)     All other components within normal limits   COMPREHENSIVE METABOLIC PANEL W/ REFLEX TO MG FOR LOW K - Abnormal; Notable for the following components:    Glucose 387 (*)     CREATININE 1.7 (*)     BUN 40 (*)     Chloride 93 (*)     All other components within normal limits   TROPONIN - Abnormal; Notable for the following components:    Troponin T 0.017 (*)     All other components within normal limits   ANION GAP - Abnormal; Notable for the following components:    Anion Gap 20.0 (*)     All other components within normal limits   GLOMERULAR FILTRATION RATE, ESTIMATED - Abnormal; Notable for the following components:    Est, Glom Filt Rate 28 (*)     All other components within normal limits   URINE WITH REFLEXED MICRO - Abnormal; Notable for the following components:    Glucose, Ur >= 1000 (*)     Ketones, Urine TRACE (*)     Specific Gravity, Urine > 1.030 (*)     Protein, UA TRACE (*)     Character, Urine CLOUDY (*)     All other components within normal limits   CULTURE, REFLEXED, URINE    Narrative:     Source: Specimen not received       Site:           Current Antibiotics:   BRAIN NATRIURETIC PEPTIDE   OSMOLALITY   LACTIC ACID, PLASMA   TROPONIN   BASIC METABOLIC PANEL W/ REFLEX TO MG FOR LOW K   CBC       Radiologic studies results:  VL DUP LOWER EXTREMITY VENOUS BILATERAL   Final Result      No evidence of deep venous thrombosis in either lower extremity. **This report has been created using voice recognition software. It may contain minor errors which are inherent in voice recognition technology. **             Final report electronically signed by Dr. Darien Bae on 11/5/2021 5:43 PM      CT ABDOMEN PELVIS W IV CONTRAST Additional Contrast? Radiologist Recommendation   Final Result       1. No evidence of acute intra-abdominal or intrapelvic abnormality. 2. No evidence of acute osseous injury of the lumbar spine or pelvis. 3. Moderate retained stool. 4. Colonic diverticulosis. 5. Small focus of air in the bladder likely on the basis of previous instrumentation.  In the absence of instrumentation, this is concerning for cystitis. **This report has been created using voice recognition software. It may contain minor errors which are inherent in voice recognition technology. **      Final report electronically signed by Dr. Zack Arredondo MD on 11/5/2021 11:34 AM      CT CERVICAL SPINE WO CONTRAST   Final Result   Stable appearance no acute fracture. **This report has been created using voice recognition software. It may contain minor errors which are inherent in voice recognition technology. **      Final report electronically signed by Dr. Shilpa España on 11/5/2021 11:21 AM      CT CHEST W CONTRAST   Final Result       1. Stable CT scan of the chest, no interval change since previous study dated 20 November 2020.   2. No displaced rib fracture or evidence of pneumothorax. 3. Thickening off the interstitial lung markings. Scarring in the right upper lobe. 4. Cardiomegaly with mitral valve calcification. Coronary artery calcification. 5. Calcification in the thoracic aorta. 6. Thoracic spondylosis. **This report has been created using voice recognition software. It may contain minor errors which are inherent in voice recognition technology. **      Final report electronically signed by DR Barb Urbina on 11/5/2021 11:29 AM      CT HEAD WO CONTRAST   Final Result       1. Stable CT scan of the brain, no interval change 27th of October 2021. Ann Crumble **This report has been created using voice recognition software. It may contain minor errors which are inherent in voice recognition technology. **      Final report electronically signed by DR Barb Urbina on 11/5/2021 11:20 AM      CT LUMBAR RECONSTRUCTION WO POST PROCESS   Final Result       1. No evidence of acute intra-abdominal or intrapelvic abnormality. 2. No evidence of acute osseous injury of the lumbar spine or pelvis. 3. Moderate retained stool. 4. Colonic diverticulosis.    5. Small focus of air in the bladder likely on the basis of previous instrumentation. In the absence of instrumentation, this is concerning for cystitis. **This report has been created using voice recognition software. It may contain minor errors which are inherent in voice recognition technology. **      Final report electronically signed by Dr. Oksana Stephens MD on 11/5/2021 11:34 AM      CT THORACIC RECONSTRUCTION WO POST PROCESS   Final Result   No acute abnormality            **This report has been created using voice recognition software. It may contain minor errors which are inherent in voice recognition technology. **      Final report electronically signed by Dr. Nabila Suggs on 11/5/2021 11:27 AM      XR HIP W PELVIS MIN 5 VWS BILATERAL   Final Result   No acute fracture            **This report has been created using voice recognition software. It may contain minor errors which are inherent in voice recognition technology. **      Final report electronically signed by Dr. Nabila Suggs on 11/5/2021 11:04 AM          ED Medications administered this visit:   Medications   0.9 % sodium chloride bolus (has no administration in time range)   sucralfate (CARAFATE) 1 GM tablet (has no administration in time range)   bumetanide (BUMEX) tablet 2 mg ( Oral Automatically Held 11/8/21 0900)   cefdinir (OMNICEF) capsule 300 mg ( Oral Automatically Held 11/8/21 0900)   clopidogrel (PLAVIX) tablet 75 mg ( Oral Unheld by provider 11/5/21 1712)   DULoxetine (CYMBALTA) extended release capsule 120 mg (has no administration in time range)   metoprolol tartrate (LOPRESSOR) tablet 50 mg (has no administration in time range)   nitroGLYCERIN (NITROSTAT) SL tablet 0.4 mg (has no administration in time range)   potassium chloride (KLOR-CON) extended release tablet 10 mEq (has no administration in time range)   metOLazone (ZAROXOLYN) tablet 2.5 mg ( Oral Held by provider 11/5/21 1703)   melatonin tablet 3 mg (has no administration in time range)   magnesium 1128 CT Cervical Spine:IMPRESSION:  Stable appearance no acute fracture. [CR]   0132 CT Thoracic Spine:IMPRESSION:  No acute abnormality       [CR]   1135 CT Chest:IMPRESSION:   1. Stable CT scan of the chest, no interval change since previous study dated 20 November 2020.  2. No displaced rib fracture or evidence of pneumothorax. 3. Thickening off the interstitial lung markings. Scarring in the right upper lobe. 4. Cardiomegaly with mitral valve calcification. Coronary artery calcification. 5. Calcification in the thoracic aorta. 6. Thoracic spondylosis. [CR]   1135 WBC 12.9Creatinine 1.7Troponin T 0.017Glucose 387    [CR]   1159 CT Lumbar Spine:IMPRESSION:     1. No evidence of acute intra-abdominal or intrapelvic abnormality. 2. No evidence of acute osseous injury of the lumbar spine or pelvis. 3. Moderate retained stool. 4. Colonic diverticulosis. 5. Small focus of air in the bladder likely on the basis of previous instrumentation. In the absence of instrumentation, this is concerning for cystitis. [CR]   1200 CT ABD/PEL:IMPRESSION:     1. No evidence of acute intra-abdominal or intrapelvic abnormality. 2. No evidence of acute osseous injury of the lumbar spine or pelvis. 3. Moderate retained stool. 4. Colonic diverticulosis. 5. Small focus of air in the bladder likely on the basis of previous instrumentation. In the absence of instrumentation, this is concerning for cystitis. [CR]   7839 Additional IV fluids ordered; aspirin 324 ordered. [CR]      ED Course User Index  [CR] Tri Bunch MD           MEDICATION CHANGES     Current Discharge Medication List            FINAL DISPOSITION     Final diagnoses:   Fall, initial encounter   Injury of head, initial encounter   ARTIE (acute kidney injury) ([facility]   Elevated troponin     Condition: condition: stable  Dispo: Admit to med/surg floor      This transcription was electronically signed.  Parts of this transcriptions may have been dictated by use of voice recognition software and electronically transcribed, and parts may have been transcribed with the assistance of an ED scribe. The transcription may contain errors not detected in proofreading. Please refer to my supervising physician's documentation if my documentation differs.     Electronically Signed: Julio Webster MD, 11/05/21, 6:13 PM         Julio Webster MD  Resident  11/05/21 [id number]

## 2021-11-05 NOTE — CONSULTS
I have independently performed an evaluation on Rosa Maria . I have reviewed the above documentation completed by the [facility]. Please see my additional contributions to the HPI, physical exam, assessment/medical decision making. Patient fell at home, no complaints at time of exam, but was having chest discomfort earlier. Has soreness all over from falling, no acute traumatic injuries. Full trauma scans performed and no acute injuries appreciated. Did have ARTIE and elevated troponin.  No need for admission from trauma perspective, will have medicine evaluate need for admission for medical  opimialization and falls     Electronically signed by Benny Jones MD on 11/5/2021 at 5:01 PM      Trauma consult    Patient:  Malathi Irene date: 11/5/2021   YOB: 1935 Date of Evaluation: 11/5/2021  MRN: [id number]  Acct: [de-identified]    Injury Date: 11/5/2021  injury time: Morning, 0400  PCP: Gissell Lawler MD   Referring physician: Dr. Cesar Barnard    Time of Trauma Surgeon Notification: 12  Time of JORDIN Arrival: 200  Time of Trauma Surgeon Arrival:  7562    Assessment:      Patient Active Problem List   Diagnosis    Diabetes type 2, uncontrolled    Type 2 diabetes mellitus without complication ([address]    Acute cholecystitis    Lactic acidosis    Type 2 diabetes mellitus with hyperosmolarity without coma, without long-term current use of insulin ([address]    Choledocholithiasis    Essential hypertension    Mirizzi's syndrome    Encounter for fitting or adjustment of hearing aid    FATOUMATA (generalized anxiety disorder)    Transient cerebral ischemia    Left hip pain    Aphasia    TIA (transient ischemic attack)    Major depression, recurrent, chronic ([address]    CHF (congestive heart failure), NYHA class II, unspecified failure chronicity, combined ([address]    Dermatitis    SOB (shortness of breath)    Fall from ground level    Chronic combined systolic and diastolic congestive heart failure [facility]    Tachycardia    Angina of effort ([facility]    Status post angioplasty    Abnormal findings on cardiac catheterization    Angina, class III (HCC)    Syncope and collapse    Closed fracture of nasal bones    Fall at home    PVD (peripheral vascular disease) ([facility]    Fall (on) (from) other stairs and steps, initial encounter    ARTIE (acute kidney injury) ([facility]    Urinary tract infection without hematuria       Plan:    Patient mentating appropriately at baseline on exam. CT head, neck, chest, abdomen, and pelvis shows no acute osseous, soft tissue, or hemorraghic injury. No apparent life threatening or unstable injuries evident on physical exam. Patient stable from trauma perspective. Patient with elevated troponin and ARTIE on labs, patient would likely benefit from medicine admission. Care in coordination with trauma surgeon and ED provider. Disposition per ED provider. Thank you for consultation.      Activation:    [] Level I (Trauma Alert)   [x] Level II (Injury Call)   [] Level III (Trauma Consult)   [] Downgraded (Time: )   Mode of Arrival: EMS transportation  Referring Facility: None  Loss of Consciousness [x]No []Yes[]Unknown  Duration(min)  Mechanism of Injury:  []Motor Vehicle crash   []Single Vehicle [] []Passenger []Scene Fatality []Front Seat  []Restrained   []Air Bag Deployed   []Ejected []Rollover []Pedestrian []Trapped   Type of vehicle:   Protective Devices:   []Motorcycle  Wearing Helmet []Yes []No  []Bicycle  Wearing Helmet []Yes []No  [x]Fall   Distance -standing   []Assault    Abuse Reported []Yes []No  []Gunshot  []Stabbing  []Work Related  []Burn: []Flame []Scald []Electrical []Chemical []Contact []Inhalation []House Fire  []Other:   Patient Active Problem List   Diagnosis    Diabetes type 2, uncontrolled    Type 2 diabetes mellitus without complication (HCC)    Acute cholecystitis    Lactic acidosis    Type 2 diabetes mellitus with hyperosmolarity without coma, without long-term current use of insulin (HCC)    Choledocholithiasis    Essential hypertension    Mirizzi's syndrome    Encounter for fitting or adjustment of hearing aid    FATOUMATA (generalized anxiety disorder)    Transient cerebral ischemia    Left hip pain    Aphasia    TIA (transient ischemic attack)    Major depression, recurrent, chronic (HCC)    CHF (congestive heart failure), NYHA class II, unspecified failure chronicity, combined (HCC)    Dermatitis    SOB (shortness of breath)    Fall from ground level    Chronic combined systolic and diastolic congestive heart failure (HCC)    Tachycardia    Angina of effort (HCC)    Status post angioplasty    Abnormal findings on cardiac catheterization    Angina, class III ([facility]    Syncope and collapse    Closed fracture of nasal bones    Fall at home    PVD (peripheral vascular disease) [facility]    Fall (on) (from) other stairs and steps, initial encounter    ARTIE (acute kidney injury) ([facility]    Urinary tract infection without hematuria     Subjective   Chief Complaint: Fall    History of Present Illness:    She is a 80 y.o. female presenting at [facility] by activation of level 2 trauma, brought by EMS following a chemical fall at home with no LOC; past medical history includes DM, DVT, PE on Plavix, anxiety, CAD, COPD, GERD and other comorbidities. Per report patient was ambulating with her walker when she got her feet tangled causing her to trip and fall striking her head on the floor, she does report 3 falls in the last 2 weeks with dizziness surrounding events, she does not believe she fell because she was dizzy today, but does report some dizziness prior to fall. .  Patient intermittently reports left abdominal pain, chest pain, right hip pain, leg pain, head pain, each pain complaint when reviewed seems to have resolved.  Patient denies chest pain, shortness of breath, cough, headache, dizziness, lightheadedness, numbness, paraesthesias, weakness, chills, fevers,  nausea, vomiting, diarrhea, blood in stool, neck pain, or back pain. Care in coordination with trauma surgeon Dr. Lisa Mccray. Review of Systems:   Review of Systems   Constitutional: Negative for chills and fever. Respiratory: Negative for chest tightness and shortness of breath. Cardiovascular: Positive for chest pain. Negative for palpitations. Gastrointestinal: Positive for abdominal pain. Negative for nausea and vomiting. Musculoskeletal: Negative for back pain and neck pain. Right hip pain   Skin: Negative for color change, pallor and wound. Neurological: Positive for headaches. Negative for dizziness, seizures, syncope, weakness and light-headedness. Psychiatric/Behavioral: Negative for agitation and confusion. The patient is not nervous/anxious.         Ciprofloxacin hcl, Food, and Penicillins  Past Surgical History:   Procedure Laterality Date    BREAST SURGERY      right lumpectomy    CHOLECYSTECTOMY  4-15-16    cholangiogram    COLONOSCOPY      EYE SURGERY      macular hole left eye    JOINT REPLACEMENT      left knee & right hip    SKIN BIOPSY      right arm     Past Medical History:   Diagnosis Date    Anxiety     nervous breakdown 9/2016    Breast cancer ([address]     CAD (coronary artery disease)     cardiomegaly    COPD (chronic obstructive pulmonary disease) (HCC)     Diverticulitis     DVT (deep venous thrombosis) (HCC)     GERD (gastroesophageal reflux disease)     irritable bowel disease    Hyperlipidemia     Hypertension     Osteoarthritis     Pancreatic cancer ([address]     Family    Psychiatric problem     alzheimers start    Pulmonary embolism ([address]     S/P knee replacement     Type 2 diabetes mellitus ([address] 2009     Past Surgical History:   Procedure Laterality Date    BREAST SURGERY      right lumpectomy    CHOLECYSTECTOMY  4-15-16    cholangiogram    COLONOSCOPY      EYE SURGERY      macular hole left eye    JOINT REPLACEMENT      left knee & right hip    SKIN BIOPSY      right arm     Social History     Socioeconomic History    Marital status:      Spouse name: Ney Terrazas Number of children: 11    Years of education: 15    Highest education level: Not on file   Occupational History    Occupation: retired   Tobacco Use    Smoking status: Never Smoker    Smokeless tobacco: Never Used   Vaping Use    Vaping Use: Never used   Substance and Sexual Activity    Alcohol use: Yes     Comment: rare    Drug use: No    Sexual activity: Not Currently   Other Topics Concern    Not on file   Social History Narrative    Not on file     Social Determinants of Health     Financial Resource Strain:     Difficulty of Paying Living Expenses:    Food Insecurity:     Worried About Running Out of Food in the Last Year:     [address] in the Last Year:    Transportation Needs:     Lack of Transportation (Medical):      Lack of Transportation (Non-Medical):    Physical Activity:     Days of Exercise per Week:     Minutes of Exercise per Session:    Stress:     Feeling of Stress :    Social Connections:     Frequency of Communication with Friends and Family:     Frequency of Social Gatherings with Friends and Family:     Attends [employer] Services:     Active Member of Clubs or Organizations:     Attends Club or Organization Meetings:     Marital Status:    Intimate Partner Violence:     Fear of Current or Ex-Partner:     Emotionally Abused:     Physically Abused:     Sexually Abused:      Family History   Problem Relation Age of Onset    Cancer Mother         pancreatic    Heart Disease Father 80    Other Sister         infant death    High Cholesterol Brother        Home medications:    Previous Medications    ACETAMINOPHEN (TYLENOL) 325 MG TABLET    Take 650 mg by mouth every 6 hours as needed for Pain    ATORVASTATIN (LIPITOR) 40 MG TABLET    Take 1 tablet by mouth daily    BUMETANIDE (BUMEX) 2 MG TABLET    Take 1 tablet by mouth daily    CEFDINIR (OMNICEF) 300 MG CAPSULE    Take 1 capsule by mouth 2 times daily for 3 days    CLOPIDOGREL (PLAVIX) 75 MG TABLET    Pt takes 300 mg tonight then 75 mg every day starting on 2/4/2020    D-MANNOSE 500 MG CAPS    Take 1 capsule by mouth daily     DULOXETINE (CYMBALTA) 60 MG EXTENDED RELEASE CAPSULE    Take 2 capsules by mouth daily    GLUCOSE BLOOD VI TEST STRIPS (ONE TOUCH ULTRA TEST) STRIP    Test daily or AD. Dx. 250.00    LACTOBACILLUS (CULTURELLE) CAPSULE    Take 1 capsule by mouth 2 times daily (with meals)    MAGNESIUM HYDROXIDE (MILK OF MAGNESIA) 400 MG/5ML SUSPENSION    Take by mouth daily as needed for Constipation    MELATONIN 3 MG TABS TABLET    Take 3 mg by mouth nightly    METFORMIN (GLUCOPHAGE-XR) 750 MG EXTENDED RELEASE TABLET    Take 1 tablet by mouth Daily with supper Restart this medication Friday am    METOLAZONE (ZAROXOLYN) 2.5 MG TABLET    Take 2.5 mg by mouth daily as needed (wt gain, edema, sob)    METOPROLOL TARTRATE (LOPRESSOR) 50 MG TABLET    Take 1 tablet by mouth 2 times daily    NITROGLYCERIN (NITROSTAT) 0.4 MG SL TABLET    up to max of 3 total doses. If no relief after 1 dose, call 911.     POLYETHYL GLYCOL-PROPYL GLYCOL 0.4-0.3 % (SYSTANE) 0.4-0.3 % OPHTHALMIC SOLUTION    1 drop as needed for Dry Eyes    POTASSIUM CHLORIDE (KLOR-CON) 10 MEQ EXTENDED RELEASE TABLET    Take 1 tablet by mouth daily       Hospital medications:  Scheduled Meds:   sodium chloride  1,000 mL IntraVENous Once     Continuous Infusions:   sodium chloride 1,000 mL (11/05/21 1026)     PRN Meds:sodium chloride  Objective   ED TRIAGE VITALS  BP: 139/78, Temp: 97.9 °F (36.6 °C), Pulse: 121, Resp: 14, SpO2: 96 %  [place] Coma Scale  Eye Opening: Spontaneous  Best Verbal Response: Oriented  Best Motor Response: Obeys commands  [place] Coma Scale Score: 15  Results for orders placed or performed during the hospital encounter of 11/05/21   CBC Auto Differential   Result Value Ref Range    WBC 12.9 (H) 4.8 - 10.8 thou/mm3    RBC 4.69 4.20 - 5.40 mill/mm3    Hemoglobin 15.0 12.0 - 16.0 gm/dl    Hematocrit 44.5 37.0 - 47.0 %    MCV 94.9 81.0 - 99.0 fL    MCH 32.0 26.0 - 33.0 pg    MCHC 33.7 32.2 - 35.5 gm/dl    RDW-CV 13.7 11.5 - 14.5 %    RDW-SD 46.4 (H) 35.0 - 45.0 fL    Platelets 798 074 - 744 thou/mm3    MPV 8.7 (L) 9.4 - 12.4 fL    Seg Neutrophils 57.2 %    Lymphocytes 33.3 %    Monocytes 8.4 %    Eosinophils 0.4 %    Basophils 0.2 %    Immature Granulocytes 0.5 %    Segs Absolute 7.4 1 - 7 thou/mm3    Lymphocytes Absolute 4.3 1.0 - 4.8 thou/mm3    Monocytes Absolute 1.1 0.4 - 1.3 thou/mm3    Eosinophils Absolute 0.1 0.0 - 0.4 thou/mm3    Basophils Absolute 0.0 0.0 - 0.1 thou/mm3    Immature Grans (Abs) 0.07 0.00 - 0.07 thou/mm3    nRBC 0 /100 wbc   Comprehensive Metabolic Panel w/ Reflex to MG   Result Value Ref Range    Glucose 387 (H) 70 - 108 mg/dL    CREATININE 1.7 (H) 0.4 - 1.2 mg/dL    BUN 40 (H) 7 - 22 mg/dL    Sodium 136 135 - 145 meq/L    Potassium reflex Magnesium 4.6 3.5 - 5.2 meq/L    Chloride 93 (L) 98 - 111 meq/L    CO2 23 23 - 33 meq/L    Calcium 9.7 8.5 - 10.5 mg/dL    AST 28 5 - 40 U/L    Alkaline Phosphatase 80 38 - 126 U/L    Total Protein 7.1 6.1 - 8.0 g/dL    Albumin 4.1 3.5 - 5.1 g/dL    Total Bilirubin 0.6 0.3 - 1.2 mg/dL    ALT 50 11 - 66 U/L   Brain Natriuretic Peptide   Result Value Ref Range    Pro-.1 0.0 - 1800.0 pg/mL   Troponin   Result Value Ref Range    Troponin T 0.017 (A) ng/ml   Anion Gap   Result Value Ref Range    Anion Gap 20.0 (H) 8.0 - 16.0 meq/L   Glomerular Filtration Rate, Estimated   Result Value Ref Range    Est, Glom Filt Rate 28 (A) ml/min/1.73m2   Osmolality   Result Value Ref Range    Osmolality Calc 297.7 275.0 - 300.0 mOsmol/kg   EKG 12 Lead   Result Value Ref Range    Ventricular Rate 126 BPM    Atrial Rate 126 BPM    P-R Interval 136 ms    QRS Duration 80 ms    Q-T Interval 318 ms    QTc Calculation (Bazett) 460 ms    P Axis 57 degrees    R Axis 58 degrees    T Axis 136 degrees   EKG 12 Lead   Result Value Ref Range    Ventricular Rate 122 BPM    Atrial Rate 122 BPM    P-R Interval 134 ms    QRS Duration 86 ms    Q-T Interval 338 ms    QTc Calculation (Bazett) 481 ms    P Axis 33 degrees    R Axis 41 degrees    T Axis 121 degrees       Physical Exam:  Patient Vitals for the past 24 hrs:   BP Temp Temp src Pulse Resp SpO2 Height Weight   11/05/21 1322 139/78   121 14 96 %     11/05/21 1125 129/84 97.9 °F (36.6 °C) Oral 132 18 100 %     11/05/21 1059 128/87   152 20 100 %     11/05/21 1038 121/78   128 18 99 %     11/05/21 1026 (!) 133/91   128 16 98 %     11/05/21 1019 98/66   129 20 98 %     11/05/21 1012 124/84 97.5 °F (36.4 °C) Oral 126 16 97 % 5' 5\" (1.651 m) 162 lb (73.5 kg)     Primary Assessment:  Airway: Patent, trachea midline  Breathing: Breath sounds present and equal bilaterally, spontaneous, and unlabored  Circulation: Hemodynamically stable, 2+ central and peripheral pulses. Disability: TELLO x 4, following commands. GCS =15    Secondary Assessment:  GENERAL: Presents sitting upright in bed unassisted, A&Ox4 to person, place, time, and events; In no acute distress and well nourished  HEAD: Minor ecchymosis over nose bridge, left face. No tenderness to palpation. No raccoon eyes, rivera signs or visible CSF leakage. EYES: No apparent trauma, discharge, or hematoma bilaterally. PERRL at 3mm  EARS: Set evenly on head. No apparent external trauma. TM grey and translucent, with anterior/inferior position of cone of light, no fluid lines, bubbles, or hemotympanum. NECK: C-spine maintained by placed in ED. Neck is atraumatic, trachea midline, no visible lacerations, step off deformity, expanding swelling or midline tenderness. CARDIO: No visible chest wall deformity. Some tenderness with initial palpation of left chest wall, resolved on second evaluation. No heaves or lifts. Strong/regular S1/S2 rate and rhythm.  No rubs murmurs, or gallops. 2+ radial, posterior tibial, and dorsalis pedal pulses. Capillary refill <2 sec. No extremity edema noted. PULMONARY:  Trachea midline, no audible wheezing, increased respiratory effort, accessory muscle use, or asymmetrical chest wall movement. Lung sounds are clear and audible to ascultation in superior and inferior fields. ABDOMEN: Abdomen is non distended without lacerations. Abdomen soft and initially tender to palpation in LUQ, tenderness resolved on second evaluation  MSK: Moving all extremities without pain. No other deformity, contusion, or bleeding.  strength 5/5 and equal bilaterally. No tenderness to palpation at the midline of cervical, thoracic, and lumbar spine. NEURO: Follows commands, reasoning intact, recalls recent events. PMS intact, moves limbs freely. No focal neurological deficits  SKIN: Appropriate for ethnicity, warm and dry. Medical Decision Making: On arrival patient vital signs tachycardic. Patient given 1 L of normal saline. Patient sent for CT pan scan showing no acute traumatic injuries. Patient stable from trauma perspective. Would likely benefit from medicine admission for elevated troponins and ARTIE. Radiology:     CT ABDOMEN PELVIS W IV CONTRAST Additional Contrast? Radiologist Recommendation   Final Result       1. No evidence of acute intra-abdominal or intrapelvic abnormality. 2. No evidence of acute osseous injury of the lumbar spine or pelvis. 3. Moderate retained stool. 4. Colonic diverticulosis. 5. Small focus of air in the bladder likely on the basis of previous instrumentation. In the absence of instrumentation, this is concerning for cystitis. **This report has been created using voice recognition software. It may contain minor errors which are inherent in voice recognition technology. **      Final report electronically signed by Dr. Ban Gill MD on 11/5/2021 11:34 AM      CT CERVICAL SPINE WO CONTRAST   Final Result   Stable appearance no acute fracture. **This report has been created using voice recognition software. It may contain minor errors which are inherent in voice recognition technology. **      Final report electronically signed by Dr. Jesus Alba on 11/5/2021 11:21 AM      CT CHEST W CONTRAST   Final Result       1. Stable CT scan of the chest, no interval change since previous study dated 20 November 2020.   2. No displaced rib fracture or evidence of pneumothorax. 3. Thickening off the interstitial lung markings. Scarring in the right upper lobe. 4. Cardiomegaly with mitral valve calcification. Coronary artery calcification. 5. Calcification in the thoracic aorta. 6. Thoracic spondylosis. **This report has been created using voice recognition software. It may contain minor errors which are inherent in voice recognition technology. **      Final report electronically signed by DR Estela Simmons on 11/5/2021 11:29 AM      CT HEAD WO CONTRAST   Final Result       1. Stable CT scan of the brain, no interval change 27th of October 2021. Sky Patricia **This report has been created using voice recognition software. It may contain minor errors which are inherent in voice recognition technology. **      Final report electronically signed by DR Estela Simmons on 11/5/2021 11:20 AM      CT LUMBAR RECONSTRUCTION WO POST PROCESS   Final Result       1. No evidence of acute intra-abdominal or intrapelvic abnormality. 2. No evidence of acute osseous injury of the lumbar spine or pelvis. 3. Moderate retained stool. 4. Colonic diverticulosis. 5. Small focus of air in the bladder likely on the basis of previous instrumentation. In the absence of instrumentation, this is concerning for cystitis. **This report has been created using voice recognition software. It may contain minor errors which are inherent in voice recognition technology. **      Final report electronically signed by Dr. Ely Oquendo MD on 11/5/2021 11:34 AM      CT THORACIC RECONSTRUCTION WO POST PROCESS   Final Result   No acute abnormality            **This report has been created using voice recognition software. It may contain minor errors which are inherent in voice recognition technology. **      Final report electronically signed by Dr. Herve Orlando on 11/5/2021 11:27 AM      XR HIP W PELVIS MIN 5 VWS BILATERAL   Final Result   No acute fracture            **This report has been created using voice recognition software. It may contain minor errors which are inherent in voice recognition technology. **      Final report electronically signed by Dr. Herve Orlando on 11/5/2021 11:04 AM        Fast Exam: Yes    FAST EXAM:  A limited, bedside FAST exam was performed. The medical necessity was to evaluate for the presence or absence of intraperitoneal or pericardial fluid. The structures studied were the hepatorenal space, splenorenal space, pericardium, and bladder. FINDINGS:  Inconclusive due to inability to visualize left kidney and spleen. The study was technically inadequate.   Performed by myself and ED resident at bedside    Electronically signed by SHELBIE Combs on 11/5/2021 at 1:37 PM

## 2021-11-05 NOTE — ED NOTES
ED nurse-to-nurse bedside report    Chief Complaint   Patient presents with    Fall      LOC: alert and orientated to name, place, date  Vital signs   Vitals:    11/05/21 1019 11/05/21 1026 11/05/21 1038 11/05/21 1059   BP: 98/66 (!) 133/91 121/78 128/87   Pulse: 129 128 128 152   Resp: 20 16 18 20   Temp:       TempSrc:       SpO2: 98% 98% 99% 100%   Weight:       Height:          Pain:    Pain Interventions:   Pain Goal: 4/10  Oxygen: No    Current needs required room air   Telemetry: Yes  LDAs:   Peripheral IV 11/05/21 Right Antecubital (Active)   Site Assessment Clean;Dry; Intact 11/05/21 1021   Line Status Specimen collected;Normal saline locked 11/05/21 1021   Dressing Status Clean; Intact;Dry 11/05/21 1021   Dressing Intervention New 11/05/21 1021     Continuous Infusions:    sodium chloride 1,000 mL (11/05/21 1026)     Mobility: Requires assistance * 1  [place] Fall Risk Score: No flowsheet data found.   Fall Interventions: call light, side rails  Report given to: Ramiro Bolden RN  11/05/21 1107

## 2021-11-05 NOTE — ED NOTES
Pt transported to [facility] on cart in stable condition. Floor contacted before transport. Spoke with Jody Lomas  11/05/21 6835

## 2021-11-06 NOTE — PROGRESS NOTES
Hospitalist Progress Note      Patient:  Kenzie Serum    Unit/Bed:6K-21/021-A  YOB: 1935  MRN: [id number]   Acct: [de-identified]   PCP: Sixto Jarrell MD  Date of Admission: 11/5/2021    Assessment/Plan:    1. Acute kidney injury on CKD stage IV: Resolved  a. Secondary to prerenal azotemia. b. Improved with IV fluid resuscitation. c. Currently holding Bumex. 2. Elevated troponin:   a. Suspect this is related to ARTIE on CKD. b. No ischemic changes on EKG. 3. Frequent falls:   a. Symptoms consistent with orthostasis. b. Patient received IV fluids overnight. Orthostatic vital signs pending.  c. PT/OT evaluation. d. Nursing reports that patient has significant difficulty with standing. Not safe for discharge at this time. 4. History of DVT with PE  5. Left leg pain/swelling:   a. Venous Dopplers negative for DVT. b. No indication anticoagulation given high fall risk and bleeding risk. 6. Diabetes mellitus type 2:  a. Poorly controlled. A1c on 10/29/2021 of 17.9  b. Holding home Metformin  c. Continue with Lantus and SSI. 7. Hypertension:   a. Continue home Lopressor  8. Hyperlipidemia:   a. Continue home Lipitor    Chief Complaint: [facility] Course:    11/5: Patient was the hospital following fall at home. Trauma alert called. Patient with no evidence of trauma. Noted to have frequent falls at home and ARTIE on CKD. Subjective (past 24 hours):   Patient states she feels well. Slept well overnight. Denies any acute complaints this morning. Past medical history, family history, social history and allergies reviewed again and is unchanged since admission. ROS (12 point review of systems completed. Pertinent positives noted.  Otherwise ROS is negative)     Medications:  Reviewed    Infusion Medications    sodium chloride       Scheduled Medications    enoxaparin  30 mg SubCUTAneous Q24H    sodium chloride  1,000 mL IntraVENous Once    [Held by provider] bumetanide  2 mg Oral Daily    [Held by provider] cefdinir  300 mg Oral BID    clopidogrel  75 mg Oral Daily    DULoxetine  120 mg Oral Daily    metoprolol tartrate  50 mg Oral BID    potassium chloride  10 mEq Oral Daily    melatonin  3 mg Oral Nightly    lactobacillus  1 capsule Oral BID WC    atorvastatin  40 mg Oral Daily    sodium chloride flush  5-40 mL IntraVENous 2 times per day    insulin glargine  10 Units SubCUTAneous Nightly    insulin lispro  0-12 Units SubCUTAneous TID     insulin lispro  0-6 Units SubCUTAneous Nightly     PRN Meds: nitroGLYCERIN, [Held by provider] metOLazone, magnesium hydroxide, sodium chloride flush, sodium chloride, ondansetron **OR** ondansetron, polyethylene glycol, acetaminophen **OR** acetaminophen      Intake/Output Summary (Last 24 hours) at 11/6/2021 1414  Last data filed at 11/6/2021 1137  Gross per 24 hour   Intake 420 ml   Output 0 ml   Net 420 ml       Diet:  ADULT DIET; Dysphagia - Soft and Bite Sized    Exam:  BP (!) 106/51   Pulse 94   Temp 97.1 °F (36.2 °C) (Axillary)   Resp 20   Ht 5' 5\" (1.651 m)   Wt 166 lb (75.3 kg)   SpO2 92%   BMI 27.62 kg/m²   General appearance: Elderly female resting in bed. No acute distress. HEENT: Pupils equal, round, and reactive to light. Conjunctivae/corneas clear. Neck: Supple, with full range of motion. No jugular venous distention. Trachea midline. Respiratory:  Normal respiratory effort. Clear to auscultation, bilaterally without Rales/Wheezes/Rhonchi. Cardiovascular: Regular rate and rhythm with normal S1/S2 without murmurs, rubs or gallops. Abdomen: Soft, non-tender, non-distended with normal bowel sounds. Musculoskeletal: passive and active ROM x 4 extremities. Skin: Skin color, texture, turgor normal.  No rashes or lesions. Neurologic:  Neurovascularly intact without any focal sensory/motor deficits.  Cranial nerves: II-XII intact, grossly non-focal.  Psychiatric: Alert and oriented, thought content appropriate, normal insight  Capillary Refill: Brisk,< 3 seconds   Peripheral Pulses: +2 palpable, equal bilaterally     Labs:   Recent Labs     11/05/21  1023 11/06/21  0517   WBC 12.9* 10.3   HGB 15.0 12.0   HCT 44.5 37.4    257     Recent Labs     11/05/21  1023 11/06/21  0517    138   K 4.6 3.7   CL 93* 101   CO2 23 23   BUN 40* 34*   CREATININE 1.7* 1.3*   CALCIUM 9.7 8.5     Recent Labs     11/05/21  1023   AST 28   ALT 50   BILITOT 0.6   ALKPHOS 80     No results for input(s): INR in the last 72 hours. No results for input(s): Lali Horsfall in the last 72 hours. Microbiology:    Blood culture #1: No results found for: [facility]    Blood culture #2:No results found for: Rafiq Baker    Organism:  Lab Results   Component Value Date    ORG Growth of Contaminants 11/05/2021         Lab Results   Component Value Date    LABGRAM  10/12/2021     No segmented neutrophils observed. Rare epithelial cells observed. No bacteria seen. MRSA culture only:No results found for: [address] [place] [address]    Urine culture:   Lab Results   Component Value Date    LABURIN Pisek count: >100,000 CFU/mL  10/29/2021       Respiratory culture: No results found for: CULTRESP    Aerobic and Anaerobic :  Lab Results   Component Value Date    LABAERO  10/12/2021     No growth-preliminary Culture also yielded light growth of Staphylococcus species (coagulase negative). LABAERO light growth  10/12/2021    LABAERO  10/12/2021     very light growth Pantoea species which may be initially susceptible to third generation cephalosporins may develop resistance within three to four days of initiation of this antimicrobial therapy.       No results found for: LABANAE    Urinalysis:      Lab Results   Component Value Date    NITRU NEGATIVE 11/05/2021    WBCUA 15-25 11/05/2021    BACTERIA NONE SEEN 11/05/2021    RBCUA NONE 11/05/2021    BLOODU NEGATIVE 11/05/2021    SPECGRAV 1.013 10/27/2021    GLUCOSEU >= 1000 11/05/2021       Radiology:  VL DUP LOWER EXTREMITY VENOUS BILATERAL   Final Result      No evidence of deep venous thrombosis in either lower extremity. **This report has been created using voice recognition software. It may contain minor errors which are inherent in voice recognition technology. **             Final report electronically signed by Dr. Casey Odell on 11/5/2021 5:43 PM      CT ABDOMEN PELVIS W IV CONTRAST Additional Contrast? Radiologist Recommendation   Final Result       1. No evidence of acute intra-abdominal or intrapelvic abnormality. 2. No evidence of acute osseous injury of the lumbar spine or pelvis. 3. Moderate retained stool. 4. Colonic diverticulosis. 5. Small focus of air in the bladder likely on the basis of previous instrumentation. In the absence of instrumentation, this is concerning for cystitis. **This report has been created using voice recognition software. It may contain minor errors which are inherent in voice recognition technology. **      Final report electronically signed by Dr. Jerris Gottron, MD on 11/5/2021 11:34 AM      CT CERVICAL SPINE WO CONTRAST   Final Result   Stable appearance no acute fracture. **This report has been created using voice recognition software. It may contain minor errors which are inherent in voice recognition technology. **      Final report electronically signed by Dr. Shanda Amador on 11/5/2021 11:21 AM      CT CHEST W CONTRAST   Final Result       1. Stable CT scan of the chest, no interval change since previous study dated 20 November 2020.   2. No displaced rib fracture or evidence of pneumothorax. 3. Thickening off the interstitial lung markings. Scarring in the right upper lobe. 4. Cardiomegaly with mitral valve calcification. Coronary artery calcification. 5. Calcification in the thoracic aorta. 6. Thoracic spondylosis.                **This report has been created using voice recognition software. It may contain minor errors which are inherent in voice recognition technology. **      Final report electronically signed by DR Shellye Ahumada on 11/5/2021 11:29 AM      CT HEAD WO CONTRAST   Final Result       1. Stable CT scan of the brain, no interval change 27th of October 2021. [place] **This report has been created using voice recognition software. It may contain minor errors which are inherent in voice recognition technology. **      Final report electronically signed by DR Shellye Ahumada on 11/5/2021 11:20 AM      CT LUMBAR RECONSTRUCTION WO POST PROCESS   Final Result       1. No evidence of acute intra-abdominal or intrapelvic abnormality. 2. No evidence of acute osseous injury of the lumbar spine or pelvis. 3. Moderate retained stool. 4. Colonic diverticulosis. 5. Small focus of air in the bladder likely on the basis of previous instrumentation. In the absence of instrumentation, this is concerning for cystitis. **This report has been created using voice recognition software. It may contain minor errors which are inherent in voice recognition technology. **      Final report electronically signed by Dr. Ely Oquendo MD on 11/5/2021 11:34 AM      CT THORACIC RECONSTRUCTION WO POST PROCESS   Final Result   No acute abnormality            **This report has been created using voice recognition software. It may contain minor errors which are inherent in voice recognition technology. **      Final report electronically signed by Dr. Herve Orlando on 11/5/2021 11:27 AM      XR HIP W PELVIS MIN 5 VWS BILATERAL   Final Result   No acute fracture            **This report has been created using voice recognition software. It may contain minor errors which are inherent in voice recognition technology. **      Final report electronically signed by Dr. Herve Orlando on 11/5/2021 11:04 AM        CT HEAD WO CONTRAST    Result Date: 11/5/2021  PROCEDURE: CT HEAD WO CONTRAST CLINICAL INFORMATION: Trauma, Trauma. COMPARISON: CT scan of the brain dated 27 October 2021. . TECHNIQUE: Noncontrast 5 mm axial images were obtained through the brain. Sagittal and coronal reconstructions were obtained. All CT scans at this facility use dose modulation, iterative reconstruction, and/or weight-based dosing when appropriate to reduce radiation dose to as low as reasonably achievable. FINDINGS: There is mild atrophy and dilatation of the lateral ventricles. There is diminished attenuation in the white matter most likely representing ischemic changes. This a lacunar infarct in the right cerebellar hemisphere. There is calcification in the pineal  gland and choroid plexus of both lateral ventricles. There is calcification the cavernous segments of both internal carotid arteries. There is no hemorrhage. There are no intra-or extra-axial collections. There is no  midline shift or mass effect. The paranasal sinuses and mastoid air cells are normally aerated. There is no suspicious calvarial abnormality. 1. Stable CT scan of the brain, no interval change 27th of October 2021. Kelly Sotelo **This report has been created using voice recognition software. It may contain minor errors which are inherent in voice recognition technology. ** Final report electronically signed by DR Richard Carlos on 11/5/2021 11:20 AM    CT CHEST W CONTRAST    Result Date: 11/5/2021  PROCEDURE: CT CHEST W CONTRAST CLINICAL INFORMATION: Trauma, Trauma. COMPARISON: CT scan of the chest dated 20 th of November 2020. Kelly Sotelo TECHNIQUE: 5 mm axial images were obtained through the chest after the administration of intravenous contrast. Sagittal and coronal reconstructions were obtained. All CT scans at this facility use dose modulation, iterative reconstruction, and/or weight-based dosing when appropriate to reduce radiation dose to as low as reasonably achievable.  FINDINGS: There is mild cardiomegaly with mitral valve calcification. . There is atherosclerotic calcification in the aortic root and aortic arch. There is no evidence for aortic dissection or mediastinal hematoma. . There is no gross abnormality of the pulmonary artery and its proximal branches. There is no mediastinal, axillary or hilar adenopathy. There is no pericardial or pleural effusion. There is diffuse COPD and thickening off the interstitial lung markings. There is scarring in the right upper lobe. . There is thoracic spondylosis. There is no acute fracture. The ribs appear to be intact. There is no displaced rib fracture. There is no pneumothorax. . There is a hiatal hernia. 1. Stable CT scan of the chest, no interval change since previous study dated 20 November 2020. 2. No displaced rib fracture or evidence of pneumothorax. 3. Thickening off the interstitial lung markings. Scarring in the right upper lobe. 4. Cardiomegaly with mitral valve calcification. Coronary artery calcification. 5. Calcification in the thoracic aorta. 6. Thoracic spondylosis. **This report has been created using voice recognition software. It may contain minor errors which are inherent in voice recognition technology. ** Final report electronically signed by DR Mary Ceballos on 11/5/2021 11:29 AM    CT CERVICAL SPINE WO CONTRAST    Result Date: 11/5/2021  PROCEDURE: CT CERVICAL SPINE WO CONTRAST CLINICAL INFORMATION: Trauma, Trauma . TECHNIQUE: 2-D multiplanar noncontrast CT cervical spine All CT scans at this facility use dose modulation, iterative reconstruction, and/or weight-based dosing when appropriate to reduce radiation dose to as low as reasonably achievable. COMPARISON: 10/27/2021 FINDINGS: No acute fracture or acute bony malalignment. Diffuse degenerative changes are detailed on the previous exam. Straightening of the normal cervical lordosis which may be due to positioning. No precervical soft tissue swelling. Stable appearance no acute fracture.  **This report has been created using voice recognition software. It may contain minor errors which are inherent in voice recognition technology. ** Final report electronically signed by Dr. Felicity Medina on 11/5/2021 11:21 AM    CT ABDOMEN PELVIS W IV CONTRAST Additional Contrast? Radiologist Recommendation    Result Date: 11/5/2021  PROCEDURE: CT ABDOMEN PELVIS W IV CONTRAST, CT LUMBAR RECONSTRUCTION WO POST PROCESS CLINICAL INFORMATION: Trauma, Trauma . Fall today with generalized pain. COMPARISON: CT abdomen pelvis dated 11/20/2020. TECHNIQUE: Helical CT of the abdomen and pelvis following intravenous administration of 80 mL Isovue-370 injected in the right AC with sagittal and coronal reconstructions. Reformatted images of the lumbar spine with axial, sagittal and coronal reconstructions were also created. All CT scans at this facility use dose modulation, iterative reconstruction, and/or weight-based dosing when appropriate to reduce radiation dose to as low as reasonably achievable. FINDINGS: There is mild interstitial prominence at the lung bases, similar to prior exam with superimposed mild posterior dependent change. The visualized portion of the heart is unremarkable. There is a small hiatal hernia, stable compared to prior exam. Liver is unremarkable. The gallbladder is surgically absent. Adrenal glands are unremarkable. The kidneys are mildly atrophied with cortical thinning, similar to prior exam. The spleen is unremarkable. The pancreas is partially fatty replaced. No retroperitoneal or mesenteric lymphadenopathy is identified. There is moderately prominent stool throughout the large bowel. There are scattered diverticula without adjacent inflammation to suggest diverticulitis. The pelvis is partially obscured from streak artifact from right hip arthroplasty. There is a small focus of air in the bladder. Unopacified bladder is otherwise unremarkable. The uterus and adnexa are within normal limits.  No free fluid is identified in the abdomen or pelvis. There are stable degenerative changes of the lumbar spine. There is a stable dextrocurvature of the lumbar spine. There is otherwise anatomic vertebral body height and alignment. No fracture of the lumbar vertebral column is identified. No fracture of the pelvis is identified. There are stable degenerative changes of the sacral iliac joints. 1. No evidence of acute intra-abdominal or intrapelvic abnormality. 2. No evidence of acute osseous injury of the lumbar spine or pelvis. 3. Moderate retained stool. 4. Colonic diverticulosis. 5. Small focus of air in the bladder likely on the basis of previous instrumentation. In the absence of instrumentation, this is concerning for cystitis. **This report has been created using voice recognition software. It may contain minor errors which are inherent in voice recognition technology. ** Final report electronically signed by Dr. Oksana Stephens MD on 11/5/2021 11:34 AM    VL DUP LOWER EXTREMITY VENOUS BILATERAL    Result Date: 11/5/2021  PROCEDURE: VL DUP LOWER EXTREMITY VENOUS BILATERAL CLINICAL INFORMATION: History of blood clots, PE. TECHNIQUE: High-resolution duplex ultrasound with spectral analysis of the bilateral lower extremities was performed. The common femoral, femoral, popliteal and calf vein segments were studied to the ankles. COMPARISON: Bilateral lower extremity venous duplex ultrasound 2/17/2020 FINDINGS: There is normal compressibility with no evidence of thrombus. Flow study shows normal color flow, augmentation and phasic response. No evidence of deep venous thrombosis in either lower extremity. **This report has been created using voice recognition software. It may contain minor errors which are inherent in voice recognition technology. **  Final report electronically signed by Dr. Dora Gabriel on 11/5/2021 5:43 PM    CT LUMBAR RECONSTRUCTION WO POST PROCESS    Result Date: 11/5/2021  PROCEDURE: CT ABDOMEN PELVIS W IV CONTRAST, CT LUMBAR RECONSTRUCTION WO POST PROCESS CLINICAL INFORMATION: Trauma, Trauma . Fall today with generalized pain. COMPARISON: CT abdomen pelvis dated 11/20/2020. TECHNIQUE: Helical CT of the abdomen and pelvis following intravenous administration of 80 mL Isovue-370 injected in the right AC with sagittal and coronal reconstructions. Reformatted images of the lumbar spine with axial, sagittal and coronal reconstructions were also created. All CT scans at this facility use dose modulation, iterative reconstruction, and/or weight-based dosing when appropriate to reduce radiation dose to as low as reasonably achievable. FINDINGS: There is mild interstitial prominence at the lung bases, similar to prior exam with superimposed mild posterior dependent change. The visualized portion of the heart is unremarkable. There is a small hiatal hernia, stable compared to prior exam. Liver is unremarkable. The gallbladder is surgically absent. Adrenal glands are unremarkable. The kidneys are mildly atrophied with cortical thinning, similar to prior exam. The spleen is unremarkable. The pancreas is partially fatty replaced. No retroperitoneal or mesenteric lymphadenopathy is identified. There is moderately prominent stool throughout the large bowel. There are scattered diverticula without adjacent inflammation to suggest diverticulitis. The pelvis is partially obscured from streak artifact from right hip arthroplasty. There is a small focus of air in the bladder. Unopacified bladder is otherwise unremarkable. The uterus and adnexa are within normal limits. No free fluid is identified in the abdomen or pelvis. There are stable degenerative changes of the lumbar spine. There is a stable dextrocurvature of the lumbar spine. There is otherwise anatomic vertebral body height and alignment. No fracture of the lumbar vertebral column is identified. No fracture of the pelvis is identified.  There are stable degenerative changes of the sacral iliac joints. 1. No evidence of acute intra-abdominal or intrapelvic abnormality. 2. No evidence of acute osseous injury of the lumbar spine or pelvis. 3. Moderate retained stool. 4. Colonic diverticulosis. 5. Small focus of air in the bladder likely on the basis of previous instrumentation. In the absence of instrumentation, this is concerning for cystitis. **This report has been created using voice recognition software. It may contain minor errors which are inherent in voice recognition technology. ** Final report electronically signed by Dr. Uma Blanca MD on 11/5/2021 11:34 AM    CT THORACIC RECONSTRUCTION WO POST PROCESS    Result Date: 11/5/2021  PROCEDURE: CT THORACIC RECONSTRUCTION WO POST PROCESS CLINICAL INFORMATION: Trauma . TECHNIQUE: 2-D multiplanar reconstructed images of the thoracic spine All CT scans at this facility use dose modulation, iterative reconstruction, and/or weight-based dosing when appropriate to reduce radiation dose to as low as reasonably achievable. COMPARISON: 11/20/2020 FINDINGS: No acute fracture or acute bony malalignment. Degenerative changes in the lower thoracic level in particular. Several images are missing from the T6 vertebral body level. These are seen there is prominent calcification in the posterior longitudinal ligament at T11-12 no paraspinous soft tissue abnormality is seen. No significant foraminal encroachment is seen. On the soft tissue images. No acute abnormality **This report has been created using voice recognition software. It may contain minor errors which are inherent in voice recognition technology. ** Final report electronically signed by Dr. Ramya Henry on 11/5/2021 11:27 AM    XR HIP W PELVIS MIN 5 VWS BILATERAL    Result Date: 11/5/2021  PROCEDURE: XR HIP W PELVIS MIN 5 VWS BILATERAL CLINICAL INFORMATION: fall hip pain . TECHNIQUE: AP pelvis and AP and attempted frog-leg projections of both hips. COMPARISON: No prior study. FINDINGS: Right hip replacement. No acute fracture or bone destruction. Exam limited by bone density and technique. If clinical concern remains for fracture CT would be recommended. No acute fracture **This report has been created using voice recognition software. It may contain minor errors which are inherent in voice recognition technology. ** Final report electronically signed by Dr. Anthony Au on 11/5/2021 11:04 AM      Electronically signed by Licha Grey DO on 11/6/2021 at 2:14 PM

## 2021-11-06 NOTE — CARE COORDINATION
11/06/21 1052   Readmission Assessment   Number of Days since last admission? 1-7 days   Previous Disposition Other (comment)  [facility] - with [facility] PT/OT ordered.)   Who is being Interviewed Patient   What was the patient's/caregiver's perception as to why they think they needed to return back to the hospital? Other (Comment)  (I fell.)   Did you visit your Primary Care Physician after you left the hospital, before you returned this time? No   Why weren't you able to visit your PCP? Other (Comment)  (Went to [facility])   Did you see a specialist, such as Cardiac, Pulmonary, Orthopedic Physician, etc. after you left the hospital? No   Who advised the patient to return to the hospital? Other (Comment)  ([facility] Staff)   Does the patient report anything that got in the way of taking their medications? No   In our efforts to provide the best possible care to you and others like you, can you think of anything that we could have done to help you after you left the hospital the first time, so that you might not have needed to return so soon? Other (Comment)  (\"I don't know\". )

## 2021-11-06 NOTE — PROGRESS NOTES
Pt admitted to  6K21 from ED. Complaints: Chest pain / discomfort. IV none infusing into the antecubital right, condition patent and no redness . IV site free of s/s of infection or infiltration. Vital signs obtained. Assessment and data collection initiated. Two nurse skin assessment performed by [facility] and Vincent Milian RN. Oriented to room. Policies and procedures for [facility] explained. Tyrese discussed hourly rounding with patient addressing 5 P's. Fall prevention and safety brochure discussed with patient. Bed alarm on. Call light in reach. The best day to schedule a follow up Dr appointment is: Wednesday a.m.

## 2021-11-06 NOTE — CARE COORDINATION
11/6/21, 10:33 AM EDT  DISCHARGE PLANNING EVALUATION:    Comfort Stock       Admitted: 11/5/2021/ [address] [facility] day: 1   Location: -21/021-A Reason for admit: Elevated troponin [R77.8]  ARTIE (acute kidney injury) ([address] [N17.9]  Injury of head, initial encounter [J96.76GV]  Fall, initial encounter [W19. XXXA]  Fall at nursing home, initial encounter [A14. Mili Smith, Y92.129]   PMH:  has a past medical history of Anxiety, Breast cancer ([address] CAD (coronary artery disease), COPD (chronic obstructive pulmonary disease) ([address] Diverticulitis, DVT (deep venous thrombosis) ([address] GERD (gastroesophageal reflux disease), Hyperlipidemia, Hypertension, Osteoarthritis, Pancreatic cancer ([address] Psychiatric problem, Pulmonary embolism ([address] S/P knee replacement, and Type 2 diabetes mellitus ([address] Procedure:   11/5 XR Hip/pelvis: No acute fracture  11/5 CT Head: No acute findings  11/5 CT Chest:   1. Stable CT scan of the chest, no interval change since previous study dated 20 November 2020.   2. No displaced rib fracture or evidence of pneumothorax. 3. Thickening off the interstitial lung markings. Scarring in the right upper lobe. 4. Cardiomegaly with mitral valve calcification. Coronary artery calcification. 5. Calcification in the thoracic aorta. 6. Thoracic spondylosis     11/5 CT Abd/pelvis/Lumbar spine:    1. No evidence of acute intra-abdominal or intrapelvic abnormality. 2. No evidence of acute osseous injury of the lumbar spine or pelvis. 3. Moderate retained stool. 4. Colonic diverticulosis. 5. Small focus of air in the bladder likely on the basis of previous instrumentation. In the absence of instrumentation, this is concerning for cystitis. 11/5 CT Cervical/Thoracic spine: No acute findings  11/5 BLE venous dopplers: Negative for DVT    Barriers to Discharge:  Presented to ED from nursing home s/p fall. She fell while moving from her bed to the recliner, using her walker.  She remembers falling and laying on the floor for 4 hours before she was found. Reports some lightheadedness and dizziness for the past month. Also reports chest pain when she coughs or takes a deep breath. Daughter reports patient has had a fall everyday for the last 6 days. ARTIE on CKD. Admitted to [address] [place] [address]. Orthostatic vitals ordered. Afebrile. 's. On room air. Oriented to person, place, and time. PT/OT. Telemetry. [facility]. [facility]. Lipitor, plavix, cymbalta, lantus, SSI, lactobacillus, lopressor. Received 2L in fld bolus. BUN 40 -now 34, Creat 1.7 - now 1.3, trop 0.017 x2, wbc 12.9 - now 10.3. Urine sent for culture. PCP: Shilo Moss MD  Readmission Risk Score: 14.8 ( )%    Patient Goals/Plan/Treatment Preferences: Spoke with Jesusita Heredia; states she lives at [facility] and uses a walker. She states she was not working with therapy there. Jesusita Heredia states she plans to return to [place] at discharge. SW consulted. Transportation/Food Security/Housekeeping Addressed:  No issues identified.

## 2021-11-07 NOTE — PROGRESS NOTES
Hospitalist Progress Note      Patient:  Chikis Trinidad    Unit/Bed:6K-21/021-A  YOB: 1935  MRN: [id number]   Acct: [de-identified]   PCP: Sami Childress MD  Date of Admission: 11/5/2021    Assessment/Plan:    1. Acute kidney injury on CKD stage IV: Resolved  a. Secondary to prerenal azotemia. b. Improved with IV fluid resuscitation. c. Currently holding Bumex and Zaroxolyn. On D/c plan to continue holding diuretics. Have her follow with PCP. 2. Elevated troponin:   a. Suspect this is related to ARTIE on CKD. b. No ischemic changes on EKG. 3. Frequent falls:   a. Symptoms consistent with orthostasis. b. Patient given NaCl bolus today for soft BP. Orthostatic vital signs from today pending.  c. Waiting for PT/OT evaluation  d. Nursing reports that patient has significant difficulty with standing. Not safe for discharge at this time. She had some improvement this morning. 4. History of DVT with PE:  a. Venous Dopplers negative for DVT. b. No indication anticoagulation given high fall risk and bleeding risk. 5. Left leg pain/swelling:   a. Venous Dopplers negative for DVT. b. No indication anticoagulation given high fall risk and bleeding risk. 6. Diabetes mellitus type 2:  a. Poorly controlled. A1c on 10/29/2021 of 17.9  b. Holding home Metformin  c. Continue with Lantus and SSI. 7. Hypertension:   a. Continue home Lopressor    8. Hyperlipidemia:   a. Continue home Lipitor    Chief Complaint: [facility] Course: Chikis Trinidad is a 80 y.o. female with a with a PMH of breast cancer, coronary artery disease, congestive heart failure, diabetes mellitus type 2, hyperlipidemia, hypertension, PE and DVT who presented with a mechanical fall and was found to have an ARTIE and elevated troponin. Patient is on Plavix at home an therefore this was called a level 2 trauma in ED.  Patient comes from a nursing home where this morning at 4:30 am she fell while moving from her bed to her recliner. She states she was using her walker and is not sure if she tripped or if the walker was not locked properly. She remembers falling and laying on the floor for 4 hours before she was found. She denies losing consciousness, head pain, or neck pain. She states she had some lightheadedness and dizziness for the last month. Patient mentions that when she initially stood up she feel like \"all the blood was rushing to my head\". She also has had chest pain when she coughs or takes a deep breath. Patient is accompanied with her daughter who states her mother was recently discharged from [facility] on 11/1/21 with similar symptoms and that she has had a fall everyday for the last 6 days. Daughter also states that patient is normally tachy at baseline. Patient also had acute encephalopathy at last admission. Today she is A&Ox3. She states she is having some abdominal discomfort. Patient denies having any fevers, chills, nausea, vomiting, palpitations, shortness of breath, diarrhea, urinary frequency, urgency or dysuria. 11/6: Positive orthostatic vital sighs. Patient had trouble walking to the bathroom and needed two people to assist her. 11/7: Pending PT/OT evaluation. Gave 1 NaCl 1000 mL bolus as BP soft. Ordered new orthostatic vital signs. Potential discharge tomorrow. Subjective (past 24 hours):   No acute changes overnight. Patient states she is doing well overall and ready to go back to the nursing home. She denies having any lightheadedness, dizziness, nausea, vomiting, fever, chills, shortness of breath, or abdominal pain today. Past medical history, family history, social history and allergies reviewed again and is unchanged since admission. ROS (12 point review of systems completed. Pertinent positives noted.  Otherwise ROS is negative)     Medications:  Reviewed    Infusion Medications    dextrose      sodium chloride LABANAE    Urinalysis:      Lab Results   Component Value Date    NITRU NEGATIVE 11/05/2021    WBCUA 15-25 11/05/2021    BACTERIA NONE SEEN 11/05/2021    RBCUA NONE 11/05/2021    BLOODU NEGATIVE 11/05/2021    SPECGRAV 1.013 10/27/2021    GLUCOSEU >= 1000 11/05/2021       Radiology:  VL DUP LOWER EXTREMITY VENOUS BILATERAL   Final Result      No evidence of deep venous thrombosis in either lower extremity. **This report has been created using voice recognition software. It may contain minor errors which are inherent in voice recognition technology. **             Final report electronically signed by Dr. Latricia Willis on 11/5/2021 5:43 PM      CT ABDOMEN PELVIS W IV CONTRAST Additional Contrast? Radiologist Recommendation   Final Result       1. No evidence of acute intra-abdominal or intrapelvic abnormality. 2. No evidence of acute osseous injury of the lumbar spine or pelvis. 3. Moderate retained stool. 4. Colonic diverticulosis. 5. Small focus of air in the bladder likely on the basis of previous instrumentation. In the absence of instrumentation, this is concerning for cystitis. **This report has been created using voice recognition software. It may contain minor errors which are inherent in voice recognition technology. **      Final report electronically signed by Dr. Zack Arredondo MD on 11/5/2021 11:34 AM      CT CERVICAL SPINE WO CONTRAST   Final Result   Stable appearance no acute fracture. **This report has been created using voice recognition software. It may contain minor errors which are inherent in voice recognition technology. **      Final report electronically signed by Dr. Shilpa España on 11/5/2021 11:21 AM      CT CHEST W CONTRAST   Final Result       1. Stable CT scan of the chest, no interval change since previous study dated 20 November 2020.   2. No displaced rib fracture or evidence of pneumothorax.    3. Thickening off the interstitial lung markings. Scarring in the right upper lobe. 4. Cardiomegaly with mitral valve calcification. Coronary artery calcification. 5. Calcification in the thoracic aorta. 6. Thoracic spondylosis. **This report has been created using voice recognition software. It may contain minor errors which are inherent in voice recognition technology. **      Final report electronically signed by DR Juan Tinoco on 11/5/2021 11:29 AM      CT HEAD WO CONTRAST   Final Result       1. Stable CT scan of the brain, no interval change 27th of October 2021. Pinky Angles **This report has been created using voice recognition software. It may contain minor errors which are inherent in voice recognition technology. **      Final report electronically signed by DR Juan Tinoco on 11/5/2021 11:20 AM      CT LUMBAR RECONSTRUCTION WO POST PROCESS   Final Result       1. No evidence of acute intra-abdominal or intrapelvic abnormality. 2. No evidence of acute osseous injury of the lumbar spine or pelvis. 3. Moderate retained stool. 4. Colonic diverticulosis. 5. Small focus of air in the bladder likely on the basis of previous instrumentation. In the absence of instrumentation, this is concerning for cystitis. **This report has been created using voice recognition software. It may contain minor errors which are inherent in voice recognition technology. **      Final report electronically signed by Dr. Milagros Olivares MD on 11/5/2021 11:34 AM      CT THORACIC RECONSTRUCTION WO POST PROCESS   Final Result   No acute abnormality            **This report has been created using voice recognition software. It may contain minor errors which are inherent in voice recognition technology. **      Final report electronically signed by Dr. King Seen on 11/5/2021 11:27 AM      XR HIP W PELVIS MIN 5 VWS BILATERAL   Final Result   No acute fracture            **This report has been created using voice recognition software. It may contain minor errors which are inherent in voice recognition technology. **      Final report electronically signed by Dr. Pierre Nicolas on 11/5/2021 11:04 AM        CT HEAD WO CONTRAST    Result Date: 11/5/2021  PROCEDURE: CT HEAD WO CONTRAST CLINICAL INFORMATION: Trauma, Trauma. COMPARISON: CT scan of the brain dated 27 October 2021. . TECHNIQUE: Noncontrast 5 mm axial images were obtained through the brain. Sagittal and coronal reconstructions were obtained. All CT scans at this facility use dose modulation, iterative reconstruction, and/or weight-based dosing when appropriate to reduce radiation dose to as low as reasonably achievable. FINDINGS: There is mild atrophy and dilatation of the lateral ventricles. There is diminished attenuation in the white matter most likely representing ischemic changes. This a lacunar infarct in the right cerebellar hemisphere. There is calcification in the pineal  gland and choroid plexus of both lateral ventricles. There is calcification the cavernous segments of both internal carotid arteries. There is no hemorrhage. There are no intra-or extra-axial collections. There is no  midline shift or mass effect. The paranasal sinuses and mastoid air cells are normally aerated. There is no suspicious calvarial abnormality. 1. Stable CT scan of the brain, no interval change 27th of October 2021. Mellissa Coleman **This report has been created using voice recognition software. It may contain minor errors which are inherent in voice recognition technology. ** Final report electronically signed by DR Charmayne Ferdinand on 11/5/2021 11:20 AM    CT CHEST W CONTRAST    Result Date: 11/5/2021  PROCEDURE: CT CHEST W CONTRAST CLINICAL INFORMATION: Trauma, Trauma. COMPARISON: CT scan of the chest dated 20 th of November 2020. Mellissa Coleman TECHNIQUE: 5 mm axial images were obtained through the chest after the administration of intravenous contrast. Sagittal and coronal reconstructions were obtained.  All CT scans at this facility use dose modulation, iterative reconstruction, and/or weight-based dosing when appropriate to reduce radiation dose to as low as reasonably achievable. FINDINGS: There is mild cardiomegaly with mitral valve calcification. . There is atherosclerotic calcification in the aortic root and aortic arch. There is no evidence for aortic dissection or mediastinal hematoma. . There is no gross abnormality of the pulmonary artery and its proximal branches. There is no mediastinal, axillary or hilar adenopathy. There is no pericardial or pleural effusion. There is diffuse COPD and thickening off the interstitial lung markings. There is scarring in the right upper lobe. . There is thoracic spondylosis. There is no acute fracture. The ribs appear to be intact. There is no displaced rib fracture. There is no pneumothorax. . There is a hiatal hernia. 1. Stable CT scan of the chest, no interval change since previous study dated 20 November 2020. 2. No displaced rib fracture or evidence of pneumothorax. 3. Thickening off the interstitial lung markings. Scarring in the right upper lobe. 4. Cardiomegaly with mitral valve calcification. Coronary artery calcification. 5. Calcification in the thoracic aorta. 6. Thoracic spondylosis. **This report has been created using voice recognition software. It may contain minor errors which are inherent in voice recognition technology. ** Final report electronically signed by DR Janas Opitz on 11/5/2021 11:29 AM    CT CERVICAL SPINE WO CONTRAST    Result Date: 11/5/2021  PROCEDURE: CT CERVICAL SPINE WO CONTRAST CLINICAL INFORMATION: Trauma, Trauma . TECHNIQUE: 2-D multiplanar noncontrast CT cervical spine All CT scans at this facility use dose modulation, iterative reconstruction, and/or weight-based dosing when appropriate to reduce radiation dose to as low as reasonably achievable. COMPARISON: 10/27/2021 FINDINGS: No acute fracture or acute bony malalignment.  Diffuse degenerative changes are detailed on the previous exam. Straightening of the normal cervical lordosis which may be due to positioning. No precervical soft tissue swelling. Stable appearance no acute fracture. **This report has been created using voice recognition software. It may contain minor errors which are inherent in voice recognition technology. ** Final report electronically signed by Dr. Og Cornelius on 11/5/2021 11:21 AM    CT ABDOMEN PELVIS W IV CONTRAST Additional Contrast? Radiologist Recommendation    Result Date: 11/5/2021  PROCEDURE: CT ABDOMEN PELVIS W IV CONTRAST, CT LUMBAR RECONSTRUCTION WO POST PROCESS CLINICAL INFORMATION: Trauma, Trauma . Fall today with generalized pain. COMPARISON: CT abdomen pelvis dated 11/20/2020. TECHNIQUE: Helical CT of the abdomen and pelvis following intravenous administration of 80 mL Isovue-370 injected in the right AC with sagittal and coronal reconstructions. Reformatted images of the lumbar spine with axial, sagittal and coronal reconstructions were also created. All CT scans at this facility use dose modulation, iterative reconstruction, and/or weight-based dosing when appropriate to reduce radiation dose to as low as reasonably achievable. FINDINGS: There is mild interstitial prominence at the lung bases, similar to prior exam with superimposed mild posterior dependent change. The visualized portion of the heart is unremarkable. There is a small hiatal hernia, stable compared to prior exam. Liver is unremarkable. The gallbladder is surgically absent. Adrenal glands are unremarkable. The kidneys are mildly atrophied with cortical thinning, similar to prior exam. The spleen is unremarkable. The pancreas is partially fatty replaced. No retroperitoneal or mesenteric lymphadenopathy is identified. There is moderately prominent stool throughout the large bowel. There are scattered diverticula without adjacent inflammation to suggest diverticulitis.  The pelvis is partially obscured from streak artifact from right hip arthroplasty. There is a small focus of air in the bladder. Unopacified bladder is otherwise unremarkable. The uterus and adnexa are within normal limits. No free fluid is identified in the abdomen or pelvis. There are stable degenerative changes of the lumbar spine. There is a stable dextrocurvature of the lumbar spine. There is otherwise anatomic vertebral body height and alignment. No fracture of the lumbar vertebral column is identified. No fracture of the pelvis is identified. There are stable degenerative changes of the sacral iliac joints. 1. No evidence of acute intra-abdominal or intrapelvic abnormality. 2. No evidence of acute osseous injury of the lumbar spine or pelvis. 3. Moderate retained stool. 4. Colonic diverticulosis. 5. Small focus of air in the bladder likely on the basis of previous instrumentation. In the absence of instrumentation, this is concerning for cystitis. **This report has been created using voice recognition software. It may contain minor errors which are inherent in voice recognition technology. ** Final report electronically signed by Dr. Krishan Hart MD on 11/5/2021 11:34 AM    VL DUP LOWER EXTREMITY VENOUS BILATERAL    Result Date: 11/5/2021  PROCEDURE: VL DUP LOWER EXTREMITY VENOUS BILATERAL CLINICAL INFORMATION: History of blood clots, PE. TECHNIQUE: High-resolution duplex ultrasound with spectral analysis of the bilateral lower extremities was performed. The common femoral, femoral, popliteal and calf vein segments were studied to the ankles. COMPARISON: Bilateral lower extremity venous duplex ultrasound 2/17/2020 FINDINGS: There is normal compressibility with no evidence of thrombus. Flow study shows normal color flow, augmentation and phasic response. No evidence of deep venous thrombosis in either lower extremity. **This report has been created using voice recognition software.  It may contain minor errors which are inherent in voice recognition technology. **  Final report electronically signed by Dr. Maribel Parra on 11/5/2021 5:43 PM    CT LUMBAR RECONSTRUCTION WO POST PROCESS    Result Date: 11/5/2021  PROCEDURE: CT ABDOMEN PELVIS W IV CONTRAST, CT LUMBAR RECONSTRUCTION WO POST PROCESS CLINICAL INFORMATION: Trauma, Trauma . Fall today with generalized pain. COMPARISON: CT abdomen pelvis dated 11/20/2020. TECHNIQUE: Helical CT of the abdomen and pelvis following intravenous administration of 80 mL Isovue-370 injected in the right AC with sagittal and coronal reconstructions. Reformatted images of the lumbar spine with axial, sagittal and coronal reconstructions were also created. All CT scans at this facility use dose modulation, iterative reconstruction, and/or weight-based dosing when appropriate to reduce radiation dose to as low as reasonably achievable. FINDINGS: There is mild interstitial prominence at the lung bases, similar to prior exam with superimposed mild posterior dependent change. The visualized portion of the heart is unremarkable. There is a small hiatal hernia, stable compared to prior exam. Liver is unremarkable. The gallbladder is surgically absent. Adrenal glands are unremarkable. The kidneys are mildly atrophied with cortical thinning, similar to prior exam. The spleen is unremarkable. The pancreas is partially fatty replaced. No retroperitoneal or mesenteric lymphadenopathy is identified. There is moderately prominent stool throughout the large bowel. There are scattered diverticula without adjacent inflammation to suggest diverticulitis. The pelvis is partially obscured from streak artifact from right hip arthroplasty. There is a small focus of air in the bladder. Unopacified bladder is otherwise unremarkable. The uterus and adnexa are within normal limits. No free fluid is identified in the abdomen or pelvis. There are stable degenerative changes of the lumbar spine.  There is a stable dextrocurvature of the lumbar spine. There is otherwise anatomic vertebral body height and alignment. No fracture of the lumbar vertebral column is identified. No fracture of the pelvis is identified. There are stable degenerative changes of the sacral iliac joints. 1. No evidence of acute intra-abdominal or intrapelvic abnormality. 2. No evidence of acute osseous injury of the lumbar spine or pelvis. 3. Moderate retained stool. 4. Colonic diverticulosis. 5. Small focus of air in the bladder likely on the basis of previous instrumentation. In the absence of instrumentation, this is concerning for cystitis. **This report has been created using voice recognition software. It may contain minor errors which are inherent in voice recognition technology. ** Final report electronically signed by Dr. Shaun Miramontes MD on 11/5/2021 11:34 AM    CT THORACIC RECONSTRUCTION WO POST PROCESS    Result Date: 11/5/2021  PROCEDURE: CT THORACIC RECONSTRUCTION WO POST PROCESS CLINICAL INFORMATION: Trauma . TECHNIQUE: 2-D multiplanar reconstructed images of the thoracic spine All CT scans at this facility use dose modulation, iterative reconstruction, and/or weight-based dosing when appropriate to reduce radiation dose to as low as reasonably achievable. COMPARISON: 11/20/2020 FINDINGS: No acute fracture or acute bony malalignment. Degenerative changes in the lower thoracic level in particular. Several images are missing from the T6 vertebral body level. These are seen there is prominent calcification in the posterior longitudinal ligament at T11-12 no paraspinous soft tissue abnormality is seen. No significant foraminal encroachment is seen. On the soft tissue images. No acute abnormality **This report has been created using voice recognition software. It may contain minor errors which are inherent in voice recognition technology. ** Final report electronically signed by Dr. Analisa Walter on 11/5/2021 11:27 AM    XR HIP W PELVIS MIN 5 VWS BILATERAL    Result Date: 11/5/2021  PROCEDURE: XR HIP W PELVIS MIN 5 VWS BILATERAL CLINICAL INFORMATION: fall hip pain . TECHNIQUE: AP pelvis and AP and attempted frog-leg projections of both hips. COMPARISON: No prior study. FINDINGS: Right hip replacement. No acute fracture or bone destruction. Exam limited by bone density and technique. If clinical concern remains for fracture CT would be recommended. No acute fracture **This report has been created using voice recognition software. It may contain minor errors which are inherent in voice recognition technology. ** Final report electronically signed by Dr. Antoinette Whiting on 11/5/2021 11:04 AM      Electronically signed by Kanika Scott DO on 11/7/2021 at 3:16 PM

## 2021-11-08 NOTE — PROGRESS NOTES
[facility]  INPATIENT OCCUPATIONAL THERAPY  STRZ RENAL TELEMETRY 6K  EVALUATION    Time:   Time In: 1130  Time Out: 1150  Timed Code Treatment Minutes: 12 Minutes  Minutes: 20          Date: 2021  Patient Name: Kari Yee,   Gender: female      MRN: [id number]  : 1935  (80 y.o.)  Referring Practitioner: Madonna Nichole DO  Diagnosis: Elevated Troponin  Additional Pertinent Hx: Per H&P, Kari Yee is a 80 y.o. female with a with a PMH of breast cancer, coronary artery disease, congestive heart failure, diabetes mellitus type 2, hyperlipidemia, hypertension, PE and DVT who presented with a mechanical fall and was found to have an ARTIE and elevated troponin. Patient is on Plavix at home an therefore this was called a level 2 trauma in ED. Patient comes from a nursing home where this morning at 4:30 am she fell while moving from her bed to her recliner. She states she was using her walker and is not sure if she tripped or if the walker was not locked properly. She remembers falling and laying on the floor for 4 hours before she was found. She denies losing consciousness, head pain, or neck pain. She states she had some lightheadedness and dizziness for the last month. Patient mentions that when she initially stood up she feel like \"all the blood was rushing to my head\". She also has had chest pain when she coughs or takes a deep breath. Patient is accompanied with her daughter who states her mother was recently discharged from [address] [place] [address] on 21 with similar symptoms and that she has had a fall everyday for the last 6 days. Daughter also states that patient is normally tachy at baseline. Patient also had acute encephalopathy at last admission. Today she is A&Ox3. She states she is having some abdominal discomfort.  Patient denies having any fevers, chills, nausea, vomiting, palpitations, shortness of breath, diarrhea, urinary frequency, urgency or long-term goal time frame for expected duration of plan of care. If no long-term goals established, a short length of stay is anticipated. Goals:  Patient goals : Go Home  Short term goals  Time Frame for Short term goals: Until discharge  Short term goal 1: Pt will complete BUE light resistive exercises with min vcs for technique to increase indep and endurance with all self cares and transfers. Short term goal 2: Pt will complete standing tolerance x 4 minutes with 2 UE release and Mod Indep to increase indep and endurance with all sinkside grooming. Short term goal 3: Pt will complete functional mobility HH distances Mod Indep to increase indep and endurance with all self care routine. Short term goal 4: Pt will complete LB dressing with LHAE PRN and SBA to increase indep and endurance in home environment. Following session, patient left in safe position with all fall risk precautions in place.

## 2021-11-08 NOTE — DISCHARGE INSTR - COC
Continuity of Care Form    Patient Name: John Rey   :  1935  MRN:  [id number]    [facility] date:  2021  Discharge date:  11/15/2021    Code Status Order: DNR-CCA   Advance Directives:      Admitting Physician:  Geovany Santos DO  PCP: Dolores Jimenez MD    Discharging Nurse: [facility] Unit/Room#: 6K-21/021-A  Discharging Unit Phone Number: [phone]    Emergency Contact:   Extended Emergency Contact Information  Primary Emergency Contact: Berna Mabry [address] Phone: [phone]  Mobile Phone: [phone]  Relation: Child  Secondary Emergency Contact: Rainelle Schlatter [address] Phone: [phone]  Work Phone: [phone]  Mobile Phone: [phone]  Relation: Child    Past Surgical History:  Past Surgical History:   Procedure Laterality Date    BREAST SURGERY      right lumpectomy    CHOLECYSTECTOMY  4-15-16    cholangiogram    COLONOSCOPY      EYE SURGERY      macular hole left eye    JOINT REPLACEMENT      left knee & right hip    SKIN BIOPSY      right arm       Immunization History:   Immunization History   Administered Date(s) Administered    Influenza A ([id number]) Vaccine PF IM 2010    Influenza Virus Vaccine 10/02/2013, 10/13/2015, 2016, 10/22/2018    Influenza, High Dose (Fluzone 65 yrs and older) 10/01/2014, 10/13/2015    Influenza, Quadv, IM, (6 mo and older Fluzone, Flulaval, Fluarix and 3 yrs and older Afluria) 2016    Pneumococcal Conjugate 7-valent (Tim Granado) 10/01/2007    Pneumococcal Polysaccharide ([id number]) 2015    Zoster Live (Zostavax) 10/21/2012       Active Problems:  Patient Active Problem List   Diagnosis Code    Diabetes type 2, uncontrolled E11.65    Type 2 diabetes mellitus without complication (HCC) P68.6    Acute cholecystitis K81.0    Lactic acidosis E87.2    Type 2 diabetes mellitus with hyperosmolarity without coma, without long-term current use of insulin ([address] E11.00    Choledocholithiasis K80.50    Essential hypertension I10    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    Encounter for fitting or adjustment of hearing aid Z46.1    FATOUMATA (generalized anxiety disorder) F41.1    Transient cerebral ischemia G45.9    Left hip pain M25.552    Aphasia R47.01    TIA (transient ischemic attack) G45.9    Major depression, recurrent, chronic (HCC) F33.9    CHF (congestive heart failure), NYHA class II, unspecified failure chronicity, combined (HCC) I50.40    Dermatitis L30.9    SOB (shortness of breath) R06.02    Fall from ground level W18.30XA    Chronic combined systolic and diastolic congestive heart failure (HCC) I50.42    Tachycardia R00.0    Angina of effort ([facility]) I20.8    Status post angioplasty Z98.62    Abnormal findings on cardiac catheterization R93.1    Angina, class III ([facility]) I20.9    Syncope and collapse R55    Closed fracture of nasal bones S02. 2XXA    Fall at home [address] XXXA, Y92.009    PVD (peripheral vascular disease) ([facility] [address] I73.9    Fall (on) (from) other stairs and steps, initial encounter W10. 8XXA    ARTIE (acute kidney injury) ([facility] [address] N17.9    Urinary tract infection without hematuria N39.0    Fall at nursing home [address] Amaryllis Prader, R59.387       Isolation/Infection:   Isolation            No Isolation          Patient Infection Status       Infection Onset Added Last Indicated Last Indicated By Review Planned Expiration Resolved Resolved By    None active    Resolved    COVID-19 Rule Out 10/27/21 10/27/21 10/27/21 COVID-19, Rapid (Ordered)   10/27/21 Rule-Out Test Resulted    COVID-19 Rule Out 08/27/20 08/27/20 08/27/20 Covid-19 Ambulatory (Ordered)   08/28/20 Rule-Out Test Resulted            Nurse Assessment:  Last Vital Signs: BP (!) 115/59   Pulse 82   Temp 97.4 °F (36.3 °C) (Oral)   Resp 16   Ht 5' 5\" (1.651 m)   Wt 155 lb (70.3 kg)   SpO2 96%   BMI 25.79 kg/m²     Last documented pain score (0-10 scale): Pain Level: 0  Last Weight:   Wt Readings from Last 1 Encounters:   11/08/21 155 lb (70.3 kg)     Mental Status:  oriented and alert    IV Access:  - None    Nursing Mobility/ADLs:  Walking   Assisted  Transfer  Assisted  Bathing  Assisted  Dressing  Assisted  Toileting  Assisted  Feeding  Independent  Med Admin  Assisted  Med Delivery   whole    Wound Care Documentation and Therapy:  Wound 11/05/21 Sacrum (Active)   Wound Etiology Pressure Stage  1 11/08/21 0830   Dressing Status Intact; Dry; Clean; New dressing applied 11/08/21 0830   Wound Assessment Pink/red 11/08/21 0830   Drainage Amount None 11/08/21 0830   Number of days: 2        Elimination:  Continence: Bowel: Yes  Bladder: Yes  Urinary Catheter: None   Colostomy/Ileostomy/Ileal Conduit: No       Date of Last BM: 11/13/21 @1600      Intake/Output Summary (Last 24 hours) at 11/8/2021 1350  Last data filed at 11/8/2021 1230  Gross per 24 hour   Intake 2158.74 ml   Output 0 ml   Net 2158.74 ml     I/O last 3 completed shifts: In: 1798.7 [P.O.:660; I.V.:1138.7]  Out: 0     Safety Concerns:     None    Impairments/Disabilities:      Hearing    Nutrition Therapy:  Current Nutrition Therapy:   - Oral Diet:  Dysphagia 2 mechanically altered and /or \"soft and bite sized\"    Routes of Feeding: Oral  Liquids: Thin Liquids  Daily Fluid Restriction: no  Last Modified Barium Swallow with Video (Video Swallowing Test): not done    Treatments at the Time of Hospital Discharge:   Respiratory Treatments: n/a  Oxygen Therapy:  is not on home oxygen therapy.   Ventilator:    - No ventilator support    Rehab Therapies: Physical Therapy and Occupational Therapy  Weight Bearing Status/Restrictions: No weight bearing restirctions  Other Medical Equipment (for information only, NOT a DME order):  walker and bath bench  Other Treatments: n/a    Patient's personal belongings (please select all that are sent with patient):  None    RN SIGNATURE:  Electronically signed by Marylee Marines, RN on 11/15/21 at 12:04 PM EST    CASE MANAGEMENT/SOCIAL WORK SECTION    Inpatient Status Date: 11/5/21    Readmission Risk Assessment Score:  Readmission Risk              Risk of Unplanned Readmission:  24           Discharging to Facility/ Agency   Name: [address] [place] [facility] [facility], [address]  [address]  Fax:[phone]    Dialysis Facility (if applicable)   Name:  Address:  Dialysis Schedule:  Phone:  Fax:    / signature: Electronically signed by Marylene Sizer, LSW on 11/8/21 at 1:51 PM EST    PHYSICIAN SECTION    Prognosis: Good    Condition at Discharge: Stable    Rehab Potential (if transferring to Rehab): Good    Recommended Labs or Other Treatments After Discharge:     Physician Certification: I certify the above information and transfer of Chikis Trinidad  is necessary for the continuing treatment of the diagnosis listed and that she requires Andrew Pierre for greater 30 days.      Update Admission H&P: No change in H&P    PHYSICIAN SIGNATURE:  Electronically signed by Marcel Meckel, DO on 11/15/21 at 11:36 AM EST

## 2021-11-08 NOTE — PROGRESS NOTES
[address] [address]  INPATIENT PHYSICAL THERAPY  EVALUATION  STRZ RENAL TELEMETRY 6K - 6K-21/021-A    Time In: 8443  Time Out: 0802  Timed Code Treatment Minutes: 12 Minutes  Minutes: 24          Date: 2021  Patient Name: Ulises Galvez,  Gender:  female        MRN: [id number]  : 1935  (80 y.o.)      Referring Practitioner: Dr. Shannan Narvaez  Diagnosis: elevated troponin  Additional Pertinent Hx: admit with above diagnosis, ARTIE on CKD, frequent falls     Restrictions/Precautions:  Restrictions/Precautions: General Precautions, Fall Risk    Subjective:  Chart Reviewed: Yes  Patient assessed for rehabilitation services?: Yes  Subjective: pleasant and cooperative, pt would lose her train of thought mid sentence. confused and dec STM noted. General:    Hearing: Exceptions to [facility]  Hearing Exceptions: Hard of hearing/hearing concerns         Pain: no c/o pain    Vitals: Vitals not assessed per clinical judgement, see nursing flowsheet    Social/Functional History:    Lives With: Alone  Type of Home: Assisted living  Home Layout: One level  Home Access: Level entry  Home Equipment: 4 wheeled walker     Bathroom Shower/Tub: Walk-in shower  Bathroom Toilet: Handicap height  Bathroom Equipment: Built-in shower seat, Grab bars in shower, Grab bars around toilet  Bathroom Accessibility: Accessible       ADL Assistance: [address] [place] [address] Avenue: Needs assistance (Facility completes cleaning.  Pt reports indep with laundry and cooking.)  Homemaking Responsibilities: Yes  Ambulation Assistance: Independent  Transfer Assistance: Independent    Active : No  Occupation: Retired       OBJECTIVE:  Range of Motion:  Bilateral Lower Extremity: WFL    Strength:  Bilateral Lower Extremity: WFL, generalized weakness    Balance:  Static Sitting Balance:  Stand By Assistance, discussed inc time after transition for sup to sit and sit to std for safety with mobility-pt verbalized understanding but dec STM  Static Standing Balance: Contact Guard Assistance, to SBA with RW    Bed Mobility:  Rolling to Right: Stand By Assistance   Supine to Sit: Stand By Assistance  Scooting: Stand By Assistance  HOB up 20 degrees and use BR  Transfers:  Sit to Stand: Contact Guard Assistance  Stand to Sit:Stand By Assistance  Cues to turn fully to surface with walker before sitting and for hand placement for safety  Ambulation:  Contact Guard Assistance, to SBA  Distance: 80'x3  Surface: Level Tile  Device:Rolling Walker  Gait Deviations: Forward Flexed Posture, Slow Brittny, Mild Path Deviations and stding rest breaks between bouts of amb    Exercise:  Patient was guided in 1 set(s) 10 reps of exercise to both lower extremities. Ankle pumps, Glut sets and Quad sets. To cont throughout the day. Exercises were completed for increased independence with functional mobility. Functional Outcome Measures: Completed  AM-PAC Inpatient Mobility Raw Score : 18  AM-PAC Inpatient T-Scale Score : 43.63    ASSESSMENT:  Activity Tolerance:  Patient tolerance of  treatment: good. Treatment Initiated: Treatment and education initiated within context of evaluation. Evaluation time included review of current medical information, gathering information related to past medical, social and functional history, completion of standardized testing, formal and informal observation of tasks, assessment of data and development of plan of care and goals. Treatment time included skilled education and facilitation of tasks to increase safety and independence with functional mobility for improved independence and quality of life. Assessment:   Body structures, Functions, Activity limitations: Decreased functional mobility , Decreased balance, Decreased strength, Decreased safe awareness, Decreased cognition  Assessment: pt with dec cognition and STM during PT session, generalized weakness, dec balance, lives alone in [place]., use of walker and inc assist for safe mobility, recommend cont PT to inc pt I with functional mobility  Prognosis: Good    REQUIRES PT FOLLOW UP: Yes    Discharge Recommendations:  Discharge Recommendations: Continue to assess pending progress, Patient would benefit from continued therapy after discharge    Patient Education:  PT Education: Goals, General Safety, PT Role, Plan of Care, Functional Mobility Training    Equipment Recommendations:  Equipment Needed: No    Plan:  Times per week: 5X GM  Times per day: Daily  Specific instructions for Next Treatment: therex, mobility, balance activities    Goals:  Patient goals : return to [employer] term goals  Time Frame for Short term goals: by discharge  Short term goal 1: bed mobility with MOD I to get in/out of bed  Short term goal 2: transfer with S to get in/out of chairs  Short term goal 3: amb >100'x1 with walker and S to walk safely in assisted living  Long term goals  Time Frame for Long term goals : no LTGs set secondary to short ELOS    Following session, patient left in safe position with all fall risk precautions in place.

## 2021-11-08 NOTE — PROGRESS NOTES
Hospitalist Progress Note      Patient:  Kari Yee    Unit/Bed:6K-21/021-A  YOB: 1935  MRN: [id number]   Acct: [de-identified]   PCP: Marian Canales MD  Date of Admission: 11/5/2021    Assessment/Plan:    1. Acute kidney injury on CKD stage IV: Resolved  a. Secondary to prerenal azotemia. b. Improved with IV fluid resuscitation. c. Currently holding Bumex and Zaroxolyn. On discharge plan to continue holding diuretics. Can take the diuretics PRN for swelling, edema, or weight gain. Follow with PCP and repeat BMP in 1 week when she discharges. 2. Elevated troponin:   a. Suspect this is related to ARTIE on CKD. b. No ischemic changes on EKG. 3. Frequent falls:   a. Symptoms consistent with orthostasis. b. Patient given NaCl bolus yesterday for soft BP. Orthostatic vital signs from yesterday negative. c. PT/OT evaluated patient this morning  d. Nursing reports that patient has significant difficulty with standing. She had some improvement this morning. 4. History of DVT with PE:  a. Venous Dopplers negative for DVT. b. No indication anticoagulation given high fall risk and bleeding risk. 5. Left leg pain/swelling:   a. Venous Dopplers negative for DVT. b. No indication anticoagulation given high fall risk and bleeding risk. 6. Diabetes mellitus type 2:  a. Poorly controlled. A1c on 10/29/2021 of 17.9  b. Holding home Metformin  c. Continue with Lantus and SSI. 7. Hypertension:   a. Continue home Lopressor    8. Hyperlipidemia:   a. Continue home Lipitor    Chief Complaint: [facility] Course: Kari Yee is a 80 y.o. female with a with a PMH of breast cancer, coronary artery disease, congestive heart failure, diabetes mellitus type 2, hyperlipidemia, hypertension, PE and DVT who presented with a mechanical fall and was found to have an ARTIE and elevated troponin.  Patient is on Plavix at home an therefore this was called a level 2 trauma in ED. Patient comes from a nursing home where this morning at 4:30 am she fell while moving from her bed to her recliner. She states she was using her walker and is not sure if she tripped or if the walker was not locked properly. She remembers falling and laying on the floor for 4 hours before she was found. She denies losing consciousness, head pain, or neck pain. She states she had some lightheadedness and dizziness for the last month. Patient mentions that when she initially stood up she feel like \"all the blood was rushing to my head\". She also has had chest pain when she coughs or takes a deep breath. Patient is accompanied with her daughter who states her mother was recently discharged from [facility] on 11/1/21 with similar symptoms and that she has had a fall everyday for the last 6 days. Daughter also states that patient is normally tachy at baseline. Patient also had acute encephalopathy at last admission. Today she is A&Ox3. She states she is having some abdominal discomfort. Patient denies having any fevers, chills, nausea, vomiting, palpitations, shortness of breath, diarrhea, urinary frequency, urgency or dysuria. 11/6: Positive orthostatic vital sighs. Patient had trouble walking to the bathroom and needed two people to assist her. 11/7: Pending PT/OT evaluation. Gave 1 NaCl 1000 mL bolus as BP soft. Ordered new orthostatic vital signs. Potential discharge tomorrow. 11/8: Orthostatic vital signs negative from yesterday. Awaiting precert to SNF as her assisted living facility will not take her back based on PT/OT recommendations. Will discharge patient when she is approved. Subjective (past 24 hours):   No acute changes overnight. Patient is laying comfortably in bed. She states she is doing well overall and worked with PT/OT this morning. She said she was able to walk around the hallway without weakness or fall.  She denies having any lightheadedness, dizziness, nausea, vomiting, fever, chills, shortness of breath, or abdominal pain today. Past medical history, family history, social history and allergies reviewed again and is unchanged since admission. ROS (12 point review of systems completed. Pertinent positives noted. Otherwise ROS is negative)     Medications:  Reviewed    Infusion Medications    dextrose      sodium chloride       Scheduled Medications    enoxaparin  30 mg SubCUTAneous Q24H    insulin glargine  20 Units SubCUTAneous Nightly    sodium chloride  1,000 mL IntraVENous Once    [Held by provider] bumetanide  2 mg Oral Daily    [Held by provider] cefdinir  300 mg Oral BID    clopidogrel  75 mg Oral Daily    DULoxetine  120 mg Oral Daily    metoprolol tartrate  50 mg Oral BID    potassium chloride  10 mEq Oral Daily    melatonin  3 mg Oral Nightly    lactobacillus  1 capsule Oral BID WC    atorvastatin  40 mg Oral Daily    sodium chloride flush  5-40 mL IntraVENous 2 times per day    insulin lispro  0-12 Units SubCUTAneous TID WC    insulin lispro  0-6 Units SubCUTAneous Nightly     PRN Meds: glucose, dextrose, glucagon (rDNA), dextrose, nitroGLYCERIN, [Held by provider] metOLazone, magnesium hydroxide, sodium chloride flush, sodium chloride, ondansetron **OR** ondansetron, polyethylene glycol, acetaminophen **OR** acetaminophen      Intake/Output Summary (Last 24 hours) at 11/8/2021 1540  Last data filed at 11/8/2021 1230  Gross per 24 hour   Intake 2158.74 ml   Output 0 ml   Net 2158.74 ml       Diet:  ADULT DIET; Dysphagia - Soft and Bite Sized    Exam:  BP (!) 115/59   Pulse 82   Temp 97.4 °F (36.3 °C) (Oral)   Resp 16   Ht 5' 5\" (1.651 m)   Wt 155 lb (70.3 kg)   SpO2 96%   BMI 25.79 kg/m²      General appearance: Elderly female resting in bed. No acute distress. HEENT: Pupils equal, round, and reactive to light. Conjunctivae/corneas clear. Neck: Supple, with full range of motion. No jugular venous distention. also yielded light growth of Staphylococcus species (coagulase negative). LABAERO light growth  10/12/2021    LABAERO  10/12/2021     very light growth Pantoea species which may be initially susceptible to third generation cephalosporins may develop resistance within three to four days of initiation of this antimicrobial therapy. No results found for: LABANAE    Urinalysis:      Lab Results   Component Value Date    NITRU NEGATIVE 11/05/2021    WBCUA 15-25 11/05/2021    BACTERIA NONE SEEN 11/05/2021    RBCUA NONE 11/05/2021    BLOODU NEGATIVE 11/05/2021    SPECGRAV 1.013 10/27/2021    GLUCOSEU >= 1000 11/05/2021       Radiology:  VL DUP LOWER EXTREMITY VENOUS BILATERAL   Final Result      No evidence of deep venous thrombosis in either lower extremity. **This report has been created using voice recognition software. It may contain minor errors which are inherent in voice recognition technology. **             Final report electronically signed by Dr. Norma Yusuf on 11/5/2021 5:43 PM      CT ABDOMEN PELVIS W IV CONTRAST Additional Contrast? Radiologist Recommendation   Final Result       1. No evidence of acute intra-abdominal or intrapelvic abnormality. 2. No evidence of acute osseous injury of the lumbar spine or pelvis. 3. Moderate retained stool. 4. Colonic diverticulosis. 5. Small focus of air in the bladder likely on the basis of previous instrumentation. In the absence of instrumentation, this is concerning for cystitis. **This report has been created using voice recognition software. It may contain minor errors which are inherent in voice recognition technology. **      Final report electronically signed by Dr. Leigh Gorman MD on 11/5/2021 11:34 AM      CT CERVICAL SPINE WO CONTRAST   Final Result   Stable appearance no acute fracture. **This report has been created using voice recognition software.   It may contain minor errors which are inherent in voice recognition technology. **      Final report electronically signed by Dr. Tawnya Flynn on 11/5/2021 11:21 AM      CT CHEST W CONTRAST   Final Result       1. Stable CT scan of the chest, no interval change since previous study dated 20 November 2020.   2. No displaced rib fracture or evidence of pneumothorax. 3. Thickening off the interstitial lung markings. Scarring in the right upper lobe. 4. Cardiomegaly with mitral valve calcification. Coronary artery calcification. 5. Calcification in the thoracic aorta. 6. Thoracic spondylosis. **This report has been created using voice recognition software. It may contain minor errors which are inherent in voice recognition technology. **      Final report electronically signed by DR Richard Carlos on 11/5/2021 11:29 AM      CT HEAD WO CONTRAST   Final Result       1. Stable CT scan of the brain, no interval change 27th of October 2021. Kelly Sotelo **This report has been created using voice recognition software. It may contain minor errors which are inherent in voice recognition technology. **      Final report electronically signed by DR Richard Carlos on 11/5/2021 11:20 AM      CT LUMBAR RECONSTRUCTION WO POST PROCESS   Final Result       1. No evidence of acute intra-abdominal or intrapelvic abnormality. 2. No evidence of acute osseous injury of the lumbar spine or pelvis. 3. Moderate retained stool. 4. Colonic diverticulosis. 5. Small focus of air in the bladder likely on the basis of previous instrumentation. In the absence of instrumentation, this is concerning for cystitis. **This report has been created using voice recognition software. It may contain minor errors which are inherent in voice recognition technology. **      Final report electronically signed by Dr. Gabriella Valentine MD on 11/5/2021 11:34 AM      CT THORACIC RECONSTRUCTION WO POST PROCESS   Final Result   No acute abnormality            **This 11:20 AM    CT CHEST W CONTRAST    Result Date: 11/5/2021  PROCEDURE: CT CHEST W CONTRAST CLINICAL INFORMATION: Trauma, Trauma. COMPARISON: CT scan of the chest dated 20 th of November 2020. Kyler Sewell TECHNIQUE: 5 mm axial images were obtained through the chest after the administration of intravenous contrast. Sagittal and coronal reconstructions were obtained. All CT scans at this facility use dose modulation, iterative reconstruction, and/or weight-based dosing when appropriate to reduce radiation dose to as low as reasonably achievable. FINDINGS: There is mild cardiomegaly with mitral valve calcification. . There is atherosclerotic calcification in the aortic root and aortic arch. There is no evidence for aortic dissection or mediastinal hematoma. . There is no gross abnormality of the pulmonary artery and its proximal branches. There is no mediastinal, axillary or hilar adenopathy. There is no pericardial or pleural effusion. There is diffuse COPD and thickening off the interstitial lung markings. There is scarring in the right upper lobe. . There is thoracic spondylosis. There is no acute fracture. The ribs appear to be intact. There is no displaced rib fracture. There is no pneumothorax. . There is a hiatal hernia. 1. Stable CT scan of the chest, no interval change since previous study dated 20 November 2020. 2. No displaced rib fracture or evidence of pneumothorax. 3. Thickening off the interstitial lung markings. Scarring in the right upper lobe. 4. Cardiomegaly with mitral valve calcification. Coronary artery calcification. 5. Calcification in the thoracic aorta. 6. Thoracic spondylosis. **This report has been created using voice recognition software. It may contain minor errors which are inherent in voice recognition technology. ** Final report electronically signed by DR Wes Moreau on 11/5/2021 11:29 AM    CT CERVICAL SPINE WO CONTRAST    Result Date: 11/5/2021  PROCEDURE: CT CERVICAL SPINE WO CONTRAST CLINICAL INFORMATION: Trauma, Trauma . TECHNIQUE: 2-D multiplanar noncontrast CT cervical spine All CT scans at this facility use dose modulation, iterative reconstruction, and/or weight-based dosing when appropriate to reduce radiation dose to as low as reasonably achievable. COMPARISON: 10/27/2021 FINDINGS: No acute fracture or acute bony malalignment. Diffuse degenerative changes are detailed on the previous exam. Straightening of the normal cervical lordosis which may be due to positioning. No precervical soft tissue swelling. Stable appearance no acute fracture. **This report has been created using voice recognition software. It may contain minor errors which are inherent in voice recognition technology. ** Final report electronically signed by Dr. Antoinette Whiting on 11/5/2021 11:21 AM    CT ABDOMEN PELVIS W IV CONTRAST Additional Contrast? Radiologist Recommendation    Result Date: 11/5/2021  PROCEDURE: CT ABDOMEN PELVIS W IV CONTRAST, CT LUMBAR RECONSTRUCTION WO POST PROCESS CLINICAL INFORMATION: Trauma, Trauma . Fall today with generalized pain. COMPARISON: CT abdomen pelvis dated 11/20/2020. TECHNIQUE: Helical CT of the abdomen and pelvis following intravenous administration of 80 mL Isovue-370 injected in the right AC with sagittal and coronal reconstructions. Reformatted images of the lumbar spine with axial, sagittal and coronal reconstructions were also created. All CT scans at this facility use dose modulation, iterative reconstruction, and/or weight-based dosing when appropriate to reduce radiation dose to as low as reasonably achievable. FINDINGS: There is mild interstitial prominence at the lung bases, similar to prior exam with superimposed mild posterior dependent change. The visualized portion of the heart is unremarkable. There is a small hiatal hernia, stable compared to prior exam. Liver is unremarkable. The gallbladder is surgically absent. Adrenal glands are unremarkable.  The kidneys are mildly atrophied with cortical thinning, similar to prior exam. The spleen is unremarkable. The pancreas is partially fatty replaced. No retroperitoneal or mesenteric lymphadenopathy is identified. There is moderately prominent stool throughout the large bowel. There are scattered diverticula without adjacent inflammation to suggest diverticulitis. The pelvis is partially obscured from streak artifact from right hip arthroplasty. There is a small focus of air in the bladder. Unopacified bladder is otherwise unremarkable. The uterus and adnexa are within normal limits. No free fluid is identified in the abdomen or pelvis. There are stable degenerative changes of the lumbar spine. There is a stable dextrocurvature of the lumbar spine. There is otherwise anatomic vertebral body height and alignment. No fracture of the lumbar vertebral column is identified. No fracture of the pelvis is identified. There are stable degenerative changes of the sacral iliac joints. 1. No evidence of acute intra-abdominal or intrapelvic abnormality. 2. No evidence of acute osseous injury of the lumbar spine or pelvis. 3. Moderate retained stool. 4. Colonic diverticulosis. 5. Small focus of air in the bladder likely on the basis of previous instrumentation. In the absence of instrumentation, this is concerning for cystitis. **This report has been created using voice recognition software. It may contain minor errors which are inherent in voice recognition technology. ** Final report electronically signed by Dr. Ban Gill MD on 11/5/2021 11:34 AM    VL DUP LOWER EXTREMITY VENOUS BILATERAL    Result Date: 11/5/2021  PROCEDURE: VL DUP LOWER EXTREMITY VENOUS BILATERAL CLINICAL INFORMATION: History of blood clots, PE. TECHNIQUE: High-resolution duplex ultrasound with spectral analysis of the bilateral lower extremities was performed. The common femoral, femoral, popliteal and calf vein segments were studied to the ankles.  COMPARISON: Bilateral lower extremity venous duplex ultrasound 2/17/2020 FINDINGS: There is normal compressibility with no evidence of thrombus. Flow study shows normal color flow, augmentation and phasic response. No evidence of deep venous thrombosis in either lower extremity. **This report has been created using voice recognition software. It may contain minor errors which are inherent in voice recognition technology. **  Final report electronically signed by Dr. Chalino Pendleton on 11/5/2021 5:43 PM    CT LUMBAR RECONSTRUCTION WO POST PROCESS    Result Date: 11/5/2021  PROCEDURE: CT ABDOMEN PELVIS W IV CONTRAST, CT LUMBAR RECONSTRUCTION WO POST PROCESS CLINICAL INFORMATION: Trauma, Trauma . Fall today with generalized pain. COMPARISON: CT abdomen pelvis dated 11/20/2020. TECHNIQUE: Helical CT of the abdomen and pelvis following intravenous administration of 80 mL Isovue-370 injected in the right AC with sagittal and coronal reconstructions. Reformatted images of the lumbar spine with axial, sagittal and coronal reconstructions were also created. All CT scans at this facility use dose modulation, iterative reconstruction, and/or weight-based dosing when appropriate to reduce radiation dose to as low as reasonably achievable. FINDINGS: There is mild interstitial prominence at the lung bases, similar to prior exam with superimposed mild posterior dependent change. The visualized portion of the heart is unremarkable. There is a small hiatal hernia, stable compared to prior exam. Liver is unremarkable. The gallbladder is surgically absent. Adrenal glands are unremarkable. The kidneys are mildly atrophied with cortical thinning, similar to prior exam. The spleen is unremarkable. The pancreas is partially fatty replaced. No retroperitoneal or mesenteric lymphadenopathy is identified. There is moderately prominent stool throughout the large bowel.  There are scattered diverticula without adjacent inflammation to suggest diverticulitis. The pelvis is partially obscured from streak artifact from right hip arthroplasty. There is a small focus of air in the bladder. Unopacified bladder is otherwise unremarkable. The uterus and adnexa are within normal limits. No free fluid is identified in the abdomen or pelvis. There are stable degenerative changes of the lumbar spine. There is a stable dextrocurvature of the lumbar spine. There is otherwise anatomic vertebral body height and alignment. No fracture of the lumbar vertebral column is identified. No fracture of the pelvis is identified. There are stable degenerative changes of the sacral iliac joints. 1. No evidence of acute intra-abdominal or intrapelvic abnormality. 2. No evidence of acute osseous injury of the lumbar spine or pelvis. 3. Moderate retained stool. 4. Colonic diverticulosis. 5. Small focus of air in the bladder likely on the basis of previous instrumentation. In the absence of instrumentation, this is concerning for cystitis. **This report has been created using voice recognition software. It may contain minor errors which are inherent in voice recognition technology. ** Final report electronically signed by Dr. Krishan Hart MD on 11/5/2021 11:34 AM    CT THORACIC RECONSTRUCTION WO POST PROCESS    Result Date: 11/5/2021  PROCEDURE: CT THORACIC RECONSTRUCTION WO POST PROCESS CLINICAL INFORMATION: Trauma . TECHNIQUE: 2-D multiplanar reconstructed images of the thoracic spine All CT scans at this facility use dose modulation, iterative reconstruction, and/or weight-based dosing when appropriate to reduce radiation dose to as low as reasonably achievable. COMPARISON: 11/20/2020 FINDINGS: No acute fracture or acute bony malalignment. Degenerative changes in the lower thoracic level in particular. Several images are missing from the T6 vertebral body level.  These are seen there is prominent calcification in the posterior longitudinal ligament at T11-12 no paraspinous soft tissue abnormality is seen. No significant foraminal encroachment is seen. On the soft tissue images. No acute abnormality **This report has been created using voice recognition software. It may contain minor errors which are inherent in voice recognition technology. ** Final report electronically signed by Dr. Kelsie Douglass on 11/5/2021 11:27 AM    XR HIP W PELVIS MIN 5 VWS BILATERAL    Result Date: 11/5/2021  PROCEDURE: XR HIP W PELVIS MIN 5 VWS BILATERAL CLINICAL INFORMATION: fall hip pain . TECHNIQUE: AP pelvis and AP and attempted frog-leg projections of both hips. COMPARISON: No prior study. FINDINGS: Right hip replacement. No acute fracture or bone destruction. Exam limited by bone density and technique. If clinical concern remains for fracture CT would be recommended. No acute fracture **This report has been created using voice recognition software. It may contain minor errors which are inherent in voice recognition technology. ** Final report electronically signed by Dr. Kelsie Douglass on 11/5/2021 11:04 AM    Electronically signed by Jose Mcgill DO on 11/8/2021 at 3:40 PM

## 2021-11-08 NOTE — CARE COORDINATION
11/8/21, 9:53 AM EST    DISCHARGE PLANNING EVALUATION    SW consult received \"from [facility]\". Full assessment deferred, completed last admission. See Fernanda Cohn note dated 11/1/21. No changes. SW was advised by physician of possible discharge today. RUSSELL spoke with Denisha Siddiqui with [facility], they will review chart and call SW if able to accept back to [place] today. PT evaluation completed.

## 2021-11-09 NOTE — PROGRESS NOTES
Hospitalist Progress Note      Patient:  Kelvin Miramontes    Unit/Bed:6K-21/021-A  YOB: 1935  MRN: [id number]   Acct: [de-identified]   PCP: Sarah Diaz MD  Date of Admission: 11/5/2021    Assessment/Plan:    1. Acute kidney injury on CKD stage IV: Resolved  a. Secondary to prerenal azotemia. b. Improved with IV fluid resuscitation. c. Currently holding Bumex and Zaroxolyn. On discharge plan to continue holding diuretics. Can take the diuretics PRN for swelling, edema, or weight gain. Follow with PCP and repeat BMP in 1 week when she discharges. 2. Elevated troponin:   a. Suspect this is related to ARTIE on CKD. b. No ischemic changes on EKG. 3. Frequent falls:   a. Symptoms consistent with orthostasis. b. Patient given NaCl bolus for soft BP on 11/7. Orthostatic vital signs from 11/7 negative. c. PT/OT evaluated   d. Nursing reports that patient was able to walk down the hallway without any issues today. 4. History of DVT with PE:  a. Venous Dopplers negative for DVT. b. No indication anticoagulation given high fall risk and bleeding risk. 5. Left leg pain/swelling:   a. Venous Dopplers negative for DVT. b. No indication anticoagulation given high fall risk and bleeding risk. 6. Diabetes mellitus type 2:  a. Poorly controlled. A1c on 10/29/2021 of 17.9  b. Holding home Metformin  c. Continue with Lantus and SSI. d. Today glucose was found to be 426. Patient given lispro as ordered   e. Will monitor glucose     7. Hypertension:   a. Continue home Lopressor    8. Hyperlipidemia:   a. Continue home Lipitor    Chief Complaint: [facility] Course:     Kelvin Miramontes is a 80 y.o. female with a with a PMH of breast cancer, coronary artery disease, congestive heart failure, diabetes mellitus type 2, hyperlipidemia, hypertension, PE and DVT who presented with a mechanical fall and was found to have an ARITE and elevated troponin. Patient is on Plavix at home an therefore this was called a level 2 trauma in ED. Patient comes from a nursing home where this morning at 4:30 am she fell while moving from her bed to her recliner. She states she was using her walker and is not sure if she tripped or if the walker was not locked properly. She remembers falling and laying on the floor for 4 hours before she was found. She denies losing consciousness, head pain, or neck pain. She states she had some lightheadedness and dizziness for the last month. Patient mentions that when she initially stood up she feel like \"all the blood was rushing to my head\". She also has had chest pain when she coughs or takes a deep breath. Patient is accompanied with her daughter who states her mother was recently discharged from [facility] on [facility] with similar symptoms and that she has had a fall everyday for the last 6 days. Daughter also states that patient is normally tachy at baseline. Patient also had acute encephalopathy at last admission. Today she is A&Ox3. She states she is having some abdominal discomfort. Patient denies having any fevers, chills, nausea, vomiting, palpitations, shortness of breath, diarrhea, urinary frequency, urgency or dysuria. 11/6: Positive orthostatic vital sighs. Patient had trouble walking to the bathroom and needed two people to assist her. 11/7: Pending PT/OT evaluation. Gave 1 NaCl 1000 mL bolus as BP soft. Ordered new orthostatic vital signs. Potential discharge tomorrow. 11/8: Orthostatic vital signs negative from yesterday. Awaiting precert to SNF as her assisted living facility will not take her back based on PT/OT recommendations. Will discharge patient when she is approved. 35/3: Awaiting precert to SNF. POCT glucose around 12 pm today was 426. She was given Lispro as ordered. Will monitor blood glucose. Subjective (past 24 hours):   No acute changes overnight.  Patient is sitting in the chair reading a newspaper. She states she is doing well overall however would like to go back home soon. She said she was able to walk around the hallway with the nurse without any weakness or fall. She denies having any lightheadedness, dizziness, nausea, vomiting, fever, chills, shortness of breath, or abdominal pain today. Past medical history, family history, social history and allergies reviewed again and is unchanged since admission. ROS (12 point review of systems completed. Pertinent positives noted. Otherwise ROS is negative)     Medications:  Reviewed    Infusion Medications    dextrose      sodium chloride       Scheduled Medications    enoxaparin  30 mg SubCUTAneous Q24H    insulin glargine  20 Units SubCUTAneous Nightly    sodium chloride  1,000 mL IntraVENous Once    [Held by provider] bumetanide  2 mg Oral Daily    clopidogrel  75 mg Oral Daily    DULoxetine  120 mg Oral Daily    metoprolol tartrate  50 mg Oral BID    potassium chloride  10 mEq Oral Daily    melatonin  3 mg Oral Nightly    lactobacillus  1 capsule Oral BID WC    atorvastatin  40 mg Oral Daily    sodium chloride flush  5-40 mL IntraVENous 2 times per day    insulin lispro  0-12 Units SubCUTAneous TID WC    insulin lispro  0-6 Units SubCUTAneous Nightly     PRN Meds: glucose, dextrose, glucagon (rDNA), dextrose, nitroGLYCERIN, [Held by provider] metOLazone, magnesium hydroxide, sodium chloride flush, sodium chloride, ondansetron **OR** ondansetron, polyethylene glycol, acetaminophen **OR** acetaminophen      Intake/Output Summary (Last 24 hours) at 11/9/2021 1607  Last data filed at 11/9/2021 0521  Gross per 24 hour   Intake 660 ml   Output    Net 660 ml       Diet:  ADULT DIET; Dysphagia - Soft and Bite Sized    Exam:  /64   Pulse 65   Temp 97.8 °F (36.6 °C) (Oral)   Resp 16   Ht 5' 5\" (1.651 m)   Wt 155 lb (70.3 kg)   SpO2 98%   BMI 25.79 kg/m²      General appearance: Elderly female resting in bed.  No acute distress. HEENT: Pupils equal, round, and reactive to light. Conjunctivae/corneas clear. Neck: Supple, with full range of motion. No jugular venous distention. Respiratory:  Normal respiratory effort. Clear to auscultation, bilaterally without Rales/Wheezes/Rhonchi. Cardiovascular: Regular rate and rhythm with normal S1/S2 without murmurs, rubs or gallops. Abdomen: Soft, non-tender, non-distended with normal bowel sounds. Musculoskeletal: passive and active ROM x 4 extremities. Skin: Skin color, texture, turgor normal.  No rashes or lesions. Neurologic:  Neurovascularly intact without any focal sensory/motor deficits. Cranial nerves: II-XII intact, grossly non-focal.  Psychiatric: Alert and oriented, thought content appropriate, normal insight  Capillary Refill: Brisk,< 3 seconds   Peripheral Pulses: +2 palpable, equal bilaterally     Labs:   Recent Labs     11/07/21  0505 11/08/21  0513   WBC 8.1 7.4   HGB 12.1 11.8*   HCT 37.4 37.1    214     Recent Labs     11/07/21  1316 11/08/21  0513 11/09/21  0548    139 138   K 4.2 4.3 4.2   CL 97* 104 103   CO2 26 24 25   BUN 28* 26* 25*   CREATININE 1.1 1.0 1.2   CALCIUM 8.5 8.4* 8.7     No results for input(s): AST, ALT, BILIDIR, BILITOT, ALKPHOS in the last 72 hours. No results for input(s): INR in the last 72 hours. No results for input(s): Hermelinda Fuchs in the last 72 hours. Microbiology:    Blood culture #1: No results found for: [facility]    Blood culture #2:No results found for: Winter Schaeffer    Organism:  Lab Results   Component Value Date    ORG Growth of Contaminants 11/05/2021         Lab Results   Component Value Date    LABGRAM  10/12/2021     No segmented neutrophils observed. Rare epithelial cells observed. No bacteria seen.         MRSA culture only:No results found for: [facility]    Urine culture:   Lab Results   Component Value Date    LABURIN Bryant count: >100,000 CFU/mL  10/29/2021       Respiratory culture: No results found for: CULTRESP    Aerobic and Anaerobic :  Lab Results   Component Value Date    LABAERO  10/12/2021     No growth-preliminary Culture also yielded light growth of Staphylococcus species (coagulase negative). LABAERO light growth  10/12/2021    LABAERO  10/12/2021     very light growth Pantoea species which may be initially susceptible to third generation cephalosporins may develop resistance within three to four days of initiation of this antimicrobial therapy. No results found for: LABANAE    Urinalysis:      Lab Results   Component Value Date    NITRU NEGATIVE 11/05/2021    WBCUA 15-25 11/05/2021    BACTERIA NONE SEEN 11/05/2021    RBCUA NONE 11/05/2021    BLOODU NEGATIVE 11/05/2021    SPECGRAV 1.013 10/27/2021    GLUCOSEU >= 1000 11/05/2021       Radiology:  VL DUP LOWER EXTREMITY VENOUS BILATERAL   Final Result      No evidence of deep venous thrombosis in either lower extremity. **This report has been created using voice recognition software. It may contain minor errors which are inherent in voice recognition technology. **             Final report electronically signed by Dr. Cristofer Garner on 11/5/2021 5:43 PM      CT ABDOMEN PELVIS W IV CONTRAST Additional Contrast? Radiologist Recommendation   Final Result       1. No evidence of acute intra-abdominal or intrapelvic abnormality. 2. No evidence of acute osseous injury of the lumbar spine or pelvis. 3. Moderate retained stool. 4. Colonic diverticulosis. 5. Small focus of air in the bladder likely on the basis of previous instrumentation. In the absence of instrumentation, this is concerning for cystitis. **This report has been created using voice recognition software. It may contain minor errors which are inherent in voice recognition technology. **      Final report electronically signed by Dr. Tyron Ramirez MD on 11/5/2021 11:34 AM      CT CERVICAL SPINE WO CONTRAST   Final Result   Stable appearance no acute fracture. **This report has been created using voice recognition software. It may contain minor errors which are inherent in voice recognition technology. **      Final report electronically signed by Dr. Dakota Pascal on 11/5/2021 11:21 AM      CT CHEST W CONTRAST   Final Result       1. Stable CT scan of the chest, no interval change since previous study dated 20 November 2020.   2. No displaced rib fracture or evidence of pneumothorax. 3. Thickening off the interstitial lung markings. Scarring in the right upper lobe. 4. Cardiomegaly with mitral valve calcification. Coronary artery calcification. 5. Calcification in the thoracic aorta. 6. Thoracic spondylosis. **This report has been created using voice recognition software. It may contain minor errors which are inherent in voice recognition technology. **      Final report electronically signed by DR Carlee Isaac on 11/5/2021 11:29 AM      CT HEAD WO CONTRAST   Final Result       1. Stable CT scan of the brain, no interval change 27th of October 2021. Lida Melendez **This report has been created using voice recognition software. It may contain minor errors which are inherent in voice recognition technology. **      Final report electronically signed by DR Carlee Isaac on 11/5/2021 11:20 AM      CT LUMBAR RECONSTRUCTION WO POST PROCESS   Final Result       1. No evidence of acute intra-abdominal or intrapelvic abnormality. 2. No evidence of acute osseous injury of the lumbar spine or pelvis. 3. Moderate retained stool. 4. Colonic diverticulosis. 5. Small focus of air in the bladder likely on the basis of previous instrumentation. In the absence of instrumentation, this is concerning for cystitis. **This report has been created using voice recognition software. It may contain minor errors which are inherent in voice recognition technology. **      Final report electronically signed by Dr. Krishan Hart MD on 11/5/2021 11:34 AM      CT THORACIC RECONSTRUCTION WO POST PROCESS   Final Result   No acute abnormality            **This report has been created using voice recognition software. It may contain minor errors which are inherent in voice recognition technology. **      Final report electronically signed by Dr. Benito Kebede on 11/5/2021 11:27 AM      XR HIP W PELVIS MIN 5 VWS BILATERAL   Final Result   No acute fracture            **This report has been created using voice recognition software. It may contain minor errors which are inherent in voice recognition technology. **      Final report electronically signed by Dr. Benito Kebede on 11/5/2021 11:04 AM        CT HEAD WO CONTRAST    Result Date: 11/5/2021  PROCEDURE: CT HEAD WO CONTRAST CLINICAL INFORMATION: Trauma, Trauma. COMPARISON: CT scan of the brain dated 27 October 2021. . TECHNIQUE: Noncontrast 5 mm axial images were obtained through the brain. Sagittal and coronal reconstructions were obtained. All CT scans at this facility use dose modulation, iterative reconstruction, and/or weight-based dosing when appropriate to reduce radiation dose to as low as reasonably achievable. FINDINGS: There is mild atrophy and dilatation of the lateral ventricles. There is diminished attenuation in the white matter most likely representing ischemic changes. This a lacunar infarct in the right cerebellar hemisphere. There is calcification in the pineal  gland and choroid plexus of both lateral ventricles. There is calcification the cavernous segments of both internal carotid arteries. There is no hemorrhage. There are no intra-or extra-axial collections. There is no  midline shift or mass effect. The paranasal sinuses and mastoid air cells are normally aerated. There is no suspicious calvarial abnormality. 1. Stable CT scan of the brain, no interval change 27th of October 2021. Yasmine Dumont **This report has been created using voice recognition software.  It may contain minor errors which are inherent in voice recognition technology. ** Final report electronically signed by DR Richard Carlos on 11/5/2021 11:20 AM    CT CHEST W CONTRAST    Result Date: 11/5/2021  PROCEDURE: CT CHEST W CONTRAST CLINICAL INFORMATION: Trauma, Trauma. COMPARISON: CT scan of the chest dated 20 th of November 2020. Kelly Sotelo TECHNIQUE: 5 mm axial images were obtained through the chest after the administration of intravenous contrast. Sagittal and coronal reconstructions were obtained. All CT scans at this facility use dose modulation, iterative reconstruction, and/or weight-based dosing when appropriate to reduce radiation dose to as low as reasonably achievable. FINDINGS: There is mild cardiomegaly with mitral valve calcification. . There is atherosclerotic calcification in the aortic root and aortic arch. There is no evidence for aortic dissection or mediastinal hematoma. . There is no gross abnormality of the pulmonary artery and its proximal branches. There is no mediastinal, axillary or hilar adenopathy. There is no pericardial or pleural effusion. There is diffuse COPD and thickening off the interstitial lung markings. There is scarring in the right upper lobe. . There is thoracic spondylosis. There is no acute fracture. The ribs appear to be intact. There is no displaced rib fracture. There is no pneumothorax. . There is a hiatal hernia. 1. Stable CT scan of the chest, no interval change since previous study dated 20 November 2020. 2. No displaced rib fracture or evidence of pneumothorax. 3. Thickening off the interstitial lung markings. Scarring in the right upper lobe. 4. Cardiomegaly with mitral valve calcification. Coronary artery calcification. 5. Calcification in the thoracic aorta. 6. Thoracic spondylosis. **This report has been created using voice recognition software. It may contain minor errors which are inherent in voice recognition technology. ** Final report electronically signed by DR Richard Carlos on 11/5/2021 11:29 AM    CT CERVICAL SPINE WO CONTRAST    Result Date: 11/5/2021  PROCEDURE: CT CERVICAL SPINE WO CONTRAST CLINICAL INFORMATION: Trauma, Trauma . TECHNIQUE: 2-D multiplanar noncontrast CT cervical spine All CT scans at this facility use dose modulation, iterative reconstruction, and/or weight-based dosing when appropriate to reduce radiation dose to as low as reasonably achievable. COMPARISON: 10/27/2021 FINDINGS: No acute fracture or acute bony malalignment. Diffuse degenerative changes are detailed on the previous exam. Straightening of the normal cervical lordosis which may be due to positioning. No precervical soft tissue swelling. Stable appearance no acute fracture. **This report has been created using voice recognition software. It may contain minor errors which are inherent in voice recognition technology. ** Final report electronically signed by Dr. Natalie Angulo on 11/5/2021 11:21 AM    CT ABDOMEN PELVIS W IV CONTRAST Additional Contrast? Radiologist Recommendation    Result Date: 11/5/2021  PROCEDURE: CT ABDOMEN PELVIS W IV CONTRAST, CT LUMBAR RECONSTRUCTION WO POST PROCESS CLINICAL INFORMATION: Trauma, Trauma . Fall today with generalized pain. COMPARISON: CT abdomen pelvis dated 11/20/2020. TECHNIQUE: Helical CT of the abdomen and pelvis following intravenous administration of 80 mL Isovue-370 injected in the right AC with sagittal and coronal reconstructions. Reformatted images of the lumbar spine with axial, sagittal and coronal reconstructions were also created. All CT scans at this facility use dose modulation, iterative reconstruction, and/or weight-based dosing when appropriate to reduce radiation dose to as low as reasonably achievable. FINDINGS: There is mild interstitial prominence at the lung bases, similar to prior exam with superimposed mild posterior dependent change. The visualized portion of the heart is unremarkable.  There is a small hiatal hernia, stable compared to prior exam. Liver is unremarkable. The gallbladder is surgically absent. Adrenal glands are unremarkable. The kidneys are mildly atrophied with cortical thinning, similar to prior exam. The spleen is unremarkable. The pancreas is partially fatty replaced. No retroperitoneal or mesenteric lymphadenopathy is identified. There is moderately prominent stool throughout the large bowel. There are scattered diverticula without adjacent inflammation to suggest diverticulitis. The pelvis is partially obscured from streak artifact from right hip arthroplasty. There is a small focus of air in the bladder. Unopacified bladder is otherwise unremarkable. The uterus and adnexa are within normal limits. No free fluid is identified in the abdomen or pelvis. There are stable degenerative changes of the lumbar spine. There is a stable dextrocurvature of the lumbar spine. There is otherwise anatomic vertebral body height and alignment. No fracture of the lumbar vertebral column is identified. No fracture of the pelvis is identified. There are stable degenerative changes of the sacral iliac joints. 1. No evidence of acute intra-abdominal or intrapelvic abnormality. 2. No evidence of acute osseous injury of the lumbar spine or pelvis. 3. Moderate retained stool. 4. Colonic diverticulosis. 5. Small focus of air in the bladder likely on the basis of previous instrumentation. In the absence of instrumentation, this is concerning for cystitis. **This report has been created using voice recognition software. It may contain minor errors which are inherent in voice recognition technology. ** Final report electronically signed by Dr. Shelia Donahue MD on 11/5/2021 11:34 AM    VL DUP LOWER EXTREMITY VENOUS BILATERAL    Result Date: 11/5/2021  PROCEDURE: VL DUP LOWER EXTREMITY VENOUS BILATERAL CLINICAL INFORMATION: History of blood clots, PE. TECHNIQUE: High-resolution duplex ultrasound with spectral analysis of the bilateral lower extremities was performed. The common femoral, femoral, popliteal and calf vein segments were studied to the ankles. COMPARISON: Bilateral lower extremity venous duplex ultrasound 2/17/2020 FINDINGS: There is normal compressibility with no evidence of thrombus. Flow study shows normal color flow, augmentation and phasic response. No evidence of deep venous thrombosis in either lower extremity. **This report has been created using voice recognition software. It may contain minor errors which are inherent in voice recognition technology. **  Final report electronically signed by Dr. Dora Gabriel on 11/5/2021 5:43 PM    CT LUMBAR RECONSTRUCTION WO POST PROCESS    Result Date: 11/5/2021  PROCEDURE: CT ABDOMEN PELVIS W IV CONTRAST, CT LUMBAR RECONSTRUCTION WO POST PROCESS CLINICAL INFORMATION: Trauma, Trauma . Fall today with generalized pain. COMPARISON: CT abdomen pelvis dated 11/20/2020. TECHNIQUE: Helical CT of the abdomen and pelvis following intravenous administration of 80 mL Isovue-370 injected in the right AC with sagittal and coronal reconstructions. Reformatted images of the lumbar spine with axial, sagittal and coronal reconstructions were also created. All CT scans at this facility use dose modulation, iterative reconstruction, and/or weight-based dosing when appropriate to reduce radiation dose to as low as reasonably achievable. FINDINGS: There is mild interstitial prominence at the lung bases, similar to prior exam with superimposed mild posterior dependent change. The visualized portion of the heart is unremarkable. There is a small hiatal hernia, stable compared to prior exam. Liver is unremarkable. The gallbladder is surgically absent. Adrenal glands are unremarkable. The kidneys are mildly atrophied with cortical thinning, similar to prior exam. The spleen is unremarkable. The pancreas is partially fatty replaced. No retroperitoneal or mesenteric lymphadenopathy is identified.  There is moderately prominent stool throughout the large bowel. There are scattered diverticula without adjacent inflammation to suggest diverticulitis. The pelvis is partially obscured from streak artifact from right hip arthroplasty. There is a small focus of air in the bladder. Unopacified bladder is otherwise unremarkable. The uterus and adnexa are within normal limits. No free fluid is identified in the abdomen or pelvis. There are stable degenerative changes of the lumbar spine. There is a stable dextrocurvature of the lumbar spine. There is otherwise anatomic vertebral body height and alignment. No fracture of the lumbar vertebral column is identified. No fracture of the pelvis is identified. There are stable degenerative changes of the sacral iliac joints. 1. No evidence of acute intra-abdominal or intrapelvic abnormality. 2. No evidence of acute osseous injury of the lumbar spine or pelvis. 3. Moderate retained stool. 4. Colonic diverticulosis. 5. Small focus of air in the bladder likely on the basis of previous instrumentation. In the absence of instrumentation, this is concerning for cystitis. **This report has been created using voice recognition software. It may contain minor errors which are inherent in voice recognition technology. ** Final report electronically signed by Dr. Corey Thomson MD on 11/5/2021 11:34 AM    CT THORACIC RECONSTRUCTION WO POST PROCESS    Result Date: 11/5/2021  PROCEDURE: CT THORACIC RECONSTRUCTION WO POST PROCESS CLINICAL INFORMATION: Trauma . TECHNIQUE: 2-D multiplanar reconstructed images of the thoracic spine All CT scans at this facility use dose modulation, iterative reconstruction, and/or weight-based dosing when appropriate to reduce radiation dose to as low as reasonably achievable. COMPARISON: 11/20/2020 FINDINGS: No acute fracture or acute bony malalignment. Degenerative changes in the lower thoracic level in particular. Several images are missing from the T6 vertebral body level. These are seen there is prominent calcification in the posterior longitudinal ligament at T11-12 no paraspinous soft tissue abnormality is seen. No significant foraminal encroachment is seen. On the soft tissue images. No acute abnormality **This report has been created using voice recognition software. It may contain minor errors which are inherent in voice recognition technology. ** Final report electronically signed by Dr. Shilpa España on 11/5/2021 11:27 AM    XR HIP W PELVIS MIN 5 VWS BILATERAL    Result Date: 11/5/2021  PROCEDURE: XR HIP W PELVIS MIN 5 VWS BILATERAL CLINICAL INFORMATION: fall hip pain . TECHNIQUE: AP pelvis and AP and attempted frog-leg projections of both hips. COMPARISON: No prior study. FINDINGS: Right hip replacement. No acute fracture or bone destruction. Exam limited by bone density and technique. If clinical concern remains for fracture CT would be recommended. No acute fracture **This report has been created using voice recognition software. It may contain minor errors which are inherent in voice recognition technology. ** Final report electronically signed by Dr. Shilpa España on 11/5/2021 11:04 AM    Electronically signed by Brannon Vick DO on 11/9/2021 at 4:07 PM

## 2021-11-09 NOTE — PROGRESS NOTES
[address] [address]  INPATIENT PHYSICAL THERAPY  DAILY NOTE  [facility] RENAL TELEMETRY 6K - 6K-21/021-A    Time In: 2458  Time Out: 2563  Timed Code Treatment Minutes: 28 Minutes  Minutes: 28          Date: 2021  Patient Name: Kade Hernández,  Gender:  female        MRN: [id number]  : 1935  (80 y.o.)     Referring Practitioner: Dr. Niya Zheng  Diagnosis: elevated troponin  Additional Pertinent Hx: admit with above diagnosis, ARTIE on CKD, frequent falls     Prior Level of Function:  Lives With: Alone  Type of Home: Assisted living  Home Layout: One level  Home Access: Level entry  Home Equipment: 4 wheeled walker   Bathroom Shower/Tub: Walk-in shower  Bathroom Toilet: Handicap height  Bathroom Equipment: Built-in shower seat, Grab bars in shower, Grab bars around toilet  Bathroom Accessibility: Accessible    ADL Assistance: [facility] [address]: Needs assistance (Facility completes cleaning. Pt reports indep with laundry and cooking.)  Homemaking Responsibilities: Yes  Ambulation Assistance: Independent  Transfer Assistance: Independent  Active : No    Restrictions/Precautions:  Restrictions/Precautions: General Precautions, Fall Risk     SUBJECTIVE: pleasant and cooperative, pt repeating conversations from yesterday, dec STM    PAIN: no pain per pt    Vitals: Vitals not assessed per clinical judgement, see nursing flowsheet    OBJECTIVE:  Bed Mobility:  Not Tested    Transfers:  Sit to Stand: Stand By Assistance  Stand to Sit:Stand By Assistance  Cues for hand placement for safety  Ambulation:  Contact Guard Assistance, to SBA  Distance: 25'x2, 50'x1  Surface: Level Tile  Device:Rolling Walker  Gait Deviations: Forward Flexed Posture, Slow Brittny, Mild Path Deviations and pt running into objects on right x3, required cues for path finding throughout amb, SAFETY CONCERNS        Exercise:  Patient was guided in 1 set(s) 10 reps of exercise to both lower extremities.   Glut sets, Seated marches, Seated heel/toe raises and Long arc quads. Exercises were completed for increased independence with functional mobility. Functional Outcome Measures: Completed  Balance Score: 7  Gait Score: 9  Tinetti Total Score: 16  Risk Indicators:  Less than/equal to 18 = high risk  19-23 Moderate risk  Greater than/equal to 24 = low risk  AM-PAC Inpatient Mobility without Stair Climbing Raw Score : 15  AM-PAC Inpatient without Stair Climbing T-Scale Score : 43.03    ASSESSMENT:  Assessment: Patient progressing toward established goals. and pt with fall risk and safety concerns for home alone, pt with Tinetti score of 16 indicating high fall risk, pt would benefit from cont PT  Activity Tolerance:  Patient tolerance of  treatment: good. Equipment Recommendations:Equipment Needed: No  Discharge Recommendations: Continue to assess pending progress and Subacte/Skilled Nursing Facility  Plan: Times per week: 5X GM  Times per day: Daily  Specific instructions for Next Treatment: therex, mobility, balance activities    Patient Education  Patient Education: Home Exercise Program, Transfers, Gait    Goals:  Patient goals : return to [employer] term goals  Time Frame for Short term goals: by discharge  Short term goal 1: bed mobility with MOD I to get in/out of bed  Short term goal 2: transfer with S to get in/out of chairs  Short term goal 3: amb >100'x1 with walker and S to walk safely in assisted living  Long term goals  Time Frame for Long term goals : no LTGs set secondary to short ELOS    Following session, patient left in safe position with all fall risk precautions in place.

## 2021-11-09 NOTE — PROGRESS NOTES
[address] RENAL TELEMETRY 6K  Occupational Therapy  Daily Note  Time:   Time In: 440  Time Out: 5581  Timed Code Treatment Minutes: 64 Minutes  Minutes: 56          Date: 2021  Patient Name: Key New,   Gender: female      Room: [facility]021-A  MRN: [id number]  : 1935  (80 y.o.)  Referring Practitioner: Brennan Pool DO  Diagnosis: Elevated Troponin  Additional Pertinent Hx: Per H&P, Key New is a 80 y.o. female with a with a PMH of breast cancer, coronary artery disease, congestive heart failure, diabetes mellitus type 2, hyperlipidemia, hypertension, PE and DVT who presented with a mechanical fall and was found to have an ARTIE and elevated troponin. Patient is on Plavix at home an therefore this was called a level 2 trauma in ED. Patient comes from a nursing home where this morning at 4:30 am she fell while moving from her bed to her recliner. She states she was using her walker and is not sure if she tripped or if the walker was not locked properly. She remembers falling and laying on the floor for 4 hours before she was found. She denies losing consciousness, head pain, or neck pain. She states she had some lightheadedness and dizziness for the last month. Patient mentions that when she initially stood up she feel like \"all the blood was rushing to my head\". She also has had chest pain when she coughs or takes a deep breath. Patient is accompanied with her daughter who states her mother was recently discharged [facility] on [facility] with similar symptoms and that she has had a fall everyday for the last 6 days. Daughter also states that patient is normally tachy at baseline. Patient also had acute encephalopathy at last admission. Today she is A&Ox3. She states she is having some abdominal discomfort.  Patient denies having any fevers, chills, nausea, vomiting, palpitations, shortness of breath, diarrhea, urinary frequency, urgency or cognition. Patient was able to retrieve all items from outside WEST with SBA and minimal VCs for safety during the retrieval and transportation of items, in addition to safe placement of RW. Reinforced transferring coffee cup from countertop to table before walking, rather than trying to maneuver RW while simultaneously holding coffee cup. Patient required SBA throughout task with a standing endurance of ~4 mintues. Extended seated rest break required before returning to room. ASSESSMENT:     Activity Tolerance:  Patient tolerance of  treatment: good. Discharge Recommendations: ECF with OT  Equipment Recommendations: Equipment Needed: No  Plan: Times per week: 3-5x  Current Treatment Recommendations: Strengthening, Balance Training, Functional Mobility Training, Endurance Training, Safety Education & Training, Self-Care / ADL, Patient/Caregiver Education & Training, Home Management Training    Patient Education  Patient Education: Role of OT, Plan of Care, IADL's, Home Exercise Program and Assistive Device Safety    Goals  Short term goals  Time Frame for Short term goals: Until discharge  Short term goal 1: Pt will complete BUE light resistive exercises with min vcs for technique to increase indep and endurance with all self cares and transfers. Short term goal 2: Pt will complete standing tolerance x 4 minutes with 2 UE release and Mod Indep to increase indep and endurance with all sinkside grooming. Short term goal 3: Pt will complete functional mobility HH distances Mod Indep to increase indep and endurance with all self care routine. Short term goal 4: Pt will complete LB dressing with LHAE PRN and SBA to increase indep and endurance in home environment. Following session, patient left in safe position with all fall risk precautions in place.

## 2021-11-10 NOTE — FLOWSHEET NOTE
[facility] PROGRESS NOTE      Patient: Zena Downey  Room #: 5X-83/829-C            YOB: 1935  Age: 80 y.o. Gender: female            Admit Date & Time: 11/5/2021  9:59 AM    Assessment:  Pt is an 86y.o. female, propped up in bed watching television, in 6K-21. Terri Ledezma is calm and approachable, but coping with an upcoming procedure; she was less than charitable about the prep for it. She shared that her daughters have been consistently visiting her and she is ready to get the procedure taken care of. She shared that the [employer] she used to attend closed a while back, that some extended family members were part of it, but that her jarrett remains strong and knows God is with her. Interventions:   provided active listening, conversation, encouragement, nurtured hope and prayer. Outcomes: Terri Ledezma expressed gratitude for the visit and shared she hopes to get a good night's rest.    Plan:  1. Continue spiritual support post-procedure    Electronically signed by Leonid Vallejo on 11/9/2021 at 10:00 PM.  [address]  [phone]       11/09/21 2050   Encounter Summary   Services provided to: Patient   Referral/Consult From: [address] [place] [address] Family members   Continue Visiting Yes  (11/9)   Complexity of Encounter Low   Length of Encounter 15 minutes   Routine   Type Initial   Assessment Calm; Approachable; Coping;  Hopeful   Intervention Active listening; Explored feelings, thoughts, concerns; Prayer; Discussed illness/injury and it's impact   Outcome Engaged in conversation; Expressed gratitude; Expressed feelings/needs/concerns; Receptive

## 2021-11-10 NOTE — PROGRESS NOTES
Hospitalist Progress Note      Patient:  Walter Mccauley    Unit/Bed:6K-21/021-A  YOB: 1935  MRN: [id number]   Acct: [de-identified]   PCP: Manuel Acosta MD  Date of Admission: 11/5/2021    Assessment/Plan:    1. Hypotension: Active  Patient BP 80/48 this morning, 106/56 2 hours later after fluid bolus  Plan:   -Ordered a fluid bolus, 1000 mL  -Added oral supplements to diet   -Changed Lopressor dose to 25 mg BID  -Will continue to monitor BP    2. Acute kidney injury on CKD stage IV: Resolved  -Secondary to prerenal azotemia.  -Improved with IV fluid resuscitation.  -Currently holding Bumex and Zaroxolyn. On discharge plan to continue holding diuretics. Can take the diuretics PRN for swelling, edema, or weight gain. Follow with PCP and repeat BMP in 1 week when she discharges. 3. Elevated troponin:   -Suspect this is related to ARTIE on CKD. -No ischemic changes on EKG. 4. Frequent falls:   -Symptoms consistent with orthostasis. -Patient given NaCl bolus for soft BP on 11/7. Orthostatic vital signs from 11/7 negative. -PT/OT evaluated   -Nursing reports that patient was able to walk down the hallway without any issues today. 5. History of DVT with PE/left leg pain & swelling:  -Venous Dopplers negative for DVT. -No indication anticoagulation given high fall risk and bleeding risk. 6. Diabetes mellitus type 2:  -Poorly controlled. A1c on 10/29/2021 of 17.9  -Holding home Metformin  -Glucose today in the   -Continue with Lantus and medium SSI  -Started meal time insulin 11 units yesterday, 11/9  -Will continue to monitor glucose     7. Hypertension:   -Changed home Lopressor 50 mg BID to 25 mg BID as patient was hypotensive     8. Hyperlipidemia:   -Continue home Lipitor 40 mg    Chief Complaint: [facility] Course:     Walter Mccauley is a 80 y.o. female with a with a PMH of breast cancer, coronary artery disease, congestive heart failure, diabetes mellitus type 2, hyperlipidemia, hypertension, PE and DVT who presented with a mechanical fall and was found to have an ARTIE and elevated troponin. Patient is on Plavix at home an therefore this was called a level 2 trauma in ED. Patient comes from a nursing home where this morning at 4:30 am she fell while moving from her bed to her recliner. She states she was using her walker and is not sure if she tripped or if the walker was not locked properly. She remembers falling and laying on the floor for 4 hours before she was found. She denies losing consciousness, head pain, or neck pain. She states she had some lightheadedness and dizziness for the last month. Patient mentions that when she initially stood up she feel like \"all the blood was rushing to my head\". She also has had chest pain when she coughs or takes a deep breath. Patient is accompanied with her daughter who states her mother was recently discharged from [facility] on 11/1/21 with similar symptoms and that she has had a fall everyday for the last 6 days. Daughter also states that patient is normally tachy at baseline. Patient also had acute encephalopathy at last admission. Today she is A&Ox3. She states she is having some abdominal discomfort. Patient denies having any fevers, chills, nausea, vomiting, palpitations, shortness of breath, diarrhea, urinary frequency, urgency or dysuria. 11/6: Positive orthostatic vital sighs. Patient had trouble walking to the bathroom and needed two people to assist her. 11/7: Pending PT/OT evaluation. Gave 1 NaCl 1000 mL bolus as BP soft. Ordered new orthostatic vital signs. Potential discharge tomorrow. 11/8: Orthostatic vital signs negative from yesterday. Awaiting precert to SNF as her assisted living facility will not take her back based on PT/OT recommendations. Will discharge patient when she is approved. 40/9: Awaiting precert to SNF.  POCT glucose around 12 pm today hour   Intake 1180 ml   Output    Net 1180 ml       Diet:  ADULT DIET; Dysphagia - Soft and Bite Sized; 3 carb choices (45 gm/meal)  ADULT ORAL NUTRITION SUPPLEMENT; PM Snack, HS Snack; Frozen Oral Supplement  ADULT ORAL NUTRITION SUPPLEMENT; Breakfast, Dinner; Diabetic Oral Supplement    Exam:  BP (!) 106/56   Pulse 101   Temp 98 °F (36.7 °C) (Oral)   Resp 18   Ht 5' 5\" (1.651 m)   Wt 155 lb (70.3 kg)   SpO2 98%   BMI 25.79 kg/m²      General appearance: Elderly female resting in bed. No acute distress. HEENT: Pupils equal, round, and reactive to light. Conjunctivae/corneas clear. Neck: Supple, with full range of motion. No jugular venous distention. Respiratory:  Normal respiratory effort. Clear to auscultation, bilaterally without Rales/Wheezes/Rhonchi. Cardiovascular: Regular rate and rhythm with normal S1/S2 without murmurs, rubs or gallops. Abdomen: Soft, non-tender, non-distended with normal bowel sounds. Musculoskeletal: passive and active ROM x 4 extremities. Skin: Skin color, texture, turgor normal.  No rashes or lesions. Neurologic:  Neurovascularly intact without any focal sensory/motor deficits. Cranial nerves: II-XII intact, grossly non-focal.  Psychiatric: Alert and oriented, thought content appropriate, normal insight  Capillary Refill: Brisk,< 3 seconds   Peripheral Pulses: +2 palpable, equal bilaterally     Labs:   Recent Labs     11/08/21  0513   WBC 7.4   HGB 11.8*   HCT 37.1        Recent Labs     11/08/21  0513 11/09/21  0548 11/10/21  0441    138 142   K 4.3 4.2 4.8    103 105   CO2 24 25 25   BUN 26* 25* 20   CREATININE 1.0 1.2 0.9   CALCIUM 8.4* 8.7 8.8     No results for input(s): AST, ALT, BILIDIR, BILITOT, ALKPHOS in the last 72 hours. No results for input(s): INR in the last 72 hours. No results for input(s): Cherre Gash in the last 72 hours.     Microbiology:    Blood culture #1: No results found for: BC    Blood culture #2:No results found for: BLOODCULT2    Organism:  Lab Results   Component Value Date    ORG Growth of Contaminants 11/05/2021         Lab Results   Component Value Date    LABGRAM  10/12/2021     No segmented neutrophils observed. Rare epithelial cells observed. No bacteria seen. MRSA culture only:No results found for: [facility]    Urine culture:   Lab Results   Component Value Date    LABURIN [place] count: >100,000 CFU/mL  10/29/2021       Respiratory culture: No results found for: CULTRESP    Aerobic and Anaerobic :  Lab Results   Component Value Date    LABAERO  10/12/2021     No growth-preliminary Culture also yielded light growth of Staphylococcus species (coagulase negative). LABAERO light growth  10/12/2021    LABAERO  10/12/2021     very light growth Pantoea species which may be initially susceptible to third generation cephalosporins may develop resistance within three to four days of initiation of this antimicrobial therapy. No results found for: LABANAE    Urinalysis:      Lab Results   Component Value Date    NITRU NEGATIVE 11/05/2021    WBCUA 15-25 11/05/2021    BACTERIA NONE SEEN 11/05/2021    RBCUA NONE 11/05/2021    BLOODU NEGATIVE 11/05/2021    SPECGRAV 1.013 10/27/2021    GLUCOSEU >= 1000 11/05/2021       Radiology:  VL DUP LOWER EXTREMITY VENOUS BILATERAL   Final Result      No evidence of deep venous thrombosis in either lower extremity. **This report has been created using voice recognition software. It may contain minor errors which are inherent in voice recognition technology. **             Final report electronically signed by Dr. Juarez Graham on 11/5/2021 5:43 PM      CT ABDOMEN PELVIS W IV CONTRAST Additional Contrast? Radiologist Recommendation   Final Result       1. No evidence of acute intra-abdominal or intrapelvic abnormality. 2. No evidence of acute osseous injury of the lumbar spine or pelvis. 3. Moderate retained stool. 4. Colonic diverticulosis.    5. Small focus of air in the bladder likely on the basis of previous instrumentation. In the absence of instrumentation, this is concerning for cystitis. **This report has been created using voice recognition software. It may contain minor errors which are inherent in voice recognition technology. **      Final report electronically signed by Dr. Gabriella Valentine MD on 11/5/2021 11:34 AM      CT CERVICAL SPINE WO CONTRAST   Final Result   Stable appearance no acute fracture. **This report has been created using voice recognition software. It may contain minor errors which are inherent in voice recognition technology. **      Final report electronically signed by Dr. Tawnya Flynn on 11/5/2021 11:21 AM      CT CHEST W CONTRAST   Final Result       1. Stable CT scan of the chest, no interval change since previous study dated 20 November 2020.   2. No displaced rib fracture or evidence of pneumothorax. 3. Thickening off the interstitial lung markings. Scarring in the right upper lobe. 4. Cardiomegaly with mitral valve calcification. Coronary artery calcification. 5. Calcification in the thoracic aorta. 6. Thoracic spondylosis. **This report has been created using voice recognition software. It may contain minor errors which are inherent in voice recognition technology. **      Final report electronically signed by DR Richard Carlos on 11/5/2021 11:29 AM      CT HEAD WO CONTRAST   Final Result       1. Stable CT scan of the brain, no interval change 27th of October 2021. Kelly Sotelo **This report has been created using voice recognition software. It may contain minor errors which are inherent in voice recognition technology. **      Final report electronically signed by DR Richard Carlos on 11/5/2021 11:20 AM      CT LUMBAR RECONSTRUCTION WO POST PROCESS   Final Result       1. No evidence of acute intra-abdominal or intrapelvic abnormality.    2. No evidence of acute osseous injury of the lumbar spine or pelvis. 3. Moderate retained stool. 4. Colonic diverticulosis. 5. Small focus of air in the bladder likely on the basis of previous instrumentation. In the absence of instrumentation, this is concerning for cystitis. **This report has been created using voice recognition software. It may contain minor errors which are inherent in voice recognition technology. **      Final report electronically signed by Dr. Juve Cooper MD on 11/5/2021 11:34 AM      CT THORACIC RECONSTRUCTION WO POST PROCESS   Final Result   No acute abnormality            **This report has been created using voice recognition software. It may contain minor errors which are inherent in voice recognition technology. **      Final report electronically signed by Dr. Benito Kebede on 11/5/2021 11:27 AM      XR HIP W PELVIS MIN 5 VWS BILATERAL   Final Result   No acute fracture            **This report has been created using voice recognition software. It may contain minor errors which are inherent in voice recognition technology. **      Final report electronically signed by Dr. Benito Kebede on 11/5/2021 11:04 AM        CT HEAD WO CONTRAST    Result Date: 11/5/2021  PROCEDURE: CT HEAD WO CONTRAST CLINICAL INFORMATION: Trauma, Trauma. COMPARISON: CT scan of the brain dated 27 October 2021. . TECHNIQUE: Noncontrast 5 mm axial images were obtained through the brain. Sagittal and coronal reconstructions were obtained. All CT scans at this facility use dose modulation, iterative reconstruction, and/or weight-based dosing when appropriate to reduce radiation dose to as low as reasonably achievable. FINDINGS: There is mild atrophy and dilatation of the lateral ventricles. There is diminished attenuation in the white matter most likely representing ischemic changes. This a lacunar infarct in the right cerebellar hemisphere. There is calcification in the pineal  gland and choroid plexus of both lateral ventricles.  There is calcification the cavernous segments of both internal carotid arteries. There is no hemorrhage. There are no intra-or extra-axial collections. There is no  midline shift or mass effect. The paranasal sinuses and mastoid air cells are normally aerated. There is no suspicious calvarial abnormality. 1. Stable CT scan of the brain, no interval change 27th of October 2021. Kim Monroe **This report has been created using voice recognition software. It may contain minor errors which are inherent in voice recognition technology. ** Final report electronically signed by DR Juan Tinoco on 11/5/2021 11:20 AM    CT CHEST W CONTRAST    Result Date: 11/5/2021  PROCEDURE: CT CHEST W CONTRAST CLINICAL INFORMATION: Trauma, Trauma. COMPARISON: CT scan of the chest dated 20 th of November 2020. Kim Monroe TECHNIQUE: 5 mm axial images were obtained through the chest after the administration of intravenous contrast. Sagittal and coronal reconstructions were obtained. All CT scans at this facility use dose modulation, iterative reconstruction, and/or weight-based dosing when appropriate to reduce radiation dose to as low as reasonably achievable. FINDINGS: There is mild cardiomegaly with mitral valve calcification. . There is atherosclerotic calcification in the aortic root and aortic arch. There is no evidence for aortic dissection or mediastinal hematoma. . There is no gross abnormality of the pulmonary artery and its proximal branches. There is no mediastinal, axillary or hilar adenopathy. There is no pericardial or pleural effusion. There is diffuse COPD and thickening off the interstitial lung markings. There is scarring in the right upper lobe. . There is thoracic spondylosis. There is no acute fracture. The ribs appear to be intact. There is no displaced rib fracture. There is no pneumothorax. . There is a hiatal hernia.        1. Stable CT scan of the chest, no interval change since previous study dated 20 November 2020. 2. No displaced rib fracture or evidence of pneumothorax. 3. Thickening off the interstitial lung markings. Scarring in the right upper lobe. 4. Cardiomegaly with mitral valve calcification. Coronary artery calcification. 5. Calcification in the thoracic aorta. 6. Thoracic spondylosis. **This report has been created using voice recognition software. It may contain minor errors which are inherent in voice recognition technology. ** Final report electronically signed by DR Donell Boudreaux on 11/5/2021 11:29 AM    CT CERVICAL SPINE WO CONTRAST    Result Date: 11/5/2021  PROCEDURE: CT CERVICAL SPINE WO CONTRAST CLINICAL INFORMATION: Trauma, Trauma . TECHNIQUE: 2-D multiplanar noncontrast CT cervical spine All CT scans at this facility use dose modulation, iterative reconstruction, and/or weight-based dosing when appropriate to reduce radiation dose to as low as reasonably achievable. COMPARISON: 10/27/2021 FINDINGS: No acute fracture or acute bony malalignment. Diffuse degenerative changes are detailed on the previous exam. Straightening of the normal cervical lordosis which may be due to positioning. No precervical soft tissue swelling. Stable appearance no acute fracture. **This report has been created using voice recognition software. It may contain minor errors which are inherent in voice recognition technology. ** Final report electronically signed by Dr. Ijeoma Blake on 11/5/2021 11:21 AM    CT ABDOMEN PELVIS W IV CONTRAST Additional Contrast? Radiologist Recommendation    Result Date: 11/5/2021  PROCEDURE: CT ABDOMEN PELVIS W IV CONTRAST, CT LUMBAR RECONSTRUCTION WO POST PROCESS CLINICAL INFORMATION: Trauma, Trauma . Fall today with generalized pain. COMPARISON: CT abdomen pelvis dated 11/20/2020. TECHNIQUE: Helical CT of the abdomen and pelvis following intravenous administration of 80 mL Isovue-370 injected in the right AC with sagittal and coronal reconstructions.  Reformatted images of the lumbar spine with axial, sagittal and coronal reconstructions were also created. All CT scans at this facility use dose modulation, iterative reconstruction, and/or weight-based dosing when appropriate to reduce radiation dose to as low as reasonably achievable. FINDINGS: There is mild interstitial prominence at the lung bases, similar to prior exam with superimposed mild posterior dependent change. The visualized portion of the heart is unremarkable. There is a small hiatal hernia, stable compared to prior exam. Liver is unremarkable. The gallbladder is surgically absent. Adrenal glands are unremarkable. The kidneys are mildly atrophied with cortical thinning, similar to prior exam. The spleen is unremarkable. The pancreas is partially fatty replaced. No retroperitoneal or mesenteric lymphadenopathy is identified. There is moderately prominent stool throughout the large bowel. There are scattered diverticula without adjacent inflammation to suggest diverticulitis. The pelvis is partially obscured from streak artifact from right hip arthroplasty. There is a small focus of air in the bladder. Unopacified bladder is otherwise unremarkable. The uterus and adnexa are within normal limits. No free fluid is identified in the abdomen or pelvis. There are stable degenerative changes of the lumbar spine. There is a stable dextrocurvature of the lumbar spine. There is otherwise anatomic vertebral body height and alignment. No fracture of the lumbar vertebral column is identified. No fracture of the pelvis is identified. There are stable degenerative changes of the sacral iliac joints. 1. No evidence of acute intra-abdominal or intrapelvic abnormality. 2. No evidence of acute osseous injury of the lumbar spine or pelvis. 3. Moderate retained stool. 4. Colonic diverticulosis. 5. Small focus of air in the bladder likely on the basis of previous instrumentation. In the absence of instrumentation, this is concerning for cystitis.  **This report has been created posterior dependent change. The visualized portion of the heart is unremarkable. There is a small hiatal hernia, stable compared to prior exam. Liver is unremarkable. The gallbladder is surgically absent. Adrenal glands are unremarkable. The kidneys are mildly atrophied with cortical thinning, similar to prior exam. The spleen is unremarkable. The pancreas is partially fatty replaced. No retroperitoneal or mesenteric lymphadenopathy is identified. There is moderately prominent stool throughout the large bowel. There are scattered diverticula without adjacent inflammation to suggest diverticulitis. The pelvis is partially obscured from streak artifact from right hip arthroplasty. There is a small focus of air in the bladder. Unopacified bladder is otherwise unremarkable. The uterus and adnexa are within normal limits. No free fluid is identified in the abdomen or pelvis. There are stable degenerative changes of the lumbar spine. There is a stable dextrocurvature of the lumbar spine. There is otherwise anatomic vertebral body height and alignment. No fracture of the lumbar vertebral column is identified. No fracture of the pelvis is identified. There are stable degenerative changes of the sacral iliac joints. 1. No evidence of acute intra-abdominal or intrapelvic abnormality. 2. No evidence of acute osseous injury of the lumbar spine or pelvis. 3. Moderate retained stool. 4. Colonic diverticulosis. 5. Small focus of air in the bladder likely on the basis of previous instrumentation. In the absence of instrumentation, this is concerning for cystitis. **This report has been created using voice recognition software. It may contain minor errors which are inherent in voice recognition technology. ** Final report electronically signed by Dr. Chelo Jain MD on 11/5/2021 11:34 AM    CT THORACIC RECONSTRUCTION WO POST PROCESS    Result Date: 11/5/2021  PROCEDURE: CT THORACIC RECONSTRUCTION WO POST PROCESS CLINICAL INFORMATION: Trauma . TECHNIQUE: 2-D multiplanar reconstructed images of the thoracic spine All CT scans at this facility use dose modulation, iterative reconstruction, and/or weight-based dosing when appropriate to reduce radiation dose to as low as reasonably achievable. COMPARISON: 11/20/2020 FINDINGS: No acute fracture or acute bony malalignment. Degenerative changes in the lower thoracic level in particular. Several images are missing from the T6 vertebral body level. These are seen there is prominent calcification in the posterior longitudinal ligament at T11-12 no paraspinous soft tissue abnormality is seen. No significant foraminal encroachment is seen. On the soft tissue images. No acute abnormality **This report has been created using voice recognition software. It may contain minor errors which are inherent in voice recognition technology. ** Final report electronically signed by Dr. Erik Ramos on 11/5/2021 11:27 AM    XR HIP W PELVIS MIN 5 VWS BILATERAL    Result Date: 11/5/2021  PROCEDURE: XR HIP W PELVIS MIN 5 VWS BILATERAL CLINICAL INFORMATION: fall hip pain . TECHNIQUE: AP pelvis and AP and attempted frog-leg projections of both hips. COMPARISON: No prior study. FINDINGS: Right hip replacement. No acute fracture or bone destruction. Exam limited by bone density and technique. If clinical concern remains for fracture CT would be recommended. No acute fracture **This report has been created using voice recognition software. It may contain minor errors which are inherent in voice recognition technology. ** Final report electronically signed by Dr. Erik Ramos on 11/5/2021 11:04 AM    Electronically signed by Joceline Mijares DO on 11/10/2021 at 4:29 PM

## 2021-11-10 NOTE — FLOWSHEET NOTE
Paged MD Blunt Lone about pt's low BP of 80/48 manually. Pt denies any HA, lightheadedness, dizziness.  Orders received for bolus  Sheeba Schaeffer RN

## 2021-11-10 NOTE — PROGRESS NOTES
[address] RENAL TELEMETRY 6K  Occupational Therapy  Daily Note  Time:    Time In: 3348  Time Out: 5057  Timed Code Treatment Minutes: 47 Minutes  Minutes: 54          Date: 11/10/2021  Patient Name: Kelvin Miramontes,   Gender: female      Room: [facility]021-A  MRN: [id number]  : 1935  (80 y.o.)  Referring Practitioner: Lul Smith DO  Diagnosis: Elevated Troponin  Additional Pertinent Hx: Per H&P, Kelvin Miramontes is a 80 y.o. female with a with a PMH of breast cancer, coronary artery disease, congestive heart failure, diabetes mellitus type 2, hyperlipidemia, hypertension, PE and DVT who presented with a mechanical fall and was found to have an ARTIE and elevated troponin. Patient is on Plavix at home an therefore this was called a level 2 trauma in ED. Patient comes from a nursing home where this morning at 4:30 am she fell while moving from her bed to her recliner. She states she was using her walker and is not sure if she tripped or if the walker was not locked properly. She remembers falling and laying on the floor for 4 hours before she was found. She denies losing consciousness, head pain, or neck pain. She states she had some lightheadedness and dizziness for the last month. Patient mentions that when she initially stood up she feel like \"all the blood was rushing to my head\". She also has had chest pain when she coughs or takes a deep breath. Patient is accompanied with her daughter who states her mother was recently discharged from [facility] on 21 with similar symptoms and that she has had a fall everyday for the last 6 days. Daughter also states that patient is normally tachy at baseline. Patient also had acute encephalopathy at last admission. Today she is A&Ox3. She states she is having some abdominal discomfort.  Patient denies having any fevers, chills, nausea, vomiting, palpitations, shortness of breath, diarrhea, urinary frequency, urgency or dysuria. \"    Restrictions/Precautions:  Restrictions/Precautions: General Precautions, Fall Risk      SUBJECTIVE: Pt lying in bed very pleasant and agreeable to OT session. Pt stating \"I will do anything to get me out of here. \"     PAIN: 0 /10:      Vitals: Vitals not assessed per clinical judgement, see nursing flowsheetCourtney RN stating pt had low BP this am but no problems since    COGNITION: Decreased Safety Awareness    ADL:   Grooming: Contact Guard Assistance. standing at sink with education on walker safety to keep walker in front of her at all times   Bathing: [employer]. while standing in shower to wash buttocks. Pt educated on safety of completing showering tasks in a seated position to decrease fall risk especially when washing her feet. Pt verbalized understanding but still attempting to reach to LEs when standing equiring cues for follow through  Upper Extremity Dressing: Stand By Assistance. doffing/donning overhead shirt. Pt educated on completing in a seated position for safety and to decrease fall risk   Lower Extremity Dressing: Minimal Assistance. donning underwear, shorts d/t pt catching her toes on clothign   Toileting: Contact Guard Assistance. Toilet Transfer: Contact Guard Assistance. [place] BALANCE:  Standing Balance: Contact Guard Assistance. during aDL tasks    BED MOBILITY:  Supine to Sit: Stand By Assistance      TRANSFERS:  Sit to Stand:  Contact Guard Assistance. from eOB  and shower chair  Stand to Sit: [employer]. into bedside chair and onto shower chair    FUNCTIONAL MOBILITY:  Assistive Device: Rolling Walker  Assist Level:  Contact Guard Assistance. Distance: To and from bathroom   steady pace with no LOB during        ASSESSMENT:     Activity Tolerance:  Patient tolerance of  treatment: good.  Increased fatigue and SOB noted at end of session       Discharge Recommendations: Subacute/skilled nursing facility  Equipment Recommendations:

## 2021-11-10 NOTE — PROGRESS NOTES
[address] [address]  INPATIENT PHYSICAL THERAPY  DAILY NOTE  [facility] RENAL TELEMETRY 6K - 6K-21/021-A    Time In: 1366  Time Out: 2410  Timed Code Treatment Minutes: 15 Minutes  Minutes: 15          Date: 11/10/2021  Patient Name: Sang Dow,  Gender:  female        MRN: [id number]  : 1935  (80 y.o.)     Referring Practitioner: Dr. Merline Ferraris  Diagnosis: elevated troponin  Additional Pertinent Hx: admit with above diagnosis, ARTIE on CKD, frequent falls     Prior Level of Function:  Lives With: Alone  Type of Home: Assisted living  Home Layout: One level  Home Access: Level entry  Home Equipment: 4 wheeled walker   Bathroom Shower/Tub: Walk-in shower  Bathroom Toilet: Handicap height  Bathroom Equipment: Built-in shower seat, Grab bars in shower, Grab bars around toilet  Bathroom Accessibility: Accessible    ADL Assistance: [address]: Needs assistance (Facility completes cleaning. Pt reports indep with laundry and cooking.)  Homemaking Responsibilities: Yes  Ambulation Assistance: Independent  Transfer Assistance: Independent  Active : No    Restrictions/Precautions:  Restrictions/Precautions: General Precautions, Fall Risk     SUBJECTIVE: RN approved session. Patient laying in bed upon arrival and agreeable to therapy with encouragement. Patient requested to use restroom during session. Patient declined ambulating out in the hallway and exercises due to wanting to eat breakfast.     PAIN: denies    Vitals: Vitals not assessed per clinical judgement, see nursing flowsheet    OBJECTIVE:  Bed Mobility:  Supine to Sit: Stand By Assistance, with head of bed flat, with rail    Transfers:  Sit to Stand: Contact Guard Assistance, Minimal Assistance, X 1  Stand to Sit:Contact [employer] Assistance    Ambulation:  Contact Guard Assistance  Distance: 15ft, 20ft  Surface: Level Tile  Device:Rolling Walker  Gait Deviations:   Forward Flexed Posture, Slow Brittny, Decreased Step Length Bilaterally, Decreased Gait Speed, Decreased Heel Strike Bilaterally, Mild Path Deviations and cues to keep walker closer to body    Balance:  Dynamic Standing Balance: Stand By Assistance, standing at sink    Exercise:  None this session. Functional Outcome Measures: Completed  AM-PAC Inpatient Mobility Raw Score : 18  AM-PAC Inpatient T-Scale Score : 43.63    ASSESSMENT:  Assessment: Patient progressing toward established goals. Activity Tolerance:  Patient tolerance of  treatment: fair. Equipment Recommendations:Equipment Needed: No  Discharge Recommendations: Subacte/Skilled Nursing Facility  Plan: Times per week: 5X GM  Times per day: Daily  Specific instructions for Next Treatment: therex, mobility, balance activities    Patient Education  Patient Education: Plan of Care, Transfers, Gait, Up in Chair for All Meals    Goals:  Patient goals : return to [employer] term goals  Time Frame for Short term goals: by discharge  Short term goal 1: bed mobility with MOD I to get in/out of bed  Short term goal 2: transfer with S to get in/out of chairs  Short term goal 3: amb >100'x1 with walker and S to walk safely in assisted living  Long term goals  Time Frame for Long term goals : no LTGs set secondary to short ELOS    Following session, patient left in safe position with all fall risk precautions in place.

## 2021-11-11 NOTE — CARE COORDINATION
11/11/21, 10:06 AM EST    DISCHARGE PLANNING EVALUATION      Call to Marck Styles with [facility] to check on pre-cert, left a voicemail for a call back. Call back from Marck Styles, reports they found out precert was denied. He will check on appeal/peer to peer information and get back with SW.    Message from Marck Styles with peer to peer information for appeal. LUCA Flood did pass this information on to provider. Provider wondering if pt can return to the [facility]. RUSSELL did call Marck Styles back again, he will check on this and check back with SW.

## 2021-11-11 NOTE — PROGRESS NOTES
Decreased Step Length Bilaterally, Decreased Gait Speed, Decreased Heel Strike Bilaterally and Mild Path Deviations    **path finding task required moderate cues to find patient's room, decreased recall noted. Exercise:  Patient was guided in 1 set(s) 10 reps of exercise to both lower extremities. Ankle pumps, Glut sets, Quad sets, Heelslides, Hip abduction/adduction, Straight leg raises, Seated marches, Seated heel/toe raises, Long arc quads and Seated isometric hip adduction. Exercises were completed for increased independence with functional mobility. Functional Outcome Measures: Completed  AM-PAC Inpatient Mobility Raw Score : 18  AM-PAC Inpatient T-Scale Score : 43.63    ASSESSMENT:  Assessment: Patient progressing toward established goals. Activity Tolerance:  Patient tolerance of  treatment: good. Equipment Recommendations:Equipment Needed: No  Discharge Recommendations: Subacte/Skilled Nursing Facility  Plan: Times per week: 5X GM  Times per day: Daily  Specific instructions for Next Treatment: therex, mobility, balance activities    Patient Education  Patient Education: Plan of Care, Home Exercise Program, Bed Mobility, Transfers, Gait, Up in Chair for All Meals, Verbal Exercise Instruction    Goals:  Patient goals : return to [employer] term goals  Time Frame for Short term goals: by discharge  Short term goal 1: bed mobility with MOD I to get in/out of bed  Short term goal 2: transfer with S to get in/out of chairs  Short term goal 3: amb >100'x1 with walker and S to walk safely in assisted living  Long term goals  Time Frame for Long term goals : no LTGs set secondary to short ELOS    Following session, patient left in safe position with all fall risk precautions in place.

## 2021-11-11 NOTE — PROGRESS NOTES
[address] RENAL TELEMETRY 6K  Occupational Therapy  Daily Note  Time:   Time In: 7769  Time Out: 1500  Timed Code Treatment Minutes: 28 Minutes  Minutes: 28    Date: 2021  Patient Name: Nayeli Batista,   Gender: female      Room: [facility]021-A  MRN: [id number]  : 1935  (80 y.o.)  Referring Practitioner: Larry Ramirez DO  Diagnosis: Elevated Troponin  Additional Pertinent Hx: Per H&P, Nayeli Batista is a 80 y.o. female with a with a PMH of breast cancer, coronary artery disease, congestive heart failure, diabetes mellitus type 2, hyperlipidemia, hypertension, PE and DVT who presented with a mechanical fall and was found to have an ARTIE and elevated troponin. Patient is on Plavix at home an therefore this was called a level 2 trauma in ED. Patient comes from a nursing home where this morning at 4:30 am she fell while moving from her bed to her recliner. She states she was using her walker and is not sure if she tripped or if the walker was not locked properly. She remembers falling and laying on the floor for 4 hours before she was found. She denies losing consciousness, head pain, or neck pain. She states she had some lightheadedness and dizziness for the last month. Patient mentions that when she initially stood up she feel like \"all the blood was rushing to my head\". She also has had chest pain when she coughs or takes a deep breath. Patient is accompanied with her daughter who states her mother was recently discharged from [facility] on 21 with similar symptoms and that she has had a fall everyday for the last 6 days. Daughter also states that patient is normally tachy at baseline. Patient also had acute encephalopathy at last admission. Today she is A&Ox3. She states she is having some abdominal discomfort.  Patient denies having any fevers, chills, nausea, vomiting, palpitations, shortness of breath, diarrhea, urinary frequency, urgency or dysuria. \"    Restrictions/Precautions:  Restrictions/Precautions: General Precautions, Fall Risk     SUBJECTIVE: RN approved OT session. Pt resting in bed upon arrival, stating she is cold and requesting to remain in bed, however agreeable to completing UB exercises. Pt pleasant and cooperative throughout. At end of session requesting to use the restroom. PAIN: Pt with no complaints of pain except some discomfort in back between shoulder blades after fall. Vitals: Vitals not assessed per clinical judgement    COGNITION: WFL    ADL:   Toileting: Contact Guard Assistance. including clothing management and pericares while standing  Toilet Transfer: Contact Guard Assistance and with verbal cues . for hand placement. BALANCE:  Sitting Balance:  Stand By Assistance. Standing Balance: Stand By Assistance. for static standing, CGA dynamic standing with 1-2UE support on RW    BED MOBILITY:  Supine to Sit: Stand By Assistance    Sit to Supine: Stand By Assistance      TRANSFERS:  Sit to Stand:  [employer], cues for hand placement. Stand to Sit: Contact Guard Assistance. FUNCTIONAL MOBILITY:  Assistive Device: Rolling Walker  Assist Level:  Contact Guard Assistance. Distance: To and from bathroom  Pt with steady pace, no LOB or safety concerns. ADDITIONAL ACTIVITIES:  Patient completed BUE strengthening exercises with skilled education on HEP: completed x10-15 reps x1 set with a orange theraband in all joints and all planes in order to improve UE strength and activity tolerance required for BADL routine and toilet / shower transfers. Patient tolerated well, requiring brief rest breaks. Patient also required minimal cues for technique. Provided pt with handout. Pt states \"these exercises feel too easy\", but does agree that moving her hands closer together created a greater challenge. Recommend bringing mod resistive theraband for next session.     ASSESSMENT:     Activity Tolerance: Patient tolerance of  treatment: good. Discharge Recommendations: Subacute/skilled nursing facility  Equipment Recommendations: Equipment Needed: No  Plan: Times per week: 3-5x  Current Treatment Recommendations: Strengthening, Balance Training, Functional Mobility Training, Endurance Training, Safety Education & Training, Self-Care / ADL, Patient/Caregiver Education & Training, Home Management Training    Patient Education  Patient Education: Home Exercise Program and transfer training    Goals  Short term goals  Time Frame for Short term goals: Until discharge  Short term goal 1: Pt will complete BUE light resistive exercises with min vcs for technique to increase indep and endurance with all self cares and transfers. Short term goal 2: Pt will complete standing tolerance x 4 minutes with 2 UE release and Mod Indep to increase indep and endurance with all sinkside grooming. Short term goal 3: Pt will complete functional mobility HH distances Mod Indep to increase indep and endurance with all self care routine. Short term goal 4: Pt will complete LB dressing with LHAE PRN and SBA to increase indep and endurance in home environment. Following session, patient left in safe position with all fall risk precautions in place.

## 2021-11-12 NOTE — PROGRESS NOTES
Hospitalist Progress Note      Patient:  Chikis Trinidad    Unit/Bed:6K-21/021-A  YOB: 1935  MRN: [id number]   Acct: [de-identified]   PCP: Sami Childress MD  Date of Admission: 11/5/2021    Assessment/Plan:    1. Hypotension: Active  Patient /64 this morning. Patient is fluid responsive. Plan:   -If BP drops SBP less than 100, give bolus 1000 mL and monitor. Goal to keep MAP >65.  -Continue oral supplements    -Continue Lopressor dose 25 mg BID, parameters in place. 2. Acute kidney injury on CKD stage IV: Resolved  -Secondary to prerenal azotemia.  -Improved with IV fluid resuscitation.  -Currently holding Bumex and Zaroxolyn. On discharge plan to continue holding diuretics. Can take the diuretics PRN for swelling, edema, or weight gain. Follow with PCP and repeat BMP in 1 week when she discharges. 3. Elevated troponin:   -Suspect this is related to ARTIE on CKD. -No ischemic changes on EKG. 4. Frequent falls:   -Symptoms consistent with orthostasis. -Patient given NaCl bolus for soft BP on 11/7 and 11/11. Orthostatic vital signs from 11/7 negative. -PT/OT following  -Patient able to walk without any issues    5. History of DVT with PE/left leg pain & swelling:  -Venous Dopplers negative for DVT. -No indication anticoagulation given high fall risk and bleeding risk. 6. Diabetes mellitus type 2:  -Poorly controlled. A1c on 10/29/2021 of 17.9  -Holding home Metformin  -Glucose today in the   -Continue with Lantus and medium SSI  -Continue meal time insulin 11 units yesterday, 11/9  -Will continue to monitor glucose     7. Hypertension:   -Continue Lopressor 25 mg BID as patient still is hypotensive at times. 8. Hyperlipidemia:   -Continue home Lipitor 40 mg    Chief Complaint: [facility] Course:     Chikis Trinidad is a 80 y.o. female with a with a PMH of breast cancer, coronary artery disease, congestive heart failure, diabetes mellitus type 2, hyperlipidemia, hypertension, PE and DVT who presented with a mechanical fall and was found to have an ARTIE and elevated troponin. Patient is on Plavix at home an therefore this was called a level 2 trauma in ED. Patient comes from a nursing home where this morning at 4:30 am she fell while moving from her bed to her recliner. She states she was using her walker and is not sure if she tripped or if the walker was not locked properly. She remembers falling and laying on the floor for 4 hours before she was found. She denies losing consciousness, head pain, or neck pain. She states she had some lightheadedness and dizziness for the last month. Patient mentions that when she initially stood up she feel like \"all the blood was rushing to my head\". She also has had chest pain when she coughs or takes a deep breath. Patient is accompanied with her daughter who states her mother was recently discharged from [facility] on 11/1/21 with similar symptoms and that she has had a fall everyday for the last 6 days. Daughter also states that patient is normally tachy at baseline. Patient also had acute encephalopathy at last admission. Today she is A&Ox3. She states she is having some abdominal discomfort. Patient denies having any fevers, chills, nausea, vomiting, palpitations, shortness of breath, diarrhea, urinary frequency, urgency or dysuria. 11/10: Patient denied precert from SNF. Seeing if assisted living where patient came from will accept her back. Subjective (past 24 hours):   No acute changes overnight. Patient is resting comfortably in bed. She wants to go back to the assisted living she came from. She denies having any lightheadedness, dizziness, nausea, vomiting, fever, chills, shortness of breath, or abdominal pain today. Past medical history, family history, social history and allergies reviewed again and is unchanged since admission.     ROS (12 point review of systems completed. Pertinent positives noted. Otherwise ROS is negative)     Medications:  Reviewed    Infusion Medications    dextrose      sodium chloride       Scheduled Medications    metoprolol tartrate  25 mg Oral BID    insulin lispro  11 Units SubCUTAneous TID WC    enoxaparin  30 mg SubCUTAneous Q24H    insulin glargine  20 Units SubCUTAneous Nightly    sodium chloride  1,000 mL IntraVENous Once    [Held by provider] bumetanide  2 mg Oral Daily    clopidogrel  75 mg Oral Daily    DULoxetine  120 mg Oral Daily    potassium chloride  10 mEq Oral Daily    melatonin  3 mg Oral Nightly    lactobacillus  1 capsule Oral BID WC    atorvastatin  40 mg Oral Daily    sodium chloride flush  5-40 mL IntraVENous 2 times per day    insulin lispro  0-12 Units SubCUTAneous TID WC    insulin lispro  0-6 Units SubCUTAneous Nightly     PRN Meds: glucose, dextrose, glucagon (rDNA), dextrose, nitroGLYCERIN, [Held by provider] metOLazone, magnesium hydroxide, sodium chloride flush, sodium chloride, ondansetron **OR** ondansetron, polyethylene glycol, acetaminophen **OR** acetaminophen      Intake/Output Summary (Last 24 hours) at 11/11/2021 2245  Last data filed at 11/11/2021 1658  Gross per 24 hour   Intake 1030 ml   Output    Net 1030 ml       Diet:  ADULT DIET; Dysphagia - Soft and Bite Sized; 3 carb choices (45 gm/meal)  ADULT ORAL NUTRITION SUPPLEMENT; PM Snack, HS Snack; Frozen Oral Supplement  ADULT ORAL NUTRITION SUPPLEMENT; Breakfast, Dinner; Diabetic Oral Supplement    Exam:  BP (!) 108/53   Pulse 88   Temp 97.6 °F (36.4 °C) (Oral)   Resp 16   Ht 5' 5\" (1.651 m)   Wt 155 lb (70.3 kg)   SpO2 98%   BMI 25.79 kg/m²      General appearance: Elderly female resting in bed. No acute distress. HEENT: Pupils equal, round, and reactive to light. Conjunctivae/corneas clear. Neck: Supple, with full range of motion. No jugular venous distention. Respiratory:  Normal respiratory effort.  Clear to auscultation, very light growth Pantoea species which may be initially susceptible to third generation cephalosporins may develop resistance within three to four days of initiation of this antimicrobial therapy. No results found for: LABANAE    Urinalysis:      Lab Results   Component Value Date    NITRU NEGATIVE 11/05/2021    WBCUA 15-25 11/05/2021    BACTERIA NONE SEEN 11/05/2021    RBCUA NONE 11/05/2021    BLOODU NEGATIVE 11/05/2021    SPECGRAV 1.013 10/27/2021    GLUCOSEU >= 1000 11/05/2021       Radiology:  VL DUP LOWER EXTREMITY VENOUS BILATERAL   Final Result      No evidence of deep venous thrombosis in either lower extremity. **This report has been created using voice recognition software. It may contain minor errors which are inherent in voice recognition technology. **             Final report electronically signed by Dr. Nadine Gray on 11/5/2021 5:43 PM      CT ABDOMEN PELVIS W IV CONTRAST Additional Contrast? Radiologist Recommendation   Final Result       1. No evidence of acute intra-abdominal or intrapelvic abnormality. 2. No evidence of acute osseous injury of the lumbar spine or pelvis. 3. Moderate retained stool. 4. Colonic diverticulosis. 5. Small focus of air in the bladder likely on the basis of previous instrumentation. In the absence of instrumentation, this is concerning for cystitis. **This report has been created using voice recognition software. It may contain minor errors which are inherent in voice recognition technology. **      Final report electronically signed by Dr. Joy Connor MD on 11/5/2021 11:34 AM      CT CERVICAL SPINE WO CONTRAST   Final Result   Stable appearance no acute fracture. **This report has been created using voice recognition software. It may contain minor errors which are inherent in voice recognition technology. **      Final report electronically signed by Dr. Natalie Angulo on 11/5/2021 11:21 AM      CT CHEST W CONTRAST   Final Result       1. Stable CT scan of the chest, no interval change since previous study dated 20 November 2020.   2. No displaced rib fracture or evidence of pneumothorax. 3. Thickening off the interstitial lung markings. Scarring in the right upper lobe. 4. Cardiomegaly with mitral valve calcification. Coronary artery calcification. 5. Calcification in the thoracic aorta. 6. Thoracic spondylosis. **This report has been created using voice recognition software. It may contain minor errors which are inherent in voice recognition technology. **      Final report electronically signed by DR Barb Urbina on 11/5/2021 11:29 AM      CT HEAD WO CONTRAST   Final Result       1. Stable CT scan of the brain, no interval change 27th of October 2021. Ann Crumble **This report has been created using voice recognition software. It may contain minor errors which are inherent in voice recognition technology. **      Final report electronically signed by DR Barb Urbina on 11/5/2021 11:20 AM      CT LUMBAR RECONSTRUCTION WO POST PROCESS   Final Result       1. No evidence of acute intra-abdominal or intrapelvic abnormality. 2. No evidence of acute osseous injury of the lumbar spine or pelvis. 3. Moderate retained stool. 4. Colonic diverticulosis. 5. Small focus of air in the bladder likely on the basis of previous instrumentation. In the absence of instrumentation, this is concerning for cystitis. **This report has been created using voice recognition software. It may contain minor errors which are inherent in voice recognition technology. **      Final report electronically signed by Dr. Zack Arredondo MD on 11/5/2021 11:34 AM      CT THORACIC RECONSTRUCTION WO POST PROCESS   Final Result   No acute abnormality            **This report has been created using voice recognition software. It may contain minor errors which are inherent in voice recognition technology. ** Final report electronically signed by Dr. Kelsie Douglass on 11/5/2021 11:27 AM      XR HIP W PELVIS MIN 5 VWS BILATERAL   Final Result   No acute fracture            **This report has been created using voice recognition software. It may contain minor errors which are inherent in voice recognition technology. **      Final report electronically signed by Dr. Kelsie Douglass on 11/5/2021 11:04 AM        CT HEAD WO CONTRAST    Result Date: 11/5/2021  PROCEDURE: CT HEAD WO CONTRAST CLINICAL INFORMATION: Trauma, Trauma. COMPARISON: CT scan of the brain dated 27 October 2021. . TECHNIQUE: Noncontrast 5 mm axial images were obtained through the brain. Sagittal and coronal reconstructions were obtained. All CT scans at this facility use dose modulation, iterative reconstruction, and/or weight-based dosing when appropriate to reduce radiation dose to as low as reasonably achievable. FINDINGS: There is mild atrophy and dilatation of the lateral ventricles. There is diminished attenuation in the white matter most likely representing ischemic changes. This a lacunar infarct in the right cerebellar hemisphere. There is calcification in the pineal  gland and choroid plexus of both lateral ventricles. There is calcification the cavernous segments of both internal carotid arteries. There is no hemorrhage. There are no intra-or extra-axial collections. There is no  midline shift or mass effect. The paranasal sinuses and mastoid air cells are normally aerated. There is no suspicious calvarial abnormality. 1. Stable CT scan of the brain, no interval change 27th of October 2021. Jason Velazquez **This report has been created using voice recognition software. It may contain minor errors which are inherent in voice recognition technology. ** Final report electronically signed by DR Amado Maurer on 11/5/2021 11:20 AM    CT CHEST W CONTRAST    Result Date: 11/5/2021  PROCEDURE: CT CHEST W CONTRAST CLINICAL INFORMATION: Trauma, Trauma.  COMPARISON: CT iterative reconstruction, and/or weight-based dosing when appropriate to reduce radiation dose to as low as reasonably achievable. COMPARISON: 10/27/2021 FINDINGS: No acute fracture or acute bony malalignment. Diffuse degenerative changes are detailed on the previous exam. Straightening of the normal cervical lordosis which may be due to positioning. No precervical soft tissue swelling. Stable appearance no acute fracture. **This report has been created using voice recognition software. It may contain minor errors which are inherent in voice recognition technology. ** Final report electronically signed by Dr. Maritza Rogers on 11/5/2021 11:21 AM    CT ABDOMEN PELVIS W IV CONTRAST Additional Contrast? Radiologist Recommendation    Result Date: 11/5/2021  PROCEDURE: CT ABDOMEN PELVIS W IV CONTRAST, CT LUMBAR RECONSTRUCTION WO POST PROCESS CLINICAL INFORMATION: Trauma, Trauma . Fall today with generalized pain. COMPARISON: CT abdomen pelvis dated 11/20/2020. TECHNIQUE: Helical CT of the abdomen and pelvis following intravenous administration of 80 mL Isovue-370 injected in the right AC with sagittal and coronal reconstructions. Reformatted images of the lumbar spine with axial, sagittal and coronal reconstructions were also created. All CT scans at this facility use dose modulation, iterative reconstruction, and/or weight-based dosing when appropriate to reduce radiation dose to as low as reasonably achievable. FINDINGS: There is mild interstitial prominence at the lung bases, similar to prior exam with superimposed mild posterior dependent change. The visualized portion of the heart is unremarkable. There is a small hiatal hernia, stable compared to prior exam. Liver is unremarkable. The gallbladder is surgically absent. Adrenal glands are unremarkable. The kidneys are mildly atrophied with cortical thinning, similar to prior exam. The spleen is unremarkable. The pancreas is partially fatty replaced.  No retroperitoneal or mesenteric lymphadenopathy is identified. There is moderately prominent stool throughout the large bowel. There are scattered diverticula without adjacent inflammation to suggest diverticulitis. The pelvis is partially obscured from streak artifact from right hip arthroplasty. There is a small focus of air in the bladder. Unopacified bladder is otherwise unremarkable. The uterus and adnexa are within normal limits. No free fluid is identified in the abdomen or pelvis. There are stable degenerative changes of the lumbar spine. There is a stable dextrocurvature of the lumbar spine. There is otherwise anatomic vertebral body height and alignment. No fracture of the lumbar vertebral column is identified. No fracture of the pelvis is identified. There are stable degenerative changes of the sacral iliac joints. 1. No evidence of acute intra-abdominal or intrapelvic abnormality. 2. No evidence of acute osseous injury of the lumbar spine or pelvis. 3. Moderate retained stool. 4. Colonic diverticulosis. 5. Small focus of air in the bladder likely on the basis of previous instrumentation. In the absence of instrumentation, this is concerning for cystitis. **This report has been created using voice recognition software. It may contain minor errors which are inherent in voice recognition technology. ** Final report electronically signed by Dr. Jayden Lee MD on 11/5/2021 11:34 AM    VL DUP LOWER EXTREMITY VENOUS BILATERAL    Result Date: 11/5/2021  PROCEDURE: VL DUP LOWER EXTREMITY VENOUS BILATERAL CLINICAL INFORMATION: History of blood clots, PE. TECHNIQUE: High-resolution duplex ultrasound with spectral analysis of the bilateral lower extremities was performed. The common femoral, femoral, popliteal and calf vein segments were studied to the ankles. COMPARISON: Bilateral lower extremity venous duplex ultrasound 2/17/2020 FINDINGS: There is normal compressibility with no evidence of thrombus. Flow study shows normal color flow, augmentation and phasic response. No evidence of deep venous thrombosis in either lower extremity. **This report has been created using voice recognition software. It may contain minor errors which are inherent in voice recognition technology. **  Final report electronically signed by Dr. Kathy Ponce on 11/5/2021 5:43 PM    CT LUMBAR RECONSTRUCTION WO POST PROCESS    Result Date: 11/5/2021  PROCEDURE: CT ABDOMEN PELVIS W IV CONTRAST, CT LUMBAR RECONSTRUCTION WO POST PROCESS CLINICAL INFORMATION: Trauma, Trauma . Fall today with generalized pain. COMPARISON: CT abdomen pelvis dated 11/20/2020. TECHNIQUE: Helical CT of the abdomen and pelvis following intravenous administration of 80 mL Isovue-370 injected in the right AC with sagittal and coronal reconstructions. Reformatted images of the lumbar spine with axial, sagittal and coronal reconstructions were also created. All CT scans at this facility use dose modulation, iterative reconstruction, and/or weight-based dosing when appropriate to reduce radiation dose to as low as reasonably achievable. FINDINGS: There is mild interstitial prominence at the lung bases, similar to prior exam with superimposed mild posterior dependent change. The visualized portion of the heart is unremarkable. There is a small hiatal hernia, stable compared to prior exam. Liver is unremarkable. The gallbladder is surgically absent. Adrenal glands are unremarkable. The kidneys are mildly atrophied with cortical thinning, similar to prior exam. The spleen is unremarkable. The pancreas is partially fatty replaced. No retroperitoneal or mesenteric lymphadenopathy is identified. There is moderately prominent stool throughout the large bowel. There are scattered diverticula without adjacent inflammation to suggest diverticulitis. The pelvis is partially obscured from streak artifact from right hip arthroplasty.  There is a small focus of air in the **This report has been created using voice recognition software. It may contain minor errors which are inherent in voice recognition technology. ** Final report electronically signed by Dr. Herve Orlando on 11/5/2021 11:27 AM    XR HIP W PELVIS MIN 5 VWS BILATERAL    Result Date: 11/5/2021  PROCEDURE: XR HIP W PELVIS MIN 5 VWS BILATERAL CLINICAL INFORMATION: fall hip pain . TECHNIQUE: AP pelvis and AP and attempted frog-leg projections of both hips. COMPARISON: No prior study. FINDINGS: Right hip replacement. No acute fracture or bone destruction. Exam limited by bone density and technique. If clinical concern remains for fracture CT would be recommended. No acute fracture **This report has been created using voice recognition software. It may contain minor errors which are inherent in voice recognition technology. ** Final report electronically signed by Dr. Herve Orlando on 11/5/2021 11:04 AM    Electronically signed by Deena Gray DO on 11/11/2021 at 10:45 PM

## 2021-11-12 NOTE — PROGRESS NOTES
[address] RENAL TELEMETRY 6K  Occupational Therapy  Daily Note  Time:   Time In: 1340  Time Out: 8702  Timed Code Treatment Minutes: 23 Minutes  Minutes: 23          Date: 2021  Patient Name: Jarek Judge,   Gender: female      Room: [facility]  MRN: [id number]  : 1935  (80 y.o.)  Referring Practitioner: Char Hall DO  Diagnosis: Elevated Troponin  Additional Pertinent Hx: Per H&P, Jarek Judge is a 80 y.o. female with a with a PMH of breast cancer, coronary artery disease, congestive heart failure, diabetes mellitus type 2, hyperlipidemia, hypertension, PE and DVT who presented with a mechanical fall and was found to have an ARTIE and elevated troponin. Patient is on Plavix at home an therefore this was called a level 2 trauma in ED. Patient comes from a nursing home where this morning at 4:30 am she fell while moving from her bed to her recliner. She states she was using her walker and is not sure if she tripped or if the walker was not locked properly. She remembers falling and laying on the floor for 4 hours before she was found. She denies losing consciousness, head pain, or neck pain. She states she had some lightheadedness and dizziness for the last month. Patient mentions that when she initially stood up she feel like \"all the blood was rushing to my head\". She also has had chest pain when she coughs or takes a deep breath. Patient is accompanied with her daughter who states her mother was recently discharged from [facility] on 21 with similar symptoms and that she has had a fall everyday for the last 6 days. Daughter also states that patient is normally tachy at baseline. Patient also had acute encephalopathy at last admission. Today she is A&Ox3. She states she is having some abdominal discomfort.  Patient denies having any fevers, chills, nausea, vomiting, palpitations, shortness of breath, diarrhea, urinary frequency, urgency or dysuria. \"    Restrictions/Precautions:  Restrictions/Precautions: General Precautions, Fall Risk     SUBJECTIVE: Pt. Nurse okayed OT treatment. Pt. Sitting on toilet upon entry and agreeable to OT treatment. PAIN: no psin reported    Vitals: Vitals not assessed per clinical judgement, see nursing flowsheet  Heart Rate:      COGNITION: WFL    ADL:   Grooming: Contact Guard Assistance. Toileting: Contact Guard Assistance. Toilet Transfer: Contact Guard Assistance. m. BALANCE:  Sitting Balance:  Stand By Assistance. while seated on EOB without suport  Standing Balance: Contact Guard Assistance. BED MOBILITY:  Not Tested    TRANSFERS:  Sit to Stand:  Contact Guard Assistance. Stand to Sit: Contact Guard Assistance. FUNCTIONAL MOBILITY:  Assistive Device: Rolling Walker  Assist Level:  Contact Guard Assistance. Distance: To and from bathroom and Pt. complete 2 laps in hallway around nurses station with min fatigue noted no LOB          ASSESSMENT:     Activity Tolerance:  Patient tolerance of  treatment: fair. Discharge Recommendations: Continue to assess pending progress and Subacute/skilled nursing facility  Equipment Recommendations: Equipment Needed: No  Plan: Times per week: 3-5x  Current Treatment Recommendations: Strengthening, Balance Training, Functional Mobility Training, Endurance Training, Safety Education & Training, Self-Care / ADL, Patient/Caregiver Education & Training, Home Management Training    Patient Education  Patient Education: ADL's, Importance of Increasing Activity and Assistive Device Safety and safety with functional mobility and transfers. Goals  Short term goals  Time Frame for Short term goals: Until discharge  Short term goal 1: Pt will complete BUE light resistive exercises with min vcs for technique to increase indep and endurance with all self cares and transfers.   Short term goal 2: Pt will complete standing tolerance x 4 minutes with 2 UE release and Mod Indep to increase indep and endurance with all sinkside grooming. Short term goal 3: Pt will complete functional mobility HH distances Mod Indep to increase indep and endurance with all self care routine. Short term goal 4: Pt will complete LB dressing with LHAE PRN and SBA to increase indep and endurance in home environment. Following session, patient left in safe position with all fall risk precautions in place.

## 2021-11-12 NOTE — CARE COORDINATION
11/12/21, 7:38 AM EST    DISCHARGE PLANNING EVALUATION      No call back yesterday from [facility] to see if they can take pt back in [place]. SW called [facility] this morning, left a message for a call back. 9:58 AM  Call back from [facility], reports [facility] from [facility] will be coming up to assess pt shortly and will make a decision from there. 10:52 AM   Call from [address] with [facility], reports they do not feel they can safely take pt back to their facility, reports patient has too many recent falls and family has declined increasing services in the past. Marcial Estes is update provider on above, plans to do a peer to peer.

## 2021-11-12 NOTE — CARE COORDINATION
Discharge Planning Update:    Spoke with Dr. Gloria Russell, the [facility] will not take Terri Ledezma back. Dr. Gloria Russell is agreeable to attempting P2P. This CM contacted peer to peer line and was able to leave a message with the requested information for them to contact Dr. Gloria Russell. Electronically signed by Petra Morales RN on 11/12/2021 at 10:55 AM      *Spoke with peer to peer line, it is too late to complete P2P. Chloé Alaniz spoke with family, they would like to file the expedited appeal.  Contacted [facility] appeals line to initiate. Electronically signed by Petra Morales RN on 11/12/2021 at 11:30 AM     * Appeal filed. Requested information faxed. Dr. Gloria Russell updated.  Electronically signed by Petra Morales RN on 11/12/2021 at 12:12 PM

## 2021-11-12 NOTE — PROGRESS NOTES
coronary artery disease, congestive heart failure, diabetes mellitus type 2, hyperlipidemia, hypertension, PE and DVT who presented with a mechanical fall and was found to have an ARTIE and elevated troponin. Patient is on Plavix at home an therefore this was called a level 2 trauma in ED. Patient comes from a nursing home where this morning at 4:30 am she fell while moving from her bed to her recliner. She states she was using her walker and is not sure if she tripped or if the walker was not locked properly. She remembers falling and laying on the floor for 4 hours before she was found. She denies losing consciousness, head pain, or neck pain. She states she had some lightheadedness and dizziness for the last month. Patient mentions that when she initially stood up she feel like \"all the blood was rushing to my head\". She also has had chest pain when she coughs or takes a deep breath. Patient is accompanied with her daughter who states her mother was recently discharged from [address] [place] [address] on 11/1/21 with similar symptoms and that she has had a fall everyday for the last 6 days. Daughter also states that patient is normally tachy at baseline. Patient also had acute encephalopathy at last admission. Today she is A&Ox3. She states she is having some abdominal discomfort. Patient denies having any fevers, chills, nausea, vomiting, palpitations, shortness of breath, diarrhea, urinary frequency, urgency or dysuria. 11/11: Patient denied precert from SNF, and assisted living where patient came from will not accept her back. P2P also denied due to time frame at this time. Subjective (past 24 hours):   No acute changes overnight. Patient is resting comfortably in bed. Her daughter is present at bedside. She denies having any lightheadedness, dizziness, nausea, vomiting, fever, chills, shortness of breath, or abdominal pain today.       Past medical history, family history, social history and allergies reviewed again and is unchanged since admission. ROS (12 point review of systems completed. Pertinent positives noted. Otherwise ROS is negative)     Medications:  Reviewed    Infusion Medications    dextrose      sodium chloride       Scheduled Medications    sodium chloride  1,000 mL IntraVENous Once    metoprolol tartrate  25 mg Oral BID    insulin lispro  11 Units SubCUTAneous TID WC    enoxaparin  30 mg SubCUTAneous Q24H    insulin glargine  20 Units SubCUTAneous Nightly    sodium chloride  1,000 mL IntraVENous Once    [Held by provider] bumetanide  2 mg Oral Daily    clopidogrel  75 mg Oral Daily    DULoxetine  120 mg Oral Daily    potassium chloride  10 mEq Oral Daily    melatonin  3 mg Oral Nightly    lactobacillus  1 capsule Oral BID WC    atorvastatin  40 mg Oral Daily    sodium chloride flush  5-40 mL IntraVENous 2 times per day    insulin lispro  0-12 Units SubCUTAneous TID WC    insulin lispro  0-6 Units SubCUTAneous Nightly     PRN Meds: glucose, dextrose, glucagon (rDNA), dextrose, nitroGLYCERIN, [Held by provider] metOLazone, magnesium hydroxide, sodium chloride flush, sodium chloride, ondansetron **OR** ondansetron, polyethylene glycol, acetaminophen **OR** acetaminophen      Intake/Output Summary (Last 24 hours) at 11/12/2021 1658  Last data filed at 11/12/2021 1133  Gross per 24 hour   Intake 600 ml   Output 0 ml   Net 600 ml       Diet:  ADULT DIET; Dysphagia - Soft and Bite Sized; 3 carb choices (45 gm/meal)  ADULT ORAL NUTRITION SUPPLEMENT; PM Snack, HS Snack; Frozen Oral Supplement  ADULT ORAL NUTRITION SUPPLEMENT; Breakfast, Dinner; Diabetic Oral Supplement    Exam:  BP 96/72   Pulse 106   Temp 98.1 °F (36.7 °C) (Oral)   Resp 18   Ht 5' 5\" (1.651 m)   Wt 155 lb (70.3 kg)   SpO2 96%   BMI 25.79 kg/m²      General appearance: Elderly female resting in bed. No acute distress. HEENT: Pupils equal, round, and reactive to light. Conjunctivae/corneas clear.   Neck: Supple, with full range of motion. No jugular venous distention. Respiratory:  Normal respiratory effort. Clear to auscultation, bilaterally without Rales/Wheezes/Rhonchi. Cardiovascular: Regular rate and rhythm with normal S1/S2 without murmurs, rubs or gallops. Abdomen: Soft, non-tender, non-distended with normal bowel sounds. Musculoskeletal: passive and active ROM x 4 extremities. Skin: Skin color, texture, turgor normal.  No rashes or lesions. Neurologic:  Neurovascularly intact without any focal sensory/motor deficits. Cranial nerves: II-XII intact, grossly non-focal.  Psychiatric: Alert and oriented, thought content appropriate, normal insight  Capillary Refill: Brisk,< 3 seconds   Peripheral Pulses: +2 palpable, equal bilaterally     Labs:   Recent Labs     11/11/21  1002   WBC 6.6   HGB 12.1   HCT 38.9        Recent Labs     11/10/21  0441 11/11/21  0524 11/12/21  0426    141 143   K 4.8 4.3 4.8    106 108   CO2 25 26 26   BUN 20 20 20   CREATININE 0.9 1.0 1.0   CALCIUM 8.8 8.7 9.1     No results for input(s): AST, ALT, BILIDIR, BILITOT, ALKPHOS in the last 72 hours. No results for input(s): INR in the last 72 hours. No results for input(s): Ardelle Chalk in the last 72 hours. Microbiology:    Blood culture #1: No results found for: [facility]    Blood culture #2:No results found for: Jessi Espitia    Organism:  Lab Results   Component Value Date    ORG Growth of Contaminants 11/05/2021         Lab Results   Component Value Date    LABGRAM  10/12/2021     No segmented neutrophils observed. Rare epithelial cells observed. No bacteria seen.         MRSA culture only:No results found for: [address] [place] [address]    Urine culture:   Lab Results   Component Value Date    LABURIN [place] count: >100,000 CFU/mL  10/29/2021       Respiratory culture: No results found for: CULTRESP    Aerobic and Anaerobic :  Lab Results   Component Value Date    LABAERO  10/12/2021     No growth-preliminary Culture also yielded light growth of Staphylococcus species (coagulase negative). LABAERO light growth  10/12/2021    LABAERO  10/12/2021     very light growth Pantoea species which may be initially susceptible to third generation cephalosporins may develop resistance within three to four days of initiation of this antimicrobial therapy. No results found for: LABANAE    Urinalysis:      Lab Results   Component Value Date    NITRU NEGATIVE 11/05/2021    WBCUA 15-25 11/05/2021    BACTERIA NONE SEEN 11/05/2021    RBCUA NONE 11/05/2021    BLOODU NEGATIVE 11/05/2021    SPECGRAV 1.013 10/27/2021    GLUCOSEU >= 1000 11/05/2021       Radiology:  VL DUP LOWER EXTREMITY VENOUS BILATERAL   Final Result      No evidence of deep venous thrombosis in either lower extremity. **This report has been created using voice recognition software. It may contain minor errors which are inherent in voice recognition technology. **             Final report electronically signed by Dr. Dora Gabriel on 11/5/2021 5:43 PM      CT ABDOMEN PELVIS W IV CONTRAST Additional Contrast? Radiologist Recommendation   Final Result       1. No evidence of acute intra-abdominal or intrapelvic abnormality. 2. No evidence of acute osseous injury of the lumbar spine or pelvis. 3. Moderate retained stool. 4. Colonic diverticulosis. 5. Small focus of air in the bladder likely on the basis of previous instrumentation. In the absence of instrumentation, this is concerning for cystitis. **This report has been created using voice recognition software. It may contain minor errors which are inherent in voice recognition technology. **      Final report electronically signed by Dr. Oksana Stephens MD on 11/5/2021 11:34 AM      CT CERVICAL SPINE WO CONTRAST   Final Result   Stable appearance no acute fracture. **This report has been created using voice recognition software.   It may contain minor errors which are inherent in voice recognition technology. **      Final report electronically signed by Dr. Ellen España on 11/5/2021 11:21 AM      CT CHEST W CONTRAST   Final Result       1. Stable CT scan of the chest, no interval change since previous study dated 20 November 2020.   2. No displaced rib fracture or evidence of pneumothorax. 3. Thickening off the interstitial lung markings. Scarring in the right upper lobe. 4. Cardiomegaly with mitral valve calcification. Coronary artery calcification. 5. Calcification in the thoracic aorta. 6. Thoracic spondylosis. **This report has been created using voice recognition software. It may contain minor errors which are inherent in voice recognition technology. **      Final report electronically signed by DR Yvonne Enriquez on 11/5/2021 11:29 AM      CT HEAD WO CONTRAST   Final Result       1. Stable CT scan of the brain, no interval change 27th of October 2021. Trula Blew **This report has been created using voice recognition software. It may contain minor errors which are inherent in voice recognition technology. **      Final report electronically signed by DR Yvonne Enriquez on 11/5/2021 11:20 AM      CT LUMBAR RECONSTRUCTION WO POST PROCESS   Final Result       1. No evidence of acute intra-abdominal or intrapelvic abnormality. 2. No evidence of acute osseous injury of the lumbar spine or pelvis. 3. Moderate retained stool. 4. Colonic diverticulosis. 5. Small focus of air in the bladder likely on the basis of previous instrumentation. In the absence of instrumentation, this is concerning for cystitis. **This report has been created using voice recognition software. It may contain minor errors which are inherent in voice recognition technology. **      Final report electronically signed by Dr. Qiana Anders MD on 11/5/2021 11:34 AM      CT THORACIC RECONSTRUCTION WO POST PROCESS   Final Result   No acute abnormality            **This report has been created using voice recognition software. It may contain minor errors which are inherent in voice recognition technology. **      Final report electronically signed by Dr. Pierre Nicolas on 11/5/2021 11:27 AM      XR HIP W PELVIS MIN 5 VWS BILATERAL   Final Result   No acute fracture            **This report has been created using voice recognition software. It may contain minor errors which are inherent in voice recognition technology. **      Final report electronically signed by Dr. Pierre Nicolas on 11/5/2021 11:04 AM        CT HEAD WO CONTRAST    Result Date: 11/5/2021  PROCEDURE: CT HEAD WO CONTRAST CLINICAL INFORMATION: Trauma, Trauma. COMPARISON: CT scan of the brain dated 27 October 2021. . TECHNIQUE: Noncontrast 5 mm axial images were obtained through the brain. Sagittal and coronal reconstructions were obtained. All CT scans at this facility use dose modulation, iterative reconstruction, and/or weight-based dosing when appropriate to reduce radiation dose to as low as reasonably achievable. FINDINGS: There is mild atrophy and dilatation of the lateral ventricles. There is diminished attenuation in the white matter most likely representing ischemic changes. This a lacunar infarct in the right cerebellar hemisphere. There is calcification in the pineal  gland and choroid plexus of both lateral ventricles. There is calcification the cavernous segments of both internal carotid arteries. There is no hemorrhage. There are no intra-or extra-axial collections. There is no  midline shift or mass effect. The paranasal sinuses and mastoid air cells are normally aerated. There is no suspicious calvarial abnormality. 1. Stable CT scan of the brain, no interval change 27th of October 2021. Mellissa Coleman **This report has been created using voice recognition software. It may contain minor errors which are inherent in voice recognition technology. ** Final report electronically signed by DR Charmayne Ferdinand on 11/5/2021 11:20 AM    CT CHEST W CONTRAST    Result Date: 11/5/2021  PROCEDURE: CT CHEST W CONTRAST CLINICAL INFORMATION: Trauma, Trauma. COMPARISON: CT scan of the chest dated 20 th of November 2020. Matt Amend TECHNIQUE: 5 mm axial images were obtained through the chest after the administration of intravenous contrast. Sagittal and coronal reconstructions were obtained. All CT scans at this facility use dose modulation, iterative reconstruction, and/or weight-based dosing when appropriate to reduce radiation dose to as low as reasonably achievable. FINDINGS: There is mild cardiomegaly with mitral valve calcification. . There is atherosclerotic calcification in the aortic root and aortic arch. There is no evidence for aortic dissection or mediastinal hematoma. . There is no gross abnormality of the pulmonary artery and its proximal branches. There is no mediastinal, axillary or hilar adenopathy. There is no pericardial or pleural effusion. There is diffuse COPD and thickening off the interstitial lung markings. There is scarring in the right upper lobe. . There is thoracic spondylosis. There is no acute fracture. The ribs appear to be intact. There is no displaced rib fracture. There is no pneumothorax. . There is a hiatal hernia. 1. Stable CT scan of the chest, no interval change since previous study dated 20 November 2020. 2. No displaced rib fracture or evidence of pneumothorax. 3. Thickening off the interstitial lung markings. Scarring in the right upper lobe. 4. Cardiomegaly with mitral valve calcification. Coronary artery calcification. 5. Calcification in the thoracic aorta. 6. Thoracic spondylosis. **This report has been created using voice recognition software. It may contain minor errors which are inherent in voice recognition technology. ** Final report electronically signed by DR Sutherland Slider on 11/5/2021 11:29 AM    CT CERVICAL SPINE WO CONTRAST    Result Date: 11/5/2021  PROCEDURE: CT CERVICAL SPINE WO CONTRAST CLINICAL INFORMATION: Trauma, Trauma . TECHNIQUE: 2-D multiplanar noncontrast CT cervical spine All CT scans at this facility use dose modulation, iterative reconstruction, and/or weight-based dosing when appropriate to reduce radiation dose to as low as reasonably achievable. COMPARISON: 10/27/2021 FINDINGS: No acute fracture or acute bony malalignment. Diffuse degenerative changes are detailed on the previous exam. Straightening of the normal cervical lordosis which may be due to positioning. No precervical soft tissue swelling. Stable appearance no acute fracture. **This report has been created using voice recognition software. It may contain minor errors which are inherent in voice recognition technology. ** Final report electronically signed by Dr. Jesus Alba on 11/5/2021 11:21 AM    CT ABDOMEN PELVIS W IV CONTRAST Additional Contrast? Radiologist Recommendation    Result Date: 11/5/2021  PROCEDURE: CT ABDOMEN PELVIS W IV CONTRAST, CT LUMBAR RECONSTRUCTION WO POST PROCESS CLINICAL INFORMATION: Trauma, Trauma . Fall today with generalized pain. COMPARISON: CT abdomen pelvis dated 11/20/2020. TECHNIQUE: Helical CT of the abdomen and pelvis following intravenous administration of 80 mL Isovue-370 injected in the right AC with sagittal and coronal reconstructions. Reformatted images of the lumbar spine with axial, sagittal and coronal reconstructions were also created. All CT scans at this facility use dose modulation, iterative reconstruction, and/or weight-based dosing when appropriate to reduce radiation dose to as low as reasonably achievable. FINDINGS: There is mild interstitial prominence at the lung bases, similar to prior exam with superimposed mild posterior dependent change. The visualized portion of the heart is unremarkable. There is a small hiatal hernia, stable compared to prior exam. Liver is unremarkable. The gallbladder is surgically absent. Adrenal glands are unremarkable.  The kidneys are mildly atrophied with cortical venous duplex ultrasound 2/17/2020 FINDINGS: There is normal compressibility with no evidence of thrombus. Flow study shows normal color flow, augmentation and phasic response. No evidence of deep venous thrombosis in either lower extremity. **This report has been created using voice recognition software. It may contain minor errors which are inherent in voice recognition technology. **  Final report electronically signed by Dr. Doris Rodriguez on 11/5/2021 5:43 PM    CT LUMBAR RECONSTRUCTION WO POST PROCESS    Result Date: 11/5/2021  PROCEDURE: CT ABDOMEN PELVIS W IV CONTRAST, CT LUMBAR RECONSTRUCTION WO POST PROCESS CLINICAL INFORMATION: Trauma, Trauma . Fall today with generalized pain. COMPARISON: CT abdomen pelvis dated 11/20/2020. TECHNIQUE: Helical CT of the abdomen and pelvis following intravenous administration of 80 mL Isovue-370 injected in the right AC with sagittal and coronal reconstructions. Reformatted images of the lumbar spine with axial, sagittal and coronal reconstructions were also created. All CT scans at this facility use dose modulation, iterative reconstruction, and/or weight-based dosing when appropriate to reduce radiation dose to as low as reasonably achievable. FINDINGS: There is mild interstitial prominence at the lung bases, similar to prior exam with superimposed mild posterior dependent change. The visualized portion of the heart is unremarkable. There is a small hiatal hernia, stable compared to prior exam. Liver is unremarkable. The gallbladder is surgically absent. Adrenal glands are unremarkable. The kidneys are mildly atrophied with cortical thinning, similar to prior exam. The spleen is unremarkable. The pancreas is partially fatty replaced. No retroperitoneal or mesenteric lymphadenopathy is identified. There is moderately prominent stool throughout the large bowel. There are scattered diverticula without adjacent inflammation to suggest diverticulitis.  The pelvis is partially obscured from streak artifact from right hip arthroplasty. There is a small focus of air in the bladder. Unopacified bladder is otherwise unremarkable. The uterus and adnexa are within normal limits. No free fluid is identified in the abdomen or pelvis. There are stable degenerative changes of the lumbar spine. There is a stable dextrocurvature of the lumbar spine. There is otherwise anatomic vertebral body height and alignment. No fracture of the lumbar vertebral column is identified. No fracture of the pelvis is identified. There are stable degenerative changes of the sacral iliac joints. 1. No evidence of acute intra-abdominal or intrapelvic abnormality. 2. No evidence of acute osseous injury of the lumbar spine or pelvis. 3. Moderate retained stool. 4. Colonic diverticulosis. 5. Small focus of air in the bladder likely on the basis of previous instrumentation. In the absence of instrumentation, this is concerning for cystitis. **This report has been created using voice recognition software. It may contain minor errors which are inherent in voice recognition technology. ** Final report electronically signed by Dr. Dale Briones MD on 11/5/2021 11:34 AM    CT THORACIC RECONSTRUCTION WO POST PROCESS    Result Date: 11/5/2021  PROCEDURE: CT THORACIC RECONSTRUCTION WO POST PROCESS CLINICAL INFORMATION: Trauma . TECHNIQUE: 2-D multiplanar reconstructed images of the thoracic spine All CT scans at this facility use dose modulation, iterative reconstruction, and/or weight-based dosing when appropriate to reduce radiation dose to as low as reasonably achievable. COMPARISON: 11/20/2020 FINDINGS: No acute fracture or acute bony malalignment. Degenerative changes in the lower thoracic level in particular. Several images are missing from the T6 vertebral body level.  These are seen there is prominent calcification in the posterior longitudinal ligament at T11-12 no paraspinous soft tissue abnormality is seen. No significant foraminal encroachment is seen. On the soft tissue images. No acute abnormality **This report has been created using voice recognition software. It may contain minor errors which are inherent in voice recognition technology. ** Final report electronically signed by Dr. Maritza Rogers on 11/5/2021 11:27 AM    XR HIP W PELVIS MIN 5 VWS BILATERAL    Result Date: 11/5/2021  PROCEDURE: XR HIP W PELVIS MIN 5 VWS BILATERAL CLINICAL INFORMATION: fall hip pain . TECHNIQUE: AP pelvis and AP and attempted frog-leg projections of both hips. COMPARISON: No prior study. FINDINGS: Right hip replacement. No acute fracture or bone destruction. Exam limited by bone density and technique. If clinical concern remains for fracture CT would be recommended. No acute fracture **This report has been created using voice recognition software. It may contain minor errors which are inherent in voice recognition technology. ** Final report electronically signed by Dr. Maritza Rogers on 11/5/2021 11:04 AM    Electronically signed by Reena Connell DO on 11/12/2021 at 4:58 PM

## 2021-11-13 NOTE — PROGRESS NOTES
Hospitalist Progress Note      Patient:  Comfort Stock    Unit/Bed:6K-21/021-A  YOB: 1935  MRN: [id number]   Acct: [de-identified]   PCP: Caroline Mayer MD  Date of Admission: 11/5/2021    Assessment/Plan:    1. Hypotension: Active  Plan:   -If BP drops SBP less than 100, give bolus 1000 mL and monitor. Goal to keep MAP >65.  -Continue oral nutritional supplements    -Continue Lopressor dose 25 mg BID, parameters in place hold if SBP less than 100.      2. Frequent falls:   -Symptoms consistent with orthostasis. -Patient given NaCl bolus for soft BP on 11/7 and 11/11. Orthostatic vital signs from 11/7 negative. -PT/OT following    3. Acute kidney injury on CKD stage IV: Resolved  -Secondary to prerenal azotemia.  -Improved with IV fluid resuscitation.  -Currently holding Bumex and Zaroxolyn. On discharge plan to continue holding diuretics. Can take the diuretics PRN for swelling, edema, or weight gain. Follow with PCP and repeat BMP in 1 week when she discharges. 4. Elevated troponin:   -Suspect this is related to ARTIE on CKD. -No ischemic changes on EKG. 5. History of DVT with PE/left leg pain & swelling:  -Venous Dopplers negative for DVT. -No indication anticoagulation given high fall risk and bleeding risk. 6. Diabetes mellitus type 2:  -Poorly controlled. A1c on 10/29/2021 of 17.9  -Holding home Metformin  -Glucose today in the   -Continue with Lantus and medium SSI  -Continue meal time insulin 11 units yesterday, 11/9  -Will continue to monitor glucose     7. Hypertension:   -Continue Lopressor 25 mg BID as patient still is hypotensive at times. 8. Hyperlipidemia:   -Continue home Lipitor 40 mg    Chief Complaint: [facility] Course:     Comfort Stock is a 80 y.o. female with a with a PMH of breast cancer, coronary artery disease, congestive heart failure, diabetes mellitus type 2, hyperlipidemia, hypertension, PE and DVT who presented with a mechanical fall and was found to have an ARTIE and elevated troponin. Patient is on Plavix at home an therefore this was called a level 2 trauma in ED. Patient comes from a nursing home where this morning at 4:30 am she fell while moving from her bed to her recliner. She states she was using her walker and is not sure if she tripped or if the walker was not locked properly. She remembers falling and laying on the floor for 4 hours before she was found. She denies losing consciousness, head pain, or neck pain. She states she had some lightheadedness and dizziness for the last month. Patient mentions that when she initially stood up she feel like \"all the blood was rushing to my head\". She also has had chest pain when she coughs or takes a deep breath. Patient is accompanied with her daughter who states her mother was recently discharged from [facility] on [facility]/21 with similar symptoms and that she has had a fall everyday for the last 6 days. Daughter also states that patient is normally tachy at baseline. Patient also had acute encephalopathy at last admission. Today she is A&Ox3. She states she is having some abdominal discomfort. Patient denies having any fevers, chills, nausea, vomiting, palpitations, shortness of breath, diarrhea, urinary frequency, urgency or dysuria. 11/11: Patient denied precert from SNF, and assisted living where patient came from will not accept her back. P2P also denied due to time frame at this time. Subjective (past 24 hours):   Patient seen and examined at bedside. Patient reports she is doing well. She is a little frustrated that she cannot go back to assisted living and she got denied by insurance for nursing home. Patient reports that she feels she is improving and getting stronger while here. No new complaints or acute overnight events.         Past medical history, family history, social history and allergies reviewed again and is unchanged since admission. ROS (12 point review of systems completed. Pertinent positives noted. Otherwise ROS is negative)     Medications:  Reviewed    Infusion Medications    dextrose      sodium chloride       Scheduled Medications    metoprolol tartrate  25 mg Oral BID    insulin lispro  11 Units SubCUTAneous TID WC    enoxaparin  30 mg SubCUTAneous Q24H    insulin glargine  20 Units SubCUTAneous Nightly    sodium chloride  1,000 mL IntraVENous Once    [Held by provider] bumetanide  2 mg Oral Daily    clopidogrel  75 mg Oral Daily    DULoxetine  120 mg Oral Daily    potassium chloride  10 mEq Oral Daily    melatonin  3 mg Oral Nightly    lactobacillus  1 capsule Oral BID WC    atorvastatin  40 mg Oral Daily    sodium chloride flush  5-40 mL IntraVENous 2 times per day    insulin lispro  0-12 Units SubCUTAneous TID WC    insulin lispro  0-6 Units SubCUTAneous Nightly     PRN Meds: glucose, dextrose, glucagon (rDNA), dextrose, nitroGLYCERIN, [Held by provider] metOLazone, magnesium hydroxide, sodium chloride flush, sodium chloride, ondansetron **OR** ondansetron, polyethylene glycol, acetaminophen **OR** acetaminophen    No intake or output data in the 24 hours ending 11/13/21 1243    Diet:  ADULT DIET; Dysphagia - Soft and Bite Sized; 3 carb choices (45 gm/meal)  ADULT ORAL NUTRITION SUPPLEMENT; PM Snack, HS Snack; Frozen Oral Supplement  ADULT ORAL NUTRITION SUPPLEMENT; Breakfast, Dinner; Diabetic Oral Supplement    Exam:  /61   Pulse 95   Temp 97.5 °F (36.4 °C) (Oral)   Resp 17   Ht 5' 5\" (1.651 m)   Wt 155 lb (70.3 kg)   SpO2 95%   BMI 25.79 kg/m²      General appearance:  No acute distress. Respiratory:  Normal respiratory effort. Clear to auscultation, bilaterally without Rales/Wheezes/Rhonchi. Cardiovascular: Regular rate and rhythm with normal S1/S2 without murmurs, rubs or gallops. Abdomen: Soft, non-tender, non-distended with normal bowel sounds.   Skin: Skin color, texture, turgor normal.  No rashes or lesions. Neurologic:  Neurovascularly intact without any focal sensory/motor deficits. Cranial nerves: II-XII intact, grossly non-focal.      Labs:   Recent Labs     11/11/21  1002   WBC 6.6   HGB 12.1   HCT 38.9        Recent Labs     11/11/21  0524 11/11/21  0524 11/12/21  0426 11/13/21  0510 11/13/21  0729     --  143 144  --    K 4.3   < > 4.8 6.1* 4.9     --  108 109  --    CO2 26  --  26 26  --    BUN 20  --  20 24*  --    CREATININE 1.0  --  1.0 1.0  --    CALCIUM 8.7  --  9.1 9.1  --     < > = values in this interval not displayed. No results for input(s): AST, ALT, BILIDIR, BILITOT, ALKPHOS in the last 72 hours. No results for input(s): INR in the last 72 hours. No results for input(s): Chandrika [place] in the last 72 hours. Microbiology:    Blood culture #1: No results found for: [facility]    Blood culture #2:No results found for: Yoseph Arguelles    Organism:  Lab Results   Component Value Date    ORG Growth of Contaminants 11/05/2021         Lab Results   Component Value Date    LABGRAM  10/12/2021     No segmented neutrophils observed. Rare epithelial cells observed. No bacteria seen. MRSA culture only:No results found for: [facility]    Urine culture:   Lab Results   Component Value Date    LABURIN [place] count: >100,000 CFU/mL  10/29/2021       Respiratory culture: No results found for: CULTRESP    Aerobic and Anaerobic :  Lab Results   Component Value Date    LABAERO  10/12/2021     No growth-preliminary Culture also yielded light growth of Staphylococcus species (coagulase negative). LABAERO light growth  10/12/2021    LABAERO  10/12/2021     very light growth Pantoea species which may be initially susceptible to third generation cephalosporins may develop resistance within three to four days of initiation of this antimicrobial therapy.       No results found for: LABANAE    Urinalysis:      Lab Results   Component Value Date    NITRU NEGATIVE 11/05/2021    WBCUA 15-25 11/05/2021    BACTERIA NONE SEEN 11/05/2021    RBCUA NONE 11/05/2021    BLOODU NEGATIVE 11/05/2021    SPECGRAV 1.013 10/27/2021    GLUCOSEU >= 1000 11/05/2021       Radiology:  VL DUP LOWER EXTREMITY VENOUS BILATERAL   Final Result      No evidence of deep venous thrombosis in either lower extremity. **This report has been created using voice recognition software. It may contain minor errors which are inherent in voice recognition technology. **             Final report electronically signed by Dr. Cristofer Garner on 11/5/2021 5:43 PM      CT ABDOMEN PELVIS W IV CONTRAST Additional Contrast? Radiologist Recommendation   Final Result       1. No evidence of acute intra-abdominal or intrapelvic abnormality. 2. No evidence of acute osseous injury of the lumbar spine or pelvis. 3. Moderate retained stool. 4. Colonic diverticulosis. 5. Small focus of air in the bladder likely on the basis of previous instrumentation. In the absence of instrumentation, this is concerning for cystitis. **This report has been created using voice recognition software. It may contain minor errors which are inherent in voice recognition technology. **      Final report electronically signed by Dr. Tyron Ramirez MD on 11/5/2021 11:34 AM      CT CERVICAL SPINE WO CONTRAST   Final Result   Stable appearance no acute fracture. **This report has been created using voice recognition software. It may contain minor errors which are inherent in voice recognition technology. **      Final report electronically signed by Dr. Nelsy Flood on 11/5/2021 11:21 AM      CT CHEST W CONTRAST   Final Result       1. Stable CT scan of the chest, no interval change since previous study dated 20 November 2020.   2. No displaced rib fracture or evidence of pneumothorax. 3. Thickening off the interstitial lung markings. Scarring in the right upper lobe.    4. Cardiomegaly with mitral valve calcification. Coronary artery calcification. 5. Calcification in the thoracic aorta. 6. Thoracic spondylosis. **This report has been created using voice recognition software. It may contain minor errors which are inherent in voice recognition technology. **      Final report electronically signed by DR Richard Carlos on 11/5/2021 11:29 AM      CT HEAD WO CONTRAST   Final Result       1. Stable CT scan of the brain, no interval change 27th of October 2021. Kelly Sotelo **This report has been created using voice recognition software. It may contain minor errors which are inherent in voice recognition technology. **      Final report electronically signed by DR Richard Carlos on 11/5/2021 11:20 AM      CT LUMBAR RECONSTRUCTION WO POST PROCESS   Final Result       1. No evidence of acute intra-abdominal or intrapelvic abnormality. 2. No evidence of acute osseous injury of the lumbar spine or pelvis. 3. Moderate retained stool. 4. Colonic diverticulosis. 5. Small focus of air in the bladder likely on the basis of previous instrumentation. In the absence of instrumentation, this is concerning for cystitis. **This report has been created using voice recognition software. It may contain minor errors which are inherent in voice recognition technology. **      Final report electronically signed by Dr. Gabriella Valentine MD on 11/5/2021 11:34 AM      CT THORACIC RECONSTRUCTION WO POST PROCESS   Final Result   No acute abnormality            **This report has been created using voice recognition software. It may contain minor errors which are inherent in voice recognition technology. **      Final report electronically signed by Dr. Tawnya Flynn on 11/5/2021 11:27 AM      XR HIP W PELVIS MIN 5 VWS BILATERAL   Final Result   No acute fracture            **This report has been created using voice recognition software.   It may contain minor errors which are inherent in voice recognition weight-based dosing when appropriate to reduce radiation dose to as low as reasonably achievable. FINDINGS: There is mild cardiomegaly with mitral valve calcification. . There is atherosclerotic calcification in the aortic root and aortic arch. There is no evidence for aortic dissection or mediastinal hematoma. . There is no gross abnormality of the pulmonary artery and its proximal branches. There is no mediastinal, axillary or hilar adenopathy. There is no pericardial or pleural effusion. There is diffuse COPD and thickening off the interstitial lung markings. There is scarring in the right upper lobe. . There is thoracic spondylosis. There is no acute fracture. The ribs appear to be intact. There is no displaced rib fracture. There is no pneumothorax. . There is a hiatal hernia. 1. Stable CT scan of the chest, no interval change since previous study dated 20 November 2020. 2. No displaced rib fracture or evidence of pneumothorax. 3. Thickening off the interstitial lung markings. Scarring in the right upper lobe. 4. Cardiomegaly with mitral valve calcification. Coronary artery calcification. 5. Calcification in the thoracic aorta. 6. Thoracic spondylosis. **This report has been created using voice recognition software. It may contain minor errors which are inherent in voice recognition technology. ** Final report electronically signed by DR Brielle Huerta on 11/5/2021 11:29 AM    CT CERVICAL SPINE WO CONTRAST    Result Date: 11/5/2021  PROCEDURE: CT CERVICAL SPINE WO CONTRAST CLINICAL INFORMATION: Trauma, Trauma . TECHNIQUE: 2-D multiplanar noncontrast CT cervical spine All CT scans at this facility use dose modulation, iterative reconstruction, and/or weight-based dosing when appropriate to reduce radiation dose to as low as reasonably achievable. COMPARISON: 10/27/2021 FINDINGS: No acute fracture or acute bony malalignment.  Diffuse degenerative changes are detailed on the previous exam. Straightening of the normal cervical lordosis which may be due to positioning. No precervical soft tissue swelling. Stable appearance no acute fracture. **This report has been created using voice recognition software. It may contain minor errors which are inherent in voice recognition technology. ** Final report electronically signed by Dr. Claudia Mancia on 11/5/2021 11:21 AM    CT ABDOMEN PELVIS W IV CONTRAST Additional Contrast? Radiologist Recommendation    Result Date: 11/5/2021  PROCEDURE: CT ABDOMEN PELVIS W IV CONTRAST, CT LUMBAR RECONSTRUCTION WO POST PROCESS CLINICAL INFORMATION: Trauma, Trauma . Fall today with generalized pain. COMPARISON: CT abdomen pelvis dated 11/20/2020. TECHNIQUE: Helical CT of the abdomen and pelvis following intravenous administration of 80 mL Isovue-370 injected in the right AC with sagittal and coronal reconstructions. Reformatted images of the lumbar spine with axial, sagittal and coronal reconstructions were also created. All CT scans at this facility use dose modulation, iterative reconstruction, and/or weight-based dosing when appropriate to reduce radiation dose to as low as reasonably achievable. FINDINGS: There is mild interstitial prominence at the lung bases, similar to prior exam with superimposed mild posterior dependent change. The visualized portion of the heart is unremarkable. There is a small hiatal hernia, stable compared to prior exam. Liver is unremarkable. The gallbladder is surgically absent. Adrenal glands are unremarkable. The kidneys are mildly atrophied with cortical thinning, similar to prior exam. The spleen is unremarkable. The pancreas is partially fatty replaced. No retroperitoneal or mesenteric lymphadenopathy is identified. There is moderately prominent stool throughout the large bowel. There are scattered diverticula without adjacent inflammation to suggest diverticulitis. The pelvis is partially obscured from streak artifact from right hip arthroplasty.  There is a small focus of air in the bladder. Unopacified bladder is otherwise unremarkable. The uterus and adnexa are within normal limits. No free fluid is identified in the abdomen or pelvis. There are stable degenerative changes of the lumbar spine. There is a stable dextrocurvature of the lumbar spine. There is otherwise anatomic vertebral body height and alignment. No fracture of the lumbar vertebral column is identified. No fracture of the pelvis is identified. There are stable degenerative changes of the sacral iliac joints. 1. No evidence of acute intra-abdominal or intrapelvic abnormality. 2. No evidence of acute osseous injury of the lumbar spine or pelvis. 3. Moderate retained stool. 4. Colonic diverticulosis. 5. Small focus of air in the bladder likely on the basis of previous instrumentation. In the absence of instrumentation, this is concerning for cystitis. **This report has been created using voice recognition software. It may contain minor errors which are inherent in voice recognition technology. ** Final report electronically signed by Dr. Alejandro Chavez MD on 11/5/2021 11:34 AM    VL DUP LOWER EXTREMITY VENOUS BILATERAL    Result Date: 11/5/2021  PROCEDURE: VL DUP LOWER EXTREMITY VENOUS BILATERAL CLINICAL INFORMATION: History of blood clots, PE. TECHNIQUE: High-resolution duplex ultrasound with spectral analysis of the bilateral lower extremities was performed. The common femoral, femoral, popliteal and calf vein segments were studied to the ankles. COMPARISON: Bilateral lower extremity venous duplex ultrasound 2/17/2020 FINDINGS: There is normal compressibility with no evidence of thrombus. Flow study shows normal color flow, augmentation and phasic response. No evidence of deep venous thrombosis in either lower extremity. **This report has been created using voice recognition software. It may contain minor errors which are inherent in voice recognition technology. **  Final report electronically signed by Dr. Kathy Ponce on 11/5/2021 5:43 PM    CT LUMBAR RECONSTRUCTION WO POST PROCESS    Result Date: 11/5/2021  PROCEDURE: CT ABDOMEN PELVIS W IV CONTRAST, CT LUMBAR RECONSTRUCTION WO POST PROCESS CLINICAL INFORMATION: Trauma, Trauma . Fall today with generalized pain. COMPARISON: CT abdomen pelvis dated 11/20/2020. TECHNIQUE: Helical CT of the abdomen and pelvis following intravenous administration of 80 mL Isovue-370 injected in the right AC with sagittal and coronal reconstructions. Reformatted images of the lumbar spine with axial, sagittal and coronal reconstructions were also created. All CT scans at this facility use dose modulation, iterative reconstruction, and/or weight-based dosing when appropriate to reduce radiation dose to as low as reasonably achievable. FINDINGS: There is mild interstitial prominence at the lung bases, similar to prior exam with superimposed mild posterior dependent change. The visualized portion of the heart is unremarkable. There is a small hiatal hernia, stable compared to prior exam. Liver is unremarkable. The gallbladder is surgically absent. Adrenal glands are unremarkable. The kidneys are mildly atrophied with cortical thinning, similar to prior exam. The spleen is unremarkable. The pancreas is partially fatty replaced. No retroperitoneal or mesenteric lymphadenopathy is identified. There is moderately prominent stool throughout the large bowel. There are scattered diverticula without adjacent inflammation to suggest diverticulitis. The pelvis is partially obscured from streak artifact from right hip arthroplasty. There is a small focus of air in the bladder. Unopacified bladder is otherwise unremarkable. The uterus and adnexa are within normal limits. No free fluid is identified in the abdomen or pelvis. There are stable degenerative changes of the lumbar spine. There is a stable dextrocurvature of the lumbar spine.  There is otherwise anatomic vertebral body height and alignment. No fracture of the lumbar vertebral column is identified. No fracture of the pelvis is identified. There are stable degenerative changes of the sacral iliac joints. 1. No evidence of acute intra-abdominal or intrapelvic abnormality. 2. No evidence of acute osseous injury of the lumbar spine or pelvis. 3. Moderate retained stool. 4. Colonic diverticulosis. 5. Small focus of air in the bladder likely on the basis of previous instrumentation. In the absence of instrumentation, this is concerning for cystitis. **This report has been created using voice recognition software. It may contain minor errors which are inherent in voice recognition technology. ** Final report electronically signed by Dr. Laura Smith MD on 11/5/2021 11:34 AM    CT THORACIC RECONSTRUCTION WO POST PROCESS    Result Date: 11/5/2021  PROCEDURE: CT THORACIC RECONSTRUCTION WO POST PROCESS CLINICAL INFORMATION: Trauma . TECHNIQUE: 2-D multiplanar reconstructed images of the thoracic spine All CT scans at this facility use dose modulation, iterative reconstruction, and/or weight-based dosing when appropriate to reduce radiation dose to as low as reasonably achievable. COMPARISON: 11/20/2020 FINDINGS: No acute fracture or acute bony malalignment. Degenerative changes in the lower thoracic level in particular. Several images are missing from the T6 vertebral body level. These are seen there is prominent calcification in the posterior longitudinal ligament at T11-12 no paraspinous soft tissue abnormality is seen. No significant foraminal encroachment is seen. On the soft tissue images. No acute abnormality **This report has been created using voice recognition software. It may contain minor errors which are inherent in voice recognition technology. ** Final report electronically signed by Dr. Beni Singer on 11/5/2021 11:27 AM    XR HIP W PELVIS MIN 5 VWS BILATERAL    Result Date: 11/5/2021  PROCEDURE: XR HIP W PELVIS MIN 5 VWS BILATERAL CLINICAL INFORMATION: fall hip pain . TECHNIQUE: AP pelvis and AP and attempted frog-leg projections of both hips. COMPARISON: No prior study. FINDINGS: Right hip replacement. No acute fracture or bone destruction. Exam limited by bone density and technique. If clinical concern remains for fracture CT would be recommended. No acute fracture **This report has been created using voice recognition software. It may contain minor errors which are inherent in voice recognition technology. ** Final report electronically signed by Dr. Remigio Cohen on 11/5/2021 11:04 AM    Electronically signed by Sara Villalpando MD on 11/13/2021 at 12:43 PM

## 2021-11-13 NOTE — PROGRESS NOTES
Lab called this AM with a critical result on K+ being 6.1 notified Elyssa Musa who ordered a redraw of potassium level

## 2021-11-14 NOTE — PROGRESS NOTES
Hospitalist Progress Note      Patient:  Chikis Trinidad    Unit/Bed:6K-21/021-A  YOB: 1935  MRN: [id number]   Acct: [de-identified]   PCP: Sami Childress MD  Date of Admission: 11/5/2021    Assessment/Plan:    1. Hypotension: Active  Plan:   -Resolved, continue to monitor bp  -Continue oral nutritional supplements    -Continue Lopressor dose 25 mg BID, parameters in place hold if SBP less than 100.      2. Frequent falls:   -Symptoms consistent with orthostasis. -Patient given NaCl bolus for soft BP on 11/7 and 11/11. Orthostatic vital signs from 11/7 negative. -PT/OT following; awaiting placement    3. Acute kidney injury on CKD stage IV: Resolved  -Secondary to prerenal azotemia.  -Improved with IV fluid resuscitation.  -Currently holding Bumex and Zaroxolyn. On discharge plan to continue holding diuretics. Can take the diuretics PRN for swelling, edema, or weight gain. Follow with PCP and repeat BMP in 1 week when she discharges. 4. Elevated troponin:   -Suspect this is related to ARTIE on CKD. -No ischemic changes on EKG. 5. History of DVT with PE/left leg pain & swelling:  -Venous Dopplers negative for DVT. -No indication anticoagulation given high fall risk and bleeding risk. 6. Diabetes mellitus type 2:  -Poorly controlled. A1c on 10/29/2021 of 17.9  -Holding home Metformin  -Glucose today in the   -Continue with Lantus and medium SSI  -Continue meal time insulin 11 units yesterday, 11/9  -Will continue to monitor glucose     7. Hypertension:   -Continue Lopressor 25 mg BID as patient still is hypotensive at times. 8. Hyperlipidemia:   -Continue home Lipitor 40 mg    Chief Complaint: [facility] Course:     Chikis Trinidad is a 80 y.o. female with a with a PMH of breast cancer, coronary artery disease, congestive heart failure, diabetes mellitus type 2, hyperlipidemia, hypertension, PE and DVT who presented with a mechanical fall and was found to have an ARTIE and elevated troponin. Patient is on Plavix at home an therefore this was called a level 2 trauma in ED. Patient comes from a nursing home where this morning at 4:30 am she fell while moving from her bed to her recliner. She states she was using her walker and is not sure if she tripped or if the walker was not locked properly. She remembers falling and laying on the floor for 4 hours before she was found. She denies losing consciousness, head pain, or neck pain. She states she had some lightheadedness and dizziness for the last month. Patient mentions that when she initially stood up she feel like \"all the blood was rushing to my head\". She also has had chest pain when she coughs or takes a deep breath. Patient is accompanied with her daughter who states her mother was recently discharged from [facility] on 11/1/21 with similar symptoms and that she has had a fall everyday for the last 6 days. Daughter also states that patient is normally tachy at baseline. Patient also had acute encephalopathy at last admission. Today she is A&Ox3. She states she is having some abdominal discomfort. Patient denies having any fevers, chills, nausea, vomiting, palpitations, shortness of breath, diarrhea, urinary frequency, urgency or dysuria. 11/11: Patient denied precert from SNF, and assisted living where patient came from will not accept her back. P2P also denied due to time frame at this time. Subjective (past 24 hours):   Patient seen and examined at bedside. Patient reports she is doing well. No new complaints or acute overnight events    Past medical history, family history, social history and allergies reviewed again and is unchanged since admission. ROS (12 point review of systems completed. Pertinent positives noted.  Otherwise ROS is negative)     Medications:  Reviewed    Infusion Medications    dextrose      sodium chloride       Scheduled Medications    metoprolol tartrate  25 mg Oral BID    insulin lispro  11 Units SubCUTAneous TID     enoxaparin  30 mg SubCUTAneous Q24H    insulin glargine  20 Units SubCUTAneous Nightly    sodium chloride  1,000 mL IntraVENous Once    [Held by provider] bumetanide  2 mg Oral Daily    clopidogrel  75 mg Oral Daily    DULoxetine  120 mg Oral Daily    potassium chloride  10 mEq Oral Daily    melatonin  3 mg Oral Nightly    lactobacillus  1 capsule Oral BID WC    atorvastatin  40 mg Oral Daily    sodium chloride flush  5-40 mL IntraVENous 2 times per day    insulin lispro  0-12 Units SubCUTAneous TID WC    insulin lispro  0-6 Units SubCUTAneous Nightly     PRN Meds: glucose, dextrose, glucagon (rDNA), dextrose, nitroGLYCERIN, [Held by provider] metOLazone, magnesium hydroxide, sodium chloride flush, sodium chloride, ondansetron **OR** ondansetron, polyethylene glycol, acetaminophen **OR** acetaminophen    No intake or output data in the 24 hours ending 11/14/21 1151    Diet:  ADULT DIET; Dysphagia - Soft and Bite Sized; 3 carb choices (45 gm/meal)  ADULT ORAL NUTRITION SUPPLEMENT; PM Snack, HS Snack; Frozen Oral Supplement  ADULT ORAL NUTRITION SUPPLEMENT; Breakfast, Dinner; Diabetic Oral Supplement    Exam:  BP (!) 111/56   Pulse 85   Temp 97.7 °F (36.5 °C) (Oral)   Resp 16   Ht 5' 5\" (1.651 m)   Wt 155 lb (70.3 kg)   SpO2 93%   BMI 25.79 kg/m²      General appearance:  No acute distress. Respiratory:  Normal respiratory effort. Clear to auscultation, bilaterally without Rales/Wheezes/Rhonchi. Cardiovascular: Regular rate and rhythm with normal S1/S2 without murmurs, rubs or gallops. Abdomen: Soft, non-tender, non-distended with normal bowel sounds. Skin: Skin color, texture, turgor normal.  No rashes or lesions. Neurologic:  Neurovascularly intact without any focal sensory/motor deficits.  Cranial nerves: II-XII intact, grossly non-focal.      Labs:   No results for input(s): WBC, HGB, HCT, PLT in the last 72 hours. Recent Labs     11/12/21  0426 11/12/21  0426 11/13/21  0510 11/13/21  0729 11/14/21  0551     --  144  --  139   K 4.8   < > 6.1* 4.9 4.3     --  109  --  106   CO2 26  --  26  --  24   BUN 20  --  24*  --  20   CREATININE 1.0  --  1.0  --  0.9   CALCIUM 9.1  --  9.1  --  8.9    < > = values in this interval not displayed. No results for input(s): AST, ALT, BILIDIR, BILITOT, ALKPHOS in the last 72 hours. No results for input(s): INR in the last 72 hours. No results for input(s): Prentis Revels in the last 72 hours. Microbiology:    Blood culture #1: No results found for: [facility]    Blood culture #2:No results found for: Obdulia Fell    Organism:  Lab Results   Component Value Date    ORG Growth of Contaminants 11/05/2021         Lab Results   Component Value Date    LABGRAM  10/12/2021     No segmented neutrophils observed. Rare epithelial cells observed. No bacteria seen. MRSA culture only:No results found for: [address] [place] [address]    Urine culture:   Lab Results   Component Value Date    LABURIN [place] count: >100,000 CFU/mL  10/29/2021       Respiratory culture: No results found for: CULTRESP    Aerobic and Anaerobic :  Lab Results   Component Value Date    LABAERO  10/12/2021     No growth-preliminary Culture also yielded light growth of Staphylococcus species (coagulase negative). LABAERO light growth  10/12/2021    LABAERO  10/12/2021     very light growth Pantoea species which may be initially susceptible to third generation cephalosporins may develop resistance within three to four days of initiation of this antimicrobial therapy.       No results found for: LABANAE    Urinalysis:      Lab Results   Component Value Date    NITRU NEGATIVE 11/05/2021    WBCUA 15-25 11/05/2021    BACTERIA NONE SEEN 11/05/2021    RBCUA NONE 11/05/2021    BLOODU NEGATIVE 11/05/2021    SPECGRAV 1.013 10/27/2021    GLUCOSEU >= 1000 11/05/2021       Radiology:  VL DUP LOWER EXTREMITY VENOUS BILATERAL Final Result      No evidence of deep venous thrombosis in either lower extremity. **This report has been created using voice recognition software. It may contain minor errors which are inherent in voice recognition technology. **             Final report electronically signed by Dr. Héctor Caceres on 11/5/2021 5:43 PM      CT ABDOMEN PELVIS W IV CONTRAST Additional Contrast? Radiologist Recommendation   Final Result       1. No evidence of acute intra-abdominal or intrapelvic abnormality. 2. No evidence of acute osseous injury of the lumbar spine or pelvis. 3. Moderate retained stool. 4. Colonic diverticulosis. 5. Small focus of air in the bladder likely on the basis of previous instrumentation. In the absence of instrumentation, this is concerning for cystitis. **This report has been created using voice recognition software. It may contain minor errors which are inherent in voice recognition technology. **      Final report electronically signed by Dr. Rebel Enriquez MD on 11/5/2021 11:34 AM      CT CERVICAL SPINE WO CONTRAST   Final Result   Stable appearance no acute fracture. **This report has been created using voice recognition software. It may contain minor errors which are inherent in voice recognition technology. **      Final report electronically signed by Dr. Anthony Au on 11/5/2021 11:21 AM      CT CHEST W CONTRAST   Final Result       1. Stable CT scan of the chest, no interval change since previous study dated 20 November 2020.   2. No displaced rib fracture or evidence of pneumothorax. 3. Thickening off the interstitial lung markings. Scarring in the right upper lobe. 4. Cardiomegaly with mitral valve calcification. Coronary artery calcification. 5. Calcification in the thoracic aorta. 6. Thoracic spondylosis. **This report has been created using voice recognition software.  It may contain minor errors which are inherent in dated 27 October 2021. . TECHNIQUE: Noncontrast 5 mm axial images were obtained through the brain. Sagittal and coronal reconstructions were obtained. All CT scans at this facility use dose modulation, iterative reconstruction, and/or weight-based dosing when appropriate to reduce radiation dose to as low as reasonably achievable. FINDINGS: There is mild atrophy and dilatation of the lateral ventricles. There is diminished attenuation in the white matter most likely representing ischemic changes. This a lacunar infarct in the right cerebellar hemisphere. There is calcification in the pineal  gland and choroid plexus of both lateral ventricles. There is calcification the cavernous segments of both internal carotid arteries. There is no hemorrhage. There are no intra-or extra-axial collections. There is no  midline shift or mass effect. The paranasal sinuses and mastoid air cells are normally aerated. There is no suspicious calvarial abnormality. 1. Stable CT scan of the brain, no interval change 27th of October 2021. Flora Gavin **This report has been created using voice recognition software. It may contain minor errors which are inherent in voice recognition technology. ** Final report electronically signed by DR Gerald Maravilla on 11/5/2021 11:20 AM    CT CHEST W CONTRAST    Result Date: 11/5/2021  PROCEDURE: CT CHEST W CONTRAST CLINICAL INFORMATION: Trauma, Trauma. COMPARISON: CT scan of the chest dated 20 th of November 2020. Flora Gavin TECHNIQUE: 5 mm axial images were obtained through the chest after the administration of intravenous contrast. Sagittal and coronal reconstructions were obtained. All CT scans at this facility use dose modulation, iterative reconstruction, and/or weight-based dosing when appropriate to reduce radiation dose to as low as reasonably achievable. FINDINGS: There is mild cardiomegaly with mitral valve calcification. . There is atherosclerotic calcification in the aortic root and aortic arch.  There is no evidence for aortic dissection or mediastinal hematoma. . There is no gross abnormality of the pulmonary artery and its proximal branches. There is no mediastinal, axillary or hilar adenopathy. There is no pericardial or pleural effusion. There is diffuse COPD and thickening off the interstitial lung markings. There is scarring in the right upper lobe. . There is thoracic spondylosis. There is no acute fracture. The ribs appear to be intact. There is no displaced rib fracture. There is no pneumothorax. . There is a hiatal hernia. 1. Stable CT scan of the chest, no interval change since previous study dated 20 November 2020. 2. No displaced rib fracture or evidence of pneumothorax. 3. Thickening off the interstitial lung markings. Scarring in the right upper lobe. 4. Cardiomegaly with mitral valve calcification. Coronary artery calcification. 5. Calcification in the thoracic aorta. 6. Thoracic spondylosis. **This report has been created using voice recognition software. It may contain minor errors which are inherent in voice recognition technology. ** Final report electronically signed by DR Fabrizio Yusuf on 11/5/2021 11:29 AM    CT CERVICAL SPINE WO CONTRAST    Result Date: 11/5/2021  PROCEDURE: CT CERVICAL SPINE WO CONTRAST CLINICAL INFORMATION: Trauma, Trauma . TECHNIQUE: 2-D multiplanar noncontrast CT cervical spine All CT scans at this facility use dose modulation, iterative reconstruction, and/or weight-based dosing when appropriate to reduce radiation dose to as low as reasonably achievable. COMPARISON: 10/27/2021 FINDINGS: No acute fracture or acute bony malalignment. Diffuse degenerative changes are detailed on the previous exam. Straightening of the normal cervical lordosis which may be due to positioning. No precervical soft tissue swelling. Stable appearance no acute fracture. **This report has been created using voice recognition software.   It may contain minor errors which are inherent in voice recognition technology. ** Final report electronically signed by Dr. Carri Johnson on 11/5/2021 11:21 AM    CT ABDOMEN PELVIS W IV CONTRAST Additional Contrast? Radiologist Recommendation    Result Date: 11/5/2021  PROCEDURE: CT ABDOMEN PELVIS W IV CONTRAST, CT LUMBAR RECONSTRUCTION WO POST PROCESS CLINICAL INFORMATION: Trauma, Trauma . Fall today with generalized pain. COMPARISON: CT abdomen pelvis dated 11/20/2020. TECHNIQUE: Helical CT of the abdomen and pelvis following intravenous administration of 80 mL Isovue-370 injected in the right AC with sagittal and coronal reconstructions. Reformatted images of the lumbar spine with axial, sagittal and coronal reconstructions were also created. All CT scans at this facility use dose modulation, iterative reconstruction, and/or weight-based dosing when appropriate to reduce radiation dose to as low as reasonably achievable. FINDINGS: There is mild interstitial prominence at the lung bases, similar to prior exam with superimposed mild posterior dependent change. The visualized portion of the heart is unremarkable. There is a small hiatal hernia, stable compared to prior exam. Liver is unremarkable. The gallbladder is surgically absent. Adrenal glands are unremarkable. The kidneys are mildly atrophied with cortical thinning, similar to prior exam. The spleen is unremarkable. The pancreas is partially fatty replaced. No retroperitoneal or mesenteric lymphadenopathy is identified. There is moderately prominent stool throughout the large bowel. There are scattered diverticula without adjacent inflammation to suggest diverticulitis. The pelvis is partially obscured from streak artifact from right hip arthroplasty. There is a small focus of air in the bladder. Unopacified bladder is otherwise unremarkable. The uterus and adnexa are within normal limits. No free fluid is identified in the abdomen or pelvis. There are stable degenerative changes of the lumbar spine.  There is a stable dextrocurvature of the lumbar spine. There is otherwise anatomic vertebral body height and alignment. No fracture of the lumbar vertebral column is identified. No fracture of the pelvis is identified. There are stable degenerative changes of the sacral iliac joints. 1. No evidence of acute intra-abdominal or intrapelvic abnormality. 2. No evidence of acute osseous injury of the lumbar spine or pelvis. 3. Moderate retained stool. 4. Colonic diverticulosis. 5. Small focus of air in the bladder likely on the basis of previous instrumentation. In the absence of instrumentation, this is concerning for cystitis. **This report has been created using voice recognition software. It may contain minor errors which are inherent in voice recognition technology. ** Final report electronically signed by Dr. Ryan Yanes MD on 11/5/2021 11:34 AM     DUP LOWER EXTREMITY VENOUS BILATERAL    Result Date: 11/5/2021  PROCEDURE:  DUP LOWER EXTREMITY VENOUS BILATERAL CLINICAL INFORMATION: History of blood clots, PE. TECHNIQUE: High-resolution duplex ultrasound with spectral analysis of the bilateral lower extremities was performed. The common femoral, femoral, popliteal and calf vein segments were studied to the ankles. COMPARISON: Bilateral lower extremity venous duplex ultrasound 2/17/2020 FINDINGS: There is normal compressibility with no evidence of thrombus. Flow study shows normal color flow, augmentation and phasic response. No evidence of deep venous thrombosis in either lower extremity. **This report has been created using voice recognition software. It may contain minor errors which are inherent in voice recognition technology. **  Final report electronically signed by Dr. Krystyna Arvizu on 11/5/2021 5:43 PM    CT LUMBAR RECONSTRUCTION WO POST PROCESS    Result Date: 11/5/2021  PROCEDURE: CT ABDOMEN PELVIS W IV CONTRAST, CT LUMBAR RECONSTRUCTION WO POST PROCESS CLINICAL INFORMATION: Trauma, Trauma .  Fall today with generalized pain. COMPARISON: CT abdomen pelvis dated 11/20/2020. TECHNIQUE: Helical CT of the abdomen and pelvis following intravenous administration of 80 mL Isovue-370 injected in the right AC with sagittal and coronal reconstructions. Reformatted images of the lumbar spine with axial, sagittal and coronal reconstructions were also created. All CT scans at this facility use dose modulation, iterative reconstruction, and/or weight-based dosing when appropriate to reduce radiation dose to as low as reasonably achievable. FINDINGS: There is mild interstitial prominence at the lung bases, similar to prior exam with superimposed mild posterior dependent change. The visualized portion of the heart is unremarkable. There is a small hiatal hernia, stable compared to prior exam. Liver is unremarkable. The gallbladder is surgically absent. Adrenal glands are unremarkable. The kidneys are mildly atrophied with cortical thinning, similar to prior exam. The spleen is unremarkable. The pancreas is partially fatty replaced. No retroperitoneal or mesenteric lymphadenopathy is identified. There is moderately prominent stool throughout the large bowel. There are scattered diverticula without adjacent inflammation to suggest diverticulitis. The pelvis is partially obscured from streak artifact from right hip arthroplasty. There is a small focus of air in the bladder. Unopacified bladder is otherwise unremarkable. The uterus and adnexa are within normal limits. No free fluid is identified in the abdomen or pelvis. There are stable degenerative changes of the lumbar spine. There is a stable dextrocurvature of the lumbar spine. There is otherwise anatomic vertebral body height and alignment. No fracture of the lumbar vertebral column is identified. No fracture of the pelvis is identified. There are stable degenerative changes of the sacral iliac joints.       1. No evidence of acute intra-abdominal or intrapelvic abnormality. 2. No evidence of acute osseous injury of the lumbar spine or pelvis. 3. Moderate retained stool. 4. Colonic diverticulosis. 5. Small focus of air in the bladder likely on the basis of previous instrumentation. In the absence of instrumentation, this is concerning for cystitis. **This report has been created using voice recognition software. It may contain minor errors which are inherent in voice recognition technology. ** Final report electronically signed by Dr. Alma Delia Duque MD on 11/5/2021 11:34 AM    CT THORACIC RECONSTRUCTION WO POST PROCESS    Result Date: 11/5/2021  PROCEDURE: CT THORACIC RECONSTRUCTION WO POST PROCESS CLINICAL INFORMATION: Trauma . TECHNIQUE: 2-D multiplanar reconstructed images of the thoracic spine All CT scans at this facility use dose modulation, iterative reconstruction, and/or weight-based dosing when appropriate to reduce radiation dose to as low as reasonably achievable. COMPARISON: 11/20/2020 FINDINGS: No acute fracture or acute bony malalignment. Degenerative changes in the lower thoracic level in particular. Several images are missing from the T6 vertebral body level. These are seen there is prominent calcification in the posterior longitudinal ligament at T11-12 no paraspinous soft tissue abnormality is seen. No significant foraminal encroachment is seen. On the soft tissue images. No acute abnormality **This report has been created using voice recognition software. It may contain minor errors which are inherent in voice recognition technology. ** Final report electronically signed by Dr. Raul Espitia on 11/5/2021 11:27 AM    XR HIP W PELVIS MIN 5 VWS BILATERAL    Result Date: 11/5/2021  PROCEDURE: XR HIP W PELVIS MIN 5 VWS BILATERAL CLINICAL INFORMATION: fall hip pain . TECHNIQUE: AP pelvis and AP and attempted frog-leg projections of both hips. COMPARISON: No prior study. FINDINGS: Right hip replacement. No acute fracture or bone destruction.  Exam limited by bone density and technique. If clinical concern remains for fracture CT would be recommended. No acute fracture **This report has been created using voice recognition software. It may contain minor errors which are inherent in voice recognition technology. ** Final report electronically signed by Dr. Nabila Suggs on 11/5/2021 11:04 AM    Electronically signed by Ramy Orozco MD on 11/14/2021 at 11:51 AM

## 2021-11-15 NOTE — FLOWSHEET NOTE
[facility]  PHYSICAL THERAPY MISSED TREATMENT NOTE  [facility] RENAL TELEMETRY 6K    Date: 11/15/2021  Patient Name: Renetta Bauer        MRN: [id number]   : 1935  (80 y.o.)  Gender: female   Referring Practitioner: Dr. Gary Begum  Diagnosis: elevated troponin         REASON FOR MISSED TREATMENT:  Missed Treat. Patient resting in bed upon arrival. Patient declined therapy at this time, states \"I want to rest until my granddaughter comes this afternoon\". Despite encouragement. Will resume therapy on next appropriate date. T-wave inversions in lead III and T-wave flattening in lateral leads, no NISHA

## 2021-11-15 NOTE — CARE COORDINATION
Discharge Planning Update:    Discharge Planning Update:    Received a call from Omar Kelsey at [facility], she states Terri Ledezma has been approved for [facility]. Her approval # [id number]. Hiwot also left a message for [employer] at [facility].FAROOQ Tavera notified via [employer].  Electronically signed by Petra Morales RN on 11/15/2021 at 10:43 AM

## 2021-11-15 NOTE — FLOWSHEET NOTE
[facility]  PHYSICAL THERAPY MISSED TREATMENT NOTE  [facility] RENAL TELEMETRY 6K    Date: 11/15/2021  Patient Name: Armida Bailey        MRN: [id number]   : 1935  (80 y.o.)  Gender: female   Referring Practitioner: Dr. Caron Arredondo  Diagnosis: elevated troponin         REASON FOR MISSED TREATMENT:  Missed Treat. First attempt patient requested therapy try back later due to she did not sleep well. Second attempt patient working with OT. Will try back later as time allows.

## 2021-11-15 NOTE — DISCHARGE SUMMARY
Hospital Medicine Discharge Summary      Patient Identification:   Zena Downey   : 1935  MRN: [id number]   Account: [de-identified]      Patient's PCP: Emeka Parham MD    Admit Date: 2021     Discharge Date: 11/15/2021      Admitting Physician: No admitting provider for patient encounter. Discharge Physician: Wagner Man DO     Discharge Diagnoses: ARTIE on CKD  IV -resolved  Hypotension- resolved  Diabetes mellitus type 2  HLD  Hx of HTN  Hx of DVT/PE  Hx of falls. The patient was seen and examined on day of discharge and this discharge summary is in conjunction with any daily progress note from day of discharge. Hospital Course: Zena Downey is a 80 y.o. female admitted to [place] [address] on 2021 for frequent falls and found in ED- ARTIE on CKD IV, elevated troponin. Her PMHx is known for  Breast cancer in remission, CAD, PE/DVT, HLD, HTN, CHF, DM and on Plavix/aspirin. Initial lab workup found; Glucose-387, Creatinine-1.7 (baseline 1.0), eGFR-28. troponin- 0.017., anion gap-20.,   Patient is see by trauma- Gen Surgery in ED, evaluated and admitted for medical floor. Patient's home medications reviewed, held bumex, and given IV fluids for ARTIE on CKD, which is suspected pre-renal due to hypotension and poor oral intake. During the hopsital course, patient is followed by physical therapy and Occupational therapy specialists.  folowed case. Patient is stable, being discharged to [facility] for further rehab. Recommendations given. Patient needs PCP follow up within 2 weeks. Patient can take metolazone PRN for leg swelling, and potassium chloride at same day.      Exam:     Vitals:  Vitals:    21 2345 11/15/21 0500 11/15/21 0715 11/15/21 1130   BP: (!) 114/54 (!) 111/55 126/61 (!) 93/51   Pulse: 99 94 99 84   Resp: 16 16 12 12   Temp: 97.8 °F (36.6 °C) 98 °F (36.7 °C) 97.7 °F (36.5 °C) 97.4 °F (36.3 °C)   TempSrc: Oral Oral Oral Oral SpO2: 95% 95% 94% 96%   Weight:       Height:         Weight: Weight: 155 lb (70.3 kg)     24 hour intake/output:    Intake/Output Summary (Last 24 hours) at 11/15/2021 1528  Last data filed at 11/15/2021 1307  Gross per 24 hour   Intake 780 ml   Output    Net 780 ml         General appearance:  No apparent distress, appears stated age and cooperative. HEENT:  Normal cephalic, atraumatic without obvious deformity. Pupils equal, round, and reactive to light. Extra ocular muscles intact. Conjunctivae/corneas clear. Neck: Supple, with full range of motion. No jugular venous distention. Trachea midline. Respiratory:  Normal respiratory effort. Clear to auscultation, bilaterally without Rales/Wheezes/Rhonchi. Cardiovascular:  Regular rate and rhythm with normal S1/S2 without murmurs, rubs or gallops. Abdomen: Soft, non-tender, non-distended with normal bowel sounds. Musculoskeletal:  No clubbing, cyanosis or edema bilaterally. Full range of motion without deformity. Trace pitting edema in lower legs bilaterally. Skin: Skin color, texture, turgor normal.  No rashes or lesions. Neurologic:  Neurovascularly intact without any focal sensory/motor deficits. Cranial nerves: II-XII intact, grossly non-focal.  Psychiatric:  Alert and oriented, thought content appropriate, normal insight  Capillary Refill: Brisk,< 3 seconds   Peripheral Pulses: +2 palpable, equal bilaterally       Labs:  For convenience and continuity at follow-up the following most recent labs are provided:      CBC:    Lab Results   Component Value Date    WBC 6.6 11/11/2021    HGB 12.1 11/11/2021    HCT 38.9 11/11/2021     11/11/2021       Renal:    Lab Results   Component Value Date     11/15/2021    K 5.0 11/15/2021    K 3.7 11/06/2021     11/15/2021    CO2 25 11/15/2021    BUN 20 11/15/2021    CREATININE 1.0 11/15/2021    CALCIUM 9.1 11/15/2021         Significant Diagnostic Studies    Radiology:   VL DUP LOWER EXTREMITY VENOUS BILATERAL   Final Result      No evidence of deep venous thrombosis in either lower extremity. **This report has been created using voice recognition software. It may contain minor errors which are inherent in voice recognition technology. **             Final report electronically signed by Dr. Darien Bae on 11/5/2021 5:43 PM      CT ABDOMEN PELVIS W IV CONTRAST Additional Contrast? Radiologist Recommendation   Final Result       1. No evidence of acute intra-abdominal or intrapelvic abnormality. 2. No evidence of acute osseous injury of the lumbar spine or pelvis. 3. Moderate retained stool. 4. Colonic diverticulosis. 5. Small focus of air in the bladder likely on the basis of previous instrumentation. In the absence of instrumentation, this is concerning for cystitis. **This report has been created using voice recognition software. It may contain minor errors which are inherent in voice recognition technology. **      Final report electronically signed by Dr. She Hicks MD on 11/5/2021 11:34 AM      CT CERVICAL SPINE WO CONTRAST   Final Result   Stable appearance no acute fracture. **This report has been created using voice recognition software. It may contain minor errors which are inherent in voice recognition technology. **      Final report electronically signed by Dr. Rain Easton on 11/5/2021 11:21 AM      CT CHEST W CONTRAST   Final Result       1. Stable CT scan of the chest, no interval change since previous study dated 20 November 2020.   2. No displaced rib fracture or evidence of pneumothorax. 3. Thickening off the interstitial lung markings. Scarring in the right upper lobe. 4. Cardiomegaly with mitral valve calcification. Coronary artery calcification. 5. Calcification in the thoracic aorta. 6. Thoracic spondylosis. **This report has been created using voice recognition software.  It may contain minor errors which Status:  DNR-CCA     Patient Instructions:    Discharge lab work: BMP a day prior to PCP follow up, which is within 2 weeks   Activity: activity as tolerated  Diet: ADULT DIET; Dysphagia - Soft and Bite Sized; 3 carb choices (45 gm/meal)  ADULT ORAL NUTRITION SUPPLEMENT; PM Snack, HS Snack; Frozen Oral Supplement  ADULT ORAL NUTRITION SUPPLEMENT; Breakfast, Dinner; Diabetic Oral Supplement      Follow-up visits:    STR [address] [address] [address]  [facility], [address] [facility] [address]  [phone]       MS WITH OFFICE WAITING FOR REPLY ///   AH          Discharge Medications:        Medication List        CHANGE how you take these medications      metOLazone 2.5 MG tablet  Commonly known as: ZAROXOLYN  Take 1 tablet by mouth daily as needed (wt gain, edema, sob) Check Blood pressure first, if systolic blood pressure less than 100, hold the medication. If you take it, also take Potassium chloride on same day. What changed: additional instructions     metoprolol tartrate 25 MG tablet  Commonly known as: LOPRESSOR  Take 1 tablet by mouth 2 times daily  What changed:   · medication strength  · how much to take     potassium chloride 10 MEQ extended release tablet  Commonly known as: KLOR-CON  Take 1 tablet by mouth daily Take it when you take bumex, on same day  What changed: additional instructions            CONTINUE taking these medications      acetaminophen 325 MG tablet  Commonly known as: TYLENOL     atorvastatin 40 MG tablet  Commonly known as: LIPITOR  Take 1 tablet by mouth daily     blood glucose test strips strip  Commonly known as: ONE TOUCH ULTRA TEST  Test daily or AD.   Dx. 250.00     clopidogrel 75 MG tablet  Commonly known as: PLAVIX  Pt takes 300 mg tonight then 75 mg every day starting on 2/4/2020     D-Mannose 500 MG Caps     DULoxetine 60 MG extended release capsule  Commonly known as: Cymbalta  Take 2 capsules by mouth daily     lactobacillus capsule  Take 1 capsule by mouth 2 times daily (with meals)     magnesium hydroxide 400 MG/5ML suspension  Commonly known as: MILK OF MAGNESIA     melatonin 3 MG Tabs tablet     metFORMIN 750 MG extended release tablet  Commonly known as: GLUCOPHAGE-XR  Take 1 tablet by mouth Daily with supper Restart this medication Friday am     nitroGLYCERIN 0.4 MG SL tablet  Commonly known as: NITROSTAT  up to max of 3 total doses. If no relief after 1 dose, call 911. polyethyl glycol-propyl glycol 0.4-0.3 % 0.4-0.3 % ophthalmic solution  Commonly known as: SYSTANE            STOP taking these medications      bumetanide 2 MG tablet  Commonly known as: Bumex     cefdinir 300 MG capsule  Commonly known as: OMNICEF               Where to Get Your Medications        These medications were sent to [address] [facility] [address] [facility]   [address] [facility], [address] [facility]      Phone: [phone]   · nitroGLYCERIN 0.4 MG SL tablet       Information about where to get these medications is not yet available    Ask your nurse or doctor about these medications  · metOLazone 2.5 MG tablet  · metoprolol tartrate 25 MG tablet  · potassium chloride 10 MEQ extended release tablet         Time Spent on discharge is more than 30 minutes in the examination, evaluation, counseling and review of medications and discharge plan. Signed: Thank you Carmen Agrawal MD for the opportunity to be involved in this patient's care.     Electronically signed by Lizet Owens DO on 11/15/2021 at 3:28 PM

## 2021-11-15 NOTE — PROGRESS NOTES
[address] RENAL TELEMETRY 6K  Occupational Therapy  Daily Note  Time:    Time In: 3416  Time Out: 9628  Timed Code Treatment Minutes: 29 Minutes  Minutes: 29          Date: 11/15/2021  Patient Name: Debi Ponce,   Gender: female      Room: [facility]021-A  MRN: [id number]  : 1935  (80 y.o.)  Referring Practitioner: Dalila Kelsey DO  Diagnosis: Elevated Troponin  Additional Pertinent Hx: Per H&P, Debi Ponce is a 80 y.o. female with a with a PMH of breast cancer, coronary artery disease, congestive heart failure, diabetes mellitus type 2, hyperlipidemia, hypertension, PE and DVT who presented with a mechanical fall and was found to have an ARTIE and elevated troponin. Patient is on Plavix at home an therefore this was called a level 2 trauma in ED. Patient comes from a nursing home where this morning at 4:30 am she fell while moving from her bed to her recliner. She states she was using her walker and is not sure if she tripped or if the walker was not locked properly. She remembers falling and laying on the floor for 4 hours before she was found. She denies losing consciousness, head pain, or neck pain. She states she had some lightheadedness and dizziness for the last month. Patient mentions that when she initially stood up she feel like \"all the blood was rushing to my head\". She also has had chest pain when she coughs or takes a deep breath. Patient is accompanied with her daughter who states her mother was recently discharged from [facility] on 21 with similar symptoms and that she has had a fall everyday for the last 6 days. Daughter also states that patient is normally tachy at baseline. Patient also had acute encephalopathy at last admission. Today she is A&Ox3. She states she is having some abdominal discomfort.  Patient denies having any fevers, chills, nausea, vomiting, palpitations, shortness of breath, diarrhea, urinary frequency, urgency or Functional Mobility Training, Endurance Training, Safety Education & Training, Self-Care / ADL, Patient/Caregiver Education & Training, Home Management Training    Patient Education  Patient Education: HEP with handout, safety with walker at sink     Goals  Short term goals  Time Frame for Short term goals: Until discharge  Short term goal 1: Pt will complete BUE light resistive exercises with min vcs for technique to increase indep and endurance with all self cares and transfers. Short term goal 2: Pt will complete standing tolerance x 4 minutes with 2 UE release and Mod Indep to increase indep and endurance with all sinkside grooming. Short term goal 3: Pt will complete functional mobility HH distances Mod Indep to increase indep and endurance with all self care routine. Short term goal 4: Pt will complete LB dressing with LHAE PRN and SBA to increase indep and endurance in home environment. Following session, patient left in safe position with all fall risk precautions in place.

## 2021-11-15 NOTE — CARE COORDINATION
11/15/21, 11:49 AM EST    Patient goals/plan/ treatment preferences discussed by  and . Patient goals/plan/ treatment preferences reviewed with patient/ family. Patient/ family verbalize understanding of discharge plan and are in agreement with goal/plan/treatment preferences. Understanding was demonstrated using the teach back method. AVS provided by RN at time of discharge, which includes all necessary medical information pertaining to the patients current course of illness, treatment, post-discharge goals of care, and treatment preferences. Services After Discharge  Services At/After Discharge: Skilled Therapy, Nursing Services, Aide Services ([facility], skilled medicare)   IMM Letter  IMM Letter given to Patient/Family/Significant other/Guardian/POA/by[de-identified] Nati Sims CM  IMM Letter date given[de-identified] 11/15/21  IMM Letter time given[de-identified] 7344     Patient approved for rehab today. Patient discharging to [facility] today. Granddaughter will be here at 3pm today to transport Patient. RN updated and blue envelope placed on chart.

## 2021-11-16 NOTE — PATIENT INSTRUCTIONS
You may receive a survey regarding the care you received during your visit. Your input is valuable to us. We encourage you to complete and return your survey. We hope you will choose us in the future for your healthcare needs.     Continue:  · Continue current medications  · Daily weights and record  · Fluid restriction of 2 Liters per day  · Limit sodium in diet to around 7065-1464 mg/day  · Monitor BP  · Activity as tolerated     Call the Heart Failure Clinic for any of the following symptoms: [phone]   Weight gain of 2-3 pounds in 1 day or 5 pounds in 1 week   Increased shortness of breath   Shortness of breath while laying down   Cough   Chest pain   Swelling in feet, ankles or legs   Tenderness or bloating in the abdomen   Fatigue    Decreased appetite or nausea    Confusion   

## 2021-11-16 NOTE — PROGRESS NOTES
Heart Failure Clinic       Visit Date: 11/16/2021  Cardiologist:  Dr. Andria Becker  Primary Care Physician: Dr. Hollie Davalos MD    Reginald Aguilar is a 80 y.o. female who presents today for:  Chief Complaint   Patient presents with    Congestive Heart Failure       HPI:   Reginald Aguilar is a 80 y.o. female who presents to the office for a follow up patient visit in the heart failure clinic. Accompanied by no one    TYPE HF: Diastolic (EF 27-10%)  Device: no  HX: Mitral stenosis, Mild Aortic Stenosis (PRASHANTH 1.5 cm2), HTN, HLD, DM, PVD    Dry Wt:  ? Hospitalization:  Oct 2021 - s/p fall. ARTIE - creat 2.8  Nov - fall, creat 1.7 - STOPPED Bumex 2mg/day at D/C    Today: c/o a lot pain = back, chest, shoulders, from recent falls. Anxious to get back to facility to see new [facility]. Continues w/ some GALLO. Appears deconditioned   Activity: wheelchair - working w/ therapy. Getting stronger.    Diet: per facility  WT is down 17# since OV in March    Patient has:  Chest Pain: from fall - sore   SOB: chronic - continues w/ some GALLO  Orthopnea/PND: no  YASMIN: no  Edema: no  Fatigue: yes   Abdominal bloating: no  Cough: no  Appetite: good/fair  Home weight: none sent  Home blood pressure: none sent    Past Medical History:   Diagnosis Date    Anxiety     nervous breakdown 9/2016    [facility] ([facility]     CAD (coronary artery disease)     cardiomegaly    COPD (chronic obstructive pulmonary disease) (HCC)     Diverticulitis     DVT (deep venous thrombosis) (HCC)     GERD (gastroesophageal reflux disease)     irritable bowel disease    Hyperlipidemia     Hypertension     Osteoarthritis     Pancreatic cancer ([address]     Family    Psychiatric problem     alzheimers start    Pulmonary embolism ([address]     S/P knee replacement     Type 2 diabetes mellitus ([address] 2009     Past Surgical History:   Procedure Laterality Date    BREAST SURGERY      right lumpectomy    CHOLECYSTECTOMY  4-15-16 cholangiogram    COLONOSCOPY      EYE SURGERY      macular hole left eye    JOINT REPLACEMENT      left knee & right hip    SKIN BIOPSY      right arm     Family History   Problem Relation Age of Onset    Cancer Mother         pancreatic    Heart Disease Father 80    Other Sister         infant death   [facility] High Cholesterol Brother      Social History     Tobacco Use    Smoking status: Never Smoker    Smokeless tobacco: Never Used   Substance Use Topics    Alcohol use: Yes     Comment: rare     Current Outpatient Medications   Medication Sig Dispense Refill    Cholecalciferol (VITAMIN D) 50 MCG (2000 UT) CAPS capsule Take by mouth daily      zinc sulfate (ZINCATE) 220 (50 Zn) MG capsule Take 50 mg by mouth daily      Ascorbic Acid (VITAMIN C) 500 MG CAPS Take by mouth 2 times daily      metoprolol tartrate (LOPRESSOR) 25 MG tablet Take 1 tablet by mouth 2 times daily 60 tablet 3    nitroGLYCERIN (NITROSTAT) 0.4 MG SL tablet up to max of 3 total doses.  If no relief after 1 dose, call 911. 25 tablet 3    lactobacillus (CULTURELLE) capsule Take 1 capsule by mouth 2 times daily (with meals) 30 capsule 0    polyethyl glycol-propyl glycol 0.4-0.3 % (SYSTANE) 0.4-0.3 % ophthalmic solution 1 drop as needed for Dry Eyes      melatonin 3 MG TABS tablet Take 3 mg by mouth nightly      magnesium hydroxide (MILK OF MAGNESIA) 400 MG/5ML suspension Take by mouth daily as needed for Constipation      clopidogrel (PLAVIX) 75 MG tablet Pt takes 300 mg tonight then 75 mg every day starting on 2/4/2020 34 tablet 0    metFORMIN (GLUCOPHAGE-XR) 750 MG extended release tablet Take 1 tablet by mouth Daily with supper Restart this medication Friday am 30 tablet 3    atorvastatin (LIPITOR) 40 MG tablet Take 1 tablet by mouth daily 30 tablet 3    D-Mannose 500 MG CAPS Take 1 capsule by mouth daily       DULoxetine (CYMBALTA) 60 MG extended release capsule Take 2 capsules by mouth daily 60 capsule 0    acetaminophen (TYLENOL) 325 MG tablet Take 650 mg by mouth every 6 hours as needed for Pain      glucose blood VI test strips (ONE TOUCH ULTRA TEST) strip Test daily or AD. Dx. 250.00 50 each 11     No current facility-administered medications for this visit. Allergies   Allergen Reactions    Ciprofloxacin Hcl     Food Rash     strawberries    Penicillins Swelling and Rash       SUBJECTIVE:   Review of Systems   Constitutional: Negative for activity change, appetite change and fatigue. Respiratory: Negative for cough and shortness of breath. Cardiovascular: Negative for chest pain, palpitations and leg swelling. Gastrointestinal: Negative for abdominal distention. Neurological: Negative for weakness, light-headedness and headaches. Hematological: Negative for adenopathy. Psychiatric/Behavioral: Negative for sleep disturbance. OBJECTIVE:   Today's Vitals:  /60   Pulse 84   Ht 5' 4\" (1.626 m)   Wt 165 lb (74.8 kg)   SpO2 96%   BMI 28.32 kg/m²     Physical Exam  Vitals reviewed. Constitutional:       General: She is not in acute distress. Appearance: Normal appearance. She is well-developed. She is not diaphoretic. HENT:      Head: Normocephalic and atraumatic. Eyes:      Conjunctiva/sclera: Conjunctivae normal.   Neck:      Comments: No JVD  Cardiovascular:      Rate and Rhythm: Normal rate and regular rhythm. Heart sounds: Normal heart sounds. No murmur heard. Pulmonary:      Effort: Pulmonary effort is normal. No respiratory distress. Breath sounds: Normal breath sounds. No wheezing or rales. Abdominal:      General: Bowel sounds are normal. There is no distension. Palpations: Abdomen is soft. Tenderness: There is no abdominal tenderness. Musculoskeletal:         General: Normal range of motion. Cervical back: Normal range of motion and neck supple. Right lower leg: No edema. Left lower leg: No edema.    Skin:     General: Skin is warm and 11/11/2021    RBC 3.72 11/11/2021    HGB 12.1 11/11/2021    HCT 38.9 11/11/2021     11/11/2021     CMP:    Lab Results   Component Value Date     11/15/2021    K 5.0 11/15/2021    K 3.7 11/06/2021     11/15/2021    CO2 25 11/15/2021    BUN 20 11/15/2021    CREATININE 1.0 11/15/2021    AGRATIO 1.1 02/23/2021    LABGLOM 53 11/15/2021    GLUCOSE 172 11/15/2021    CALCIUM 9.1 11/15/2021     Hepatic Function Panel:    Lab Results   Component Value Date    ALKPHOS 80 11/05/2021    ALT 50 11/05/2021    AST 28 11/05/2021    PROT 7.1 11/05/2021    PROT 6.6 02/23/2021    BILITOT 0.6 11/05/2021    BILIDIR <0.2 10/27/2021    LABALBU 4.1 11/05/2021     Magnesium:    Lab Results   Component Value Date    MG 1.7 02/18/2020     PT/INR:    Lab Results   Component Value Date    INR 0.93 11/20/2020     Lipids:    Lab Results   Component Value Date    TRIG 359 01/13/2020    HDL 38 01/13/2020    [address] 63 01/13/2020       ASSESSMENT AND PLAN:   The patient's condition/symptoms are Stable: No clinical evidence of fluid overload today. Continue current medical regimen without changes at present time. Diagnosis Orders   1. CHF (congestive heart failure), NYHA class II, chronic, diastolic (HCC)  Echo 2D w doppler w color complete   2. Mild aortic stenosis  Echo 2D w doppler w color complete   3. Essential hypertension     4. ARTIE (acute kidney injury) ([facility]       Continue:  GDMT:               ACE/ARB/ARNI - None d/t Low BP              BB - Lopressor 25 BID              Diuretic - Bumex 2/day - stopped d/t ARTIE - has Metolazone PRN  AA - no  SGLT2 -  no  Vasodilator - no  Continue Current medications:  Plavix  Stable, appears Euvolemic  Labs reviewed -  K+ 5.0 = not on diuretic but still on Kcl = will stop daily Kcl  Change PRN Metolazone to PRN Bumex 1 mg/day as needed for 3# wt gain. Repeat blood work in 3 weeks  BP/HR stable   Continue diet/fluid adherence  Continue daily wts.   Repeat ECHO in 6 months - routine - monitor AS  F/U w/ Cardiology  F/U in clinic in 6 months     Tolerating above noted HF meds, no ill side effects noted. Will continue to monitor kidney function and electrolytes. Will optimize as tolerated. Pt is compliant w/ medications. Total visit time of 30 minutes has been spent with patient on education of symptoms, management, medication, and plan of care; as well as review of chart: labs, ECHO, radiology reports, etc.   I personally spent more then 50% of the appt time face to face with the patient. Daily weights  Fluid restriction of 2 Liters per day  Limit sodium in diet to around 7834-2008 mg/day  Monitor BP  Activity as tolerated     Patient was instructed to call the [facility] for any changes in symptoms as noted in AVS.      Return in about 6 months (around 5/16/2022). or sooner if needed     Patient given educational materials - see patient instructions. We discussed the importance of weighing oneself and recording daily. We also discussed the importance of a low sodium diet, higher sodium foods to avoid and better low sodium food options. Patient verbalizes understanding of plan of care using teach back method, and is agreeable to the treatment plan.        Electronically signed by BAILEY Casillas CNP on 11/16/2021 at 4:38 PM

## 2021-12-12 PROBLEM — S09.90XA CLOSED HEAD INJURY: Status: ACTIVE | Noted: 2021-01-01

## 2021-12-12 NOTE — ED NOTES
Presents to [facility] from [facility]. [facility] nurse reports fall was witness by nurse aide. Pt was ambulating slower than normal with walker prior to fall. Pt fell with walker and hit head on chair. Reports pt reported she was tired prior to fall. Upon arrival to ER small lac noted by left ear with small amount of blood oozing. Some bruising noted on left side of head. Pt confused asking where she is and what happened. Pt on Plavix. Reports head hurts \"a little'. Breathing easy and unlabored. Chest atraumatic.  This nurse to monitor care     Myrtie Galeazzi, RN  12/12/21 Karrie RN  12/12/21 9052

## 2021-12-12 NOTE — ED PROVIDER NOTES
[place] ENCOUNTER        Pt Name: Kenzie Sun  MRN: [id number]  [place] 1935  Date of evaluation: 12/12/2021  Treating Resident Physician: Sharma Sacks, MD  Supervising Physician: Mode Christian COMPLAINT       Chief Complaint   Patient presents with   Niya Pacini Altered Mental Status     Patient interviewed and examined by me immediately on arrival.  History and Physical exam limited by: Nothing  Further information obtained after primary survey and initial critical actions were performed. History obtained from EMS personnel/patient. PRIMARY SURVEY AND INTERVENTION   Airway: Patent. Breathing: Regular respiratory pattern, symmetric chest rise, lungs clear to auscultation. Circulation: Good capillary refill, no identifiable external bleeding, good pulses in all extremities. Exposure: Left ear, posterior laceration, 5 cm, no active bleeding. .  Disability: No focal neurological deficit. Patient awake. INITIAL MEDICAL DECISION MAKING   Initial assessment:   1. Clinical impression  a. Trauma level 2  b. Head trauma  c. C-spine trauma  d. Left ear laceration  . PLAN:   Large bore IV lines,  cardiac/respiratory monitoring  labs,  Analgesia   trauma team activated prior to arrival,   bedside US   observation. SECONDARY SURVEY AND INTERVENTION  HISTORY OF PRESENT ILLNESS    HPI  Kenzie Sun is a 80 y.o. female who presents to the emergency department for evaluation of fall. Patient resident in nursing home, brought by EMS due to a fall witnesses by nurse, referring patient was ambulating with her walker with posterior fall from own height, suffering head trauma, no loss of conscious; previous fall EMS reports patient had generalized weakness with no specific of focalization.   At arriving patient refers amnesia of the event, denies shortness of breath or chest pain, denies abdominal pain, no extremities pain weakness, refers mild headache left side. History of anticoagulation/Plavix  . Medical history of diabetes, COPD, hypertension, PE, COPD, CAD  The patient has no other acute complaints at this time. REVIEW OF SYSTEMS   Review of Systems   Constitutional: Negative. HENT: Negative. Eyes: Negative. Respiratory: Negative. Cardiovascular: Negative. Gastrointestinal: Negative. Genitourinary: Negative. Musculoskeletal: Negative. Neurological: Negative. Psychiatric/Behavioral: Negative. PAST MEDICAL AND SURGICAL HISTORY     Past Medical History:   Diagnosis Date    Anxiety     nervous breakdown 9/2016    [facility] [address]     CAD (coronary artery disease)     cardiomegaly    COPD (chronic obstructive pulmonary disease) (HCC)     Diverticulitis     DVT (deep venous thrombosis) (HCC)     GERD (gastroesophageal reflux disease)     irritable bowel disease    Hyperlipidemia     Hypertension     Osteoarthritis     Pancreatic cancer ([facility] [address]     Family    Psychiatric problem     alzheimers start    Pulmonary embolism ([facility] [address]     S/P knee replacement     Type 2 diabetes mellitus ([facility] [address] 2009       Past Surgical History:   Procedure Laterality Date    BREAST SURGERY      right lumpectomy    CHOLECYSTECTOMY  4-15-16    cholangiogram    COLONOSCOPY      EYE SURGERY      macular hole left eye    JOINT REPLACEMENT      left knee & right hip    SKIN BIOPSY      right arm     Unable to confirm at this moment, Except as available on EMR.       MEDICATIONS     Current Facility-Administered Medications:     lidocaine 1 % injection, , , ,     Tetanus-Diphth-Acell Pertussis (BOOSTRIX) injection 0.5 mL, 0.5 mL, IntraMUSCular, Once, Calib Yost, PA-C    sodium chloride flush 0.9 % injection 10 mL, 10 mL, IntraVENous, 2 times per day, Calib Yost, PA-C    sodium chloride flush 0.9 % injection 10 mL, 10 mL, IntraVENous, PRN, Calib Yost, PA-C    0.9 % sodium chloride infusion, 25 mL, IntraVENous, PRN, Calib Yost, PA-C    ondansetron (ZOFRAN-ODT) disintegrating tablet 4 mg, 4 mg, Oral, Q8H PRN **OR** ondansetron (ZOFRAN) injection 4 mg, 4 mg, IntraVENous, Q6H PRN, Calib Yost, PA-C    docusate sodium (COLACE) capsule 100 mg, 100 mg, Oral, Daily, Calib Yost, PA-C    polyethylene glycol (GLYCOLAX) packet 17 g, 17 g, Oral, Daily PRN, Calib Yost, PA-C    Current Outpatient Medications:     Cholecalciferol (VITAMIN D) 50 MCG (2000 UT) CAPS capsule, Take by mouth daily, Disp: , Rfl:     zinc sulfate (ZINCATE) 220 (50 Zn) MG capsule, Take 50 mg by mouth daily, Disp: , Rfl:     Ascorbic Acid (VITAMIN C) 500 MG CAPS, Take by mouth 2 times daily, Disp: , Rfl:     metoprolol tartrate (LOPRESSOR) 25 MG tablet, Take 1 tablet by mouth 2 times daily, Disp: 60 tablet, Rfl: 3    nitroGLYCERIN (NITROSTAT) 0.4 MG SL tablet, up to max of 3 total doses.  If no relief after 1 dose, call 911., Disp: 25 tablet, Rfl: 3    lactobacillus (CULTURELLE) capsule, Take 1 capsule by mouth 2 times daily (with meals), Disp: 30 capsule, Rfl: 0    polyethyl glycol-propyl glycol 0.4-0.3 % (SYSTANE) 0.4-0.3 % ophthalmic solution, 1 drop as needed for Dry Eyes, Disp: , Rfl:     melatonin 3 MG TABS tablet, Take 3 mg by mouth nightly, Disp: , Rfl:     magnesium hydroxide (MILK OF MAGNESIA) 400 MG/5ML suspension, Take by mouth daily as needed for Constipation, Disp: , Rfl:     clopidogrel (PLAVIX) 75 MG tablet, Pt takes 300 mg tonight then 75 mg every day starting on 2/4/2020, Disp: 34 tablet, Rfl: 0    metFORMIN (GLUCOPHAGE-XR) 750 MG extended release tablet, Take 1 tablet by mouth Daily with supper Restart this medication Friday am, Disp: 30 tablet, Rfl: 3    atorvastatin (LIPITOR) 40 MG tablet, Take 1 tablet by mouth daily, Disp: 30 tablet, Rfl: 3    D-Mannose 500 MG CAPS, Take 1 capsule by mouth daily , Disp: , Rfl:     DULoxetine (CYMBALTA) 60 MG extended release capsule, Take 2 capsules by mouth daily, Disp: 60 capsule, Rfl: 0    glucose blood VI test strips (ONE TOUCH ULTRA TEST) strip, Test daily or AD. Dx. 250.00, Disp: 50 each, Rfl: 11    acetaminophen (TYLENOL) 325 MG tablet, Take 650 mg by mouth every 6 hours as needed for Pain, Disp: , Rfl:   Unable to confirm at this moment, Except as available on EMR. SOCIAL HISTORY     Social History     Social History Narrative    Not on file     Social History     Tobacco Use    Smoking status: Never Smoker    Smokeless tobacco: Never Used   Vaping Use    Vaping Use: Never used   Substance Use Topics    Alcohol use: Yes     Comment: rare    Drug use: No     Unable to confirm at this moment, Except as available on EMR. ALLERGIES     Allergies   Allergen Reactions    Ciprofloxacin Hcl     Food Rash     strawberries    Penicillins Swelling and Rash           FAMILY HISTORY     Family History   Problem Relation Age of Onset    Cancer Mother         pancreatic    Heart Disease Father 80    Other Sister         infant death   Krystleana Contreras High Cholesterol Brother          PREVIOUS RECORDS   Previous records reviewed: History of COPD, hypertension, PE, COPD, CAD, diabetes current medications include Plavix. PHYSICAL EXAM     ED Triage Vitals [12/12/21 1323]   BP Temp Temp src Pulse Resp SpO2 Height Weight   111/76 97.1 °F (36.2 °C) -- 95 16 98 % -- --     Initial vital signs and nursing assessment reviewed and normal. Pulsoximetry is normal per my interpretation. Additional Vital Signs:  Vitals:    12/12/21 1535   BP: 104/60   Pulse: 95   Resp: 16   Temp:    SpO2: 93%       EKG monitor: Normal sinus rhythm, no ectopy, tachycardic. Physical Exam  HENT:      Head:      Comments: Left parietal area with mild bruising, left ear laceration 6 interiors posterior area, no cartilage hematoma       Mouth/Throat:      Mouth: Mucous membranes are moist.   Eyes:      Extraocular Movements: Extraocular movements intact.    Cardiovascular:      Rate and Rhythm: Normal rate and regular rhythm. Heart sounds: Normal heart sounds. No murmur heard. No friction rub. No gallop. Pulmonary:      Effort: Pulmonary effort is normal. No respiratory distress. Breath sounds: Normal breath sounds. No stridor. No wheezing, rhonchi or rales. Abdominal:      General: There is no distension. Palpations: Abdomen is soft. Tenderness: There is no abdominal tenderness. There is no guarding. Musculoskeletal:         General: No swelling or tenderness. Normal range of motion. Cervical back: Normal range of motion and neck supple. Skin:     General: Skin is warm. Capillary Refill: Capillary refill takes less than 2 seconds. Neurological:      Mental Status: She is alert. GCS: GCS eye subscore is 4. GCS verbal subscore is 5. GCS motor subscore is 5. Cranial Nerves: No dysarthria or facial asymmetry. Sensory: No sensory deficit. Motor: No weakness. Psychiatric:         Mood and Affect: Mood normal.             FURTHER MEDICAL DECISION MAKING     80year-old patient, low energy trauma, falling from own height, suffering head trauma C-spine trauma, multiple comorbidities eluding COPD, PE, hypertension, CAD; arriving in dynamically stable, ABCs with no life-threatening condition, assessed joint by trauma team, given previous history of anticoagulation CT head scan/C-spine will be added, chest x-ray and pelvis x-ray as well. ED observation, blood work following per trauma protocol. Suture laceration.       ED RESULTS   Laboratory results:  Labs Reviewed   URINALYSIS - Abnormal; Notable for the following components:       Result Value    Glucose, Urine 100 (*)     All other components within normal limits   BASIC METABOLIC PANEL WITHOUT CALCIUM - Abnormal; Notable for the following components:    Sodium 132 (*)     Chloride 95 (*)     Glucose 264 (*)     CREATININE 1.3 (*)     All other components within normal limits   CBC - Abnormal; Notable for the following components:    RBC 4.15 (*)     .2 (*)     MCHC 31.9 (*)     RDW-CV 14.8 (*)     RDW-SD 55.3 (*)     MPV 8.6 (*)     All other components within normal limits   GLOMERULAR FILTRATION RATE, ESTIMATED - Abnormal; Notable for the following components:    Est, Glom Filt Rate 39 (*)     All other components within normal limits   POCT GLUCOSE - Abnormal; Notable for the following components:    POC Glucose 266 (*)     All other components within normal limits   TROPONIN   HEPATIC FUNCTION PANEL   LIPASE   APTT   PROTIME-INR   ANION GAP   OSMOLALITY   TYPE AND SCREEN       Radiologic studies results:  XR PELVIS (1-2 VIEWS)   Final Result    No evidence of acute osseous injury on this single image of the pelvis. **This report has been created using voice recognition software. It may contain minor errors which are inherent in voice recognition technology. **      Final report electronically signed by Dr. Corey Thomson MD on 12/12/2021 2:52 PM      XR CHEST PORTABLE   Final Result   Right upper lobe pneumonia. **This report has been created using voice recognition software. It may contain minor errors which are inherent in voice recognition technology. **      Final report electronically signed by Dr. Corey Thomson MD on 12/12/2021 2:51 PM      CT Head WO Contrast   Final Result    No evidence of acute intracranial abnormality. **This report has been created using voice recognition software. It may contain minor errors which are inherent in voice recognition technology. **      Final report electronically signed by Dr. Corey Thomson MD on 12/12/2021 2:09 PM      CT Cervical Spine WO Contrast   Final Result    No evidence of acute osseous injury of the cervical spine. **This report has been created using voice recognition software. It may contain minor errors which are inherent in voice recognition technology. ** Final report electronically signed by Dr. Ely Oquendo MD on 12/12/2021 2:20 PM      US Ed Fast Abdomen Limited    (Results Pending)       ED Medications administered this visit:   Medications   lidocaine 1 % injection (has no administration in time range)   Tetanus-Diphth-Acell Pertussis (BOOSTRIX) injection 0.5 mL (0.5 mLs IntraMUSCular Not Given 12/12/21 6607)   sodium chloride flush 0.9 % injection 10 mL (has no administration in time range)   sodium chloride flush 0.9 % injection 10 mL (has no administration in time range)   0.9 % sodium chloride infusion (has no administration in time range)   ondansetron (ZOFRAN-ODT) disintegrating tablet 4 mg (has no administration in time range)     Or   ondansetron (ZOFRAN) injection 4 mg (has no administration in time range)   docusate sodium (COLACE) capsule 100 mg (has no administration in time range)   polyethylene glycol (GLYCOLAX) packet 17 g (has no administration in time range)   0.9 % sodium chloride bolus (0 mLs IntraVENous Stopped 12/12/21 5702)               ED COURSE       80year-old patient, level 2 trauma, head C-spine trauma, currently on Plavix, initial ABG with no life-threatening conditions, secondary assessment shows left ear laceration, assessed jointly with trauma team; patient will require admission/observation by trauma team.    MEDICATION CHANGES     New Prescriptions    No medications on file         FINAL DISPOSITION     Final diagnoses:   Contusion of left ear, initial encounter   Contusion of head, unspecified part of head, subsequent encounter   Syncope and collapse     Condition: condition: good  Dispo: Admit to med/surg floor      This transcription was electronically signed. It was dictated by use of voice recognition software and electronically transcribed. The transcription may contain errors not detected in proofreading.      Jennifer Marks MD  Resident  12/12/21 1605

## 2021-12-12 NOTE — ED NOTES
[facility] called at this time to make aware of admission.       Marce Zuniga RN  12/12/21 0883

## 2021-12-12 NOTE — ED NOTES
Dr. Nu Valverde, Dr. Dionna Pierce at bedside     Eloy Vizcaino, [facility]  12/12/21 3225

## 2021-12-12 NOTE — ED NOTES
Warm blankets applied at this time. Pt asking \"whats happened, where am I\".      Dionna Yung RN  12/12/21 11 Vanita Zimmer RN  12/12/21 7367

## 2021-12-12 NOTE — ED NOTES
Pt resting in bed. Family at bedside. Pt alert to self, situation, and place. Will monitor.       Michael Martinez RN  12/12/21 9279

## 2021-12-12 NOTE — ED NOTES
Bed: 005A  Expected date: 12/12/21  Expected time: 1:16 PM  Means of arrival: [facility]  Comments:     Melvi Coleman RN  12/12/21 3314

## 2021-12-12 NOTE — ED NOTES
Pt resting in bed watching tv. Voiced no concerns. cll light within reach. Food tray ordered at this time.      Valorie Mahoney RN  12/12/21 0902

## 2021-12-13 PROBLEM — J18.9 PNEUMONIA DUE TO INFECTIOUS ORGANISM: Status: ACTIVE | Noted: 2021-01-01

## 2021-12-13 PROBLEM — S01.312A LACERATION OF LEFT EAR: Status: ACTIVE | Noted: 2021-01-01

## 2021-12-13 NOTE — PROGRESS NOTES
[facility]  INPATIENT PHYSICAL THERAPY  EVALUATION  [facility] ONC MED 5K - 5K-13/013-A    Time In: 1410  Time Out: 1430  Timed Code Treatment Minutes: 8 Minutes  Minutes: 20          Date: 2021  Patient Name: Jacob Watson,  Gender:  female        MRN: [id number]  : 1935  (80 y.o.)      Referring Practitioner: SHELBIE Resendez  Diagnosis: syncope and collapse  Additional Pertinent Hx: admit with above diagnosis, right upper lobe pneumonia, s/p fall at [facility] and hit left side of head with laceration to left ear, [facility]     Restrictions/Precautions:  Restrictions/Precautions: General Precautions, Fall Risk  Position Activity Restriction  Other position/activity restrictions: +orthostatic hypotension    Subjective:  Chart Reviewed: Yes  Patient assessed for rehabilitation services?: Yes  Subjective: pleasant and cooperative, confusion, cooperative    General:    Hearing: Exceptions to [facility]  Hearing Exceptions: Hard of hearing/hearing concerns         Pain: no c/o pain    Vitals:  Sup Sit Std  /58 86/50 78/44   HR 94 91  RN present and aware of orthostatic hypotension    Social/Functional History:    Type of Home: Facility  Home Layout: One level  Home Equipment: 4 wheeled walker     Ambulation Assistance: Needs assistance  Transfer Assistance: Needs assistance    Additional Comments: per pt was using rollator PTA and working with PT    OBJECTIVE:  Range of Motion:  Bilateral Lower Extremity: WFL    Strength:  Bilateral Lower Extremity: WFL    Balance:  Static Sitting Balance:  Stand By Assistance  Static Standing Balance: Minimal Assistance, to [facility], x3 trials with 1-2UE support, tolerating 1-2 minutes    Bed Mobility:  Rolling to Right: Contact Guard Assistance   Supine to Sit: Minimal Assistance  Sit to Supine: Minimal Assistance   Scooting: Minimal Assistance  HOB flat, use BR  Transfers:  Sit to Stand: Minimal Assistance, to [address] [facility] to [address] [facility], to [facility]    Ambulation:  Contact [address], initially, progressed to [address] due to LOB  Distance: 3' fwd then 3'bwd  Surface: Level Tile  Device:Rolling Walker  Gait Deviations: Forward Flexed Posture, Slow Brittny and pt with LOB requiring modA and stated felt dizzy/lightheaded, RN made aware, pt returned to sup and then after rest break assessed pt's vitals in sup, sit, and std as stated above    Exercise:  Patient was guided in 1 set(s) 10 reps of exercise to both lower extremities. Seated heel/toe raises, Long arc quads and Seated abduction/adduction. Exercises were completed for increased independence with functional mobility. Functional Outcome Measures: Completed  AM-PAC Inpatient Mobility without Stair Climbing Raw Score : 14  AM-PAC Inpatient without Stair Climbing T-Scale Score : 40.85    ASSESSMENT:  Activity Tolerance:  Patient tolerance of  treatment: fair. -      Treatment Initiated: Treatment and education initiated within context of evaluation. Evaluation time included review of current medical information, gathering information related to past medical, social and functional history, completion of standardized testing, formal and informal observation of tasks, assessment of data and development of plan of care and goals. Treatment time included skilled education and facilitation of tasks to increase safety and independence with functional mobility for improved independence and quality of life. Assessment:   Body structures, Functions, Activity limitations: Decreased functional mobility , Decreased endurance, Decreased balance, Decreased strength, Decreased cognition, Decreased safe awareness  Assessment: pt with confusion/dementia, +orthostatic hypotension, generalized weakness, dec balance, use of walker and inc assist for safe mobility, recommend cont PT to inc pt functional mobility  Prognosis: Good    REQUIRES PT FOLLOW UP: Yes    Discharge Recommendations:  Discharge Recommendations: Continue to assess pending progress, ECF with PT, Subacute/Skilled Nursing Facility, Patient would benefit from continued therapy after discharge    Patient Education:  PT Education: Goals, PT Role, Plan of Care, Functional Mobility Training    Equipment Recommendations: Other: cont to assess needs    Plan:  Times per week: 3-5X GM  Times per day: Daily  Specific instructions for Next Treatment: therex and mobility, monitor BP    Goals:  Patient goals : go home  Short term goals  Time Frame for Short term goals: by discharge  Short term goal 1: bed mobility with SBA to get in/out of bed  Short term goal 2: transfer with SBA to get in/out of chairs  Short term goal 3: amb >25'x1 with walker and CGA to walk safely in home  Long term goals  Time Frame for Long term goals : no LTGs set secondary to short ELOS    Following session, patient left in safe position with all fall risk precautions in place.

## 2021-12-13 NOTE — SIGNIFICANT EVENT
Patient upon standing developed lightheadedness orthostatics were obtained and were positive discussed with trauma that we will take over the case and been managing physician will work-up orthostatic hypotension.   We will start gentle fluid rehydration and thigh-high JUANITA hose, will also obtain echocardiogram.  We will closely monitor for volume overload

## 2021-12-13 NOTE — PROGRESS NOTES
Daughter Tyrell Riggins notified that the patient would like her glasses, hearing aids and claudia bear. Tyrell Riggins verbalized understanding.

## 2021-12-13 NOTE — PROGRESS NOTES
[facility]  SPEECH THERAPY  [facility] ONC MED 5K  Speech  Language  Cognitive Evaluation & Treatment     SLP Individual Minutes  Time In: 0940  Time Out: 1020  Minutes: 40  Timed Code Treatment Minutes: 15 Minutes      Cog Eval: 25 minutes    Cog Tx: 15 minutes    Date: 2021  Patient Name: Key New      CSN: [id number]   : 1935  (80 y.o.)  Gender: female   Referring Physician:  Johana Castleman, PA-C  Diagnosis: Syncope and Collapse   Secondary Diagnosis: cognition   Precautions: fall risk   History of Present Illness/Injury: Per chart review; Key New is a 81 y/o admitted to [facility] with the above medical dx. The pt was referred for a cognitive evaluation after a fall with known head trauma. Per chart review, pt with a baseline know cognitive impairment. Past Medical History:   Diagnosis Date    Anxiety     nervous breakdown 2016    [facility] [address] [facility]     CAD (coronary artery disease)     cardiomegaly    COPD (chronic obstructive pulmonary disease) (HCC)     Diverticulitis     DVT (deep venous thrombosis) (HCC)     GERD (gastroesophageal reflux disease)     irritable bowel disease    Hyperlipidemia     Hypertension     Osteoarthritis     Pancreatic cancer ([facility] [address]     Family    Psychiatric problem     alzheimers start    Pulmonary embolism ([facility] [address]     S/P knee replacement     Type 2 diabetes mellitus ([facility] [address]        Pain: No pain reported. Subjective:  Patient seen upright in recliner chair for cognitive evaluation. The pt is alert, pleasant, and agreeable for skilled ST this date, however, becomes progressively more lethargic as the session progresses     SOCIAL HISTORY:   Living Arrangements: [place]   Work History:    Education Level: 12th grade   Driving Status: Does not drive  Finance Management: Dependent/Unable  Medication Management: Independent  ADL's: Assistance Required.    Hobbies: NA  Vision Status: glasses need but not present at the stimulation . PATIENT GOAL:    Return to prior level of function. SHORT TERM GOALS:  Short-term Goals  Timeframe for Short-term Goals: 2 weeks  Goal 1: Patient will complete BASIC orientation, recall of BIO info, and STM (1-2 units) with 70% accuracy given MOD A to improve retention of personal and previously learned information  Goal 2: Patient will complete basic problem solving, reasoning, and sequencing tasks with 70% accuracy with MOD A to improve basic safety in current environment and assist with completion fo ADLs. Goal 3: PAtient will complete basic organizational naming (divergent, convergent, responsive) and moderately complex language tasks with 70% accuracy given MIN A to improve communication of mildly complex wants/needs. Goal 4: Patient, family, and staff with demosntrate understanding and utilize low stimulation guidline to optimize brain healing  Goal 5: Will repeat ACE prior to d/c to monitor concussion symptoms. LONG TERM GOALS:  No LTM Goals recommended, due to anticipated short ELOS. Sharri Green MA., [id number] [facility] / #.[id number]

## 2021-12-13 NOTE — PROGRESS NOTES
[facility]  Daily Progress Note  Pt Name: Shekhar Aaron  Medical Record Number: [id number]  Date of Birth 1935   Today's Date: 12/13/2021    HD: # 0    CC:\" Nothing hurts today, and ready to go home\"    ASSESSMENT  1. Active Hospital Problems    Diagnosis Date Noted    Laceration of left ear [S01.312A] 12/13/2021    Pneumonia due to infectious organism [J18.9] 12/13/2021    Closed head injury [S09.90XA] 12/12/2021    Essential hypertension [I10]     Type 2 diabetes mellitus without complication ([facility] [address] [L81.9] 12/17/2015         PLAN  Patient admitted under Trauma Services      Fall              - PT/OT              - Up with assistance              - Fall risk precautions     Closed head injury               - No LOC reported              - CT head negative for acute intracranial abnormality              - SLP cog eval              - Neuro checks              - Monitor for post concussive symptoms     Left ear lacerations              - Closed in ED via sutures              - Boostrix given              - Remove suture in 5 days              - Local wound care     Right upper lobe pneumonia              - Noted on CXR              - No leukocytosis or fevers              - Hospitalist consulted for management    -Procalcitonin 0.13, hospitalist reviewed CXR stated low likelihood pneumonia. Recommends Omnicef 300 mg twice daily x7 days on discharge.   Patient stable for discharge from hospitalist perspective.     Hyponatremia, elevated Cr              - Mild hyponatremia at 132              - Cr 1.3              - IV fluids              - Repeat labs this a.m. shows return within normal limits.              - Hospitalist consulted for assistance with medical management    Orthostatic hypotension   -Blood pressure 70/40 on standing   -Hospitalist notified and recommends IV fluids 75 ML/hour, thigh-high JUANITA hose     History: CAD, COPD, DVT, GERD, hyperlipidemia, hypertension, pancreatic cancer, diabetes              - Hospitalist consult for assistance with medical management              - Restart home medications on admission              - Sliding scale insulin and Accu-Cheks     Consults: hospitalist     Pain Management              -Tylenol     Prophylaxis: SCD's, Incentive Spirometry, Colace, Pepcid, Zofran              - Restart Plavix tomorrow      General Diet, carb controlled     IVF Management  Regular Neurovascular Checks  Repeat Labs Tomorrow AM  PT/OT/SLP Eval and Treat  Activity as tolerated, pt up with assistance     Planned Discharge to pending clinical course   -Patient stable from trauma perspective, discussed case with hospitalist staff who advises antibiotics at discharge, stable for discharge from hospitalist perspective.   -Follow-up PCP in 1 week for suture removal and reevaluation for concern of pneumonia.   -Awaiting therapies input, likely discharge within 24 hours. SUBJECTIVE  She is a 80 y.o. female who continues to present at [address] [place] [address] on 5[place] following a fall without physical evidence of severe traumatic injury. Patient reports some left-sided face pain yesterday, but has since resolved, has no complaints on exam.  Patient states she has had a cough for approximately 3 months, though it is no worse than usual. Patient denies chest pain, shortness of breath, headache, dizziness, lightheadedness, numbness, paraesthesias, weakness, chills, fevers, abdominal pain, nausea, vomiting,neck pain, or back pain. Nursing staff states patient stable, though very eager to be discharged. Plan to work with therapies for recommendations. Discussed case with hospitalist and was initially anticipating discharge in next 24 hours with Omnicef due to low likelihood of pneumonia.   However, prior to discharge patient orthostatic showed 70/40 on standing, hospitalist notified and advised to keep admitted with increased IV fluids and thigh-high JUANITA hose.  Hospitalist staff has graciously agreed to assume attending role tomorrow, will monitor overnight and transition to hospitalist service in a.m. Hemoglobin stable, WBC stable, no electrolyte abnormality, CXR studies reviewed, vital signs stable on exam. Care in coordination with trauma surgeon Dr. Neida Sacks. Wt Readings from Last 3 Encounters:   11/16/21 165 lb (74.8 kg)   11/08/21 155 lb (70.3 kg)   10/27/21 162 lb (73.5 kg)     Temp Readings from Last 3 Encounters:   12/12/21 97.1 °F (36.2 °C)   11/15/21 97.4 °F (36.3 °C) (Oral)   11/01/21 97.8 °F (36.6 °C) (Oral)     BP Readings from Last 3 Encounters:   12/13/21 135/68   11/16/21 110/60   11/15/21 (!) 93/51     Pulse Readings from Last 3 Encounters:   12/13/21 116   11/16/21 84   11/15/21 84       24 HR INTAKE/OUTPUT :     Intake/Output Summary (Last 24 hours) at 12/13/2021 1411  Last data filed at 12/13/2021 1400  Gross per 24 hour   Intake 988.82 ml   Output 200 ml   Net 788.82 ml     ADULT DIET; Regular; 4 carb choices (60 gm/meal)    OBJECTIVE  CURRENT VITALS /68   Pulse 116   Temp 97.1 °F (36.2 °C)   Resp 18   SpO2 96%   GENERAL: Presents sitting upright in chair unassisted, Awake and alert; In no acute distress and well nourished. SKIN: Appropriate for ethnicity, warm and dry. ENT: Ecchymosis noted to left side of face, some dried blood over left ear. Armen Gutter PERRL at 3mm. Nares patient, membranes moist  CARDIO: No visible chest wall deformity. Strong/regular S1/S2. 2+ radial and DP pulses bilaterally. Capillary refill <2 sec. No extremity edema noted. PULMONARY:  Trachea midline, no increased respiratory effort, or paradoxical chest movement. Lung sounds are clear to ascultation in all fields without adventitious sounds. ABDOMEN: Abdomen is soft, non distended. Bowel sounds present in all four quadrants. NTTP in all quadrants   NEURO: GCS 15. PMS intact, moves limbs freely.   No focal neurological deficits  MSK: Extremities

## 2021-12-13 NOTE — H&P
(Injury Call) []Level III (Trauma Consult) [] Downgraded (Time  Mode of Arrival: EMS transportation  Referring Facility: none  Loss of Consciousness [x]No []Yes[]Unknown  Duration(min)  Mechanism of Injury:  []Motor Vehicle crash   []Single Vehicle [] []Passenger []Scene Fatality []Front Seat  []Restrained   []Air Bag Deployed   []Ejected []Rollover []Pedestrian []Trapped   Type of vehicle:   Protective Devices:   []Motorcycle  Wearing Helmet []Yes []No  []Bicycle  Wearing Helmet []Yes []No  [x]Fall   Distance - standing  []Assault    Abuse Reported []Yes []No  []Gunshot  []Stabbing  []Work Related  []Burn: []Flame []Scald []Electrical []Chemical []Contact []Inhalation []House Fire  []Other:   Patient Active Problem List   Diagnosis    Diabetes type 2, uncontrolled    Type 2 diabetes mellitus without complication ([address]    Acute cholecystitis    Lactic acidosis    Type 2 diabetes mellitus with hyperosmolarity without coma, without long-term current use of insulin (HCC)    Choledocholithiasis    Essential hypertension    Mirizzi's syndrome    Encounter for fitting or adjustment of hearing aid    FATOUMATA (generalized anxiety disorder)    Transient cerebral ischemia    Left hip pain    Aphasia    TIA (transient ischemic attack)    Major depression, recurrent, chronic (HCC)    CHF (congestive heart failure), NYHA class II, unspecified failure chronicity, combined ([address]    Dermatitis    SOB (shortness of breath)    Fall from ground level    Chronic combined systolic and diastolic congestive heart failure (HCC)    Tachycardia    Angina of effort ([address]    Status post angioplasty    Abnormal findings on cardiac catheterization    Angina, class III (HCC)    Syncope and collapse    Closed fracture of nasal bones    Fall at home    PVD (peripheral vascular disease) ([address]    Fall (on) (from) other stairs and steps, initial encounter    ARTIE (acute kidney injury) ([address]    Urinary tract infection without hematuria    Fall at nursing home    Closed head injury     Subjective   Chief Complaint: Fall     History of Present Illness: Patient is a 55-year-old female presents to [facility] as activation of level 2 trauma following a fall. Per report patient was walking at nursing home with walker when she fell and hit the left side of her head. No loss of consciousness reported. Patient on Plavix. History of recurrent falls. Chart reviewed, recent admission last month for ARTIE on CKD and fall. Upon assessment patient is ABCs intact, GCS 14 ([id number]), in no acute distress. Patient with confusion but reported baseline confusion with history of dementia. Patient was perseverating, repetitively asking same questions. Patient denied any acute pain or complaints. She denied any headaches, lightheadedness, dizziness, neck pain, back pain, chest pain, shortness of breath, abdominal pain, nausea/vomiting, pain extremities, and paresthesias. On exam patient with estimated 3 cm linear laceration noted over the posterior aspect of left auricle and 1.5 cm linear laceration noted over left tragus. No auricular hematoma noted. Patient also noted to have small amount of ecchymosis noted over left cheek and over the angle of the left manubrium. No reported TMJ pain or difficultly opening mouth. No other signs of trauma noted on exam. No midline cervical, thoracic, or lumbar tenderness palpation. Chest and abdomen nontender. Abdomen soft. No extremity tenderness palpation. PMS intact in all 4 extremities. Range of motion intact. Vital signs stable. Patient taken radiology for CT and plain film imaging. CT head negative for any acute intracranial abnormality. CT cervical spine negative for any acute osseous injuries. Chest x-ray reveals right upper lobe pneumonia. Pelvic x-ray negative for acute osseous injuries. Labs reviewed. Troponin negative. Mild hyponatremia at 132. Creat 1.3. Glucose 264.  No leukocytosis, WBCs 8.3. Hemoglobin 13.4. UA negative for UTI. Plan for patient admitted under trauma surgery for SLP cog eval following closed head injury. Monitor for postconcussive symptoms. Ear laceration closed emergency department, see procedure note below. Updated on Boostrix. Consult to hospitalist for right upper quadrant pneumonia and assistance with medical management. Case discussed and care coordinated with trauma surgeon, Dr. Kathie Verdin. Signed copy of DNR CCA by PCP noted in advance care planning documents. DNR-CCA on admission. Discussed with daughter at bedside. Review of Systems:   Review of Systems   Constitutional: Negative for chills, diaphoresis and fever. HENT: Negative for facial swelling, hearing loss, mouth sores and nosebleeds. Eyes: Negative for pain, redness and visual disturbance. Respiratory: Negative for shortness of breath, wheezing and stridor. Cardiovascular: Negative for chest pain and palpitations. Gastrointestinal: Negative for abdominal pain, nausea and vomiting. Musculoskeletal: Negative for arthralgias, back pain and neck pain. Skin: Positive for wound. Negative for pallor. Left ear laceration   Neurological: Negative for dizziness, light-headedness, numbness and headaches. Hematological: Bruises/bleeds easily. On Plavix   Psychiatric/Behavioral: Negative for agitation and behavioral problems.      Ciprofloxacin hcl, Food, and Penicillins  Past Surgical History:   Procedure Laterality Date    BREAST SURGERY      right lumpectomy    CHOLECYSTECTOMY  4-15-16    cholangiogram    COLONOSCOPY      EYE SURGERY      macular hole left eye    JOINT REPLACEMENT      left knee & right hip    SKIN BIOPSY      right arm     Past Medical History:   Diagnosis Date    Anxiety     nervous breakdown 9/2016    [facility]     CAD (coronary artery disease)     cardiomegaly    COPD (chronic obstructive pulmonary disease) [facility]     Diverticulitis     DVT (deep venous thrombosis) (HCC)     GERD (gastroesophageal reflux disease)     irritable bowel disease    Hyperlipidemia     Hypertension     Osteoarthritis     Panc[facility]     Family    Psychiatric problem     alzheimers start    Pulmonary embolism ([facility]     S/P knee replacement     Type 2 diabetes mellitus ([facility] 2009     Past Surgical History:   Procedure Laterality Date    BREAST SURGERY      right lumpectomy    CHOLECYSTECTOMY  4-15-16    cholangiogram    COLONOSCOPY      EYE SURGERY      macular hole left eye    JOINT REPLACEMENT      left knee & right hip    SKIN BIOPSY      right arm     Social History     Socioeconomic History    Marital status:      Spouse name: Helena Oliveira Number of children: 11    Years of education: 15    Highest education level: Not on file   Occupational History    Occupation: retired   Tobacco Use    Smoking status: Never Smoker    Smokeless tobacco: Never Used   Vaping Use    Vaping Use: Never used   Substance and Sexual Activity    Alcohol use: Yes     Comment: rare    Drug use: No    Sexual activity: Not Currently   Other Topics Concern    Not on file   Social History Narrative    Not on file     Social Determinants of Health     Financial Resource Strain:     Difficulty of Paying Living Expenses: Not on file   Food Insecurity:     Worried About [address] in the Last Year: Not on file    Stephanie of Food in the Last Year: Not on file   Transportation Needs:     Lack of Transportation (Medical): Not on file    Lack of Transportation (Non-Medical):  Not on file   Physical Activity:     Days of Exercise per Week: Not on file    Minutes of Exercise per Session: Not on file   Stress:     Feeling of Stress : Not on file   Social Connections:     Frequency of Communication with Friends and Family: Not on file    Frequency of Social Gatherings with Friends and Family: Not on file    Attends Voodoo Services: Not on file   [place] Active Member of Clubs or Organizations: Not on file    Attends Club or Organization Meetings: Not on file    Marital Status: Not on file   Intimate Partner Violence:     Fear of Current or Ex-Partner: Not on file    Emotionally Abused: Not on file    Physically Abused: Not on file    Sexually Abused: Not on file   Housing Stability:     Unable to Pay for Housing in the Last Year: Not on file    Number of [place] in the Last Year: Not on file    Unstable Housing in the Last Year: Not on file     Family History   Problem Relation Age of Onset    Cancer Mother         pancreatic    Heart Disease Father 80    Other Sister         infant death    High Cholesterol Brother        Home medications:    Previous Medications    ACETAMINOPHEN (TYLENOL) 325 MG TABLET    Take 650 mg by mouth every 6 hours as needed for Pain    ASCORBIC ACID (VITAMIN C) 500 MG CAPS    Take by mouth 2 times daily    ATORVASTATIN (LIPITOR) 40 MG TABLET    Take 1 tablet by mouth daily    CHOLECALCIFEROL (VITAMIN D) 50 MCG (2000 UT) CAPS CAPSULE    Take by mouth daily    CLOPIDOGREL (PLAVIX) 75 MG TABLET    Pt takes 300 mg tonight then 75 mg every day starting on 2/4/2020    D-MANNOSE 500 MG CAPS    Take 1 capsule by mouth daily     DULOXETINE (CYMBALTA) 60 MG EXTENDED RELEASE CAPSULE    Take 2 capsules by mouth daily    GLUCOSE BLOOD VI TEST STRIPS (ONE TOUCH ULTRA TEST) STRIP    Test daily or AD.   Dx. 250.00    LACTOBACILLUS (CULTURELLE) CAPSULE    Take 1 capsule by mouth 2 times daily (with meals)    MAGNESIUM HYDROXIDE (MILK OF MAGNESIA) 400 MG/5ML SUSPENSION    Take by mouth daily as needed for Constipation    MELATONIN 3 MG TABS TABLET    Take 3 mg by mouth nightly    METFORMIN (GLUCOPHAGE-XR) 750 MG EXTENDED RELEASE TABLET    Take 1 tablet by mouth Daily with supper Restart this medication Friday am    METOPROLOL TARTRATE (LOPRESSOR) 25 MG TABLET    Take 1 tablet by mouth 2 times daily    NITROGLYCERIN (NITROSTAT) 0.4 MG SL TABLET    up to max of 3 total doses. If no relief after 1 dose, call 911.     POLYETHYL GLYCOL-PROPYL GLYCOL 0.4-0.3 % (SYSTANE) 0.4-0.3 % OPHTHALMIC SOLUTION    1 drop as needed for Dry Eyes    ZINC SULFATE (ZINCATE) 220 (50 ZN) MG CAPSULE    Take 50 mg by mouth daily       Hospital medications:  Scheduled Meds:   lidocaine        Tdap-Dtap  0.5 mL IntraMUSCular Once    sodium chloride flush  10 mL IntraVENous 2 times per day    docusate sodium  100 mg Oral Daily     Continuous Infusions:   sodium chloride       PRN Meds:sodium chloride flush, sodium chloride, ondansetron **OR** ondansetron, polyethylene glycol  Objective   ED TRIAGE VITALS  BP: 120/73, Temp: 97.1 °F (36.2 °C), Pulse: 122, Resp: 24, SpO2: 97 %  [place] Coma Scale  Eye Opening: Spontaneous  Best Verbal Response: Confused  Best Motor Response: Obeys commands  [place] Coma Scale Score: 14  Results for orders placed or performed during the hospital encounter of 12/12/21   Troponin   Result Value Ref Range    Troponin T < 0.010 ng/ml   Urinalysis   Result Value Ref Range    Glucose, Urine 100 (A) NEGATIVE mg/dl    Bilirubin Urine NEGATIVE NEGATIVE    Ketones, Urine NEGATIVE NEGATIVE    Specific Gravity, UA 1.007 1.002 - 1.030    Blood, Urine NEGATIVE NEGATIVE    pH, UA 5.5 5.0 - 9.0    Protein, UA NEGATIVE NEGATIVE mg/dl    Urobilinogen, Urine 0.2 0.0 - 1.0 eu/dl    Nitrite, Urine NEGATIVE NEGATIVE    Leukocytes, UA NEGATIVE NEGATIVE    Color, UA YELLOW YELLOW-STRAW    Character, Urine CLEAR [id number]   Hepatic function panel   Result Value Ref Range    Albumin 3.6 3.5 - 5.1 g/dL    Total Bilirubin 0.3 0.3 - 1.2 mg/dL    Bilirubin, Direct <0.2 0.0 - 0.3 mg/dL    Alkaline Phosphatase 75 38 - 126 U/L    AST 34 5 - 40 U/L    ALT 26 11 - 66 U/L    Total Protein 7.2 6.1 - 8.0 g/dL   Lipase   Result Value Ref Range    Lipase 45.8 5.6 - 51.3 U/L   APTT   Result Value Ref Range    aPTT 29.6 22.0 - 38.0 seconds   Protime-INR - If Elevated refer to Anticoagulation Reversal for Injured Patient   Result Value Ref Range    INR 0.99 0.85 - 2.25   Basic Metabolic Panel, Without Calcium   Result Value Ref Range    Sodium 132 (L) 135 - 145 meq/L    Potassium 4.4 3.5 - 5.2 meq/L    Chloride 95 (L) 98 - 111 meq/L    CO2 26 23 - 33 meq/L    Glucose 264 (H) 70 - 108 mg/dL    BUN 20 7 - 22 mg/dL    CREATININE 1.3 (H) 0.4 - 1.2 mg/dL   CBC without Differential   Result Value Ref Range    WBC 8.3 4.8 - 10.8 thou/mm3    RBC 4.15 (L) 4.20 - 5.40 mill/mm3    Hemoglobin 13.4 12.0 - 16.0 gm/dl    Hematocrit 42.0 37.0 - 47.0 %    .2 (H) 81.0 - 99.0 fL    MCH 32.3 26.0 - 33.0 pg    MCHC 31.9 (L) 32.2 - 35.5 gm/dl    RDW-CV 14.8 (H) 11.5 - 14.5 %    RDW-SD 55.3 (H) 35.0 - 45.0 fL    Platelets 455 963 - 925 thou/mm3    MPV 8.6 (L) 9.4 - 12.4 fL   Anion Gap   Result Value Ref Range    Anion Gap 11.0 8.0 - 16.0 meq/L   Glomerular Filtration Rate, Estimated   Result Value Ref Range    Est, Glom Filt Rate 39 (A) ml/min/1.73m2   Osmolality   Result Value Ref Range    Osmolality Calc 276.3 275.0 - 300.0 mOsmol/kg   POCT Glucose   Result Value Ref Range    POC Glucose 266 (H) 70 - 108 mg/dl   EKG 12 lead   Result Value Ref Range    Ventricular Rate 103 BPM    Atrial Rate 104 BPM    P-R Interval 136 ms    QRS Duration 86 ms    Q-T Interval 348 ms    QTc Calculation (Bazett) 455 ms    P Axis 50 degrees    R Axis 41 degrees    T Axis 80 degrees   TYPE AND SCREEN   Result Value Ref Range    ABO A     Rh Factor POS     Antibody Screen NEG        Physical Exam:  Patient Vitals for the past 24 hrs:   BP Temp Pulse Resp SpO2   12/12/21 2041 120/73  122 24 97 %   12/12/21 1734 106/70  102 22 96 %   12/12/21 1535 104/60  95 16 93 %   12/12/21 1401 96/82  97 24 96 %   12/12/21 1335 117/70  98 20 98 %   12/12/21 1323 111/76 97.1 °F (36.2 °C) 95 16 98 %     Primary Assessment:  Airway: Patent, trachea midline  Breathing: Breath sounds present and equal bilaterally, spontaneous, and unlabored  Circulation: Hemodynamically stable, 2+ central and peripheral pulses. Disability: TELLO x 4, following commands. GCS =14 ([id number])    Secondary Assessment:  General: Alert, NAD. Pleasantly confused, cooperative with exam  Head: Normocephalic, mid face stable, Nares patent bilaterally, no epistaxis. Mouth clear of foreign bodies, no lacerations or abrasions. Estimated 3 cm linear laceration noted over the posterior aspect of left auricle and 1.5 cm linear laceration noted over left tragus. No auricular hematoma noted. Patient also noted to have small amount of ecchymosis noted over left cheek and over the angle of the left manubrium. Eyes: PERRL, EOMI, Nontraumatic  Neurologic: Confusion noted. Following commands. CN 2-12 grossly intact. PMS intact in all 4 extremities. No signs of focal neurological deficits. Neck: trachea midline. Cervical spines NTTP midline, without step-offs, crepitus or deformity. Back:TL spines are NTTP midline, without step-offs, crepitus or deformity. No abrasions, contusions, or ecchymosis noted. Lungs: Clear to auscultation bilaterally. Chest Wall: Chest rise symmetrical.  Chest wall without tenderness to palpation. No crepitus, deformities, lacerations, or abrasions. Heart: RRR. Normal S1/S2. No obvious M/G/R. Abdomen:  Soft, NTTP. No guarding. Non-peritoneal.  Pelvis:  NTTP, stable to compression. .  Extremities: No gross deformities. PMS intact. Radial /DP/PT pulses 2+ bilaterally. No extremity tenderness to palpation. Range of motion intact.  strength and plantar/dorsiflexion 5/5 bilaterally  Skin: Skin warm and dry. Normal for ethnicity. Laceration Repairs  Verbal consent for laceration repair obtained from patient. Discussed risk, benefits, and alternatives. Lacerations assessed. Patient noted to have estimated 3 cm linear laceration noted vertically over lateral aspect of mid posterior left auricle.  Patient also noted to estimated 1.5 cm linear laceration noted vertically over left tragus. Wounds explored, no foreign bodies noted. No obvious exposed cartilage noted. Lacerations were thoroughly cleansed and irrigated using surgical scrub brush, antiseptic spray, and normal saline. Local anesthesia obtained using 1% lidocaine without epinephrine. 3 cm posterior auricular laceration closed the with 4 simple interrupted sutures using 6-0 nylon. Wound edges were closely approximated. Bleeding controlled. 1.5 cm laceration of the tragus closed via 2 simple interrupted sutures using 6-0 nylon. Wound edges closely approximated. Bleeding controlled. Patient tolerated laceration repairs well with no immediate complications noted. Chart reviewed, tetanus not up-to-date in last 10 years, Boostrix ordered. Radiology:     XR PELVIS (1-2 VIEWS)   Final Result    No evidence of acute osseous injury on this single image of the pelvis. **This report has been created using voice recognition software. It may contain minor errors which are inherent in voice recognition technology. **      Final report electronically signed by Dr. Joy Connor MD on 12/12/2021 2:52 PM      XR CHEST PORTABLE   Final Result   Right upper lobe pneumonia. **This report has been created using voice recognition software. It may contain minor errors which are inherent in voice recognition technology. **      Final report electronically signed by Dr. Joy Connor MD on 12/12/2021 2:51 PM      CT Head WO Contrast   Final Result    No evidence of acute intracranial abnormality. **This report has been created using voice recognition software. It may contain minor errors which are inherent in voice recognition technology. **      Final report electronically signed by Dr. Joy Connor MD on 12/12/2021 2:09 PM      CT Cervical Spine WO Contrast   Final Result    No evidence of acute osseous injury of the cervical spine.                **This report has been created using voice recognition software. It may contain minor errors which are inherent in voice recognition technology. **      Final report electronically signed by Dr. Randall Elliott MD on 12/12/2021 2:20 PM      US Ed Fast Abdomen Limited    (Results Pending)     Fast Exam: No    Electronically signed by Macrina Lebron PA-C on 12/13/2021 at 12:03 AM     Patient seen and examined independently by me on the afternoon of 12/12/21. Above discussed and I agree with SHELBIE Garnett. See my additional comments below for updated orders and plan. Labs, cultures, and radiographs where available were reviewed. I discussed patient concerns with the patient's nurse and instructions were given. Please see our orders for the updated patient care plan. -Admit for observation and neurovascular checks. Patient with altered mental status after fall at nursing home. No acute intracranial injury. Speech therapy with cognition evaluation. Advance diet. Hold Plavix today but possibly repeat tomorrow if doing well. SCDs for DVT prophylaxis. Pain control. Wound/laceration care. Hospitalist for possible right upper lobe pneumonia. A.m. labs.     Electronically signed by Amilcar Rasheed MD on 12/13/21 at 6:12 AM EST

## 2021-12-13 NOTE — ED NOTES
Jose PA down to see pt due to agitation and wanting to leave ER.       Yvette Mojica RN  12/12/21 0299

## 2021-12-13 NOTE — PROGRESS NOTES
Nilda Muller notified that the patient will not be discharged today. Orthostatic vital signs discussed with Kacey Muller.  Kacey Muller verbalized understanding

## 2021-12-13 NOTE — CONSULTS
Consult History & Physical      Patient:  Saundra Wiseman  YOB: 1935    MRN: [id number]     Acct: [de-identified]      Chief Complaint:  fall    Date of Service: Pt seen/examined in consultation on 12.13.2021    History Of Present Illness:      80 y.o. female who we are asked to see/evaluate by Elza Eaton MD for medical management of R ight upper lobe pneumonia and medical management    Per initial evaluation by trauma \"Patient is a 12-year-old female presents to [facility] as activation of level 2 trauma following a fall.  Per report patient was walking at nursing home with walker when she fell and hit the left side of her head. No loss of consciousness reported. Patient on Plavix. History of recurrent falls. Chart reviewed, recent admission last month for ARTIE on CKD and fall. Upon assessment patient is ABCs intact, GCS 14 (E4V4M6), in no acute distress. Patient with confusion but reported baseline confusion with history of dementia. Patient was perseverating, repetitively asking same questions.  Patient denied any acute pain or complaints.  She denied any headaches, lightheadedness, dizziness, neck pain, back pain, chest pain, shortness of breath, abdominal pain, nausea/vomiting, pain extremities, and paresthesias. On exam patient with estimated 3 cm linear laceration noted over the posterior aspect of left auricle and 1.5 cm linear laceration noted over left tragus. No auricular hematoma noted. Patient also noted to have small amount of ecchymosis noted over left cheek and over the angle of the left manubrium. No reported TMJ pain or difficultly opening mouth. No other signs of trauma noted on exam. No midline cervical, thoracic, or lumbar tenderness palpation. Chest and abdomen nontender. Abdomen soft. No extremity tenderness palpation. PMS intact in all 4 extremities. Range of motion intact. Vital signs stable. Patient taken radiology for CT and plain film imaging.  CT head negative for any acute intracranial abnormality. CT cervical spine negative for any acute osseous injuries. Chest x-ray reveals right upper lobe pneumonia. Pelvic x-ray negative for acute osseous injuries. Labs reviewed. Troponin negative. Mild hyponatremia at 132. Creat 1.3. Glucose 264. No leukocytosis, WBCs 8.3. Hemoglobin 13.4. UA negative for UTI. Plan for patient admitted under trauma surgery for SLP cog eval following closed head injury. Monitor for postconcussive symptoms. Ear laceration closed emergency department, see procedure note below. Updated on Boostrix. Consult to hospitalist for right upper quadrant pneumonia and assistance with medical management. Case discussed and care coordinated with trauma surgeon, Dr. Ivana Hernandez.     Signed copy of DNR CCA by PCP noted in advance care planning documents. DNR-CCA on admission. Discussed with daughter at bedside. \"        Past Medical History:        Diagnosis Date    Anxiety     nervous breakdown 9/2016    Breast cancer ([address]     CAD (coronary artery disease)     cardiomegaly    COPD (chronic obstructive pulmonary disease) (HCC)     Diverticulitis     DVT (deep venous thrombosis) (HCC)     GERD (gastroesophageal reflux disease)     irritable bowel disease    Hyperlipidemia     Hypertension     Osteoarthritis     Pancreatic cancer ([address]     Family    Psychiatric problem     alzheimers start    Pulmonary embolism ([address]     S/P knee replacement     Type 2 diabetes mellitus ([address] 2009       Past Surgical History:        Procedure Laterality Date    BREAST SURGERY      right lumpectomy    CHOLECYSTECTOMY  4-15-16    cholangiogram    COLONOSCOPY      EYE SURGERY      macular hole left eye    JOINT REPLACEMENT      left knee & right hip    SKIN BIOPSY      right arm       Medications Prior to Admission:    Prior to Admission medications    Medication Sig Start Date End Date Taking?  Authorizing Provider   Cholecalciferol (VITAMIN D) 50 MCG (2000 UT) CAPS capsule Take by mouth daily 11/16/21 11/29/21  Historical Provider, MD   zinc sulfate (ZINCATE) 220 (50 Zn) MG capsule Take 50 mg by mouth daily 11/16/21 11/29/21  Historical Provider, MD   Ascorbic Acid (VITAMIN C) 500 MG CAPS Take by mouth 2 times daily 11/16/21 11/29/21  Historical Provider, MD   metoprolol tartrate (LOPRESSOR) 25 MG tablet Take 1 tablet by mouth 2 times daily 11/15/21   Wagner Man,    nitroGLYCERIN (NITROSTAT) 0.4 MG SL tablet up to max of 3 total doses. If no relief after 1 dose, call 911. 11/8/21   Sol Joseph,    lactobacillus (CULTURELLE) capsule Take 1 capsule by mouth 2 times daily (with meals) 11/1/21   Damaris Cool PA-C   polyethyl glycol-propyl glycol 0.4-0.3 % (SYSTANE) 0.4-0.3 % ophthalmic solution 1 drop as needed for Dry Eyes    Historical Provider, MD   melatonin 3 MG TABS tablet Take 3 mg by mouth nightly    Historical Provider, MD   magnesium hydroxide (MILK OF MAGNESIA) 400 MG/5ML suspension Take by mouth daily as needed for Constipation    Historical Provider, MD   clopidogrel (PLAVIX) 75 MG tablet Pt takes 300 mg tonight then 75 mg every day starting on 2/4/2020 2/3/20   Meka Bautista PA-C   metFORMIN (GLUCOPHAGE-XR) 750 MG extended release tablet Take 1 tablet by mouth Daily with supper Restart this medication Friday am 1/14/20   Kristan Hameed MD   atorvastatin (LIPITOR) 40 MG tablet Take 1 tablet by mouth daily 1/14/20   BAILEY Lovelace CNP   D-Mannose 500 MG CAPS Take 1 capsule by mouth daily     Historical Provider, MD   DULoxetine (CYMBALTA) 60 MG extended release capsule Take 2 capsules by mouth daily 10/10/17   BAILEY Anderson CNP   glucose blood VI test strips (ONE TOUCH ULTRA TEST) strip Test daily or AD.   Dx. 250.00 12/6/16   BAILEY Landry CNP   acetaminophen (TYLENOL) 325 MG tablet Take 650 mg by mouth every 6 hours as needed for Pain    Historical Provider, MD       Allergies:  Ciprofloxacin hcl, Food, and Penicillins    Social History:      The patient currently lives [facility]    TOBACCO:   reports that she has never smoked. She has never used smokeless tobacco.  ETOH:   reports current alcohol use. Family History:      Reviewed in detail and negative for DM, CAD, Cancer, CVA. Positive as follows:        Problem Relation Age of Onset    Cancer Mother         pancreatic    Heart Disease Father 80    Other Sister         infant death    High Cholesterol Brother        Diet:  ADULT DIET; Regular; 4 carb choices (60 gm/meal)    REVIEW OF SYSTEMS:   Pertinent positives as noted in the HPI. All other systems reviewed and negative. PHYSICAL EXAM:  /68   Pulse 116   Temp 97.1 °F (36.2 °C)   Resp 18   SpO2 96%   General appearance:  No apparent distress, appears stated age and cooperative. HEENT:  Normal cephalic, traumatic without obvious deformity. Pupils equal, round, and reactive to light. Extra ocular muscles intact. Conjunctivae/corneas clear. Neck: Supple, with full range of motion. no jugular venous distention. Trachea midline. no carotid bruits,+ve  brusing  Respiratory:  Normal respiratory effort. Clear to auscultation, bilaterally without Rales/Wheezes/Rhonchi. Breath sounds equal bilaterally  Cardiovascular:  Regular rate and rhythm with normal S1/S2 without murmurs, rubs or gallops. PMI non displaced  Abdomen: Soft, non-tender, non-distended with normal bowel sounds. No guarding, rebound. Musculoskeletal:  No clubbing, cyanosis or edema bilaterally. Full range of motion without deformity. Skin: Skin color, texture, turgor normal.  No rashes or lesions, or suspicious lesions. Neurologic:  Neurovascularly intact without any focal sensory/motor deficits.  Cranial nerves: II-XII intact, grossly non-focal.  Psychiatric:  Alert and oriented, thought content appropriate, normal insight  Capillary Refill: Brisk,< 2 seconds   Peripheral Pulses: +2 palpable, equal bilaterally upper and lower extremities  Lymphatics: no lymphadenopathy    Labs:     Recent Labs     12/12/21  1325 12/13/21  0728   WBC 8.3 8.5   HGB 13.4 13.2   HCT 42.0 41.5    259     Recent Labs     12/12/21  1325 12/13/21  0728   * 141   K 4.4 4.4   CL 95* 102   CO2 26 24   BUN 20 17   CREATININE 1.3* 1.2   CALCIUM  --  8.8     Recent Labs     12/12/21  1325   AST 34   ALT 26   BILIDIR <0.2   BILITOT 0.3   ALKPHOS 75     Recent Labs     12/12/21  1407   INR 0.99     No results for input(s): [place] in the last 72 hours. Urinalysis:    Lab Results   Component Value Date    NITRU NEGATIVE 12/12/2021    WBCUA 15-25 11/05/2021    BACTERIA NONE SEEN 11/05/2021    RBCUA NONE 11/05/2021    BLOODU NEGATIVE 12/12/2021    SPECGRAV 1.007 12/12/2021    GLUCOSEU >= 1000 11/05/2021       Radiology: I have reviewed the radiology reports with the following interpretation:     XR PELVIS (1-2 VIEWS)   Final Result    No evidence of acute osseous injury on this single image of the pelvis. **This report has been created using voice recognition software. It may contain minor errors which are inherent in voice recognition technology. **      Final report electronically signed by Dr. She Hicks MD on 12/12/2021 2:52 PM      XR CHEST PORTABLE   Final Result   Right upper lobe pneumonia. **This report has been created using voice recognition software. It may contain minor errors which are inherent in voice recognition technology. **      Final report electronically signed by Dr. She Hicks MD on 12/12/2021 2:51 PM      CT Head WO Contrast   Final Result    No evidence of acute intracranial abnormality. **This report has been created using voice recognition software. It may contain minor errors which are inherent in voice recognition technology. **      Final report electronically signed by Dr. She Hicks MD on 12/12/2021 2:09 PM      CT Cervical Spine WO Contrast   Final Result    No evidence of acute osseous injury of the cervical spine. **This report has been created using voice recognition software. It may contain minor errors which are inherent in voice recognition technology. **      Final report electronically signed by Dr. Ely Oquendo MD on 12/12/2021 2:20 PM      US Ed Fast Abdomen Limited    (Results Pending)          EKG:  I have reviewed the EKG with the following interpretation:        DVT prophylaxis: [] Lovenox                                 [] SCDs                                 [] SQ Heparin                                 [] Encourage ambulation           [] Already on Anticoagulation      Code Status: DNR-CCA    PT/OT Eval Status: Active and ongoing      ASSESSMENT:    C/Radha Butler 1106 Problems    Diagnosis Date Noted    Laceration of left ear [S01.312A] 12/13/2021    Pneumonia due to infectious organism [J18.9] 12/13/2021    Closed head injury [S09.90XA] 12/12/2021    Essential hypertension [I10]     Type 2 diabetes mellitus without complication ([facility] [O09.5] 12/17/2015       PLAN:    1. We will check a procalcitonin if elevated will start IV antibiotics, will check a respiratory viral antigen panel, sputum cultures bacterial PCR, urine for Legionella strep pneumonia. 2. Non-insulin-dependent diabetes we will continue her home medications at time of discharge  3. Essential hypertension we will monitor we will resume home medications. Thank you for the consultation.     Electronically signed by Elder Nguyen DO on 12/13/2021 at 10:20 AM

## 2021-12-13 NOTE — PROGRESS NOTES
[address] [facility] [place] notified of patient consult. 5551 Dr Latonya Sue in to see patient. Dr Latonya Sue notified of CXR results and that the patient isn't on any antibiotics. Dr Latonya Sue verbalized understanding.

## 2021-12-13 NOTE — PROGRESS NOTES
Patient's daughter Tres Morton given HIPPA code per patient's request. Tres Morton updated on orders and patient's assessment.  Tres Morton verbalized understanding

## 2021-12-13 NOTE — PROGRESS NOTES
Called down to ED due to reported patient agitation. Upon arrival patient was using bathroom with assistance. Patient stated she wanted to go home. No agitation or changes from previous assessment noted. Discussed with patient reason for admission and patient agreeable to admission.

## 2021-12-13 NOTE — DISCHARGE INSTR - COC
Continuity of Care Form    Patient Name: Rosamaria Jarvis   :  1935  MRN:  [id number]    [address] [place] [address] date:  2021  Discharge date:  2021    Code Status Order: DNR-CCA   Advance Directives:      Admitting Physician:  Isabela Madrigal MD  PCP: Neena Falcon MD    Discharging Nurse: ZANE [facility] Unit/Room#: 5K-13/013-A  Discharging Unit Phone Number: [phone]    Emergency Contact:   Extended Emergency Contact Information  Primary Emergency Contact: Ronny Paul [address] Phone: [phone]  Mobile Phone: [phone]  Relation: Child  Secondary Emergency Contact: Rosa Jack [address] Phone: [phone]  Work Phone: [phone]  Mobile Phone: [phone]  Relation: Child    Past Surgical History:  Past Surgical History:   Procedure Laterality Date    BREAST SURGERY      right lumpectomy    CHOLECYSTECTOMY  4-15-16    cholangiogram    COLONOSCOPY      EYE SURGERY      macular hole left eye    JOINT REPLACEMENT      left knee & right hip    SKIN BIOPSY      right arm       Immunization History:   Immunization History   Administered Date(s) Administered    Influenza A (Y9G9-54) Vaccine PF IM 2010    Influenza Virus Vaccine 10/02/2013, 10/13/2015, 2016, 10/22/2018    Influenza, High Dose (Fluzone 65 yrs and older) 10/01/2014, 10/13/2015    Influenza, Quadv, IM, (6 mo and older Fluzone, Flulaval, Fluarix and 3 yrs and older Afluria) 2016    Pneumococcal Conjugate 7-valent (Brandon Sep) 10/01/2007    Pneumococcal Polysaccharide ([id number]) 2015    Zoster Live (Zostavax) 10/21/2012       Active Problems:  Patient Active Problem List   Diagnosis Code    Diabetes type 2, uncontrolled E11.65    Type 2 diabetes mellitus without complication (HCC) D51.4    Acute cholecystitis K81.0    Lactic acidosis E87.2    Type 2 diabetes mellitus with hyperosmolarity without coma, without long-term current use of insulin ([facility] E11.00    Choledocholithiasis K80.50    Essential hypertension I10    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    Encounter for fitting or adjustment of hearing aid Z46.1    FATOUMATA (generalized anxiety disorder) F41.1    Transient cerebral ischemia G45.9    Left hip pain M25.552    Aphasia R47.01    TIA (transient ischemic attack) G45.9    Major depression, recurrent, chronic (HCC) F33.9    CHF (congestive heart failure), NYHA class II, unspecified failure chronicity, combined (HCC) I50.40    Dermatitis L30.9    SOB (shortness of breath) R06.02    Fall from ground level W18.30XA    Chronic combined systolic and diastolic congestive heart failure (HCC) I50.42    Tachycardia R00.0    Angina of effort ([facility]) I20.8    Status post angioplasty Z98.62    Abnormal findings on cardiac catheterization R93.1    Angina, class III ([facility]) I20.9    Syncope and collapse R55    Closed fracture of nasal bones S02. 2XXA    Fall at home [address] XXXA, Y92.009    PVD (peripheral vascular disease) ([facility] I73.9    Fall (on) (from) other stairs and steps, initial encounter W10. 8XXA    ARTIE (acute kidney injury) ([facility] N17.9    Urinary tract infection without hematuria N39.0    Fall at nursing home [address] [place] [employer], Y92.129    Closed head injury S09.90XA    Laceration of left ear S01.312A    Pneumonia due to infectious organism J18.9       Isolation/Infection:   Isolation            No Isolation          Patient Infection Status       Infection Onset Added Last Indicated Last Indicated By Review Planned Expiration Resolved Resolved By    None active    Resolved    COVID-19 (Rule Out) 10/27/21 10/27/21 10/27/21 COVID-19, Rapid (Ordered)   10/27/21 Rule-Out Test Resulted    COVID-19 (Rule Out) 08/27/20 08/27/20 08/27/20 Covid-19 Ambulatory (Ordered)   08/28/20 Rule-Out Test Resulted            Nurse Assessment:  Last Vital Signs: /68   Pulse 116   Temp 97.1 °F (36.2 °C)   Resp 18   SpO2 96%     Last documented pain score (0-10 scale):    Last Weight:   Wt Readings from Last 1 Encounters:   11/16/21 165 lb (74.8 kg)     Mental Status:  oriented and alert    IV Access:  - None    Nursing Mobility/ADLs:  Walking   Assisted  Transfer  Assisted  Bathing  Assisted  Dressing  Assisted  Toileting  Assisted  Feeding  Independent  Med Admin  Assisted  Med Delivery   whole    Wound Care Documentation and Therapy:  Wound 11/05/21 Sacrum (Active)   Number of days: 37        Elimination:  Continence: Bowel: Yes  Bladder: Yes  Urinary Catheter: None   Colostomy/Ileostomy/Ileal Conduit: No       Date of Last BM: 12/24/2021    Intake/Output Summary (Last 24 hours) at 12/13/2021 1425  Last data filed at 12/13/2021 1400  Gross per 24 hour   Intake 988.82 ml   Output 200 ml   Net 788.82 ml     No intake/output data recorded. Safety Concerns:     History of Falls (last 30 days)    Impairments/Disabilities:      None    Nutrition Therapy:  Current Nutrition Therapy:   - Oral Diet:  Carb Control 4 carbs/meal (1800kcals/day)    Routes of Feeding: Oral  Liquids: Thin Liquids  Daily Fluid Restriction: no  Last Modified Barium Swallow with Video (Video Swallowing Test): not done    Treatments at the Time of Hospital Discharge:   Respiratory Treatments: see mar  Oxygen Therapy:  is on oxygen at 1 L/min per nasal cannula.   Ventilator:    - No ventilator support    Rehab Therapies: Physical Therapy and Occupational Therapy  Weight Bearing Status/Restrictions: No weight bearing restirctions  Other Medical Equipment (for information only, NOT a DME order):  walker  Other Treatments:     Patient's personal belongings (please select all that are sent with patient):      RN SIGNATURE:  Electronically signed by Kelsie Marrero RN on 12/25/21 at 11:40 AM EST    CASE MANAGEMENT/SOCIAL WORK SECTION    Inpatient Status Date: 12/15/2021    Readmission Risk Assessment Score:  Readmission Risk              Risk of Unplanned Readmission:  0           Discharging to Facility/ Agency   Name: [facility] [address], [address]         Phone: [phone]       Fax: [phone]          Dialysis Facility (if applicable)   Name:  Address:  Dialysis Schedule:  Phone:  Fax:    / signature: Electronically signed by DAVIAN Morejon on 12/13/21 at 2:26 PM EST    PHYSICIAN SECTION    Prognosis: Fair    Condition at Discharge: Stable    Rehab Potential (if transferring to Rehab): Fair    Recommended Labs or Other Treatments After Discharge: None at this time     Physician Certification: I certify the above information and transfer of Shasta Montelongo  is necessary for the continuing treatment of the diagnosis listed and that she requires [facility] for greater 30 days.      Update Admission H&P: No change in H&P    PHYSICIAN SIGNATURE:  Electronically signed by SHELBIE Eason on 12/25/21 at 11:46 AM EST

## 2021-12-13 NOTE — ED NOTES
Pt transported to [facility] on cart in stable condition. Floor contacted before transport. Spoke with Jose Maria Robles.      Rk Moore  12/13/21 3105

## 2021-12-13 NOTE — ED PROVIDER NOTES
Patient was signed out to me by my evening colleague. Patient required no medical intervention by myself. Patient is signed out to my morning colleague in stable condition.       Lewie Bosworth, DO  12/13/21 9434

## 2021-12-13 NOTE — ED NOTES
Pt assisted onto bed pain. Respirations even and unlabored. Lights dimmed for pt comfort. Jackson alarm on, call light in reach, side rails x2, bed lowest position.       Federica Ness RN  12/13/21 [id number]

## 2021-12-13 NOTE — PROGRESS NOTES
Dr Nino Salamanca notified that patient got lightheaded when standing an we obtained orthostatic vital signs. Dr Nino Salamanca notified of orthostatic results and verbalized understanding. Dr Nino Salamanca states do not discharge and notify trauma (I already notified Charly Balbuena).

## 2021-12-13 NOTE — ED NOTES
Pt hitting call light while watching tv. Pt agitated and requesting to leave. Pt made aware we need to watch her over night. Pt calmed down.  Will monitor      Marcos Sanders RN  12/12/21 3610

## 2021-12-13 NOTE — CARE COORDINATION
12/13/21, 2:27 PM EST  Discharge Planning Evaluation  Social work consult received, patient from [facility]. Patient/Family preference is to return to [facility]. The patient's current payor source at the facility is Private Pay. Medicare skilled days available: [employer]  Insurance precert:  Yes  Spoke with [employer] at the facility. Patient bed hold:  Yes  Anticipated transport plan: Daughter Tiff Sales they require COVID 19 test to return to [facility]CF: No  Is there a required time frame which which COVID test needs done: NA

## 2021-12-13 NOTE — PROGRESS NOTES
Patient arrived to floor from the emergency room.  Belongings of pants, jacket, underwear and shoes placed in the closet

## 2021-12-14 PROBLEM — D53.9 MACROCYTIC ANEMIA: Status: ACTIVE | Noted: 2021-01-01

## 2021-12-14 PROBLEM — R42 ORTHOSTATIC DIZZINESS: Status: ACTIVE | Noted: 2021-01-01

## 2021-12-14 PROBLEM — I95.1 ORTHOSTATIC HYPOTENSION DYSAUTONOMIC SYNDROME: Status: ACTIVE | Noted: 2021-01-01

## 2021-12-14 PROBLEM — U07.1 COVID-19 VIRUS INFECTION: Status: ACTIVE | Noted: 2021-01-01

## 2021-12-14 NOTE — PROGRESS NOTES
Waylon Mohan 60  INPATIENT OCCUPATIONAL THERAPY  STR ONC MED 5K  EVALUATION    Time:   Time In: 5154  Time Out: 1100  Timed Code Treatment Minutes: 30 Minutes  Minutes: 45          Date: 2021  Patient Name: Walter Mccauley,   Gender: female      MRN: [id number]  : 1935  (80 y.o.)  Referring Practitioner: Steve Mack PA-C  Diagnosis: Syncope and Collapse  Additional Pertinent Hx: Pt admit with above diagnosis, right upper lobe pneumonia, s/p fall at [facility] and hit left side of head with laceration to left ear, [facility]    Restrictions/Precautions:  Restrictions/Precautions: General Precautions, Fall Risk  Position Activity Restriction  Other position/activity restrictions: +orthostatic hypotension    Subjective  Chart Reviewed: Yes  Patient assessed for rehabilitation services?: Yes    Subjective: Pleasant and cooperative  Comments: RN approved session. Pt agreed to get up. She needed cues to take her time. She had some lightheadedness when first getting up. She indicated that it resolved.     Pain:  Pain Assessment  Patient Currently in Pain: Denies    Vitals: Nurse checked vitals prior to session  Pt's nurse stated that her blood pressure was slightly low and her meds were adjusted this AM.      Social/Functional History:  Lives With: Alone  Type of Home: Facility ([facility])  Home Layout: One level  Home Access: Level entry  Home Equipment: 4 wheeled walker   Bathroom Shower/Tub: Walk-in shower, Shower chair with back  [address]: [id number] [place] [address] [address]: Grab bars in [address] Accessibility: [address] Help From: Other (comment) (staff as available)  ADL Assistance: Needs assistance  [address]: Needs assistance  Homemaking Responsibilities: No  Ambulation Assistance: Needs assistance  Transfer Assistance: Needs assistance    Active : No  Patient's  Info: family provides her with transportation  Occupation: Retired  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 brushing her teeth while in sitting; washing her hands and face with a washcloth while sitting  Upper Extremity [address]  don/doffing a hospital gown around her IV line  Lower Extremity Dressing: Minimal Assistance. donning her slip on shoes  Toileting: Moderate Assistance. pt had incontinence of bowel on the way to the the bathroom; help needed for thoroughness in wiping her bottom and also starting her pull ups at her feet  Toilet Transfer: [address] to/from the standard toilet seat using a grabbar. BALANCE:  Sitting Balance:  Stand By Assistance.  scooting forward at the edge of bed; grooming in the chair or starting her toileting while in the restroom  Standing Balance: [address] preparing to walk    BED MOBILITY:  Rolling to Left: Contact Guard Assistance, with head of bed raised, with rail    Supine to Sit: Contact Guard Assistance, X 1, with head of bed raised, with rail    Scooting: Stand By Assistance, with rail      TRANSFERS:  Sit to Stand:  [address] from the edge of bed  Stand to Sit: [address] to the recliner chair    FUNCTIONAL MOBILITY:  Assistive Device: Rolling Walker  Assist Level:  Contact Guard Assistance. Distance: To and from bathroom  Pt needed cues to slow down as she attempted to steer around objects with some difficulty noted. Activity Tolerance:  Patient tolerance of  treatment: fair.  Pt was having SOB while using the bathroom and walking. She needed cues to take her time and not be in a hurry. She stood only for pulling up her Depends for 50 seconds. Assessment:  Assessment: Patient would benefit from continued skilled OT services to address above deficits. She presents with Syncope and Collapse. She had positive orthostatic blood pressure during this hospitalization. She was doing her self care with help for showering prior to admission. She ambulated with help. Pt demonstrates some confusion and needed reminders about events since her admission. She needed help with doing ADLs: MIN A for upper body dressing but MOD A for lower body dressing and for her toileting. She walked with CGA using the rolling walker for distances to/from the bathroom. She showed SOB with walking and doing self care. Cues needed for taking her time for safety as she tended to want to rush things. Performance deficits / Impairments: Decreased functional mobility , Decreased ADL status, Decreased safe awareness, Decreased endurance  Prognosis: Good  REQUIRES OT FOLLOW UP: Yes  Decision Making: Low Complexity    Treatment Initiated: Treatment and education initiated within context of evaluation. Evaluation time included review of current medical information, gathering information related to past medical, social and functional history, completion of standardized testing, formal and informal observation of tasks, assessment of data and development of plan of care and goals. Treatment time included skilled education and facilitation of tasks to increase safety and independence with ADL's for improved functional independence and quality of life. Cues for safety as she does her self care. Pt had some lightheadedness during session but it resolved. Encouraged pt to take her time when getting up and moving so that her body can make needed adjustments if her blood pressure should be dropping with the change of position. Will need continued reminders. Discharge Recommendations:  Subacute/Skilled Nursing Facility    Patient Education:  OT Education: OT Role, Plan of Care, Orientation  Patient Education: Importance of taking things slowly when she is not feeling the best so that she can avoid having accidents    Equipment Recommendations:  Equipment Needed: No    Plan:  Times per week: 5x  Current Treatment Recommendations: Endurance Training, Self-Care / ADL, Safety Education & Training, Functional Mobility Training  Plan Comment: Pt would benefit from continued skilled OT services when medically stable and discharged from Acute. OT [facility] at [facility]  Specific instructions for Next Treatment: Functional mobility; ADLs and safety awareness; UE exercises. See long-term goal time frame for expected duration of plan of care. If no long-term goals established, a short length of stay is anticipated. Goals:  Patient goals : \"I want to get back to doing my own routine. \" pt states. Short term goals  Time Frame for Short term goals: By discharge  Short term goal 1: Pt will complete her toileting routine including transfers to/from the standard toilet seat with SBA to increase her independence with self care. Short term goal 2: Pt will complete lower body dressing with SBA while using any AE needwed to increase her independence with self care. Short term goal 3: Pt will demonstrate activity tolerance doing sinkside ADLs for 3 minute duration with 1-2 hand release to increase her endurance for ease of dressing or grooming. Short term goal 4: Pt will demonstrate functional mobility walking to/from the bathroom or in the hallway with SBA while using a rolling walker. Following session, patient left in safe position with all fall risk precautions in place.

## 2021-12-14 NOTE — PROGRESS NOTES
Patient slightly agitated at this time, pulling off Hunter hose and sctatching at her legs, attempted to reorient and lotion applied to BLE for itching. Stated to patient I would be back to see if the itching was better and therapy was coming in to work with her.

## 2021-12-14 NOTE — PROGRESS NOTES
Daughter Lise Santillan called, HIPAA code obtained, updated.  Stated if the patient is discharged today, patient's other daughter Anthony Rebolledo will have to transport if it is after 4 pm.

## 2021-12-14 NOTE — CARE COORDINATION
12/14/21, 5:31 PM EST    DISCHARGE PLANNING EVALUATION    Patient has tested positive for COVID. Spoke with Lindsay Harrell at [facility] (2x)-they are not able to accept patient as they are not set up to do isolation.

## 2021-12-14 NOTE — PROGRESS NOTES
Waylon Mohan 60  PHYSICAL THERAPY MISSED TREATMENT NOTE  [facility] ONC MED 5K    Date: 2021  Patient Name: Shekhar Aaron        MRN: [id number]   : 1935  (80 y.o.)  Gender: female   Referring Practitioner: SHELBIE Trejo  Diagnosis: syncope and collapse         REASON FOR MISSED TREATMENT:  Missed treat. OT therapist going in for eval upon PTA arrival. Will try back as time allows.

## 2021-12-14 NOTE — PROGRESS NOTES
[facility]  INPATIENT PHYSICAL THERAPY  DAILY NOTE  STR ONC MED 5K - 5K-13/013-A    Time In: 1310  Time Out: 1335  Timed Code Treatment Minutes: 25 Minutes  Minutes: 25          Date: 2021  Patient Name: Umberto Cardona,  Gender:  female        MRN: [id number]  : 1935  (80 y.o.)     Referring Practitioner: SHELBIE Trejo  Diagnosis: syncope and collapse  Additional Pertinent Hx: admit with above diagnosis, right upper lobe pneumonia, s/p fall at [facility] and hit left side of head with laceration to left ear, [facility]     Prior Level of Function:  Lives With: Alone  Type of Home: Facility ([facility])  Home Layout: One level  Home Access: Level entry  Home Equipment: 4 wheeled walker   Bathroom Shower/Tub: Walk-in shower, Shower chair with back  Bathroom Toilet: [id number] [place] [address] 83: Grab bars in [address] Accessibility: [address] Help From: Other (comment) (staff as available)  ADL Assistance: Needs assistance  [address]: Needs assistance  Homemaking Responsibilities: No  Ambulation Assistance: Needs assistance  Transfer Assistance: Needs assistance  Active : No  Additional Comments: per pt was using rollator PTA and working with PT    Restrictions/Precautions:  Restrictions/Precautions: General Precautions, Fall Risk  Position Activity Restriction  Other position/activity restrictions: +orthostatic hypotension     SUBJECTIVE: RN approved session, pt requesting to use restroom and RN also ok'd pt to walk to restroom. Pt resting in bedside chair, confused throughout session    PAIN: Pt denies pain when asked but then later states her jaw is very sore. Vitals: Vitals not assessed per clinical judgement, see nursing flowsheet    OBJECTIVE:  Bed Mobility:  Sit to Supine: Stand By Assistance     Transfers:  Sit to Stand: [employer], Minimal Assistance, From bedside chair and from toilet.  Cuing for hand placement for greater independence of transfer. Stand to [address] [place] [address] for hand placement for increased safety. Ambulation:  Contact Guard Assistance, Minimal Assistance  Distance: 15 feet x1 and 10 feet x1   Surface: Level Tile  Device:Rolling Walker  Gait Deviations: Forward Flexed Posture, Slow Brittny, Decreased Step Length Bilaterally, Decreased Heel Strike Bilaterally and Mild instability and somewhat impulsive with AD. After using toilet pt stating she did not feel well and began to rest head down onto AD. Called tech for second set of hands and pt assisted back to bed at [facility] to [facility] of  for safety. RN notified. Balance:  Dynamic Standing Balance: Minimal Assistance, Pt stood to manage depends requiring one UE support on AD for stability. Exercise:  Patient was guided in 1 set(s) 15 reps of exercise to both lower extremities. Seated marches, Seated hamstring curls, Seated heel/toe raises, Long arc quads and Seated abduction/adduction. Exercises were completed for increased independence with functional mobility. Functional Outcome Measures: Completed  AM-PAC Inpatient Mobility without Stair Climbing Raw Score : 14  AM-PAC Inpatient without Stair Climbing T-Scale Score : 40.85    ASSESSMENT:  Assessment: Patient progressing toward established goals. and Pt tolerated session fair. Limited by cognition, decreased balance, decreased safety awareness. Would benefit from continued therapy before returning home. Not safe to be home alone. Activity Tolerance:  Patient tolerance of  treatment: fair. Equipment Recommendations: Other: cont to assess needs  Discharge Recommendations: Continue to assess pending progress, Subacte/Skilled Nursing Facility and 24 hour assistance or supervision  Plan: Times per week: 3-5X GM  Times per day: Daily  Specific instructions for Next Treatment: therex and mobility, monitor BP    Patient Education  Patient Education: Plan of Care, Transfers, Gait    Goals:  Patient goals : go home  Short term goals  Time Frame for Short term goals: by discharge  Short term goal 1: bed mobility with SBA to get in/out of bed  Short term goal 2: transfer with SBA to get in/out of chairs  Short term goal 3: amb >25'x1 with walker and CGA to walk safely in home  Long term goals  Time Frame for Long term goals : no LTGs set secondary to short ELOS    Following session, patient left in safe position with all fall risk precautions in place.

## 2021-12-14 NOTE — PROGRESS NOTES
827  Attempted SBIRT per trauma service. Pt observed to be sleeping. Attempted to call out patient name x4. No response. Pt continues to be observed resting. Per Epic, low stimulation guidelines noted. Did not attempt further.

## 2021-12-14 NOTE — PROGRESS NOTES
Hospitalist Progress Note  [facility] 5K       Patient: Walter Mccauley    Unit/Bed: 1-00/359-A    YOB: 1935    MRN: [id number]    Acct: [de-identified]     Admitting Diagnosis:Syncope and collapse [R55]  Contusion of left ear, initial encounter [S00.432A]  Closed head injury, initial encounter [S09.90XA]  Contusion of head, unspecified part of head, subsequent encounter [S00.93XD]    Admit Date:  12/12/2021    Hospital Day: 0    Subjective:    Patient is feeling well and wants to be discharged to nursing home today. She is feisty and agitated. Positive orthostatics blood pressure lying down 104/56, sitting 98/46 and standing 86/52. Patient Seen, Chart, Labs, Radiology studies, and Consults reviewed. Objective:   /66   Pulse 104   Temp 97.8 °F (36.6 °C) (Oral)   Resp 16   Ht 5' 4\" (1.626 m)   Wt 154 lb 6.4 oz (70 kg)   SpO2 95%   BMI 26.50 kg/m²     Intake/Output Summary (Last 24 hours) at 12/14/2021 0957  Last data filed at 12/14/2021 2240  Gross per 24 hour   Intake 1228.82 ml   Output 200 ml   Net 1028.82 ml       Review of Systems   Constitutional: Positive for activity change. Negative for appetite change, chills, diaphoresis, fatigue, fever and unexpected weight change. HENT: Negative for congestion, dental problem, drooling, ear discharge, ear pain, facial swelling, hearing loss, mouth sores, nosebleeds, postnasal drip, rhinorrhea, sinus pressure, sinus pain, sneezing, sore throat, tinnitus, trouble swallowing and voice change. Eyes: Negative for photophobia, pain, discharge, redness, itching and visual disturbance. Respiratory: Negative for apnea, cough, choking, chest tightness, shortness of breath, wheezing and stridor. Cardiovascular: Negative for chest pain, palpitations and leg swelling. Gastrointestinal: Negative for abdominal distention, abdominal pain, anal bleeding, blood in stool, constipation, diarrhea, nausea, rectal pain and vomiting. Endocrine: Negative for cold intolerance, heat intolerance, polydipsia, polyphagia and polyuria. Genitourinary: Negative for decreased urine volume, difficulty urinating, dyspareunia, dysuria, enuresis, flank pain, frequency, genital sores, hematuria, menstrual problem, pelvic pain, urgency, vaginal bleeding, vaginal discharge and vaginal pain. Musculoskeletal: Negative for arthralgias, back pain, gait problem, joint swelling, myalgias, neck pain and neck stiffness. Skin: Negative for color change, pallor, rash and wound. Allergic/Immunologic: Negative for environmental allergies, food allergies and immunocompromised state. Neurological: Positive for dizziness and weakness. Negative for tremors, seizures, syncope, facial asymmetry, speech difficulty, light-headedness, numbness and headaches. Hematological: Negative for adenopathy. Does not bruise/bleed easily. Psychiatric/Behavioral: Positive for agitation and behavioral problems. Negative for confusion, decreased concentration, dysphoric mood, hallucinations, self-injury, sleep disturbance and suicidal ideas. The patient is nervous/anxious. The patient is not hyperactive. Physical Exam  Vitals and nursing note reviewed. Constitutional:       General: She is not in acute distress. Appearance: Normal appearance. She is normal weight. She is not ill-appearing, toxic-appearing or diaphoretic. HENT:      Head: Normocephalic and atraumatic. Right Ear: External ear normal.      Left Ear: External ear normal.      Ears:      Comments: Left ear laceration with stitches and no bleeding. Nose: Nose normal. No congestion or rhinorrhea. Mouth/Throat:      Mouth: Mucous membranes are moist.      Pharynx: Oropharynx is clear. No oropharyngeal exudate or posterior oropharyngeal erythema. Eyes:      General: No scleral icterus. Right eye: No discharge. Left eye: No discharge.       Extraocular Movements: Extraocular movements intact. Conjunctiva/sclera: Conjunctivae normal.      Pupils: Pupils are equal, round, and reactive to light. Neck:      Vascular: No carotid bruit. Cardiovascular:      Rate and Rhythm: Normal rate and regular rhythm. Pulses: Normal pulses. Heart sounds: Normal heart sounds. No murmur heard. No friction rub. No gallop. Pulmonary:      Effort: Pulmonary effort is normal. No respiratory distress. Breath sounds: Normal breath sounds. No stridor. No wheezing, rhonchi or rales. Chest:      Chest wall: No tenderness. Abdominal:      General: Abdomen is flat. Bowel sounds are normal. There is no distension. Palpations: There is no mass. Tenderness: There is no abdominal tenderness. There is no right CVA tenderness, left CVA tenderness, guarding or rebound. Hernia: No hernia is present. Musculoskeletal:         General: No swelling, tenderness, deformity or signs of injury. Normal range of motion. Cervical back: Normal range of motion and neck supple. No rigidity or tenderness. Right lower leg: No edema. Left lower leg: No edema. Lymphadenopathy:      Cervical: No cervical adenopathy. Skin:     General: Skin is warm. Coloration: Skin is not jaundiced or pale. Findings: No bruising, erythema, lesion or rash. Neurological:      General: No focal deficit present. Mental Status: She is alert and oriented to person, place, and time. Mental status is at baseline. Cranial Nerves: No cranial nerve deficit. Sensory: No sensory deficit. Motor: No weakness.       Coordination: Coordination normal.      Gait: Gait normal.      Deep Tendon Reflexes: Reflexes normal.         Medications:    metoprolol tartrate  25 mg Oral BID    melatonin  3 mg Oral Nightly    DULoxetine  120 mg Oral Daily    clopidogrel  75 mg Oral Daily    atorvastatin  40 mg Oral Daily    insulin lispro  0-6 Units SubCUTAneous TID     insulin lispro 0-3 Units SubCUTAneous Nightly    metFORMIN  750 mg Oral Daily with breakfast    Tdap-Dtap  0.5 mL IntraMUSCular Once    sodium chloride flush  10 mL IntraVENous 2 times per day    docusate sodium  100 mg Oral Daily       Continuous Infusions:   dextrose      sodium chloride 75 mL/hr at 12/13/21 1535    sodium chloride         PRN Meds:glucose, dextrose, glucagon (rDNA), dextrose, acetaminophen, sodium chloride flush, sodium chloride, ondansetron **OR** ondansetron, polyethylene glycol    Data:    CBC:   Recent Labs     12/12/21  1325 12/13/21  0728   WBC 8.3 8.5   RBC 4.15* 4.10*   HGB 13.4 13.2   HCT 42.0 41.5   .2* 101.2*    259       BMP:   Recent Labs     12/12/21  1325 12/13/21  0728   * 141   K 4.4 4.4   CL 95* 102   CO2 26 24   BUN 20 17   CREATININE 1.3* 1.2       BNP: No results for input(s): BNP in the last 72 hours. PT/INR:  Recent Labs     12/12/21  1407   INR 0.99       APTT:   Recent Labs     12/12/21  1407   APTT 29.6       CARDIAC ENZYMES:   Recent Labs     12/12/21  1325   TROPONINT < 0.010       FASTING LIPID PANEL:  Lab Results   Component Value Date    CHOL 173 01/13/2020    HDL 38 01/13/2020    TRIG 359 (H) 01/13/2020       LIVER PROFILE:   Recent Labs     12/12/21  1325   AST 34   ALT 26   BILIDIR <0.2   BILITOT 0.3   ALKPHOS 75        ABGs: No results found for: PH, PCO2, PO2, HCO3, O2SAT    URINALYSIS. Results for Chung Fink (MRN [id number]) as of 12/14/2021 10:02   Ref.  Range 12/12/2021 15:45   Color, UA Latest Ref Range: YELLOW-STRAW  YELLOW   Bilirubin, Urine Latest Ref Range: NEGATIVE  NEGATIVE   Ketones, Urine Latest Ref Range: NEGATIVE  NEGATIVE   Specific Gravity, UA Latest Ref Range: 1.002 - 1.030  1.007   Blood, Urine Latest Ref Range: NEGATIVE  NEGATIVE   pH, UA Latest Ref Range: 5.0 - 9.0  5.5   Protein, UA Latest Ref Range: NEGATIVE mg/dl NEGATIVE   Urobilinogen, Urine Latest Ref Range: 0.0 - 1.0 eu/dl 0.2   Nitrite, Urine Latest Ref Range: NEGATIVE  NEGATIVE   Leukocytes, UA Latest Ref Range: NEGATIVE  NEGATIVE   Glucose, Urine Latest Ref Range: NEGATIVE mg/dl 100 (A)   Character, Urine Latest Ref Range: CLR-SL.CLOUD  CLEAR     Results for Pratibha Nicole (MRN [id number]) as of 12/14/2021 21:27   Ref. Range 11/5/2021 17:12   Color, UA Latest Ref Range: STRAW-YELLOW  YELLOW   Glucose, UA Latest Ref Range: NEGATIVE mg/dl >= 1000 (A)   Bilirubin, Urine Latest Ref Range: NEGATIVE  NEGATIVE   Ketones, Urine Latest Ref Range: NEGATIVE  TRACE (A)   Blood, Urine Latest Ref Range: NEGATIVE  NEGATIVE   pH, UA Latest Ref Range: 5.0 - 9.0  5.5   Protein, UA Latest Ref Range: NEGATIVE  TRACE (A)   Urobilinogen, Urine Latest Ref Range: 0.0 - 1.0 eu/dl 0.2   Nitrite, Urine Latest Ref Range: NEGATIVE  NEGATIVE   Leukocyte Esterase, Urine Latest Ref Range: NEGATIVE  NEGATIVE   Casts UA Latest Ref Range: NONE SEEN /lpf NONE SEEN   CASTS 2 Latest Ref Range: NONE SEEN /lpf NONE SEEN   WBC, UA Latest Ref Range: 0-4/hpf /hpf 15-25   RBC, UA Latest Ref Range: 0-2/hpf /hpf NONE   Epithelial Cells, UA Latest Ref Range: 3-5/hpf /hpf 3-5   Renal Epithelial, UA Latest Ref Range: NONE SEEN  NONE SEEN   Bacteria, UA Latest Ref Range: FEW/NONE SEEN /hpf NONE SEEN   Yeast, Urine Latest Ref Range: NONE SEEN  MANY BUDDING   Crystals, UA Latest Ref Range: NONE SEEN  NONE SEEN   Character, Urine Latest Ref Range: CLEAR-SL CLOUD  CLOUDY (A)   Urine Culture Reflex Unknown Growth of Contaminants. The mixture of organisms present are not a common cause of urinary tract. .. (A)   Specific Gravity, Urine Latest Ref Range: 1.002 - 1.030  > 1.030 (A)     SEROLOGY  None. TUMOR MARKERS. None. MICROBIOLOGY   Results for Pratibha Nicole (MRN [id number]) as of 12/14/2021 21:27   Ref. Range 12/14/2021 15:25   SARS-CoV-2, PCR Latest Ref Range: NOT DETECTED  DETECTED (AA)       HISTOPATHOLOGY. None. TOXICOLOGY. None. ENDOSCOPE STUDIES. None. PROCEDURES.   Diagnostic [facility] with PCI01/13/2020. SUMMARY:   Successful PCI of the ostium of the LAD.     PLAN:  1. Bedrest.    2.  Optimal medical therapy. 3.  Risk factor management. 4.  Routine access site care. 5.  Overnight observation. 6.  IV fluids. 7. DAPT. 8.  Follow up with Dr. Aliya Lopez in one to two weeks postprocedure.     All the above was explained to the patient and the patient's family. They are agreeable and amenable to the above plan.  Romario Bañuelos MD.    IR PROCEDURES. None. RADIOLOGY. CT head without contrast12/12/2021. FINDINGS:  There is stable moderate ventriculomegaly, mildly disproportionate to peripheral   volume loss. There are mild confluent and patchy areas of hypodensity in the periventricular,   subcortical deep white matter, stable compared to prior exam.     Gray-white matter differentiation otherwise appears grossly preserved. The calvarium appears intact. Orbits are unremarkable. Paranasal sinuses and mastoid air cells are clear.     IMPRESSION:  No evidence of acute intracranial abnormality. CT cervical spine without contrast12/12/2021. FINDINGS:  There is grade 1 anterolisthesis of C4 relative to C5 on the basis of degenerative   change, stable compared to prior exam.     There is otherwise anatomic vertebral body height and alignment. No fracture of the cervical vertebral column is identified. The   craniocervical junction appears intact. No paraspinal or epidural fluid collection is identified. There are stable degenerative changes of the cervical spine most pronounced   at C6-7 with complete loss of intervertebral disc space and rim osteophytes   at this level. Paraspinal soft tissues are grossly unremarkable.     IMPRESSION:  No evidence of acute osseous injury of the cervical spine. Chest x-ray12/12/2021.   FINDINGS:  There is a patchy opacity in the right upper lobe which has appeared in the interval.     Lungs otherwise appear clear. Pulmonary vasculature and cardiomediastinal silhouette are within normal limits. Visualized osseous structures appear grossly intact.     IMPRESSION:  Right upper lobe pneumonia. Pelvic x-ray12/12/2021. FINDINGS:  There is a right hip arthroplasty. Stable alignment compared to prior exam.     No acute fracture is evident on this single view.     IMPRESSION:  No evidence of acute osseous injury on this single image of the pelvis. Echocardiogram -05/20/2021. SUMMARY:   Ejection fraction is visually estimated at 55%. Overall left ventricular function is normal.    Moderately dilated left atrium. The aortic valve leaflets were not well visualized. Aortic valve leaflets are somewhat thickened. Aortic valve leaflets are Mildly calcified. There is mild aortic stenosis with valve area of 1.5 sq cm. The mitral valve was not well visualized . MV repair not well visualized    Mild mitral regurgitation is present. ASSESSMENT AND  PLAN. 1.NEUROVASCULAR. Closed head injury stable. Orthostatic dizziness-fall precaution. 2.PULMONARY. Suspected CAP RUL-on IV Rocephin and azithromycin. COPD w/o exacerbation-as needed nebs. Acute COVID-19 infection w/o hypoxemia-as needed oxygen. CTA chest.    3.CARDIOVASCULAR. Orthostatic hypotension versus autonomic dysfunction -IVF w/ NS. CAD w/o angina. Hypertensioncontrolled. Dyslipidemia. Mild Aortic valve stenosis. 4.GI.     H/o uncomplicated diverticulosis. GERD on PPI    5.RENAL AND ELECTROLYTES. Serum magnesium and phosphorus check. 6.ID. Urinalysis negative for UTI. 7.ENDO. Hyperglycemia due to T2 DMsliding scale insulin. TSH and A1c 10.7 . Serum cortisol to r/o adrenal insufficiency. 8.PSYCH. Anxiety and depression. Agitationas needed Haldol. 9.HEM-ONC. Macrocytosis w/o anemiaHb 13.2, .2. H/o PE and DVT.      H/o breast cancer. J77 and folic acid check. 10.DISPO. Planned d/c to home vs rehab soon.         Electronically signed Terrance Thomson MD on 12/14/2021 at 9:57 AM    Rounding Hospitalist

## 2021-12-14 NOTE — PROGRESS NOTES
1720 Patient tested positive for Covid and has positive orthostatic BP, has been getting progressively more agitated throughout the day, at this time patient is out in herrera without a mask, won't return to her room, threatening to break staff appendages and glasses. Dr. Hudson Soni present, sitter requested, orders for haldol received. Did manage to get patient into a rolling chair and back into the room with omero Caldera at bedside. Attempting to call daughter to update and to have her stay the night, daughter is out of town at this time but willing to talk to her on the phone and come in to see her. Refusing IVF at this time. 189 Clementine sutherland at this time with sitter at the bedside and talking to staff, will hold off on giving the prn haldol while patient is cooperative. Still refusing IVF bolus.

## 2021-12-15 NOTE — CARE COORDINATION
Discharge Planning Update:     Pt to 6A from 5K. Covid + and cannot return to ECF at this time due to this dx. SW following for discharge disposition. Afebrile. Ortho VS +. Room air. Sat 98%. From ECF. SW following for discharge disposition.

## 2021-12-15 NOTE — PROGRESS NOTES
0600 - Called and spoke to patient's daughter Greg Smith. Update provided and all questions answered to the best of my ability.

## 2021-12-15 NOTE — PROGRESS NOTES
Called and talked with Husam Leo daughter and updated on patient status and had no further questions.

## 2021-12-15 NOTE — PROGRESS NOTES
UE strength and activity tolerance required for BADL routine and toilet / shower transfers. Patient tolerated , requiring frequent rest breaks. Patient also required verbal and vissaul cues for technique. ASSESSMENT:     Activity Tolerance:  Patient tolerance of  treatment: fair. Discharge Recommendations: ECF with OT  Equipment Recommendations: Equipment Needed: No  Plan: Times per week: 5x  Specific instructions for Next Treatment: Functional mobility; ADLs and safety awareness; UE exercises  Current Treatment Recommendations: Endurance Training, Self-Care / ADL, Safety Education & Training, Functional Mobility Training  Plan Comment: Pt would benefit from continued skilled OT services when medically stable and discharged from Acute. OT recommended while at [facility]    Patient Education  Patient Education: ADL's, Home Exercise Program, Importance of Increasing Activity and Assistive Device Safety and safety with transfers/mobility. Goals  Short term goals  Time Frame for Short term goals: By discharge  Short term goal 1: Pt will complete her toileting routine including transfers to/from the standard toilet seat with SBA to increase her independence with self care. Short term goal 2: Pt will complete lower body dressing with SBA while using any AE needwed to increase her independence with self care. Short term goal 3: Pt will demonstrate activity tolerance doing sinkside ADLs for 3 minute duration with 1-2 hand release to increase her endurance for ease of dressing or grooming. Short term goal 4: Pt will demonstrate functional mobility walking to/from the bathroom or in the hallway with SBA while using a rolling walker. Following session, patient left in safe position with all fall risk precautions in place.

## 2021-12-15 NOTE — CARE COORDINATION
12/15/21, 11:19 AM EST  DISCHARGE PLANNING EVALUATION    RUSSELL called Denisha Siddiqui at [facility] and he reported that they are not able to accept patient back at this time being positive for COVID. He reported that they will continue to look and review different ways to see if they can accommodate. RUSSELL called daughter Dre Bernard and provided her an update on patient. RUSSELL reported that SW spoke with Denisha Artur at [facility] and they are not able to move her back in until she is out of isolation. RUSSELL explained that SW would start looking into other ECFs that accept COVID patients and when patient is out of isolation patient would be able to transfer back to [facility]. Dre Bernard is agreeable to referral being sent to [facility], [facility] or other ECFs that can accept COVID. RUSSELL called and made referral [facility] and spoke with FRANCISCO. FRANCISCO reported that they are bed locked and cannot take referrals at this time. RUSSELL called [facility] and left voicemail for Eulalio Garcia asking for a call back. 12/15/21, 12:25 PM EST  DISCHARGE PLANNING EVALUATION    RUSSELL received a call from Eulalio Garcia at [facility] and she reported that their rooms are full both isolation rooms and regular rooms.

## 2021-12-15 NOTE — PROGRESS NOTES
Hospitalist Progress Note      Patient:  Dulce Tillman    Unit/Bed:6A-19/019-A  YOB: 1935  MRN: [id number]   Acct: [de-identified]   PCP: Keisha Rosen MD  Date of Admission: 12/12/2021    Assessment/Plan:  1. COVID-PNA without hypoxemia:   Patient tested positive for COVID on 12/12. CXR 12/12 reveals right upper lobe pneumonia. Patient's procal 0.13 and remains afebrile and non-toxic appearing, without an acute leukocytosis. Awaiting CTA. Pt is not requiring supplemental O2 at this time. Continue to monitor symptoms. 2. Orthostatic Hypotension:   Positive orthostatics today. Cardiology onboard. Increase IVF to 100 mL/hr. Continue lopressor, but hold for SBP <110 or HR <60. Continue orthostatic Blood pressures Qshift. Will reevaluate tomorrow AM. CBC, BMP ordered for AM.   3.  Acute HFrEF:    ECHO 12/14 reveals 45% with previous HFpEF- ECHO on 5/2021 revealed EF 55%. Pro-.2 today. Cardiology onboard. No signs of volume overload- Continue IVF. Cr 1.2 today. Recheck BMP in the AM.   4. Closed Head injury   Stable. CT of head and neck showed no acute injury or abnormality. 4. Non Insulin-dependent TDM2   On Metformin at home. Serum Cortisol WNL. Stop metformin and Low-dose SSI. Start High dose-SSI. Hypoglycemic protocol ordered. Continue POC glucose checks. 5.  Essential HTN:   Controlled. Continue lopressor. 6.   CAD, w/o angina, with valvular disease:    Stable. Hx of prior PCI of LAD. Hx of mild aortic stenosis and mild-moderate mitral stenosis. On Plavix, statin. EKG 12/12 reveals sinus tachycardia with septal infarct of undetermined age. Start ASA 81 mg, per Cardiology. 7.  Hyperlipidemia:    Stable. On atorvastatin.           Chief Complaint: \"fall\"    Initial H and P:-    History obtained from patient and chart review:     80 y.o. female who we are asked to see/evaluate by Poncho Roberto MD for medical management of R ight upper lobe pneumonia and medical management     Per initial evaluation by trauma \"Patient is a 80year-old female presents to [facility]. [place] [id number] as activation of level 2 trauma following a fall.  Per report patient was walking at nursing home with walker when she fell and hit the left side of her head. No loss of consciousness reported. Patient on Plavix. History of recurrent falls. Chart reviewed, recent admission last month for ARTIE on CKD and fall. Upon assessment patient is ABCs intact, GCS 14 ([id number]), in no acute distress. Patient with confusion but reported baseline confusion with history of dementia. Patient was perseverating, repetitively asking same questions.  Patient denied any acute pain or complaints.  She denied any headaches, lightheadedness, dizziness, neck pain, back pain, chest pain, shortness of breath, abdominal pain, nausea/vomiting, pain extremities, and paresthesias. On exam patient with estimated 3 cm linear laceration noted over the posterior aspect of left auricle and 1.5 cm linear laceration noted over left tragus. No auricular hematoma noted. Patient also noted to have small amount of ecchymosis noted over left cheek and over the angle of the left manubrium. No reported TMJ pain or difficultly opening mouth. No other signs of trauma noted on exam. No midline cervical, thoracic, or lumbar tenderness palpation. Chest and abdomen nontender. Abdomen soft. No extremity tenderness palpation. PMS intact in all 4 extremities. Range of motion intact. Vital signs stable. Patient taken radiology for CT and plain film imaging. CT head negative for any acute intracranial abnormality. CT cervical spine negative for any acute osseous injuries. Chest x-ray reveals right upper lobe pneumonia. Pelvic x-ray negative for acute osseous injuries. Labs reviewed. Troponin negative. Mild hyponatremia at 132. Creat 1.3. Glucose 264. No leukocytosis, WBCs 8.3. Hemoglobin 13.4. UA negative for UTI.  Plan for patient admitted under trauma surgery for SLP cog eval following closed head injury. Monitor for postconcussive symptoms. Ear laceration closed emergency department, see procedure note below. Updated on Boostrix. Consult to hospitalist for right upper quadrant pneumonia and assistance with medical management. Case discussed and care coordinated with trauma surgeon, Dr. Kat Shetty.     Signed copy of DNR CCA by PCP noted in advance care planning documents.  DNR-CCA on admission. Discussed with daughter at bedside. \"    Subjective (past 24 hours):   12/15: Patient is resting in bed on RA. She states that did not know she had COVID-19 when she fell and was admitted to the hospital. She denies chest pain, SOB, abdominal pain. Per nursing, patient has not been as confused this morning. Past medical history, family history, social history and allergies reviewed again and is unchanged since admission. ROS (All review of systems completed. Pertinent positives noted. Otherwise All other systems reviewed and negative.)   Review of Systems   Constitutional: Positive for activity change. Negative for diaphoresis, fatigue and fever. HENT: Negative for congestion, sinus pressure, sinus pain, sneezing and sore throat. Respiratory: Negative for cough, chest tightness, shortness of breath and wheezing. Cardiovascular: Negative for chest pain, palpitations and leg swelling. Gastrointestinal: Negative for abdominal pain, constipation, diarrhea, nausea and vomiting. Genitourinary: Negative for difficulty urinating, dysuria, frequency, hematuria and urgency. Musculoskeletal: Negative for arthralgias, back pain and myalgias. Neurological: Negative for dizziness, speech difficulty, weakness, light-headedness, numbness and headaches. Psychiatric/Behavioral: Negative for confusion.             Medications:  Reviewed    Infusion Medications    dextrose      sodium chloride 75 mL/hr at 12/13/21 1535    sodium chloride       Scheduled Medications    metoprolol tartrate  25 mg Oral BID    melatonin  3 mg Oral Nightly    DULoxetine  120 mg Oral Daily    clopidogrel  75 mg Oral Daily    atorvastatin  40 mg Oral Daily    insulin lispro  0-6 Units SubCUTAneous TID     insulin lispro  0-3 Units SubCUTAneous Nightly    metFORMIN  750 mg Oral Daily with breakfast    Tdap-Dtap  0.5 mL IntraMUSCular Once    sodium chloride flush  10 mL IntraVENous 2 times per day    docusate sodium  100 mg Oral Daily     PRN Meds: haloperidol lactate, glucose, dextrose, glucagon (rDNA), dextrose, acetaminophen, sodium chloride flush, sodium chloride, ondansetron **OR** ondansetron, polyethylene glycol      Intake/Output Summary (Last 24 hours) at 12/15/2021 0900  Last data filed at 12/14/2021 1403  Gross per 24 hour   Intake 420 ml   Output    Net 420 ml       Diet:  ADULT DIET; Regular; 4 carb choices (60 gm/meal)    Exam:  BP (!) 141/71   Pulse 122   Temp 98 °F (36.7 °C) (Axillary)   Resp 16   Ht 5' 4\" (1.626 m)   Wt 156 lb 4.8 oz (70.9 kg)   SpO2 95%   BMI 26.83 kg/m²   General appearance: No apparent distress, appears stated age and cooperative. HEENT: Dried blood noted on the left auricle. Pupils equal, round, and reactive to light. Conjunctivae/corneas clear. Neck: Supple, with full range of motion. No jugular venous distention. Trachea midline. Respiratory: Normal respiratory effort. Clear to auscultation, bilaterally without Rales/Wheezes/Rhonchi. Cardiovascular: Tachycardic. Regular  rhythm with normal S1/S2 without murmurs, rubs or gallops. Abdomen: Soft, non-tender, non-distended with normal bowel sounds. Musculoskeletal: passive and active ROM x 4 extremities. Skin: Skin color, texture, turgor normal.  No rashes or lesions. Neurologic:  Neurovascularly intact without any focal sensory/motor deficits.  Cranial nerves: II-XII intact, grossly non-focal.  Psychiatric: Alert and oriented, thought content appropriate, normal insight  Capillary Refill: Brisk,< 3 seconds   Peripheral Pulses: +2 palpable, equal bilaterally     Labs:   Recent Labs     12/12/21  1325 12/13/21  0728   WBC 8.3 8.5   HGB 13.4 13.2   HCT 42.0 41.5    259     Recent Labs     12/12/21  1325 12/13/21  0728   * 141   K 4.4 4.4   CL 95* 102   CO2 26 24   BUN 20 17   CREATININE 1.3* 1.2   CALCIUM  --  8.8     Recent Labs     12/12/21  1325   AST 34   ALT 26   BILIDIR <0.2   BILITOT 0.3   ALKPHOS 75     Recent Labs     12/12/21  1407   INR 0.99     No results for input(s): Kristi Morfinose in the last 72 hours. Microbiology:    Blood culture #1: No results found for: [facility]    Blood culture #2:No results found for: Kip Harrell    Organism:  Lab Results   Component Value Date    ORG Growth of Contaminants 11/05/2021         Lab Results   Component Value Date    LABGRAM  10/12/2021     No segmented neutrophils observed. Rare epithelial cells observed. No bacteria seen. MRSA culture only:No results found for: [facility]    Urine culture:   Lab Results   Component Value Date    LABURIN [place] count: >100,000 CFU/mL  10/29/2021       Respiratory culture: No results found for: CULTRESP    Aerobic and Anaerobic :  Lab Results   Component Value Date    LABAERO  10/12/2021     No growth-preliminary Culture also yielded light growth of Staphylococcus species (coagulase negative). LABAERO light growth  10/12/2021    LABAERO  10/12/2021     very light growth Pantoea species which may be initially susceptible to third generation cephalosporins may develop resistance within three to four days of initiation of this antimicrobial therapy.       No results found for: LABANAE    Urinalysis:      Lab Results   Component Value Date    NITRU NEGATIVE 12/12/2021    WBCUA 15-25 11/05/2021    BACTERIA NONE SEEN 11/05/2021    RBCUA NONE 11/05/2021    BLOODU NEGATIVE 12/12/2021    SPECGRAV 1.007 12/12/2021    GLUCOSEU >= 1000 11/05/2021       Radiology:  XR PELVIS (1-2 VIEWS)   Final Result    No evidence of acute osseous injury on this single image of the pelvis. **This report has been created using voice recognition software. It may contain minor errors which are inherent in voice recognition technology. **      Final report electronically signed by Dr. Leigh Gorman MD on 12/12/2021 2:52 PM      XR CHEST PORTABLE   Final Result   Right upper lobe pneumonia. **This report has been created using voice recognition software. It may contain minor errors which are inherent in voice recognition technology. **      Final report electronically signed by Dr. Leigh Gorman MD on 12/12/2021 2:51 PM      CT Head WO Contrast   Final Result    No evidence of acute intracranial abnormality. **This report has been created using voice recognition software. It may contain minor errors which are inherent in voice recognition technology. **      Final report electronically signed by Dr. Leigh Gorman MD on 12/12/2021 2:09 PM      CT Cervical Spine WO Contrast   Final Result    No evidence of acute osseous injury of the cervical spine. **This report has been created using voice recognition software. It may contain minor errors which are inherent in voice recognition technology. **      Final report electronically signed by Dr. Leigh Gorman MD on 12/12/2021 2:20 PM      US Ed Fast Abdomen Limited    (Results Pending)   CTA CHEST W CONTRAST    (Results Pending)     XR PELVIS (1-2 VIEWS)    Result Date: 12/12/2021  PROCEDURE: XR PELVIS (1-2 VIEWS) CLINICAL INFORMATION: trauma. Fall with altered mental status. COMPARISON: 11/20/2020. TECHNIQUE: AP view of the pelvis. FINDINGS:  There is a right hip arthroplasty. Stable alignment compared to prior exam. No acute fracture is evident on this single view. No evidence of acute osseous injury on this single image of the pelvis.  **This report has been created using voice recognition software. It may contain minor errors which are inherent in voice recognition technology. ** Final report electronically signed by Dr. Uma Blanca MD on 12/12/2021 2:52 PM    CT Head WO Contrast    Result Date: 12/12/2021  PROCEDURE: CT HEAD WO CONTRAST CLINICAL INFORMATION: Head trauma, anticoagulation. COMPARISON: CT head dated 11/5/2021. TECHNIQUE: Helical CT of the head. Axial, sagittal and coronal reconstructions were created. All CT scans at this facility use dose modulation, iterative reconstruction, and/or weight-based dosing when appropriate to reduce radiation dose to as low as reasonably achievable. FINDINGS: There is stable moderate ventriculomegaly, mildly disproportionate to peripheral volume loss. There are mild confluent and patchy areas of hypodensity in the periventricular, subcortical deep white matter, stable compared to prior exam. Gray-white matter  differentiation otherwise appears grossly preserved. The calvarium appears intact. Orbits are unremarkable. Paranasal sinuses and mastoid air cells are clear. No evidence of acute intracranial abnormality. **This report has been created using voice recognition software. It may contain minor errors which are inherent in voice recognition technology. ** Final report electronically signed by Dr. Uma Blanca MD on 12/12/2021 2:09 PM    CT Cervical Spine WO Contrast    Result Date: 12/12/2021  PROCEDURE: CT CERVICAL SPINE WO CONTRAST CLINICAL INFORMATION: spine trauma. COMPARISON: 11/5/2021. TECHNIQUE: 3 mm noncontrast axial images were obtained through the cervical spine with sagittal and coronal reconstructions. All CT scans at this facility use dose modulation, iterative reconstruction, and/or weight-based dosing when appropriate to reduce radiation dose to as low as reasonably achievable.  FINDINGS: There is grade 1 anterolisthesis of C4 relative to C5 on the basis of degenerative change, stable compared to prior exam. There is otherwise anatomic vertebral body height and alignment. No fracture of the cervical vertebral column is identified. The craniocervical junction appears intact. No paraspinal or epidural fluid collection is identified. There are stable degenerative changes of the cervical spine most pronounced at C6-7 with complete loss of intervertebral disc space and rim osteophytes at this level. Paraspinal soft tissues are grossly unremarkable. No evidence of acute osseous injury of the cervical spine. **This report has been created using voice recognition software. It may contain minor errors which are inherent in voice recognition technology. ** Final report electronically signed by Dr. Linda Mcallister MD on 12/12/2021 2:20 PM    XR CHEST PORTABLE    Result Date: 12/12/2021  PROCEDURE: XR CHEST PORTABLE CLINICAL INFORMATION: trauma. Fall with altered mental status. COMPARISON: Chest radiograph dated 10/17/2021. TECHNIQUE: AP upright view of the chest. FINDINGS: There is a patchy opacity in the right upper lobe which has appeared in the interval. Lungs otherwise appear clear. Pulmonary vasculature and cardiomediastinal silhouette are within normal limits. Visualized osseous structures appear grossly intact. Right upper lobe pneumonia. **This report has been created using voice recognition software. It may contain minor errors which are inherent in voice recognition technology. ** Final report electronically signed by Dr. Linda Mcallister MD on 12/12/2021 2:51 PM      Electronically signed by Hoda Herrera PA-C on 12/15/2021 at 9:00 AM

## 2021-12-15 NOTE — PROGRESS NOTES
0111 - Patient arrived to unit via wheelchair from 5K. Patient stood and pivot to bed. Patient pleasant and cooperative. Oriented patient to room and how to use call light which was provided. Patient resting comfortably with eyes closed.

## 2021-12-15 NOTE — FLOWSHEET NOTE
12/14/21 [id number]   Provider Notification   Reason for Communication Evaluate; Review case   Provider Name Sheba Allen   Provider Notification Physician   Method of Communication Secure Message   Response Waiting for response   Notification Time [phone]     Patient is confused, trying to climb out of bed, and combative. Can we get PRN order of Haldol?

## 2021-12-15 NOTE — CONSULTS
[facility] [address] [place] [address]  Cardiology Consult      Patient:  Shital Conklin  YOB: 1935    MRN: [id number]   Acct: [de-identified]     Primary Care Physician: Blade Clements MD    REASON FOR CONSULT:    New orthostatic hypotension and HFrEF     CHIEF COMPLAINT:    Fall  Head injury     HISTORY OF PRESENT ILLNESS:    Shital Conklin is a pleasant 80year old female patient with past medical history that includes:   Past Medical History:   Diagnosis Date    Anxiety     nervous breakdown 9/2016    [facility]     CAD (coronary artery disease)     cardiomegaly    COPD (chronic obstructive pulmonary disease) ([address]     Diverticulitis     DVT (deep venous thrombosis) ([facility]     GERD (gastroesophageal reflux disease)     irritable bowel disease    Hyperlipidemia     Hypertension     Osteoarthritis     Pancreatic cancer ([facility] [facility]     Family    Psychiatric problem     alzheimers start    Pulmonary embolism ([facility]     S/P knee replacement     Type 2 diabetes mellitus ([facility] [facility] 2009   The patient was admitted to the hospital on 12/12/2012 after she presented to ER post fall. She sustained a closed head injury, ear laceration. No acute findings noted on head CT scan. Patient has h/o ARTIE, CKD. She has had falls in the past. Patient denies chest pain, shortness of breath, dyspnea on exertion, orthopnea, paroxysmal nocturnal dyspnea, palpitations, syncope, recent weight gain or leg swelling. She reported some fatigue and dizziness. CXR revealed evidence for pneumonia. She tested positive for COVID. Cardiology was consulted for ortostatic hypotension. Labs on 12/13/2021 revealed Cr 1.2. ProBNP 475. Patient has h/o CAD, prior PCI of LAD in 1/2020. She has h/o mild AS, Mild to moderate MS. Echo on 12/14/2021 revealed an EF of 45%. She reports having poor appetite.      All labs, EKG's, diagnostic testing and images as well as cardiac cath, stress testing   were reviewed during this valve.  Severe calcification of the anterior leaflet of the mitral valve. If clinically indicated, consider transesophageal echocardiography for  further evaluation. Signature    ----------------------------------------------------------------  Electronically signed by Diana Guerrero MD (Interpreting  physician) on 12/14/2021 at 07:03 PM  ----------------------------------------------------------------    Findings    Mitral Valve  Myxomatotic degeneration of mitral valve. Mild-to-moderate mitral stenosis. Decreased mitral valve mobility noted. Severe calcification of the posterior leaflet of the mitral valve. Severe calcification of the anterior leaflet of the mitral valve. Aortic Valve  The aortic valve leaflets were not well visualized. Aortic valve appears tricuspid. Thickened aortic valve leaflets noted. Aortic valve leaflets are Moderately calcified. Mild aortic stenosis is present. Tricuspid Valve  Mild tricuspid regurgitation visualized. Pulmonic Valve  The pulmonic valve was not well visualized . Trivial pulmonic regurgitation visualized. Left Atrium  Moderately dilated left atrium. Left Ventricle  Ejection fraction is visually estimated at 45%. There was mild global hypokinesis of the left ventricle. Right Atrium  Right atrial size was normal.    Right Ventricle  The right ventricular size was normal with normal systolic function and  wall thickness. Pericardial Effusion  The pericardium was normal in appearance with no evidence of a pericardial  effusion. Pleural Effusion  No evidence of pleural effusion. Aorta / Great Vessels  -Aortic root dimension within normal limits.  -The Pulmonary artery is within normal limits. -IVC size is within normal limits with normal respiratory phasic changes.     M-Mode/2D Measurements & Calculations    LV Diastolic    LV Systolic Dimension:    AV Cusp Separation: 1.4 cmLA  Dimension: 3.2  2.6 cm                    Dimension: 3.2 (gastroesophageal reflux disease)     irritable bowel disease    Hyperlipidemia     Hypertension     Osteoarthritis     Pancreat[facility] ([facility] [facility]     Family    Psychiatric problem     alzheimers start    [facility]     S/P knee replacement     Type 2 diabetes mellitus ([facility] 2009       Past Surgical History:    Past Surgical History:   Procedure Laterality Date    BREAST SURGERY      right lumpectomy    CHOLECYSTECTOMY  4-15-16    cholangiogram    COLONOSCOPY      EYE SURGERY      macular hole left eye    JOINT REPLACEMENT      left knee & right hip    SKIN BIOPSY      right arm       Medications Prior to Admission:    Medications Prior to Admission: Cholecalciferol (VITAMIN D) 50 MCG (2000 UT) CAPS capsule, Take by mouth daily  zinc sulfate (ZINCATE) 220 (50 Zn) MG capsule, Take 50 mg by mouth daily  Ascorbic Acid (VITAMIN C) 500 MG CAPS, Take by mouth 2 times daily  metoprolol tartrate (LOPRESSOR) 25 MG tablet, Take 1 tablet by mouth 2 times daily  nitroGLYCERIN (NITROSTAT) 0.4 MG SL tablet, up to max of 3 total doses. If no relief after 1 dose, call 911.   lactobacillus (CULTURELLE) capsule, Take 1 capsule by mouth 2 times daily (with meals)  polyethyl glycol-propyl glycol 0.4-0.3 % (SYSTANE) 0.4-0.3 % ophthalmic solution, 1 drop as needed for Dry Eyes  melatonin 3 MG TABS tablet, Take 3 mg by mouth nightly  magnesium hydroxide (MILK OF MAGNESIA) 400 MG/5ML suspension, Take by mouth daily as needed for Constipation  clopidogrel (PLAVIX) 75 MG tablet, Pt takes 300 mg tonight then 75 mg every day starting on 2/4/2020  metFORMIN (GLUCOPHAGE-XR) 750 MG extended release tablet, Take 1 tablet by mouth Daily with supper Restart this medication Friday am  atorvastatin (LIPITOR) 40 MG tablet, Take 1 tablet by mouth daily  D-Mannose 500 MG CAPS, Take 1 capsule by mouth daily   DULoxetine (CYMBALTA) 60 MG extended release capsule, Take 2 capsules by mouth daily  glucose blood VI test strips (ONE TOUCH ULTRA TEST) strip, Test daily or AD. Dx. 250.00  acetaminophen (TYLENOL) 325 MG tablet, Take 650 mg by mouth every 6 hours as needed for Pain    Allergies:    Ciprofloxacin hcl, Food, and Penicillins    Social History:    reports that she has never smoked. She has never used smokeless tobacco. She reports previous alcohol use. She reports that she does not use drugs. Family History:   family history includes Cancer in her mother; Heart Disease (age of onset: 80) in her father; High Cholesterol in her brother; Other in her sister. REVIEW OF SYSTEMS:  Constitutional: negative for anorexia, chills and fevers,weight change  Skin: negative for new skin rash per patient  HEENT: negative for head trauma or new visual changes  Respiratory: negative for cough, hemoptysis, wheezing, GALLO, SOB  Cardiovascular: negative for  orthopnea, palpitations and syncope. Gastrointestinal: negative for abdominal pain,nausea , vomiting, constipation, diarrhea. Hematologic/lymphatic: negative for bruising,prolonged bleeding,blood clots  Musculoskeletal:negative for muscle weakness, myalgias,wasting  Neurological: negative for coordination problems, dizziness, gait problems and vertigo  Behavioral/Psych:negative for mood/sleep disturbance      PHYSICAL EXAM:   Vitals:  Patient Vitals for the past 24 hrs:   BP Temp Temp src Pulse Resp SpO2 Weight   12/15/21 0809 (!) 141/71 98 °F (36.7 °C) Axillary 122 16 95 %    12/15/21 0330 126/66 97.7 °F (36.5 °C) Axillary 110 14     12/14/21 2359       156 lb 4.8 oz (70.9 kg)   12/14/21 2257  98.1 °F (36.7 °C) Oral 115 20     12/14/21 2000 (!) 102/52 98.2 °F (36.8 °C) Oral 117 18 95 %    12/14/21 1130 (!) 83/50 98.1 °F (36.7 °C) Oral 127 18 94 %        Last 3 weights:    Wt Readings from Last 3 Encounters:   12/14/21 156 lb 4.8 oz (70.9 kg)   11/16/21 165 lb (74.8 kg)   11/08/21 155 lb (70.3 kg)     24 hour intake/output:    Intake/Output Summary (Last 24 hours) at 12/15/2021 [address] filed at 12/15/2021 0901  Gross per 24 hour   Intake 1161.18 ml   Output    Net 1161.18 ml     BMI:Body mass index is 26.83 kg/m². General Appearance: alert and oriented to person, place and time, well developed and well- nourished, in no acute distress  Skin: warm and dry, no rash or erythema  Eyes: pupils equal, round, and reactive to light, extraocular eye movements intact, conjunctivae normal  Neck: supple and non-tender without mass, no thyromegaly or thyroid nodules, no cervical lymphadenopathy  Pulmonary/Chest: clear to auscultation bilaterally- no wheezes, rales or rhonchi, normal air movement, no respiratory distress  Cardiovascular: normal rate, regular rhythm, normal S1 and S2, systolic murmur. No rubs, clicks, or gallops, distal pulses intact, no carotid bruits, Negative JVD  Radial Pulses: intact 2+  Abdomen: soft, non-tender, non-distended, normal bowel sounds, no masses or organomegaly  Extremities: no cyanosis, clubbing .  no Edema  Musculoskeletal: normal range of motion, no joint swelling, deformity or tenderness      RADIOLOGY   ECHO Complete 2D W Doppler W Color    Result Date: 12/14/2021  Transthoracic Echocardiography Report (TTE)  Demographics   Patient Name   SHOTRINITY        Gender              Female                 Corinne Booth   MR #           [id number]        Race                                                   Ethnicity   Account #      [de-identified]        Room Number         7227   Accession      [id number]       Date of Study       12/14/2021  Number   Date of Birth  1935       Referring Physician Lottie Ledbetter   Age            80 year(s)       Sonographer         Radhames Botello, ROSA MARIA,                                                      RVT                                   Interpreting        Echo reader of the                                  Physician           week Sam Villanueva MD  Procedure Type of Study   TTE procedure:ECHOCARDIOGRAM COMPLETE 2D W DOPPLER W COLOR. Procedure Date Date: 12/14/2021 Start: 11:45 AM Study Location: Bedside Technical Quality: Limited visualization due to uncooperative patient. Indications:Orthostatic Hypotension. Additional Medical History:diabetes, hypertension, PVD, hyperlipidemia, arthritis, coronary artery disease, COPD, breast cancer Patient Status: Routine Height: 64 inches Weight: 148 pounds BSA: 1.72 m^2 BMI: 25.4 kg/m^2 HR: 118 bpm BP: 116/56 mmHg Allergies   - Cipro. - Penicillins. Conclusions   Summary  Technically difficult examination. Ejection fraction is visually estimated at 45%. There was mild global hypokinesis of the left ventricle. Moderately dilated left atrium. The aortic valve leaflets were not well visualized. Aortic valve appears tricuspid. Thickened aortic valve leaflets noted. Aortic valve leaflets are Moderately calcified. Mild aortic stenosis is present. Myxomatotic degeneration of mitral valve. Mild-to-moderate mitral stenosis. Decreased mitral valve mobility noted. Severe calcification of the posterior leaflet of the mitral valve. Severe calcification of the anterior leaflet of the mitral valve. If clinically indicated, consider transesophageal echocardiography for  further evaluation. Signature   ----------------------------------------------------------------  Electronically signed by Sam Villanueva MD (Interpreting  physician) on 12/14/2021 at 07:03 PM  ----------------------------------------------------------------   Findings   Mitral Valve  Myxomatotic degeneration of mitral valve. Mild-to-moderate mitral stenosis. Decreased mitral valve mobility noted. Severe calcification of the posterior leaflet of the mitral valve. Severe calcification of the anterior leaflet of the mitral valve.    Aortic Valve  The aortic valve leaflets were not well visualized. Aortic valve appears tricuspid. Thickened aortic valve leaflets noted. Aortic valve leaflets are Moderately calcified. Mild aortic stenosis is present. Tricuspid Valve  Mild tricuspid regurgitation visualized. Pulmonic Valve  The pulmonic valve was not well visualized . Trivial pulmonic regurgitation visualized. Left Atrium  Moderately dilated left atrium. Left Ventricle  Ejection fraction is visually estimated at 45%. There was mild global hypokinesis of the left ventricle. Right Atrium  Right atrial size was normal.   Right Ventricle  The right ventricular size was normal with normal systolic function and  wall thickness. Pericardial Effusion  The pericardium was normal in appearance with no evidence of a pericardial  effusion. Pleural Effusion  No evidence of pleural effusion. Aorta / Great Vessels  -Aortic root dimension within normal limits.  -The Pulmonary artery is within normal limits. -IVC size is within normal limits with normal respiratory phasic changes.   M-Mode/2D Measurements & Calculations   LV Diastolic    LV Systolic Dimension:    AV Cusp Separation: 1.4 cmLA  Dimension: 3.2  2.6 cm                    Dimension: 3.2 cmAO Root  cm              LV Volume Diastolic: 01.9 Dimension: 3 cmLA Area: 13.9  LV FS:18.8 %    ml                        cm^2  LV PW           LV Volume Systolic: 20 ml  Diastolic: 1.2  LV EDV/LV EDV Index: 36.7  cm              ml/21 m^2LV ESV/LV ESV  Septum          Index: 20 ml/12 m^2       RV Diastolic Dimension: 2.5 cm  Diastolic: 1.1  EF Calculated: 45.5 %  cm                                        LA/Aorta: 1.07                  LV Length: 6.3 cm                                            LA volume/Index: 37.3 ml /22m^2   LV Area  Diastolic: 42.0  cm^2  LV Area  Systolic: 25.6  cm^2  Doppler Measurements & Calculations   MV Peak E-Wave: 104 cm/s AV Peak Velocity: 186  LVOT Peak Velocity: 96.8  MV Peak A-Wave: 151 cm/s cm/s Component Value Date    LABA1C 10.7 12/13/2021     FLP:    Lab Results   Component Value Date    TRIG 359 01/13/2020    HDL 38 01/13/2020    [address] 63 01/13/2020     TSH:    Lab Results   Component Value Date    TSH 3.240 12/15/2021     BNP: No results found for: BNP      ASSESSMENT:  1. Orthostatic hypotension  2. HFmrEF, Chronic  3. CAD  4. Prior PCI of LAD 1/2020  5. COVID infection  6. Pneumonia   7. S/p fall   8. Closed head injury   9. Mild aortic stenosis   10. Mild to moderate mitral stenosis   11. ARTIE, CKD     RECOMMENDATIONS:   Has h/o CAD, prior PCI of LAD    She denies chest pain   Troponin <0.01   EKG revealed sinus tachycardia, septal infarct   Cont medical therapy for stable CAD   Plavix, ASA, Lipitor    She was noticed to have orthostatic hypotension   Has h/o CHF   Echo reviewed   No signs of volume overload    She reports decreased oral intake    Agree with IVF, increase NS o 100 cc/h   Telemetry    Has valvular heart disease as described above, including MS. Would cont Metoprolol, hold for SBP<110 or HR<60   Cr 1.2 two days   Check daily BMP   Repeat orthostatic vitals in AM    Closely monitor volume status, I/O, daily weight. Stop IVF or decrease rate if orthostatic vitals improved (avoid volume overload)    Outpatient follow up with primary cardiologist     Above findings and plan of care were discussed with patient, questions were answered, agreeable to plan. Thank you for allowing me to participate in the care of this patient. Please let me know if I can be of any further assistance.       Brittni Rodriguez MD, Gonzalo Calixto   10:45 AM  12/15/2021

## 2021-12-16 NOTE — PROGRESS NOTES
Hospitalist Progress Note      Patient:  Umberto Cardona    Unit/Bed:6A-19/019-A  YOB: 1935  MRN: [id number]   Acct: [de-identified]   PCP: Shilo Moss MD  Date of Admission: 12/12/2021    Assessment/Plan:  1. COVID-PNA without hypoxemia:              Patient tested positive for COVID on 12/12. CXR is relatively unchanged from previous. Patient's procal 0.13 and remains afebrile and non-toxic appearing, without an acute leukocytosis. CTA yesterday revealed no pulmonary emboli and bilateral multifocal infiltrates and interstitial edema. Pt required O2 for a short time early this morning- now on RA with SpO2 92%. Continue to monitor symptoms and oxygen requirments. 2. Orthostatic Hypotension:              Positive orthostatics this morning. Cardiology onboard. Continue lopressor, but hold for SBP <110 or HR <60. Discussed case with Cardiology Continue IVF to 100 mL/hr and start Midodrine 2.5mg PO TID.  CBC, BMP ordered for AM. Continue orthostatic Blood pressures Qshift. 3. Acute HFrEF:               ECHO 12/14 reveals 45% with previous HFpEF- ECHO on 5/2021 revealed EF 55%. Pro-.2 today. Cardiology onboard. No signs of volume overload- Continue IVF. Cr 1.2 today. Recheck BMP in the AM.   4. Closed Head injury              Stable. CT of head and neck showed no acute injury or abnormality. 5. Non Insulin-dependent TDM2              On Metformin at home. Serum Cortisol WNL. Continue High dose-SSI. Hypoglycemic protocol ordered. Continue POC glucose checks. 6. Essential HTN:              Controlled. Continue lopressor. 7. CAD, w/o angina, with valvular disease:               Stable. Hx of prior PCI of LAD. Hx of mild aortic stenosis and mild-moderate mitral stenosis. On Plavix, statin. EKG 12/12 reveals sinus tachycardia with septal infarct of undetermined age. Continue ASA 81 mg, per Cardiology.    8. Hyperlipidemia: Stable. Continue atorvastatin.        Chief Complaint:  \"fall\"    Initial H and P:-    History obtained from patient and chart review:      80 y.o. female who we are asked to see/evaluate by Norman Chisholm MD for medical management of R ight upper lobe pneumonia and medical management     Per initial evaluation by trauma \"Patient is a 80year-old female presents to [address] [place] [address] as activation of level 2 trauma following a fall.  Per report patient was walking at nursing home with walker when she fell and hit the left side of her head. No loss of consciousness reported. Patient on Plavix. History of recurrent falls. Chart reviewed, recent admission last month for ARTIE on CKD and fall. Upon assessment patient is ABCs intact, GCS 14 ([id number]), in no acute distress. Patient with confusion but reported baseline confusion with history of dementia. Patient was perseverating, repetitively asking same questions.  Patient denied any acute pain or complaints.  She denied any headaches, lightheadedness, dizziness, neck pain, back pain, chest pain, shortness of breath, abdominal pain, nausea/vomiting, pain extremities, and paresthesias. On exam patient with estimated 3 cm linear laceration noted over the posterior aspect of left auricle and 1.5 cm linear laceration noted over left tragus. No auricular hematoma noted. Patient also noted to have small amount of ecchymosis noted over left cheek and over the angle of the left manubrium. No reported TMJ pain or difficultly opening mouth. No other signs of trauma noted on exam. No midline cervical, thoracic, or lumbar tenderness palpation. Chest and abdomen nontender. Abdomen soft. No extremity tenderness palpation. PMS intact in all 4 extremities. Range of motion intact. Vital signs stable. Patient taken radiology for CT and plain film imaging. CT head negative for any acute intracranial abnormality. CT cervical spine negative for any acute osseous injuries.  Chest x-ray reveals right upper lobe pneumonia. Pelvic x-ray negative for acute osseous injuries. Labs reviewed. Troponin negative. Mild hyponatremia at 132. Creat 1.3. Glucose 264. No leukocytosis, WBCs 8.3. Hemoglobin 13.4. UA negative for UTI. Plan for patient admitted under trauma surgery for SLP cog eval following closed head injury. Monitor for postconcussive symptoms. Ear laceration closed emergency department, see procedure note below. Updated on Boostrix. Consult to hospitalist for right upper quadrant pneumonia and assistance with medical management. Case discussed and care coordinated with trauma surgeon, Dr. Kristin Diaz.     Signed copy of DNR CCA by PCP noted in advance care planning documents.  DNR-CCA on admission. Discussed with daughter at bedside. \"     12/15: \"Patient is resting in bed on RA. She states that did not know she had COVID-19 when she fell and was admitted to the hospital. She denies chest pain, SOB, abdominal pain. Per nursing, patient has not been as confused this morning. \"    Subjective (past 24 hours):   12/16: Patient was resting comfortably in bed on RA. She states she has no new symptoms, concerns, or questions. Asking about discharge. States that she wasn't sure why she needed the O2 early this morning- denies feeling SOB. Reviewed patient's labs and discussed plan for today- get orthostatic blood pressures, Evaluate chest xray, and check with cardiology to restart IVF and possibly midodrine. Past medical history, family history, social history and allergies reviewed again and is unchanged since admission. ROS (All review of systems completed. Pertinent positives noted.  Otherwise All other systems reviewed and negative.)     Medications:  Reviewed    Infusion Medications    dextrose      sodium chloride 100 mL/hr at 12/15/21 2235    sodium chloride       Scheduled Medications    aspirin  81 mg Oral Daily    insulin lispro  0-18 Units SubCUTAneous TID WC    insulin lispro  0-9 Units SubCUTAneous Nightly    metoprolol tartrate  25 mg Oral BID    melatonin  3 mg Oral Nightly    DULoxetine  120 mg Oral Daily    clopidogrel  75 mg Oral Daily    atorvastatin  40 mg Oral Daily    Tdap-Dtap  0.5 mL IntraMUSCular Once    sodium chloride flush  10 mL IntraVENous 2 times per day    docusate sodium  100 mg Oral Daily     PRN Meds: haloperidol lactate, glucose, dextrose, glucagon (rDNA), dextrose, acetaminophen, sodium chloride flush, sodium chloride, ondansetron **OR** ondansetron, polyethylene glycol      Intake/Output Summary (Last 24 hours) at 12/16/2021 0748  Last data filed at 12/16/2021 0431  Gross per 24 hour   Intake 2464.2 ml   Output 200 ml   Net 2264.2 ml       Diet:  ADULT DIET; Regular; 4 carb choices (60 gm/meal)    Exam:   /70   Pulse 105   Temp 98.6 °F (37 °C) (Oral)   Resp 16   Ht 5' 4\" (1.626 m)   Wt 160 lb (72.6 kg)   SpO2 94%   BMI 27.46 kg/m²   General appearance: No apparent distress, appears stated age and cooperative. HEENT: Pupils equal, round, and reactive to light. Conjunctivae/corneas clear. Neck: Supple, with full range of motion. No jugular venous distention. Trachea midline. Respiratory:  Normal respiratory effort. Clear to auscultation, bilaterally without Rales/Wheezes/Rhonchi. Cardiovascular: Tachycardic. Regular rate and rhythm with normal S1/S2 without murmurs, rubs or gallops. Abdomen: Soft, non-tender, non-distended with normal bowel sounds. Musculoskeletal: No edema. passive and active ROM x 4 extremities. Skin: Skin color, texture, turgor normal.  No rashes or lesions. Neurologic:  Neurovascularly intact without any focal sensory/motor deficits.  Cranial nerves: II-XII intact, grossly non-focal.  Psychiatric: Alert and oriented, thought content appropriate, normal insight  Capillary Refill: Brisk,< 3 seconds   Peripheral Pulses: +2 palpable, equal bilaterally     Labs:   Recent Labs     12/15/21  1036 12/16/21  0630   WBC 6.0 6.1 voice recognition software. It may contain minor errors which are inherent in voice recognition technology. **      Final report electronically signed by Dr. Analisa Walter on 12/15/2021 4:38 PM      XR PELVIS (1-2 VIEWS)   Final Result    No evidence of acute osseous injury on this single image of the pelvis. **This report has been created using voice recognition software. It may contain minor errors which are inherent in voice recognition technology. **      Final report electronically signed by Dr. Shaun Miramontes MD on 12/12/2021 2:52 PM      XR CHEST PORTABLE   Final Result   Right upper lobe pneumonia. **This report has been created using voice recognition software. It may contain minor errors which are inherent in voice recognition technology. **      Final report electronically signed by Dr. Shaun Miramontes MD on 12/12/2021 2:51 PM      CT Head WO Contrast   Final Result    No evidence of acute intracranial abnormality. **This report has been created using voice recognition software. It may contain minor errors which are inherent in voice recognition technology. **      Final report electronically signed by Dr. Shaun Miramontes MD on 12/12/2021 2:09 PM      CT Cervical Spine WO Contrast   Final Result    No evidence of acute osseous injury of the cervical spine. **This report has been created using voice recognition software. It may contain minor errors which are inherent in voice recognition technology. **      Final report electronically signed by Dr. Shaun Miramontes MD on 12/12/2021 2:20 PM       Ed Fast Abdomen Limited    (Results Pending)     XR PELVIS (1-2 VIEWS)    Result Date: 12/12/2021  PROCEDURE: XR PELVIS (1-2 VIEWS) CLINICAL INFORMATION: trauma. Fall with altered mental status. COMPARISON: 11/20/2020. TECHNIQUE: AP view of the pelvis. FINDINGS:  There is a right hip arthroplasty.  Stable alignment compared to prior exam. No acute fracture is evident on this single view. No evidence of acute osseous injury on this single image of the pelvis. **This report has been created using voice recognition software. It may contain minor errors which are inherent in voice recognition technology. ** Final report electronically signed by Dr. Alejandro Chavez MD on 12/12/2021 2:52 PM    CT Head WO Contrast    Result Date: 12/12/2021  PROCEDURE: CT HEAD WO CONTRAST CLINICAL INFORMATION: Head trauma, anticoagulation. COMPARISON: CT head dated 11/5/2021. TECHNIQUE: Helical CT of the head. Axial, sagittal and coronal reconstructions were created. All CT scans at this facility use dose modulation, iterative reconstruction, and/or weight-based dosing when appropriate to reduce radiation dose to as low as reasonably achievable. FINDINGS: There is stable moderate ventriculomegaly, mildly disproportionate to peripheral volume loss. There are mild confluent and patchy areas of hypodensity in the periventricular, subcortical deep white matter, stable compared to prior exam. Gray-white matter  differentiation otherwise appears grossly preserved. The calvarium appears intact. Orbits are unremarkable. Paranasal sinuses and mastoid air cells are clear. No evidence of acute intracranial abnormality. **This report has been created using voice recognition software. It may contain minor errors which are inherent in voice recognition technology. ** Final report electronically signed by Dr. Alejandro Chavez MD on 12/12/2021 2:09 PM    CT Cervical Spine WO Contrast    Result Date: 12/12/2021  PROCEDURE: CT CERVICAL SPINE WO CONTRAST CLINICAL INFORMATION: spine trauma. COMPARISON: 11/5/2021. TECHNIQUE: 3 mm noncontrast axial images were obtained through the cervical spine with sagittal and coronal reconstructions.  All CT scans at this facility use dose modulation, iterative reconstruction, and/or weight-based dosing when appropriate to reduce radiation dose to as low as reasonably achievable. FINDINGS: There is grade 1 anterolisthesis of C4 relative to C5 on the basis of degenerative change, stable compared to prior exam. There is otherwise anatomic vertebral body height and alignment. No fracture of the cervical vertebral column is identified. The craniocervical junction appears intact. No paraspinal or epidural fluid collection is identified. There are stable degenerative changes of the cervical spine most pronounced at C6-7 with complete loss of intervertebral disc space and rim osteophytes at this level. Paraspinal soft tissues are grossly unremarkable. No evidence of acute osseous injury of the cervical spine. **This report has been created using voice recognition software. It may contain minor errors which are inherent in voice recognition technology. ** Final report electronically signed by Dr. Alma Delia Duque MD on 12/12/2021 2:20 PM    XR CHEST PORTABLE    Result Date: 12/12/2021  PROCEDURE: XR CHEST PORTABLE CLINICAL INFORMATION: trauma. Fall with altered mental status. COMPARISON: Chest radiograph dated 10/17/2021. TECHNIQUE: AP upright view of the chest. FINDINGS: There is a patchy opacity in the right upper lobe which has appeared in the interval. Lungs otherwise appear clear. Pulmonary vasculature and cardiomediastinal silhouette are within normal limits. Visualized osseous structures appear grossly intact. Right upper lobe pneumonia. **This report has been created using voice recognition software. It may contain minor errors which are inherent in voice recognition technology. ** Final report electronically signed by Dr. Alma Delia Duque MD on 12/12/2021 2:51 PM      Electronically signed by Emmy Carbajal PA-C on 12/16/2021 at 7:48 AM

## 2021-12-16 NOTE — PROGRESS NOTES
[address] [facility]  INPATIENT PHYSICAL THERAPY  DAILY NOTE  STRZ 6A CAPACITY [facility]OLA - 6A-19/019-A  Time In: 1034  Time Out: 1059  Timed Code Treatment Minutes: 25 Minutes  Minutes: 25          Date: 2021  Patient Name: Chikis Trinidad,  Gender:  female        MRN: [id number]  : 1935  (80 y.o.)     Referring Practitioner: SHELBIE Trejo  Diagnosis: syncope and collapse  Additional Pertinent Hx: admit with above diagnosis, right upper lobe pneumonia, s/p fall at [facility] and hit left side of head with laceration to left ear, CHI     Prior Level of Function:  Lives With: Alone  Type of Home: Facility ([facility])  Home Layout: One level  Home Access: Level entry  Home Equipment: 4 wheeled walker   Bathroom Shower/Tub: Walk-in shower, Shower chair with back  Bathroom Toilet: [id number] [facility]: Grab bars in [address] [facility] Accessibility: [address] Help From: Other (comment) (staff as available)  ADL Assistance: Needs assistance  [address]: Needs assistance  Homemaking Responsibilities: No  Ambulation Assistance: Needs assistance  Transfer Assistance: Needs assistance  Active : No  Additional Comments: per pt was using rollator PTA and working with PT    Restrictions/Precautions:  Restrictions/Precautions: General Precautions, Fall Risk  Position Activity Restriction  Other position/activity restrictions: +orthostatic hypotension     SUBJECTIVE: Ok to see pt per nursing. Pt in bathroom with bed alarm going off when therapy arrived, pt had BM on way to bathroom and required assist for clean up in bathroom and on floor. Pt denies dizziness or lightheadedness all throughout session. Pt did not have NC applied when in bathroom.     PAIN: denies    Vitals: Oxygen: room air, >94% during session, nursing aware, O2 remained off at end of session    OBJECTIVE:  Bed Mobility:  Not Tested    Transfers:  Sit to Stand: Stand By Assistance, [employer], X 1, cues for hand placement, with verbal cues  Stand to Sit:Stand By Assistance, X 1, cues for hand placement, with verbal cues  Cues for maintaining RW in close proximity to self at all times, poor demo noted with cues for sequencing, fair demo. Pt noted to have 1 LOB when turning to sit in chair, CGA for self correction. Pt completed from various surfaces and heights. Ambulation:  Stand By Assistance, [address], X 1, with cues for safety, with verbal cues   Distance: 10 feet  Surface: Level Tile  Device:Rolling Walker  Gait Deviations: Forward Flexed Posture, Slow Brittny, Decreased Step Length Bilaterally, Mild Path Deviations and Decreased Terminal Knee Extension  Cues for safety and use of RW for support, no LOB noted  Balance:  Static Sitting Balance:  Independent  Dynamic Sitting Balance: Supervision  Static Standing Balance: Stand By Assistance, Contact Guard Assistance  Dynamic Standing Balance: Contact Guard Assistance  Pt completed sanjay care in sitting, able to weight shift to complete. Pt completed hand washing in standing and able to norma brief with cues for safety, occasional LOB, able to self correct. Pt instructed to use RW at all times for safety, fair demo  Exercise:  Patient was guided in 1 set(s) 15 reps of exercise to both lower extremities. Ankle pumps, Glut sets, Quad sets, Heelslides, Hip abduction/adduction and Straight leg raises. Exercises were completed for increased independence with functional mobility. Pt required VC and visual cues for proper technique of exercises for completion with good demo. Pt required AAROM for L LE SLR. Functional Outcome Measures: Completed  -PAC Inpatient Mobility Raw Score : 17  AM-PAC Inpatient T-Scale Score : 42.13    ASSESSMENT:  Assessment: Patient progressing toward established goals. Activity Tolerance:  Patient tolerance of  treatment: good. Equipment Recommendations: Other: cont to assess needs  Discharge Recommendations: Subacte/Skilled Nursing Facility, ECF with PT and Patient would benefit from continued PT at discharge  Plan: Times per week: 3-5X GM  Times per day: Daily  Specific instructions for Next Treatment: therex and mobility, monitor BP    Patient Education  Patient Education: Plan of Care, Home Exercise Program, Equipment Education, Transfers, Gait, Use of Gait Henry, Verbal Exercise Instruction,  - Patient Verbalized Understanding, - Patient Requires Continued Education    Goals:  Patient goals : go home  Short term goals  Time Frame for Short term goals: by discharge  Short term goal 1: bed mobility with SBA to get in/out of bed  Short term goal 2: transfer with SBA to get in/out of chairs  Short term goal 3: amb >25'x1 with walker and CGA to walk safely in home  Long term goals  Time Frame for Long term goals : no LTGs set secondary to short ELOS    Following session, patient left in safe position with all fall risk precautions in place. Pt left in bedside chair with all needs and needs and call light in reach following session, chair alarm on, nursing aware.

## 2021-12-16 NOTE — CARE COORDINATION
Discharge Planning Update: Following for Covid. This am T 99.2. O2 at 1L/NC with sat 94%. [facility] and planned to return there but they cannot take due to her Covid status. SW following for discharge disposition. Provider feels if pt orthostatic VS are stable she may be discharged.

## 2021-12-16 NOTE — PROGRESS NOTES
0600 - called patient's daughter Jacqueline Cohen and update provided. All questions answered to the best of my ability.

## 2021-12-16 NOTE — PROGRESS NOTES
Education  Patient Education: ADL's and Home Exercise Program    Goals  Short term goals  Time Frame for Short term goals: By discharge  Short term goal 1: Pt will complete her toileting routine including transfers to/from the standard toilet seat with SBA to increase her independence with self care. Short term goal 2: Pt will complete lower body dressing with SBA while using any AE needwed to increase her independence with self care. Short term goal 3: Pt will demonstrate activity tolerance doing sinkside ADLs for 3 minute duration with 1-2 hand release to increase her endurance for ease of dressing or grooming. Short term goal 4: Pt will demonstrate functional mobility walking to/from the bathroom or in the hallway with SBA while using a rolling walker. Following session, patient left in safe position with all fall risk precautions in place.

## 2021-12-16 NOTE — PROGRESS NOTES
2230 - Patient started shift on room air with SpO2 98%. As patient started to fall asleep SpO2 dipped to 86%. Returned to 96% when awake however dropped again as patient fell asleep. 1L NC applied and SpO2 remains at 96%. Will continue to monitor.

## 2021-12-16 NOTE — CARE COORDINATION
12/16/21, 2:53 PM EST    DISCHARGE PLANNING EVALUATION    Left a message for [address] [place] [address]. Need to find out of they will have any beds available soon.

## 2021-12-17 NOTE — CARE COORDINATION
Discharge Planning Update: Following Covid. Orthostatic VS + yesterday. Started midodrine yesterday. Afebrile today. O2 at 1L/NC with sat 93%. SW following for pt return to AL or SNF. See SW note.

## 2021-12-17 NOTE — PROGRESS NOTES
[facility] 6A CAPACITY [facility]  Occupational Therapy  Daily Note  Time:   Time In:   Time Out: 1021  Timed Code Treatment Minutes: 23 Minutes  Minutes: 23          Date: 2021  Patient Name: Saundra Wiseman,   Gender: female      Room: [facility]/018-A  MRN: [id number]  : 1935  (80 y.o.)  Referring Practitioner: Nicki Rey PA-C  Diagnosis: Syncope and Collapse  Additional Pertinent Hx: Pt admit with above diagnosis, right upper lobe pneumonia, s/p fall at [facility] [place], operated by [facility] and hit left side of head with laceration to left ear, CHI    Restrictions/Precautions:  Restrictions/Precautions: General Precautions, Fall Risk, Isolation  Position Activity Restriction  Other position/activity restrictions: +orthostatic hypotension, COVID-19, droplet + precautions     SUBJECTIVE: Nurse Lala Dumont ok'd session, Up in chair upon arrival, encouragement given for session0    PAIN: 0/10:     Vitals: Patient on 1 L O2 via Nasal Canula  upon arrival to room. Patient O2 sats at 93 %. Upon activity patient maintaining O2. COGNITION: Slow Processing    ADL:   Feeding: Modified Independent. able to open containers, able to feed self  Grooming: with set-up. combed hair and brushed teeth sitting in bedside chair. Patient declined out of bed      ADDITIONAL ACTIVITIES:  Guided patient through BUE AROM this date x10 reps x1 set in chair in order to increase BUE strength and improve activity tolerance for ADLs and homemaking tasks. ASSESSMENT:     Activity Tolerance:  Patient tolerance of  treatment: good.        Discharge Recommendations: ECF with OT  Equipment Recommendations: Equipment Needed: No  Plan: Times per week: 5x  Specific instructions for Next Treatment: Functional mobility; ADLs and safety awareness; UE exercises  Current Treatment Recommendations: Endurance Training, Self-Care / ADL, Safety Education & Training, Functional Mobility Training  Plan Comment: Pt would benefit from continued skilled

## 2021-12-17 NOTE — PROGRESS NOTES
[address] [facility]  INPATIENT PHYSICAL THERAPY  DAILY NOTE  STRZ 6A CAPACITY EBOLA - 6A-18/018-A      Time In: 5695  Time Out: 1138  Timed Code Treatment Minutes: 23 Minutes  Minutes: 23          Date: 2021  Patient Name: Nathanael Box,  Gender:  female        MRN: [id number]  : 1935  (80 y.o.)     Referring Practitioner: SHELBIE Trejo  Diagnosis: syncope and collapse  Additional Pertinent Hx: admit with above diagnosis, right upper lobe pneumonia, s/p fall at [facility], [place], operated by [facility] and hit left side of head with laceration to left ear, CHI     Prior Level of Function:  Lives With: Alone  [facility]  Home Layout: One level  Home Access: Level entry  Home Equipment: 4 wheeled walker   Bathroom Shower/Tub: Walk-in shower, Shower chair with back  Bathroom Toilet: [address]: Grab bars in [address] Accessibility: [address] Help From: Other (comment) (staff as available)  ADL Assistance: Needs assistance  [address]: Needs assistance  Homemaking Responsibilities: No  Ambulation Assistance: Needs assistance  Transfer Assistance: Needs assistance  Active : No  Additional Comments: per pt was using rollator PTA and working with PT    Restrictions/Precautions:  Restrictions/Precautions: General Precautions, Fall Risk, Isolation  Position Activity Restriction  Other position/activity restrictions: +orthostatic hypotension, COVID-19, droplet + precautions       SUBJECTIVE: pt in bed on arrival she was somewhat flat with little conversation and required encouragement to participate     PAIN: 0/10:       Vitals: Oxygen: pt on 1L O2 and sats > 95%     OBJECTIVE:  Bed Mobility:  Rolling to Left: Stand By Assistance, with rail   Supine to Sit: Stand By Assistance  Scooting: Stand By Assistance    Transfers:  Sit to Stand: Stand By Assistance  Stand to  Corporation, cues for safety to reach back to surface adn to control descend Ambulation:  Contact Guard Assistance  Distance: 15x2  Surface: Level Tile  Device:Rolling Walker  Gait Deviations:  Fair eric and step length however she needed cues and assist from therapist for o2 line management     Balance:  dynamic balance activity w/ one to no UE at support with SBA with cues for o2 line management     Exercise:  Patient was guided in 1 set(s) 10 reps of exercise to both lower extremities. Ankle pumps, Glut sets, Quad sets, Heelslides, Short arc quads, Seated marches, Long arc quads and pt needed encouragement to complete exercises this date . Exercises were completed for increased independence with functional mobility. Functional Outcome Measures: Completed  -PAC Inpatient Mobility Raw Score : 16  AM-PAC Inpatient T-Scale Score : 40.78    ASSESSMENT:  Assessment: Patient progressing toward established goals. Activity Tolerance:  Patient tolerance of  treatment: fair. Equipment Recommendations: Other: cont to assess needs  Discharge Recommendations: Subacte/Skilled Nursing Facility  Plan: Times per week: 3-5x C  Times per day: Daily  Specific instructions for Next Treatment: therex and mobility, monitor BP    Patient Education  Patient Education: Plan of Care- O2 line management     Goals:  Patient goals : go home  Short term goals  Time Frame for Short term goals: by discharge  Short term goal 1: bed mobility with SBA to get in/out of bed  Short term goal 2: transfer with SBA to get in/out of chairs  Short term goal 3: amb >25'x1 with walker and CGA to walk safely in home  Long term goals  Time Frame for Long term goals : no LTGs set secondary to short ELOS    Following session, patient left in safe position with all fall risk precautions in place.

## 2021-12-17 NOTE — PROGRESS NOTES
Hospitalist Progress Note    Patient:  Sang Dow    Unit/Bed:6A-18/018-A  YOB: 1935  MRN: [id number]   Acct: [de-identified]   PCP: Kareen Abraham MD  Code Status: DNR-CCA  Date of Admission: 12/12/2021    Expected Discharge: 12/17-18? Disposition: SNF. Assessment/Plan:    1. COVID-19: Tested positive 12/12. CXR is relatively unchanged from previous. Patient's procal 0.13 and remains afebrile and non-toxic appearing, without an acute leukocytosis.  CTA 12/15 revealed no pulmonary emboli and bilateral multifocal infiltrates and interstitial edema.  - No documented hypoxia and asx, no indication for treatment at this time. 2. Orthostatic hypotension: Pt has been on IVF without much improvement. Midodrine 2.5mg TID was started 12/16. Continued on lopressor per cardiology recs given her h/o valvular heart disease. Awaiting repeat orthostats today for further management. 3. Acute HFrEF: Echo 12/14 showed EF 45% (previously 55% 5/2021). No signs of fluid overload. Strict I/Os, daily weights. Cardiology was consulted - no CP, trop negative, no acute ekg changes. No further inpatient workup at this time, pt to follow up with her primary cardiologist as OP. 4. Closed head injury: PTA. Stable. CT of head and neck showed no acute injury or abnormality. Pt was cleared by trauma. 5. NIDDM2: On Metformin at home, currently held. Continue SSI, Accu-Cheks. Hypoglycemia protocol. Glucose remains elevated. Based on 24-hour insulin requirements, will start 5 units Lantus nightly. Continue to monitor and adjust accordingly. 6. Hx CAD: h/o PCI of LAD 1/2020. Continue Plavix, aspirin, statin. 7. Mild aortic stenosis; mild to mod mitral stenosis: Noted continue beta-blocker as stated above. 8. CKD stage 3: Cr stable at baseline.          Chief Complaint: Fall    HPI / Hospital Course: Per last progress note: \"86 y.o. female who we are asked to see/evaluate by Norman SMITH Gonzales Bermudez MD for medical management of R ight upper lobe pneumonia and medical management     Per initial evaluation by trauma \"Patient is a 80year-old female [facility] level 2 [facility] following a fall.  Per report patient was walking at nursing home with walker when she fell and hit the left side of her head. No loss of consciousness reported. Patient on Plavix. History of recurrent falls. Chart reviewed, recent admission last month for ARTIE on CKD and fall. Upon assessment patient is ABCs intact, GCS 14 (U2X2Q1), in no acute distress. Patient with confusion but reported baseline confusion with history of dementia. Patient was perseverating, repetitively asking same questions.  Patient denied any acute pain or complaints.  She denied any headaches, lightheadedness, dizziness, neck pain, back pain, chest pain, shortness of breath, abdominal pain, nausea/vomiting, pain extremities, and paresthesias. On exam patient with estimated 3 cm linear laceration noted over the posterior aspect of left auricle and 1.5 cm linear laceration noted over left tragus. No auricular hematoma noted. Patient also noted to have small amount of ecchymosis noted over left cheek and over the angle of the left manubrium. No reported TMJ pain or difficultly opening mouth. No other signs of trauma noted on exam. No midline cervical, thoracic, or lumbar tenderness palpation. Chest and abdomen nontender. Abdomen soft. No extremity tenderness palpation. PMS intact in all 4 extremities. Range of motion intact. Vital signs stable. Patient taken radiology for CT and plain film imaging. CT head negative for any acute intracranial abnormality. CT cervical spine negative for any acute osseous injuries. Chest x-ray reveals right upper lobe pneumonia. Pelvic x-ray negative for acute osseous injuries. Labs reviewed. Troponin negative. Mild hyponatremia at 132. Creat 1.3. Glucose 264. No leukocytosis, WBCs 8.3. Hemoglobin 13.4. UA negative for UTI. Plan for patient admitted under trauma surgery for SLP cog eval following closed head injury. Monitor for postconcussive symptoms. Ear laceration closed emergency department, see procedure note below. Updated on Boostrix. Consult to hospitalist for right upper quadrant pneumonia and assistance with medical management. Case discussed and care coordinated with trauma surgeon, Dr. Gwyn Chambers.     Signed copy of DNR CCA by PCP noted in advance care planning documents.  DNR-CCA on admission. Discussed with daughter at bedside. \"     12/15: \"Patient is resting in bed on RA. She states that did not know she had COVID-19 when she fell and was admitted to the hospital. She denies chest pain, SOB, abdominal pain. Per nursing, patient has not been as confused this morning. \"    12/16: Patient was resting comfortably in bed on RA. She states she has no new symptoms, concerns, or questions. Asking about discharge. States that she wasn't sure why she needed the O2 early this morning- denies feeling SOB. Reviewed patient's labs and discussed plan for today- get orthostatic blood pressures, Evaluate chest xray, and check with cardiology to restart IVF and possibly midodrine. \"       Subjective (past 24 hours): Pt is doing well. She denies fever/chills, SOB, CP, abd pain, nvd. Patient has not been up today, unknown if she feels dizzy when standing. Awaiting repeat orthostats for further management recs. ROS: Pertinent positives as noted in HPI. All other systems reviewed and negative. Past medical history, family history, social history and allergies reviewed again and is unchanged since admission. Medications:  Reviewed.   Infusion Medications    dextrose      sodium chloride Stopped (12/17/21 2109)    sodium chloride       Scheduled Medications    midodrine  2.5 mg Oral TID WC    aspirin  81 mg Oral Daily    insulin lispro  0-18 Units SubCUTAneous TID WC    insulin lispro 0-9 Units SubCUTAneous Nightly    metoprolol tartrate  25 mg Oral BID    melatonin  3 mg Oral Nightly    DULoxetine  120 mg Oral Daily    clopidogrel  75 mg Oral Daily    atorvastatin  40 mg Oral Daily    Tdap-Dtap  0.5 mL IntraMUSCular Once    sodium chloride flush  10 mL IntraVENous 2 times per day    docusate sodium  100 mg Oral Daily     PRN Meds: haloperidol lactate, glucose, dextrose, glucagon (rDNA), dextrose, acetaminophen, sodium chloride flush, sodium chloride, ondansetron **OR** ondansetron, polyethylene glycol    I/O:     Intake/Output Summary (Last 24 hours) at 12/17/2021 1527  Last data filed at 12/17/2021 1111  Gross per 24 hour   Intake 2412.88 ml   Output 1675 ml   Net 737.88 ml       Diet:  ADULT DIET; Regular; 4 carb choices (60 gm/meal)    Exam:  BP (!) 115/55   Pulse 92   Temp 98.4 °F (36.9 °C) (Oral)   Resp 19   Ht 5' 4\" (1.626 m)   Wt 160 lb (72.6 kg)   SpO2 93%   BMI 27.46 kg/m²   General:   Pleasant elderly female. NAD. HEENT:  normocephalic and atraumatic. No scleral icterus. PERR. Neck: supple. No JVD. No thyromegaly. Lungs: clear to auscultation. No retractions  Cardiac: RRR without murmur. Abdomen: soft. Nontender. Bowel sounds positive. Extremities:  No clubbing, cyanosis, or edema x 4. Vasculature: capillary refill < 3 seconds. Palpable LE pulses bilaterally. Skin:  warm and dry. Psych:  Alert and oriented x3. Affect appropriate  Lymph:  No supraclavicular adenopathy. Neurologic:  No focal deficit. No seizures.       Data: (All radiographs, tracings, PFTs, and imaging are personally viewed and interpreted unless otherwise noted)  Labs:   Recent Labs     12/15/21  1036 12/16/21  0630 12/17/21  0700   WBC 6.0 6.1 7.1   HGB 11.1* 11.4* 10.7*   HCT 35.3* 36.2* 34.0*    192 179     Recent Labs     12/15/21  1036 12/16/21  0630 12/17/21  0700    137 136   K 4.1 4.3 3.7    103 102   CO2 23 24 20*   BUN 21 16 13   CREATININE 1.2 1.0 1.0 CALCIUM 8.0* 8.0* 7.6*     No results for input(s): AST, ALT, BILIDIR, BILITOT, ALKPHOS in the last 72 hours. No results for input(s): INR in the last 72 hours. No results for input(s): Cherre Gash in the last 72 hours. Urinalysis:   Lab Results   Component Value Date    NITRU NEGATIVE 12/12/2021    WBCUA 15-25 11/05/2021    BACTERIA NONE SEEN 11/05/2021    RBCUA NONE 11/05/2021    BLOODU NEGATIVE 12/12/2021    SPECGRAV 1.007 12/12/2021    GLUCOSEU >= 1000 11/05/2021     Urine culture:   Lab Results   Component Value Date    LABURIN [place] count: >100,000 CFU/mL  10/29/2021     Micro:   Blood culture #1: No results found for: BC  Blood culture #2:No results found for: BLOODCULT2  Organism:  Lab Results   Component Value Date    ORG Growth of Contaminants 11/05/2021         Lab Results   Component Value Date    LABGRAM  10/12/2021     No segmented neutrophils observed. Rare epithelial cells observed. No bacteria seen. MRSA culture only:No results found for: [facility]  Respiratory culture: No results found for: CULTRESP  Aerobic and Anaerobic :  Lab Results   Component Value Date    LABAERO  10/12/2021     No growth-preliminary Culture also yielded light growth of Staphylococcus species (coagulase negative). LABAERO light growth  10/12/2021    LABAERO  10/12/2021     very light growth Pantoea species which may be initially susceptible to third generation cephalosporins may develop resistance within three to four days of initiation of this antimicrobial therapy. No results found for: [facility]    Radiology Reports:  XR CHEST PORTABLE   Final Result   1. Abnormal density in the right upper lobe consistent with inflammatory process presumably Covid 19 infection. 2.. Thoracic spondylosis. 3. Postoperative changes along the right lateral chest wall. **This report has been created using voice recognition software.  It may contain minor errors which are inherent in voice recognition technology. **      Final report electronically signed by DR Faustino Perez on 12/16/2021 3:18 PM      CTA CHEST [address]   Final Result    There is no pulmonary emboli. There are bilateral multifocal infiltrates and interstitial edema. **This report has been created using voice recognition software. It may contain minor errors which are inherent in voice recognition technology. **      Final report electronically signed by Dr. Nelsy Flood on 12/15/2021 4:38 PM      XR PELVIS (1-2 VIEWS)   Final Result    No evidence of acute osseous injury on this single image of the pelvis. **This report has been created using voice recognition software. It may contain minor errors which are inherent in voice recognition technology. **      Final report electronically signed by Dr. Tyron Ramirez MD on 12/12/2021 2:52 PM      XR CHEST PORTABLE   Final Result   Right upper lobe pneumonia. **This report has been created using voice recognition software. It may contain minor errors which are inherent in voice recognition technology. **      Final report electronically signed by Dr. Tyron Ramirez MD on 12/12/2021 2:51 PM      CT Head WO Contrast   Final Result    No evidence of acute intracranial abnormality. **This report has been created using voice recognition software. It may contain minor errors which are inherent in voice recognition technology. **      Final report electronically signed by Dr. Tyron Ramirez MD on 12/12/2021 2:09 PM      CT Cervical Spine WO Contrast   Final Result    No evidence of acute osseous injury of the cervical spine. **This report has been created using voice recognition software. It may contain minor errors which are inherent in voice recognition technology. **      Final report electronically signed by Dr. Tyron Ramirez MD on 12/12/2021 2:20 PM      US Ed Fast Abdomen Limited    (Results Pending)     XR PELVIS (1-2 VIEWS)    Result Date: 12/12/2021  PROCEDURE: XR PELVIS (1-2 VIEWS) CLINICAL INFORMATION: trauma. Fall with altered mental status. COMPARISON: 11/20/2020. TECHNIQUE: AP view of the pelvis. FINDINGS:  There is a right hip arthroplasty. Stable alignment compared to prior exam. No acute fracture is evident on this single view. No evidence of acute osseous injury on this single image of the pelvis. **This report has been created using voice recognition software. It may contain minor errors which are inherent in voice recognition technology. ** Final report electronically signed by Dr. Ryan Yanes MD on 12/12/2021 2:52 PM    CT Head WO Contrast    Result Date: 12/12/2021  PROCEDURE: CT HEAD WO CONTRAST CLINICAL INFORMATION: Head trauma, anticoagulation. COMPARISON: CT head dated 11/5/2021. TECHNIQUE: Helical CT of the head. Axial, sagittal and coronal reconstructions were created. All CT scans at this facility use dose modulation, iterative reconstruction, and/or weight-based dosing when appropriate to reduce radiation dose to as low as reasonably achievable. FINDINGS: There is stable moderate ventriculomegaly, mildly disproportionate to peripheral volume loss. There are mild confluent and patchy areas of hypodensity in the periventricular, subcortical deep white matter, stable compared to prior exam. Gray-white matter  differentiation otherwise appears grossly preserved. The calvarium appears intact. Orbits are unremarkable. Paranasal sinuses and mastoid air cells are clear. No evidence of acute intracranial abnormality. **This report has been created using voice recognition software. It may contain minor errors which are inherent in voice recognition technology. ** Final report electronically signed by Dr. Ryan Yanes MD on 12/12/2021 2:09 PM    CT Cervical Spine WO Contrast    Result Date: 12/12/2021  PROCEDURE: CT CERVICAL SPINE WO CONTRAST CLINICAL INFORMATION: spine trauma. 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 XXXA]     Essential hypertension [I10]     Type 2 diabetes mellitus with hyperglycemia, with long-term current use of insulin ([facility] [address] [E11.65, Z79.4] 04/14/2016    Type 2 diabetes mellitus without complication ([address] [N58.8] 12/17/2015       Electronically signed by Cintia Larios PA-C on 12/17/2021 at 3:27 PM

## 2021-12-17 NOTE — PROGRESS NOTES
0600 - called patient's daughter Venessa Orona and update provided. All questions answered to the best of my ability. Venessa Orona did verbalize she will be going out of town this weekend to visit with family and may need updates to be called to her sister. She will let day shift know when they update today.

## 2021-12-17 NOTE — CARE COORDINATION
12/17/21, 9:17 AM EST    DISCHARGE PLANNING EVALUATION    Called and left a message for [address] [place] [address] to ask if they have any beds. Update 10:41am: Made referral to Timothy Holliday at [address] [place] [address]. They do have COVID beds open. Called daughter Charolet Hamman and made her aware SW called and made referral.  She asked if the patient could just stay in the hospital until she can go back to [facility]. Explained that this is an acute care hospital and once someone is stable they need to be discharged to the next level of care. She then asked why they patient can't go to assisted living. SW agreed to call Kaila Carissa at [facility]. He told SW that she will not be going back to the assisted living when she returns to their facility. She can return to their skilled unit once she is out of isolation. Received a message from Timothy Holliday at [facility] stating the patients insurance is out of network. Called him back and ask if they would do a one time authorization since none of the other local facilities take COVID. SW agreed to call local facilities to verify none of them take COVID and email him the list.  Spoke with Kaila Ferrer at [facility] and they will not acccept. Spoke with Torri Mendoza with Brenda Burns with [facility] [place] with [facility] and none of their buildings accept new COVID patients. Will wait for Timothy Holliday to call back to see if they can accept. Update 1:57pm: Em has been accepted medically to [facility]. They can't take her until they get approval from her insurance. Spoke with the  Timothy Holliday and he reports that it could come back yet today or possibly over the weekend. Called patients daughter Charolet Hamman to make her aware she has been accepted to [facility] and that we are waiting for insurance approval.  She is aware patient will be discharged when that is approved. Update 3:23pm: updated patients nurse.   She is aware patient will not be able to discharge until her insurance approves the stay. Called Lorraine at [facility] and left a message asking about the status of acceptance by insurance.

## 2021-12-18 NOTE — PROGRESS NOTES
Attempted to return phone call to Ubaldo Nathaly (daughter)no answer and no voicemail to leave message. Will try to call and update again later.

## 2021-12-18 NOTE — PROGRESS NOTES
Hospitalist Progress Note    Patient:  Maria A Guillen    Unit/Bed:6A-18/018-A  YOB: 1935  MRN: [id number]   Acct: [de-identified]   PCP: Pierre Waldron MD  Code Status: DNR-CCA  Date of Admission: 12/12/2021    Expected Discharge: 12/19? Disposition: SNF. Increased midodrine to 7.5mg TID, trend orthostats. Assessment/Plan:    1. COVID-19: Tested positive 12/12. CXR is relatively unchanged from previous. Patient's procal 0.13 and remains afebrile and non-toxic appearing, without an acute leukocytosis.  CTA 12/15 revealed no pulmonary emboli and bilateral multifocal infiltrates and interstitial edema.  - No documented hypoxia and asx, no indication for treatment at this time. 2. Orthostatic hypotension: Pt has been on IVF without much improvement. Midodrine 2.5mg TID was started 12/16. Continued on lopressor per cardiology recs given her h/o valvular heart disease.   - Midodrine increased to 5mg TID on 12/17. Orthostats remain positive and patient symptomatic. Increased midodrine to 7.5mg today, 12/18. 3. Acute HFrEF: Echo 12/14 showed EF 45% (previously 55% 5/2021). No signs of fluid overload. Strict I/Os, daily weights. Cardiology was consulted - no CP, trop negative, no acute ekg changes. No further inpatient workup at this time, pt to follow up with her primary cardiologist as OP. 4. Closed head injury: PTA. Stable. CT of head and neck showed no acute injury or abnormality. Pt was cleared by trauma. 5. NIDDM2: On Metformin at home, currently held. Continue SSI, Accu-Cheks. Hypoglycemia protocol. Glucose remains elevated. Based on 24-hour insulin requirements, will start 5 units Lantus nightly. Continue to monitor and adjust accordingly. 6. Hx CAD: h/o PCI of LAD 1/2020. Continue Plavix, aspirin, statin. 7. Mild aortic stenosis; mild to mod mitral stenosis: Noted continue beta-blocker as stated above. 8. CKD stage 3: Cr stable at baseline.          Chief Complaint: Fall    HPI / Hospital Course: Per last progress note: \"86 y.o. female who we are asked to see/evaluate by Norman Cantu MD for medical management of R ight upper lobe pneumonia and medical management     Per initial evaluation by trauma \"Patient is a 80year-old female presents to [address] [place] [address] as activation of level 2 trauma following a fall.  Per report patient was walking at nursing home with walker when she fell and hit the left side of her head. No loss of consciousness reported. Patient on Plavix. History of recurrent falls. Chart reviewed, recent admission last month for ARTIE on CKD and fall. Upon assessment patient is ABCs intact, GCS 14 (S6B5H1), in no acute distress. Patient with confusion but reported baseline confusion with history of dementia. Patient was perseverating, repetitively asking same questions.  Patient denied any acute pain or complaints.  She denied any headaches, lightheadedness, dizziness, neck pain, back pain, chest pain, shortness of breath, abdominal pain, nausea/vomiting, pain extremities, and paresthesias. On exam patient with estimated 3 cm linear laceration noted over the posterior aspect of left auricle and 1.5 cm linear laceration noted over left tragus. No auricular hematoma noted. Patient also noted to have small amount of ecchymosis noted over left cheek and over the angle of the left manubrium. No reported TMJ pain or difficultly opening mouth. No other signs of trauma noted on exam. No midline cervical, thoracic, or lumbar tenderness palpation. Chest and abdomen nontender. Abdomen soft. No extremity tenderness palpation. PMS intact in all 4 extremities. Range of motion intact. Vital signs stable. Patient taken radiology for CT and plain film imaging. CT head negative for any acute intracranial abnormality. CT cervical spine negative for any acute osseous injuries. Chest x-ray reveals right upper lobe pneumonia.  Pelvic x-ray negative for acute osseous injuries. Labs reviewed. Troponin negative. Mild hyponatremia at 132. Creat 1.3. Glucose 264. No leukocytosis, WBCs 8.3. Hemoglobin 13.4. UA negative for UTI. Plan for patient admitted under trauma surgery for SLP cog eval following closed head injury. Monitor for postconcussive symptoms. Ear laceration closed emergency department, see procedure note below. Updated on Boostrix. Consult to hospitalist for right upper quadrant pneumonia and assistance with medical management. Case discussed and care coordinated with trauma surgeon, Dr. Mary Garcia.     Signed copy of DNR CCA by PCP noted in advance care planning documents.  DNR-CCA on admission. Discussed with daughter at bedside. \"     12/15: \"Patient is resting in bed on RA. She states that did not know she had COVID-19 when she fell and was admitted to the hospital. She denies chest pain, SOB, abdominal pain. Per nursing, patient has not been as confused this morning. \"    12/16: Patient was resting comfortably in bed on RA. She states she has no new symptoms, concerns, or questions. Asking about discharge. States that she wasn't sure why she needed the O2 early this morning- denies feeling SOB. Reviewed patient's labs and discussed plan for today- get orthostatic blood pressures, Evaluate chest xray, and check with cardiology to restart IVF and possibly midodrine. \"     12/17: Patient has not been up today, unknown if she feels dizzy when standing. Awaiting repeat orthostats for further management recs. Orthos positive. Increased midodrine to 5mg TID. Subjective (past 24 hours):   12/18: Patient states that she feels well and wants to go back to assisted living. She denies any lightheadedness or dizziness with standing. However, after discussing with the nurse, when the patient was standing to check orthos this AM, the patient became very lightheaded and began to fall backwards. This was before she received AM midodrine.  Orthos repeated after midodrine and dry. Psych:  Alert and oriented x3. Affect appropriate  Lymph:  No supraclavicular adenopathy. Neurologic:  No focal deficit. No seizures. Data: (All radiographs, tracings, PFTs, and imaging are personally viewed and interpreted unless otherwise noted)  Labs:   Recent Labs     12/16/21  0630 12/17/21  0700 12/18/21  0722   WBC 6.1 7.1 6.3   HGB 11.4* 10.7* 10.9*   HCT 36.2* 34.0* 34.5*    179 212     Recent Labs     12/16/21  0630 12/17/21  0700 12/18/21  0722    136 136   K 4.3 3.7 4.2    102 102   CO2 24 20* 24   BUN 16 13 17   CREATININE 1.0 1.0 0.9   CALCIUM 8.0* 7.6* 8.0*     No results for input(s): AST, ALT, BILIDIR, BILITOT, ALKPHOS in the last 72 hours. No results for input(s): INR in the last 72 hours. No results for input(s): Dale Shorts in the last 72 hours. Urinalysis:   Lab Results   Component Value Date    NITRU NEGATIVE 12/12/2021    WBCUA 15-25 11/05/2021    BACTERIA NONE SEEN 11/05/2021    RBCUA NONE 11/05/2021    BLOODU NEGATIVE 12/12/2021    SPECGRAV 1.007 12/12/2021    GLUCOSEU >= 1000 11/05/2021     Urine culture:   Lab Results   Component Value Date    LABURIN [place] count: >100,000 CFU/mL  10/29/2021     Micro:   Blood culture #1: No results found for: BC  Blood culture #2:No results found for: BLOODCULT2  Organism:  Lab Results   Component Value Date    ORG Growth of Contaminants 11/05/2021         Lab Results   Component Value Date    LABGRAM  10/12/2021     No segmented neutrophils observed. Rare epithelial cells observed. No bacteria seen. MRSA culture only:No results found for: [address] [place] [address]  Respiratory culture: No results found for: CULTRESP  Aerobic and Anaerobic :  Lab Results   Component Value Date    LABAERO  10/12/2021     No growth-preliminary Culture also yielded light growth of Staphylococcus species (coagulase negative).      LABAERO light growth  10/12/2021    LABAERO  10/12/2021     very light growth Pantoea species which may be initially susceptible to third generation cephalosporins may develop resistance within three to four days of initiation of this antimicrobial therapy. No results found for: [facility]    Radiology Reports:  XR CHEST PORTABLE   Final Result   1. Abnormal density in the right upper lobe consistent with inflammatory process presumably Covid 19 infection. 2.. Thoracic spondylosis. 3. Postoperative changes along the right lateral chest wall. **This report has been created using voice recognition software. It may contain minor errors which are inherent in voice recognition technology. **      Final report electronically signed by DR Gerald Maravilla on 12/16/2021 3:18 PM      CTA CHEST [address]   Final Result    There is no pulmonary emboli. There are bilateral multifocal infiltrates and interstitial edema. **This report has been created using voice recognition software. It may contain minor errors which are inherent in voice recognition technology. **      Final report electronically signed by Dr. Erik Ramos on 12/15/2021 4:38 PM      XR PELVIS (1-2 VIEWS)   Final Result    No evidence of acute osseous injury on this single image of the pelvis. **This report has been created using voice recognition software. It may contain minor errors which are inherent in voice recognition technology. **      Final report electronically signed by Dr. Ban Gill MD on 12/12/2021 2:52 PM      XR CHEST PORTABLE   Final Result   Right upper lobe pneumonia. **This report has been created using voice recognition software. It may contain minor errors which are inherent in voice recognition technology. **      Final report electronically signed by Dr. Ban Gill MD on 12/12/2021 2:51 PM      CT Head WO Contrast   Final Result    No evidence of acute intracranial abnormality. **This report has been created using voice recognition software.  It may contain minor errors which are inherent in voice recognition technology. **      Final report electronically signed by Dr. Tyron Ramirez MD on 12/12/2021 2:09 PM      CT Cervical Spine WO Contrast   Final Result    No evidence of acute osseous injury of the cervical spine. **This report has been created using voice recognition software. It may contain minor errors which are inherent in voice recognition technology. **      Final report electronically signed by Dr. Tyron Ramirez MD on 12/12/2021 2:20 PM      US Ed Fast Abdomen Limited    (Results Pending)     XR PELVIS (1-2 VIEWS)    Result Date: 12/12/2021  PROCEDURE: XR PELVIS (1-2 VIEWS) CLINICAL INFORMATION: trauma. Fall with altered mental status. COMPARISON: 11/20/2020. TECHNIQUE: AP view of the pelvis. FINDINGS:  There is a right hip arthroplasty. Stable alignment compared to prior exam. No acute fracture is evident on this single view. No evidence of acute osseous injury on this single image of the pelvis. **This report has been created using voice recognition software. It may contain minor errors which are inherent in voice recognition technology. ** Final report electronically signed by Dr. Tyron Ramirez MD on 12/12/2021 2:52 PM    CT Head WO Contrast    Result Date: 12/12/2021  PROCEDURE: CT HEAD WO CONTRAST CLINICAL INFORMATION: Head trauma, anticoagulation. COMPARISON: CT head dated 11/5/2021. TECHNIQUE: Helical CT of the head. Axial, sagittal and coronal reconstructions were created. All CT scans at this facility use dose modulation, iterative reconstruction, and/or weight-based dosing when appropriate to reduce radiation dose to as low as reasonably achievable. FINDINGS: There is stable moderate ventriculomegaly, mildly disproportionate to peripheral volume loss.  There are mild confluent and patchy areas of hypodensity in the periventricular, subcortical deep white matter, stable compared to prior exam. Gray-white matter  differentiation otherwise appears grossly preserved. The calvarium appears intact. Orbits are unremarkable. Paranasal sinuses and mastoid air cells are clear. No evidence of acute intracranial abnormality. **This report has been created using voice recognition software. It may contain minor errors which are inherent in voice recognition technology. ** Final report electronically signed by Dr. Sheilah Pallas, MD on 12/12/2021 2:09 PM    CT Cervical Spine WO Contrast    Result Date: 12/12/2021  PROCEDURE: CT CERVICAL SPINE WO CONTRAST CLINICAL INFORMATION: spine trauma. COMPARISON: 11/5/2021. TECHNIQUE: 3 mm noncontrast axial images were obtained through the cervical spine with sagittal and coronal reconstructions. All CT scans at this facility use dose modulation, iterative reconstruction, and/or weight-based dosing when appropriate to reduce radiation dose to as low as reasonably achievable. FINDINGS: There is grade 1 anterolisthesis of C4 relative to C5 on the basis of degenerative change, stable compared to prior exam. There is otherwise anatomic vertebral body height and alignment. No fracture of the cervical vertebral column is identified. The craniocervical junction appears intact. No paraspinal or epidural fluid collection is identified. There are stable degenerative changes of the cervical spine most pronounced at C6-7 with complete loss of intervertebral disc space and rim osteophytes at this level. Paraspinal soft tissues are grossly unremarkable. No evidence of acute osseous injury of the cervical spine. **This report has been created using voice recognition software. It may contain minor errors which are inherent in voice recognition technology. ** Final report electronically signed by Dr. Sheilah Pallas, MD on 12/12/2021 2:20 PM    XR CHEST PORTABLE    Result Date: 12/12/2021  PROCEDURE: XR CHEST PORTABLE CLINICAL INFORMATION: trauma. Fall with altered mental status. COMPARISON: Chest radiograph dated 10/17/2021. TECHNIQUE: AP upright view of the chest. FINDINGS: There is a patchy opacity in the right upper lobe which has appeared in the interval. Lungs otherwise appear clear. Pulmonary vasculature and cardiomediastinal silhouette are within normal limits. Visualized osseous structures appear grossly intact. Right upper lobe pneumonia. **This report has been created using voice recognition software. It may contain minor errors which are inherent in voice recognition technology. ** Final report electronically signed by Dr. 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 XXXA]     Essential hypertension [I10]     Type 2 diabetes mellitus with hyperglycemia, with long-term current use of insulin ([address] [E11.65, Z79.4] 04/14/2016    Type 2 diabetes mellitus without complication ([address] [D87.3] 12/17/2015       Electronically signed by Rachelle Taveras PA-C on 12/18/2021 at 1:12 PM

## 2021-12-19 NOTE — PROGRESS NOTES
Called patients daughter, Lionel Marx. Updated her on patients day. Informed her of her BP continuing to be low and midodrine dosage increased.

## 2021-12-19 NOTE — FLOWSHEET NOTE
12/19/21 1357   Provider Notification   Reason for Communication Review case  (positive orthostatics)   Provider Name Rachelle Taveras   Provider Notification Advance Practice Clinician (CNS/NP/CNM/CRNA/PA)   Method of Communication Secure Message   Response Waiting for response   Notification Time 9880     Notified Endy MORFIN, of patients positive orthostatic VS.

## 2021-12-19 NOTE — PROGRESS NOTES
Hospitalist Progress Note    Patient:  Jacob Watson    Unit/Bed:6A-18/018-A  YOB: 1935  MRN: [id number]   Acct: [de-identified]   PCP: Annabelle Kdid MD  Code Status: DNR-CCA  Date of Admission: 12/12/2021    Expected Discharge: 12/19? Disposition: SNF. Increased midodrine to 7.5mg TID, trend orthostats. Assessment/Plan:    1. COVID-19: Tested positive 12/12. CXR is relatively unchanged from previous. Patient's procal 0.13 and remains afebrile and non-toxic appearing, without an acute leukocytosis.  CTA 12/15 revealed no pulmonary emboli and bilateral multifocal infiltrates and interstitial edema.  - No documented hypoxia and asx, no indication for treatment at this time. 2. Orthostatic hypotension: Pt has been on IVF without much improvement. Midodrine 2.5mg TID was started 12/16. Continued on lopressor per cardiology recs given her h/o valvular heart disease.   - Midodrine increased to 5mg TID on 12/17. Orthostats remain positive and patient symptomatic. Increased midodrine to 7.5mg today, 12/18. - Midodrine increased to 10mg TID 12/19. Continue IVF. 3. Acute HFrEF: Echo 12/14 showed EF 45% (previously 55% 5/2021). No signs of fluid overload. Strict I/Os, daily weights. Cardiology was consulted - no CP, trop negative, no acute ekg changes. No further inpatient workup at this time, pt to follow up with her primary cardiologist as OP. 4. Closed head injury: PTA. Stable. CT of head and neck showed no acute injury or abnormality. Pt was cleared by trauma. 5. NIDDM2: On Metformin at home, currently held. Continue SSI, Accu-Cheks. Hypoglycemia protocol. Glucose remains elevated. Based on 24-hour insulin requirements, will start 5 units Lantus nightly. Continue to monitor and adjust accordingly. 6. Hx CAD: h/o PCI of LAD 1/2020. Continue Plavix, aspirin, statin.     7. Mild aortic stenosis; mild to mod mitral stenosis: Noted continue beta-blocker as stated above.    8. CKD stage 3: Cr stable at baseline. Chief Complaint: Fall    HPI / Hospital Course: Per last progress note: \"86 y.o. female who we are asked to see/evaluate by Norman Mandujano MD for medical management of R ight upper lobe pneumonia and medical management     Per initial evaluation by trauma \"Patient is a 80year-old female presents to [address] [place] [address] as activation of level 2 trauma following a fall.  Per report patient was walking at nursing home with walker when she fell and hit the left side of her head. No loss of consciousness reported. Patient on Plavix. History of recurrent falls. Chart reviewed, recent admission last month for ARTIE on CKD and fall. Upon assessment patient is ABCs intact, GCS 14 ([id number]), in no acute distress. Patient with confusion but reported baseline confusion with history of dementia. Patient was perseverating, repetitively asking same questions.  Patient denied any acute pain or complaints.  She denied any headaches, lightheadedness, dizziness, neck pain, back pain, chest pain, shortness of breath, abdominal pain, nausea/vomiting, pain extremities, and paresthesias. On exam patient with estimated 3 cm linear laceration noted over the posterior aspect of left auricle and 1.5 cm linear laceration noted over left tragus. No auricular hematoma noted. Patient also noted to have small amount of ecchymosis noted over left cheek and over the angle of the left manubrium. No reported TMJ pain or difficultly opening mouth. No other signs of trauma noted on exam. No midline cervical, thoracic, or lumbar tenderness palpation. Chest and abdomen nontender. Abdomen soft. No extremity tenderness palpation. PMS intact in all 4 extremities. Range of motion intact. Vital signs stable. Patient taken radiology for CT and plain film imaging. CT head negative for any acute intracranial abnormality. CT cervical spine negative for any acute osseous injuries.  Chest x-ray reveals right upper lobe pneumonia. Pelvic x-ray negative for acute osseous injuries. Labs reviewed. Troponin negative. Mild hyponatremia at 132. Creat 1.3. Glucose 264. No leukocytosis, WBCs 8.3. Hemoglobin 13.4. UA negative for UTI. Plan for patient admitted under trauma surgery for SLP cog eval following closed head injury. Monitor for postconcussive symptoms. Ear laceration closed emergency department, see procedure note below. Updated on Boostrix. Consult to hospitalist for right upper quadrant pneumonia and assistance with medical management. Case discussed and care coordinated with trauma surgeon, Dr. Sadie Brito.     Signed copy of DNR CCA by PCP noted in advance care planning documents.  DNR-CCA on admission. Discussed with daughter at bedside. \"     12/15: \"Patient is resting in bed on RA. She states that did not know she had COVID-19 when she fell and was admitted to the hospital. She denies chest pain, SOB, abdominal pain. Per nursing, patient has not been as confused this morning. \"    12/16: Patient was resting comfortably in bed on RA. She states she has no new symptoms, concerns, or questions. Asking about discharge. States that she wasn't sure why she needed the O2 early this morning- denies feeling SOB. Reviewed patient's labs and discussed plan for today- get orthostatic blood pressures, Evaluate chest xray, and check with cardiology to restart IVF and possibly midodrine. \"     12/17: Patient has not been up today, unknown if she feels dizzy when standing. Awaiting repeat orthostats for further management recs. Orthos positive. Increased midodrine to 5mg TID.    12/18: Patient states that she feels well and wants to go back to assisted living. She denies any lightheadedness or dizziness with standing. However, after discussing with the nurse, when the patient was standing to check orthos this AM, the patient became very lightheaded and began to fall backwards. This was before she received AM midodrine. Orthos repeated after midodrine and still significant. Increased midodrine to 7.5mg tid. Subjective (past 24 hours):   12/19: Patient is wanting to go home. She denies fever/chills, sob, cp, abd pain, nvd. Continues to have lightheadedness with standing. Orthostats positive. Increased midodrine to 10mg TID.       ROS: Pertinent positives as noted in HPI. All other systems reviewed and negative. Past medical history, family history, social history and allergies reviewed again and is unchanged since admission. Medications:  Reviewed. Infusion Medications    dextrose      sodium chloride Stopped (12/17/21 0163)    sodium chloride       Scheduled Medications    midodrine  10 mg Oral TID WC    insulin glargine  5 Units SubCUTAneous Nightly    aspirin  81 mg Oral Daily    insulin lispro  0-18 Units SubCUTAneous TID WC    insulin lispro  0-9 Units SubCUTAneous Nightly    metoprolol tartrate  25 mg Oral BID    melatonin  3 mg Oral Nightly    DULoxetine  120 mg Oral Daily    clopidogrel  75 mg Oral Daily    atorvastatin  40 mg Oral Daily    Tdap-Dtap  0.5 mL IntraMUSCular Once    sodium chloride flush  10 mL IntraVENous 2 times per day    docusate sodium  100 mg Oral Daily     PRN Meds: haloperidol lactate, glucose, dextrose, glucagon (rDNA), dextrose, acetaminophen, sodium chloride flush, sodium chloride, ondansetron **OR** ondansetron, polyethylene glycol    I/O:     Intake/Output Summary (Last 24 hours) at 12/19/2021 1814  Last data filed at 12/19/2021 6554  Gross per 24 hour   Intake 500 ml   Output    Net 500 ml       Diet:  ADULT DIET; Regular; 4 carb choices (60 gm/meal)    Exam:  /67   Pulse 95   Temp 98 °F (36.7 °C) (Oral)   Resp 16   Ht 5' 4\" (1.626 m)   Wt 160 lb (72.6 kg)   SpO2 94%   BMI 27.46 kg/m²   General:   Pleasant elderly female. NAD. HEENT:  normocephalic and atraumatic. No scleral icterus. PERR. Neck: supple. No JVD. No thyromegaly.   Lungs: clear to auscultation. No retractions  Cardiac: RRR without murmur. Abdomen: soft. Nontender. Bowel sounds positive. Extremities:  No clubbing, cyanosis, or edema x 4. Vasculature: capillary refill < 3 seconds. Palpable LE pulses bilaterally. Skin:  warm and dry. Psych:  Alert and oriented x3. Affect appropriate  Lymph:  No supraclavicular adenopathy. Neurologic:  No focal deficit. No seizures. Data: (All radiographs, tracings, PFTs, and imaging are personally viewed and interpreted unless otherwise noted)  Labs:   Recent Labs     12/17/21  0700 12/18/21  0722   WBC 7.1 6.3   HGB 10.7* 10.9*   HCT 34.0* 34.5*    212     Recent Labs     12/17/21  0700 12/18/21  0722    136   K 3.7 4.2    102   CO2 20* 24   BUN 13 17   CREATININE 1.0 0.9   CALCIUM 7.6* 8.0*     No results for input(s): AST, ALT, BILIDIR, BILITOT, ALKPHOS in the last 72 hours. No results for input(s): INR in the last 72 hours. No results for input(s): Pauline Highman in the last 72 hours. Urinalysis:   Lab Results   Component Value Date    NITRU NEGATIVE 12/12/2021    WBCUA 15-25 11/05/2021    BACTERIA NONE SEEN 11/05/2021    RBCUA NONE 11/05/2021    BLOODU NEGATIVE 12/12/2021    SPECGRAV 1.007 12/12/2021    GLUCOSEU >= 1000 11/05/2021     Urine culture:   Lab Results   Component Value Date    LABURIN [place] count: >100,000 CFU/mL  10/29/2021     Micro:   Blood culture #1: No results found for: BC  Blood culture #2:No results found for: BLOODCULT2  Organism:  Lab Results   Component Value Date    ORG Growth of Contaminants 11/05/2021         Lab Results   Component Value Date    LABGRAM  10/12/2021     No segmented neutrophils observed. Rare epithelial cells observed. No bacteria seen.       MRSA culture only:No results found for: [address] [place] [address]  Respiratory culture: No results found for: CULTRESP  Aerobic and Anaerobic :  Lab Results   Component Value Date    LABAERO  10/12/2021     No growth-preliminary Culture also yielded light growth of Staphylococcus species (coagulase negative). LABAERO light growth  10/12/2021    LABAERO  10/12/2021     very light growth Pantoea species which may be initially susceptible to third generation cephalosporins may develop resistance within three to four days of initiation of this antimicrobial therapy. No results found for: [facility]    Radiology Reports:  XR CHEST PORTABLE   Final Result   1. Abnormal density in the right upper lobe consistent with inflammatory process presumably Covid 19 infection. 2.. Thoracic spondylosis. 3. Postoperative changes along the right lateral chest wall. **This report has been created using voice recognition software. It may contain minor errors which are inherent in voice recognition technology. **      Final report electronically signed by DR Kathie Sandra on 12/16/2021 3:18 PM      CTA CHEST [address]   Final Result    There is no pulmonary emboli. There are bilateral multifocal infiltrates and interstitial edema. **This report has been created using voice recognition software. It may contain minor errors which are inherent in voice recognition technology. **      Final report electronically signed by Dr. Felicity Medina on 12/15/2021 4:38 PM      XR PELVIS (1-2 VIEWS)   Final Result    No evidence of acute osseous injury on this single image of the pelvis. **This report has been created using voice recognition software. It may contain minor errors which are inherent in voice recognition technology. **      Final report electronically signed by Dr. Leigh Gorman MD on 12/12/2021 2:52 PM      XR CHEST PORTABLE   Final Result   Right upper lobe pneumonia. **This report has been created using voice recognition software. It may contain minor errors which are inherent in voice recognition technology. **      Final report electronically signed by Dr. Leigh Gorman MD on 12/12/2021 2:51 PM      CT Head WO Contrast   Final Result    No evidence of acute intracranial abnormality. **This report has been created using voice recognition software. It may contain minor errors which are inherent in voice recognition technology. **      Final report electronically signed by Dr. Randall Elliott MD on 12/12/2021 2:09 PM      CT Cervical Spine WO Contrast   Final Result    No evidence of acute osseous injury of the cervical spine. **This report has been created using voice recognition software. It may contain minor errors which are inherent in voice recognition technology. **      Final report electronically signed by Dr. Randall Elliott MD on 12/12/2021 2:20 PM       Ed Fast Abdomen Limited    (Results Pending)     XR PELVIS (1-2 VIEWS)    Result Date: 12/12/2021  PROCEDURE: XR PELVIS (1-2 VIEWS) CLINICAL INFORMATION: trauma. Fall with altered mental status. COMPARISON: 11/20/2020. TECHNIQUE: AP view of the pelvis. FINDINGS:  There is a right hip arthroplasty. Stable alignment compared to prior exam. No acute fracture is evident on this single view. No evidence of acute osseous injury on this single image of the pelvis. **This report has been created using voice recognition software. It may contain minor errors which are inherent in voice recognition technology. ** Final report electronically signed by Dr. Randall Elliott MD on 12/12/2021 2:52 PM    CT Head WO Contrast    Result Date: 12/12/2021  PROCEDURE: CT HEAD WO CONTRAST CLINICAL INFORMATION: Head trauma, anticoagulation. COMPARISON: CT head dated 11/5/2021. TECHNIQUE: Helical CT of the head. Axial, sagittal and coronal reconstructions were created. All CT scans at this facility use dose modulation, iterative reconstruction, and/or weight-based dosing when appropriate to reduce radiation dose to as low as reasonably achievable.  FINDINGS: There is stable moderate ventriculomegaly, mildly disproportionate to peripheral volume loss. There are mild confluent and patchy areas of hypodensity in the periventricular, subcortical deep white matter, stable compared to prior exam. Gray-white matter  differentiation otherwise appears grossly preserved. The calvarium appears intact. Orbits are unremarkable. Paranasal sinuses and mastoid air cells are clear. No evidence of acute intracranial abnormality. **This report has been created using voice recognition software. It may contain minor errors which are inherent in voice recognition technology. ** Final report electronically signed by Dr. Tomas Gutierrez MD on 12/12/2021 2:09 PM    CT Cervical Spine WO Contrast    Result Date: 12/12/2021  PROCEDURE: CT CERVICAL SPINE WO CONTRAST CLINICAL INFORMATION: spine trauma. COMPARISON: 11/5/2021. TECHNIQUE: 3 mm noncontrast axial images were obtained through the cervical spine with sagittal and coronal reconstructions. All CT scans at this facility use dose modulation, iterative reconstruction, and/or weight-based dosing when appropriate to reduce radiation dose to as low as reasonably achievable. FINDINGS: There is grade 1 anterolisthesis of C4 relative to C5 on the basis of degenerative change, stable compared to prior exam. There is otherwise anatomic vertebral body height and alignment. No fracture of the cervical vertebral column is identified. The craniocervical junction appears intact. No paraspinal or epidural fluid collection is identified. There are stable degenerative changes of the cervical spine most pronounced at C6-7 with complete loss of intervertebral disc space and rim osteophytes at this level. Paraspinal soft tissues are grossly unremarkable. No evidence of acute osseous injury of the cervical spine. **This report has been created using voice recognition software. It may contain minor errors which are inherent in voice recognition technology. ** Final report electronically signed by Dr. Tomas Gutierrez MD on 12/12/2021 2:20 PM    XR CHEST PORTABLE    Result Date: 12/12/2021  PROCEDURE: XR CHEST PORTABLE CLINICAL INFORMATION: trauma. Fall with altered mental status. COMPARISON: Chest radiograph dated 10/17/2021. TECHNIQUE: AP upright view of the chest. FINDINGS: There is a patchy opacity in the right upper lobe which has appeared in the interval. Lungs otherwise appear clear. Pulmonary vasculature and cardiomediastinal silhouette are within normal limits. Visualized osseous structures appear grossly intact. Right upper lobe pneumonia. **This report has been created using voice recognition software. It may contain minor errors which are inherent in voice recognition technology. ** Final report electronically signed by Dr. 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 XXXA]     Essential hypertension [I10]     Type 2 diabetes mellitus with hyperglycemia, with long-term current use of insulin ([address] [E11.65, Z79.4] 04/14/2016    Type 2 diabetes mellitus without complication ([address] [V25.7] 12/17/2015       Electronically signed by Freddy Hayden PA-C on 12/19/2021 at 6:14 PM

## 2021-12-19 NOTE — FLOWSHEET NOTE
Pt awake and alert, no signs and symptoms of distress throughout shift. Sister Karla Began updated on pt's status. Bath given to patient this AM by CNA.

## 2021-12-20 NOTE — PROGRESS NOTES
Hospitalist Progress Note      Patient:  Rosamaria Jarvis    Unit/Bed:6A-18/018-A  YOB: 1935  MRN: [id number]   Acct: [de-identified]   PCP: Neena Falcon MD  Date of Admission: 12/12/2021    Assessment/Plan:    1. COVID-19:    Tested positive 12/12. CXR (12/16)  is relatively unchanged from previous. Patient's procal 0.08 and remains afebrile and non-toxic appearing, without an acute leukocytosis.  CTA 12/15 revealed no pulmonary emboli and bilateral multifocal infiltrates and interstitial edema. 2. Orthostatic hypotension:    Improving. Discontinued IVF 75 cc/hr to increased O2 requirements at 2L NC. Baseline is RA. Continue Midodrine 10 mg TID (increased on 12/19) and Lopressor per cardiology recommendations given hx of valvular disease. Continue Orthostatic blood pressure checks Q4Hrs. 3. Acute HFrEF:    Echo 12/14 showed EF 45% (previously 55% 5/2021). No signs of fluid overload. Cardiology was consulted - no CP, trop negative, no acute ekg changes. No further inpatient workup at this time, pt to follow up with her primary cardiologist as OP. Continue strict I/Os, daily weights. 4. Closed head injury:    PTA. Stable. CT of head and neck showed no acute injury or abnormality. Pt was cleared by trauma. 5. NIDDM2:    On Metformin at home, currently held. Continue SSI, Accu-Cheks. Hypoglycemia protocol ordered. Glucose remains elevated. Based on 24-hour insulin requirements, will start 5 units Lantus nightly (started 12/17)- tolerating well. Continue to monitor and adjust accordingly. 6. Hx CAD:    Hx of PCI of LAD 1/2020. Continue Plavix, aspirin, statin     7. Mild aortic stenosis; mild to moderate mitral stenosis:    Noted on ECHO 12/14. Continue beta-blocker as stated above. 8. CKD stage 3:    Cr stable at baseline    DISPO: Awaiting pre-certification for SNF that accepts COVID patient's.  Hopeful for tomorrow if patient's orthostatic blood pressures continue to improve. Chief Complaint: Fall    Initial H and P:-    History obtained from patient and chart review. \"36 y.o. female who we are asked to see/evaluate by Norman Castellano MD for medical management of R ight upper lobe pneumonia and medical management     Per initial evaluation by trauma \"Patient is a 80year-old female presents to [address] [place] [address] as activation of level 2 trauma following a fall.  Per report patient was walking at nursing home with walker when she fell and hit the left side of her head. No loss of consciousness reported. Patient on Plavix. History of recurrent falls. Chart reviewed, recent admission last month for ARTIE on CKD and fall. Upon assessment patient is ABCs intact, GCS 14 ([id number]), in no acute distress. Patient with confusion but reported baseline confusion with history of dementia. Patient was perseverating, repetitively asking same questions.  Patient denied any acute pain or complaints.  She denied any headaches, lightheadedness, dizziness, neck pain, back pain, chest pain, shortness of breath, abdominal pain, nausea/vomiting, pain extremities, and paresthesias. On exam patient with estimated 3 cm linear laceration noted over the posterior aspect of left auricle and 1.5 cm linear laceration noted over left tragus. No auricular hematoma noted. Patient also noted to have small amount of ecchymosis noted over left cheek and over the angle of the left manubrium. No reported TMJ pain or difficultly opening mouth. No other signs of trauma noted on exam. No midline cervical, thoracic, or lumbar tenderness palpation. Chest and abdomen nontender. Abdomen soft. No extremity tenderness palpation. PMS intact in all 4 extremities. Range of motion intact. Vital signs stable. Patient taken radiology for CT and plain film imaging. CT head negative for any acute intracranial abnormality.  CT cervical spine negative for any acute backwards. This was before she received AM midodrine. Orthos repeated after midodrine and still significant. Increased midodrine to 7.5mg tid. \"     12/19: \"Patient is wanting to go home. She denies fever/chills, sob, cp, abd pain, nvd. Continues to have lightheadedness with standing. Orthostats positive. Increased midodrine to 10mg TID. \"     Subjective (past 24 hours):   12/20: Patient is resting in chair on 2L NC. States she has no CP, SOB, Abdominal pain, N/V, change in bowel or urinary habits. Pt states she is ready to go home, but also noted that she has noticed that she is more uneasy on her feet than normal. PT/OT ordered to evaluated and treat. Past medical history, family history, social history and allergies reviewed again and is unchanged since admission. ROS (All review of systems completed. Pertinent positives noted.  Otherwise All other systems reviewed and negative.)     Medications:  Reviewed    Infusion Medications    sodium chloride 75 mL/hr at 12/20/21 3938    dextrose      sodium chloride       Scheduled Medications    midodrine  10 mg Oral TID WC    insulin glargine  5 Units SubCUTAneous Nightly    aspirin  81 mg Oral Daily    insulin lispro  0-18 Units SubCUTAneous TID WC    insulin lispro  0-9 Units SubCUTAneous Nightly    metoprolol tartrate  25 mg Oral BID    melatonin  3 mg Oral Nightly    DULoxetine  120 mg Oral Daily    clopidogrel  75 mg Oral Daily    atorvastatin  40 mg Oral Daily    Tdap-Dtap  0.5 mL IntraMUSCular Once    sodium chloride flush  10 mL IntraVENous 2 times per day    docusate sodium  100 mg Oral Daily     PRN Meds: haloperidol lactate, glucose, dextrose, glucagon (rDNA), dextrose, acetaminophen, sodium chloride flush, sodium chloride, ondansetron **OR** ondansetron, polyethylene glycol      Intake/Output Summary (Last 24 hours) at 12/20/2021 0837  Last data filed at 12/20/2021 0800  Gross per 24 hour   Intake 500 ml   Output 200 ml   Net 300 ml Diet:  ADULT DIET; Regular; 4 carb choices (60 gm/meal)    Exam:  BP (!) 141/80 Comment: pt just got back to bed from bathrooom   Pulse 105   Temp 98 °F (36.7 °C) (Oral)   Resp 17   Ht 5' 4\" (1.626 m)   Wt 161 lb (73 kg)   SpO2 94%   BMI 27.64 kg/m²   General appearance: No apparent distress, appears stated age and cooperative. HEENT: Pupils equal, round, and reactive to light. Conjunctivae/corneas clear. Neck: Supple, with full range of motion. No jugular venous distention. Trachea midline. Respiratory:  2 L NC. Expiratory wheezes in lung bases bilaterally. Normal respiratory effort. Cardiovascular: Regular rate and rhythm with normal S1/S2 without murmurs, rubs or gallops. Abdomen: Soft, non-tender, non-distended with normal bowel sounds. Musculoskeletal: passive and active ROM x 4 extremities. Skin: Skin color, texture, turgor normal.  No rashes or lesions. Neurologic:  Neurovascularly intact without any focal sensory/motor deficits. Cranial nerves: II-XII intact, grossly non-focal.  Psychiatric: Alert and oriented, thought content appropriate, normal insight  Capillary Refill: Brisk,< 3 seconds   Peripheral Pulses: +2 palpable, equal bilaterally     Labs:   Recent Labs     12/18/21  0722 12/20/21  0649   WBC 6.3 6.3   HGB 10.9* 10.5*   HCT 34.5* 32.8*    220     Recent Labs     12/18/21  0722 12/20/21  0649    144   K 4.2 4.3    109   CO2 24 23   BUN 17 17   CREATININE 0.9 0.9   CALCIUM 8.0* 8.3*     No results for input(s): AST, ALT, BILIDIR, BILITOT, ALKPHOS in the last 72 hours. No results for input(s): INR in the last 72 hours. No results for input(s): Vilma Nieves in the last 72 hours.     Microbiology:    Blood culture #1: No results found for: [facility]    Blood culture #2:No results found for: Crissie Soulier    Organism:  Lab Results   Component Value Date    ORG Growth of Contaminants 11/05/2021         Lab Results   Component Value Date    LABGRAM  10/12/2021     No (1-2 VIEWS)   Final Result    No evidence of acute osseous injury on this single image of the pelvis. **This report has been created using voice recognition software. It may contain minor errors which are inherent in voice recognition technology. **      Final report electronically signed by Dr. Dale Briones MD on 12/12/2021 2:52 PM      XR CHEST PORTABLE   Final Result   Right upper lobe pneumonia. **This report has been created using voice recognition software. It may contain minor errors which are inherent in voice recognition technology. **      Final report electronically signed by Dr. Dale Briones MD on 12/12/2021 2:51 PM      CT Head WO Contrast   Final Result    No evidence of acute intracranial abnormality. **This report has been created using voice recognition software. It may contain minor errors which are inherent in voice recognition technology. **      Final report electronically signed by Dr. Dale Briones MD on 12/12/2021 2:09 PM      CT Cervical Spine WO Contrast   Final Result    No evidence of acute osseous injury of the cervical spine. **This report has been created using voice recognition software. It may contain minor errors which are inherent in voice recognition technology. **      Final report electronically signed by Dr. Dale Briones MD on 12/12/2021 2:20 PM      US Ed Fast Abdomen Limited    (Results Pending)     XR PELVIS (1-2 VIEWS)    Result Date: 12/12/2021  PROCEDURE: XR PELVIS (1-2 VIEWS) CLINICAL INFORMATION: trauma. Fall with altered mental status. COMPARISON: 11/20/2020. TECHNIQUE: AP view of the pelvis. FINDINGS:  There is a right hip arthroplasty. Stable alignment compared to prior exam. No acute fracture is evident on this single view. No evidence of acute osseous injury on this single image of the pelvis. **This report has been created using voice recognition software.  It may contain minor errors which are inherent in voice recognition technology. ** Final report electronically signed by Dr. Noel Carmichael MD on 12/12/2021 2:52 PM    CT Head WO Contrast    Result Date: 12/12/2021  PROCEDURE: CT HEAD WO CONTRAST CLINICAL INFORMATION: Head trauma, anticoagulation. COMPARISON: CT head dated 11/5/2021. TECHNIQUE: Helical CT of the head. Axial, sagittal and coronal reconstructions were created. All CT scans at this facility use dose modulation, iterative reconstruction, and/or weight-based dosing when appropriate to reduce radiation dose to as low as reasonably achievable. FINDINGS: There is stable moderate ventriculomegaly, mildly disproportionate to peripheral volume loss. There are mild confluent and patchy areas of hypodensity in the periventricular, subcortical deep white matter, stable compared to prior exam. Gray-white matter  differentiation otherwise appears grossly preserved. The calvarium appears intact. Orbits are unremarkable. Paranasal sinuses and mastoid air cells are clear. No evidence of acute intracranial abnormality. **This report has been created using voice recognition software. It may contain minor errors which are inherent in voice recognition technology. ** Final report electronically signed by Dr. Noel Carmichael MD on 12/12/2021 2:09 PM    CT Cervical Spine WO Contrast    Result Date: 12/12/2021  PROCEDURE: CT CERVICAL SPINE WO CONTRAST CLINICAL INFORMATION: spine trauma. COMPARISON: 11/5/2021. TECHNIQUE: 3 mm noncontrast axial images were obtained through the cervical spine with sagittal and coronal reconstructions. All CT scans at this facility use dose modulation, iterative reconstruction, and/or weight-based dosing when appropriate to reduce radiation dose to as low as reasonably achievable.  FINDINGS: There is grade 1 anterolisthesis of C4 relative to C5 on the basis of degenerative change, stable compared to prior exam. There is otherwise anatomic vertebral body height and alignment. No fracture of the cervical vertebral column is identified. The craniocervical junction appears intact. No paraspinal or epidural fluid collection is identified. There are stable degenerative changes of the cervical spine most pronounced at C6-7 with complete loss of intervertebral disc space and rim osteophytes at this level. Paraspinal soft tissues are grossly unremarkable. No evidence of acute osseous injury of the cervical spine. **This report has been created using voice recognition software. It may contain minor errors which are inherent in voice recognition technology. ** Final report electronically signed by Dr. Alejandro Chavez MD on 12/12/2021 2:20 PM    XR CHEST PORTABLE    Result Date: 12/12/2021  PROCEDURE: XR CHEST PORTABLE CLINICAL INFORMATION: trauma. Fall with altered mental status. COMPARISON: Chest radiograph dated 10/17/2021. TECHNIQUE: AP upright view of the chest. FINDINGS: There is a patchy opacity in the right upper lobe which has appeared in the interval. Lungs otherwise appear clear. Pulmonary vasculature and cardiomediastinal silhouette are within normal limits. Visualized osseous structures appear grossly intact. Right upper lobe pneumonia. **This report has been created using voice recognition software. It may contain minor errors which are inherent in voice recognition technology. ** Final report electronically signed by Dr. Alejandro Chavez MD on 12/12/2021 2:51 PM      Electronically signed by Lul Jacob PA-C on 12/20/2021 at 8:37 AM

## 2021-12-20 NOTE — CARE COORDINATION
Discharge Planning Update: Following for Covid. Pre-cert to [employer] denied. SW following for other discharge plan. Afebrile. O2 at 2L/NC with sat 100%. Await discharge disposition.

## 2021-12-20 NOTE — PROGRESS NOTES
[facility]an 60  OCCUPATIONAL THERAPY MISSED TREATMENT NOTE  [facility]  6A-18/018-A      Date: 2021  Patient Name: Mata Diaz        CSN: [id number]   : 1935  (80 y.o.)  Gender: female   Referring Practitioner: Sandee Hill PA-C  Diagnosis: Syncope and Collapse         REASON FOR MISSED TREATMENT: Pt. RN okayed OT treatment. OT treatment attempted. Pt. Clayton Monson states \"I just want to go home\". Pt.removed O2 NC and placed on lap educated Pt. On benefit of O2 Pt. Aware would like to leave off for a while Pt's RN notified and aware.

## 2021-12-20 NOTE — CARE COORDINATION
12/20/21, 12:36 PM EST    DISCHARGE PLANNING EVALUATION    Spoke with Lorraine at [facility]. The insurance precert has been denied. Will call Deangelo Marroquin at [facility] to discuss other options to get her back to their facility. Update 1:39pm: [facility] aware patients insurance denied admission to [facility]. He told SW that he will talk to his staff and figure out when they could accept her. He will not need to complete a precert as they are being waived at this time. Called patients daughter Evelyne Carvajal to update her regarding discharge plan.

## 2021-12-21 NOTE — PROGRESS NOTES
Hospitalist Progress Note      Patient:  Cooper Ferreira    Unit/Bed:6A-18/018-A  YOB: 1935  MRN: [id number]   Acct: [de-identified]   PCP: Gissell Lawler MD  Date of Admission: 12/12/2021    Assessment/Plan:    1. COVID-19:    Tested positive 12/12. CXR (12/16)  is relatively unchanged from previous day. Patient's procal 0.08 and remains afebrile and non-toxic appearing, without an acute leukocytosis.  CTA 12/15 revealed no pulmonary emboli and bilateral multifocal infiltrates and interstitial edema. No clinical indication for antibiotics or treatment for COVID infection at this time. 2. Orthostatic hypotension:    Improved. Pt requiring 1 L O2 NC. Baseline is RA. Continue Midodrine 10 mg TID (increased on 12/19) and Lopressor per cardiology recommendations given hx of valvular disease. Continue Orthostatic blood pressure negative today. Continue Ortho stats daily. PT/OT. 3. Acute HFrEF:    Echo 12/14 showed EF 45% (previously 55% 5/2021). Cardiology was consulted - no CP, trop negative, no acute ekg changes. No further inpatient workup at this time, pt to follow up with primary cardiologist OP. Continue strict I/Os, daily weights. 4. Closed head injury:    PTA. Stable. CT of head and neck showed no acute injury or abnormality. Pt was cleared by trauma. 5. NIDDM2:    On Metformin at home, currently held. Continue SSI, Accu-Cheks. Hypoglycemia protocol ordered. Glucose remains elevated. Based on 24-hour insulin requirements, will start 5 units Lantus nightly (started 12/17)- tolerating well. Continue to monitor and adjust accordingly. 6. Hx CAD:    Hx of PCI of LAD 1/2020. Continue Plavix, aspirin, statin. 7. Mild aortic stenosis; mild to moderate mitral stenosis:    Noted on ECHO 12/14. Continue beta-blocker as stated above.     8. CKD stage 3:    Cr stable at baseline    DISPO: Discharge Thursday to home, assisted living, once out of isolation. Chief Complaint: Fall    Initial H and P:-    History obtained from patient and chart review. \"89 y.o. female who we are asked to see/evaluate by Norman Morgan MD for medical management of R ight upper lobe pneumonia and medical management     Per initial evaluation by trauma \"Patient is a 80year-old female presents to [address] [place] [address] as activation of level 2 trauma following a fall.  Per report patient was walking at nursing home with walker when she fell and hit the left side of her head. No loss of consciousness reported. Patient on Plavix. History of recurrent falls. Chart reviewed, recent admission last month for ARTIE on CKD and fall. Upon assessment patient is ABCs intact, GCS 14 (H1X1Q2), in no acute distress. Patient with confusion but reported baseline confusion with history of dementia. Patient was perseverating, repetitively asking same questions.  Patient denied any acute pain or complaints.  She denied any headaches, lightheadedness, dizziness, neck pain, back pain, chest pain, shortness of breath, abdominal pain, nausea/vomiting, pain extremities, and paresthesias. On exam patient with estimated 3 cm linear laceration noted over the posterior aspect of left auricle and 1.5 cm linear laceration noted over left tragus. No auricular hematoma noted. Patient also noted to have small amount of ecchymosis noted over left cheek and over the angle of the left manubrium. No reported TMJ pain or difficultly opening mouth. No other signs of trauma noted on exam. No midline cervical, thoracic, or lumbar tenderness palpation. Chest and abdomen nontender. Abdomen soft. No extremity tenderness palpation. PMS intact in all 4 extremities. Range of motion intact. Vital signs stable. Patient taken radiology for CT and plain film imaging. CT head negative for any acute intracranial abnormality. CT cervical spine negative for any acute osseous injuries.  Chest x-ray reveals right upper lobe pneumonia. Pelvic x-ray negative for acute osseous injuries. Labs reviewed. Troponin negative. Mild hyponatremia at 132. Creat 1.3. Glucose 264. No leukocytosis, WBCs 8.3. Hemoglobin 13.4. UA negative for UTI. Plan for patient admitted under trauma surgery for SLP cog eval following closed head injury. Monitor for postconcussive symptoms. Ear laceration closed emergency department, see procedure note below. Updated on Boostrix. Consult to hospitalist for right upper quadrant pneumonia and assistance with medical management. Case discussed and care coordinated with trauma surgeon, Dr. Kristin Diaz.     Signed copy of DNR CCA by PCP noted in advance care planning documents.  DNR-CCA on admission. Discussed with daughter at bedside. \"     12/15: \"Patient is resting in bed on RA. She states that did not know she had COVID-19 when she fell and was admitted to the hospital. She denies chest pain, SOB, abdominal pain. Per nursing, patient has not been as confused this morning. \"     12/16: \"Patient was resting comfortably in bed on RA. She states she has no new symptoms, concerns, or questions. Asking about discharge. States that she wasn't sure why she needed the O2 early this morning- denies feeling SOB. Reviewed patient's labs and discussed plan for today- get orthostatic blood pressures, Evaluate chest xray, and check with cardiology to restart IVF and possibly midodrine. \"     12/17: \"Patient has not been up today, unknown if she feels dizzy when standing. Awaiting repeat orthostats for further management recs. Orthos positive. Increased midodrine to 5mg TID. \"     12/18: \"Patient states that she feels well and wants to go back to assisted living. She denies any lightheadedness or dizziness with standing. However, after discussing with the nurse, when the patient was standing to check orthos this AM, the patient became very lightheaded and began to fall backwards.  This was before she polyethylene glycol      Intake/Output Summary (Last 24 hours) at 12/21/2021 1110  Last data filed at 12/21/2021 0649  Gross per 24 hour   Intake 950 ml   Output 1800 ml   Net -850 ml       Diet:  ADULT DIET; Regular; 4 carb choices (60 gm/meal)    Exam:  /72   Pulse 85   Temp 98.5 °F (36.9 °C) (Oral)   Resp 18   Ht 5' 4\" (1.626 m)   Wt 161 lb (73 kg)   SpO2 98%   BMI 27.64 kg/m²   General appearance: No apparent distress, appears stated age and cooperative. HEENT: Pupils equal, round, and reactive to light. Conjunctivae/corneas clear. Neck: Supple, with full range of motion. No jugular venous distention. Trachea midline. Respiratory:  1 L NC. Expiratory wheezes in lung bases bilaterally. Normal respiratory effort. Cardiovascular: Regular rate and rhythm with normal S1/S2 without murmurs, rubs or gallops. Abdomen: Soft, non-tender, non-distended with normal bowel sounds. Musculoskeletal: passive and active ROM x 4 extremities. Skin: Skin color, texture, turgor normal.  No rashes or lesions. Neurologic:  Neurovascularly intact without any focal sensory/motor deficits. Cranial nerves: II-XII intact, grossly non-focal.  Psychiatric: Alert and oriented, thought content appropriate, normal insight  Capillary Refill: Brisk,< 3 seconds   Peripheral Pulses: +2 palpable, equal bilaterally     Labs:   Recent Labs     12/20/21  0649   WBC 6.3   HGB 10.5*   HCT 32.8*        Recent Labs     12/20/21  0649      K 4.3      CO2 23   BUN 17   CREATININE 0.9   CALCIUM 8.3*     No results for input(s): AST, ALT, BILIDIR, BILITOT, ALKPHOS in the last 72 hours. No results for input(s): INR in the last 72 hours. No results for input(s): [place] in the last 72 hours.     Microbiology:    Blood culture #1: No results found for: [facility]    Blood culture #2:No results found for: BLOODCULT2    Organism:  Lab Results   Component Value Date    ORG Growth of Contaminants 11/05/2021         Lab Results   Component Value Date    LABGRAM  10/12/2021     No segmented neutrophils observed. Rare epithelial cells observed. No bacteria seen. MRSA culture only:No results found for: [facility]    Urine culture:   Lab Results   Component Value Date    LABURIN [place] count: >100,000 CFU/mL  10/29/2021       Respiratory culture: No results found for: CULTRESP    Aerobic and Anaerobic :  Lab Results   Component Value Date    LABAERO  10/12/2021     No growth-preliminary Culture also yielded light growth of Staphylococcus species (coagulase negative). LABAERO light growth  10/12/2021    LABAERO  10/12/2021     very light growth Pantoea species which may be initially susceptible to third generation cephalosporins may develop resistance within three to four days of initiation of this antimicrobial therapy. No results found for: LABANAE    Urinalysis:      Lab Results   Component Value Date    NITRU NEGATIVE 12/12/2021    WBCUA 15-25 11/05/2021    BACTERIA NONE SEEN 11/05/2021    RBCUA NONE 11/05/2021    BLOODU NEGATIVE 12/12/2021    SPECGRAV 1.007 12/12/2021    GLUCOSEU >= 1000 11/05/2021       Radiology:  XR CHEST PORTABLE   Final Result   1. Abnormal density in the right upper lobe consistent with inflammatory process presumably Covid 19 infection. 2.. Thoracic spondylosis. 3. Postoperative changes along the right lateral chest wall. **This report has been created using voice recognition software. It may contain minor errors which are inherent in voice recognition technology. **      Final report electronically signed by DR Juan Tinoco on 12/16/2021 3:18 PM      CTA CHEST [address]   Final Result    There is no pulmonary emboli. There are bilateral multifocal infiltrates and interstitial edema. **This report has been created using voice recognition software. It may contain minor errors which are inherent in voice recognition technology. **      Final report electronically signed by Dr. Claudia Mancia on 12/15/2021 4:38 PM      XR PELVIS (1-2 VIEWS)   Final Result    No evidence of acute osseous injury on this single image of the pelvis. **This report has been created using voice recognition software. It may contain minor errors which are inherent in voice recognition technology. **      Final report electronically signed by Dr. Chelo Jain MD on 12/12/2021 2:52 PM      XR CHEST PORTABLE   Final Result   Right upper lobe pneumonia. **This report has been created using voice recognition software. It may contain minor errors which are inherent in voice recognition technology. **      Final report electronically signed by Dr. Chelo Jain MD on 12/12/2021 2:51 PM      CT Head WO Contrast   Final Result    No evidence of acute intracranial abnormality. **This report has been created using voice recognition software. It may contain minor errors which are inherent in voice recognition technology. **      Final report electronically signed by Dr. Chelo Jain MD on 12/12/2021 2:09 PM      CT Cervical Spine WO Contrast   Final Result    No evidence of acute osseous injury of the cervical spine. **This report has been created using voice recognition software. It may contain minor errors which are inherent in voice recognition technology. **      Final report electronically signed by Dr. Chelo Jain MD on 12/12/2021 2:20 PM      US Ed Fast Abdomen Limited    (Results Pending)     XR PELVIS (1-2 VIEWS)    Result Date: 12/12/2021  PROCEDURE: XR PELVIS (1-2 VIEWS) CLINICAL INFORMATION: trauma. Fall with altered mental status. COMPARISON: 11/20/2020. TECHNIQUE: AP view of the pelvis. FINDINGS:  There is a right hip arthroplasty. Stable alignment compared to prior exam. No acute fracture is evident on this single view. No evidence of acute osseous injury on this single image of the pelvis.  **This report has been created using voice recognition software. It may contain minor errors which are inherent in voice recognition technology. ** Final report electronically signed by Dr. Joana Marvin MD on 12/12/2021 2:52 PM    CT Head WO Contrast    Result Date: 12/12/2021  PROCEDURE: CT HEAD WO CONTRAST CLINICAL INFORMATION: Head trauma, anticoagulation. COMPARISON: CT head dated 11/5/2021. TECHNIQUE: Helical CT of the head. Axial, sagittal and coronal reconstructions were created. All CT scans at this facility use dose modulation, iterative reconstruction, and/or weight-based dosing when appropriate to reduce radiation dose to as low as reasonably achievable. FINDINGS: There is stable moderate ventriculomegaly, mildly disproportionate to peripheral volume loss. There are mild confluent and patchy areas of hypodensity in the periventricular, subcortical deep white matter, stable compared to prior exam. Gray-white matter  differentiation otherwise appears grossly preserved. The calvarium appears intact. Orbits are unremarkable. Paranasal sinuses and mastoid air cells are clear. No evidence of acute intracranial abnormality. **This report has been created using voice recognition software. It may contain minor errors which are inherent in voice recognition technology. ** Final report electronically signed by Dr. Joana Marvin MD on 12/12/2021 2:09 PM    CT Cervical Spine WO Contrast    Result Date: 12/12/2021  PROCEDURE: CT CERVICAL SPINE WO CONTRAST CLINICAL INFORMATION: spine trauma. COMPARISON: 11/5/2021. TECHNIQUE: 3 mm noncontrast axial images were obtained through the cervical spine with sagittal and coronal reconstructions. All CT scans at this facility use dose modulation, iterative reconstruction, and/or weight-based dosing when appropriate to reduce radiation dose to as low as reasonably achievable.  FINDINGS: There is grade 1 anterolisthesis of C4 relative to C5 on the basis of degenerative change, stable compared to prior exam. There is otherwise anatomic vertebral body height and alignment. No fracture of the cervical vertebral column is identified. The craniocervical junction appears intact. No paraspinal or epidural fluid collection is identified. There are stable degenerative changes of the cervical spine most pronounced at C6-7 with complete loss of intervertebral disc space and rim osteophytes at this level. Paraspinal soft tissues are grossly unremarkable. No evidence of acute osseous injury of the cervical spine. **This report has been created using voice recognition software. It may contain minor errors which are inherent in voice recognition technology. ** Final report electronically signed by Dr. Layla Barney MD on 12/12/2021 2:20 PM    XR CHEST PORTABLE    Result Date: 12/12/2021  PROCEDURE: XR CHEST PORTABLE CLINICAL INFORMATION: trauma. Fall with altered mental status. COMPARISON: Chest radiograph dated 10/17/2021. TECHNIQUE: AP upright view of the chest. FINDINGS: There is a patchy opacity in the right upper lobe which has appeared in the interval. Lungs otherwise appear clear. Pulmonary vasculature and cardiomediastinal silhouette are within normal limits. Visualized osseous structures appear grossly intact. Right upper lobe pneumonia. **This report has been created using voice recognition software. It may contain minor errors which are inherent in voice recognition technology. ** Final report electronically signed by Dr. Layla Barney MD on 12/12/2021 2:51 PM      Electronically signed by Olvin Ignacio PA-C on 12/21/2021 at 11:10 AM

## 2021-12-21 NOTE — CARE COORDINATION
Discharge Planning Update: Following for Covid. From [facility] and to return there when out of isolation on 12-24-21. Afebrile. VSS. O2 at 1L/NC and sat 98%. SW following for discharge disposition.

## 2021-12-21 NOTE — CARE COORDINATION
12/21/21, 3:29 PM EST    DISCHARGE PLANNING EVALUATION    Spoke with Gume Charles at [facility] earlier today. He told  that they will be able to accept Renetta Lainez on December 25th. RUSSELL will set everything up for the Saturday discharge on Thursday. Daughter Tianna Senior is aware.

## 2021-12-21 NOTE — PROGRESS NOTES
[address] [facility]  INPATIENT PHYSICAL THERAPY  DAILY NOTE  STRZ 6A CAPACITY EBOLA - 6A-18/018-A     Time In: 0203  Time Out: 1806  Timed Code Treatment Minutes: 23 Minutes  Minutes: 23          Date: 2021  Patient Name: John Rey,  Gender:  female        MRN: [id number]  : 1935  (80 y.o.)     Referring Practitioner: SHELBIE Trejo  Diagnosis: syncope and collapse  Additional Pertinent Hx: admit with above diagnosis, right upper lobe pneumonia, s/p fall at [facility] and hit left side of head with laceration to left ear, CHI     Prior Level of Function:  Lives With: Alone  Type of Home: Facility ([facility])  Home Layout: One level  Home Access: Level entry  Home Equipment: 4 wheeled walker   Bathroom Shower/Tub: Walk-in shower,Shower chair with back  Bathroom Toilet: [id number] [facility] [place] [place] [facility]: Grab bars in [address] Accessibility: [address] [place] [address] Help From: Other (comment) (staff as available)  ADL Assistance: Needs assistance  [address]: Needs assistance  Homemaking Responsibilities: No  Ambulation Assistance: Needs assistance  Transfer Assistance: Needs assistance  Active : No  Additional Comments: per pt was using rollator PTA and working with PT    Restrictions/Precautions:  Restrictions/Precautions: General Precautions,Fall Risk,Isolation  Position Activity Restriction  Other position/activity restrictions: +orthostatic hypotension, COVID-19, droplet + precautions      SUBJECTIVE: RN approved session. Pt resting in bed and needed some gentle encouragement to try getting up. Pt did decide that she needed to go to the bathroom.     PAIN: not reported      Vitals: per telemonitor review did have brief tachycardia and O2 sat drop to around 86% with quick recovery    OBJECTIVE:  Bed Mobility:  Supine to Sit: Contact Guard Assistance  Sit to Supine: Stand By Assistance     Transfers:  Sit to Stand: Contact Guard Assistance  Stand to Sit:Stand By CC:  Jennifer Wilson is here today for Follow-up (Patient presents in clinic for 6 month blood pressure check. )       Medications: medications verified and updated  Refills needed today?  YES  denies Latex allergy or sensitivity  Patient would like communication of their results via:    Cell Phone:   Telephone Information:   Mobile [phone]     Okay to leave a message containing results? Yes  Tobacco history: verified  Advanced directives: No        Patient's current [facility] status: Active.  There are no preventive care reminders to display for this patient.  Patient is up to date, no discussion needed..          [facility] status addressed. Patient Active.               Assistance    Ambulation:  Stand By Assistance, Contact Guard Assistance  Distance: 12 ft x 2  Surface: Level Tile  Device:Rolling Walker  Gait Deviations:  Decreased Step Length Bilaterally, Decreased Gait Speed and Decreased Heel Strike Bilaterally    Balance:  Static Sitting Balance:  Supervision  Dynamic Sitting Balance: Supervision  Static Standing Balance: Stand By Assistance  Dynamic Standing Balance: Stand By Assistance    Exercise:  Patient was guided in 1 set(s) 5-7 reps of exercise to both lower extremities. Ankle pumps, Heelslides, Hip abduction/adduction and Long arc quads. Exercises were completed for increased independence with functional mobility. Functional Outcome Measures: Completed  AM-PAC Inpatient Mobility without Stair Climbing Raw Score : 15  AM-PAC Inpatient without Stair Climbing T-Scale Score : 43.03    ASSESSMENT:  Assessment: Patient progressing toward established goals. Pt quite forgetful and asking for \"help to get out of here\" 3x during session. Each time pt was advised of the plan for discharge to be on Saturday to go to [facility] (per  documentation). Activity Tolerance:  Patient tolerance of  treatment: good. Equipment Recommendations: Other: cont to assess needs  Discharge Recommendations: Continue to assess pending progress and ECF with PT  Plan: Times per week: 3-5x C  Times per day: Daily  Specific instructions for Next Treatment: therex and mobility, monitor BP    Patient Education  Patient Education: Plan of Care, Home Exercise Program    Goals:  Patient goals : go home  Short term goals  Time Frame for Short term goals: by discharge  Short term goal 1: bed mobility with SBA to get in/out of bed  Short term goal 2: transfer with SBA to get in/out of chairs  Short term goal 3: amb >25'x1 with walker and CGA to walk safely in home  Long term goals  Time Frame for Long term goals : no LTGs set secondary to short ELOS    Following session, patient left in safe position with all fall risk precautions in place. Jarrod Armstrong.  Bala Ladd, [place] [address]

## 2021-12-22 NOTE — PROGRESS NOTES
[facility] 6A CAPACITY [place]  Occupational Therapy  Daily Note  Time:   Time In: 0900  Time Out: 5840  Timed Code Treatment Minutes: 45 Minutes  Minutes: 38          Date: 2021  Patient Name: Art Sterling,   Gender: female      Room: [facility]18/018-A  MRN: [id number]  : 1935  (80 y.o.)  Referring Practitioner: Zoila Lynch PA-C  Diagnosis: Syncope and Collapse  Additional Pertinent Hx: Pt admit with above diagnosis, right upper lobe pneumonia, s/p fall at [facility] and hit left side of head with laceration to left ear, CHI    Restrictions/Precautions:  Restrictions/Precautions: General Precautions,Fall Risk,Isolation  Position Activity Restriction  Other position/activity restrictions: +orthostatic hypotension, COVID-19, droplet + precautions     SUBJECTIVE: Pt. RN okayed OT session. Pt.in room in bed agreeable to participate in OT session. PAIN: Denies    Vitals: Vitals not assessed per clinical judgement, see nursing flowsheet  Oxygen: 1 L O2 NC sats at 93% with activity   Heart Rate: 120'2-130 Pt. RN notified and aware    COGNITION: Decreased Insight and Decreased Safety Awareness    ADL:   Grooming: Contact Guard Assistance and with set-up. Pt. stood at sink x 3-4 mins to complete oral care, no LOB noted  Bathing: Stand By Assistance and with set-up.   v  Upper Extremity Dressing: with set-up. Lower Extremity Dressing: Stand By Assistance and with set-up. to don and doff undergarment  Toiletin [address] Toilet Transfer: Contact Guard Assistance. [place] [employer] BALANCE:  Sitting Balance:  Stand By Assistance. while seated on EOB  Standing Balance: Stand By Assistance, [address] CG A for more dynamic standing    BED MOBILITY:  Supine to Sit: Stand By Assistance    Sit to Supine: Stand By Assistance    Scooting: Stand By Assistance      TRANSFERS:  Sit to Stand:  Stand By Assistance. Stand to Sit: Stand By Assistance.       FUNCTIONAL MOBILITY:  Assistive Device: Rolling Walker  Assist Level:  Contact Guard Assistance. Distance: To and from bathroom  No LOB slow pace       ASSESSMENT:     Activity Tolerance:  Patient tolerance of  treatment: fair. Discharge Recommendations: 24 hour assistance or supervision and Patient would benefit from continued OT at discharge  Equipment Recommendations: Equipment Needed: No  Plan: Times per week: 5x  Specific instructions for Next Treatment: Functional mobility; ADLs and safety awareness; UE exercises  Current Treatment Recommendations: Endurance Training,Self-Care / ADL,Safety Education & Training,Functional Mobility Training  Plan Comment: Pt would benefit from continued skilled OT services when medically stable and discharged from Acute. OT recommended while at [facility]    Patient Education  Patient Education: ADL's, Energy Conservation and Assistive Device Safety and safety with functional mobility and transfers. Goals  Short term goals  Time Frame for Short term goals: By discharge  Short term goal 1: Pt will complete her toileting routine including transfers to/from the standard toilet seat with SBA to increase her independence with self care. Short term goal 2: Pt will complete lower body dressing with SBA while using any AE needwed to increase her independence with self care. Short term goal 3: Pt will demonstrate activity tolerance doing sinkside ADLs for 3 minute duration with 1-2 hand release to increase her endurance for ease of dressing or grooming. Short term goal 4: Pt will demonstrate functional mobility walking to/from the bathroom or in the hallway with SBA while using a rolling walker. Following session, patient left in safe position with all fall risk precautions in place.

## 2021-12-22 NOTE — CARE COORDINATION
Discharge Planning Update: Following for Covid. Awaiting removal from isolation so she can return to [facility] Con. Review date 12-24. PT/OT working with pt. O2 at 1L/NC. Sat 94%. SW following for return to [facility].

## 2021-12-22 NOTE — PROGRESS NOTES
Hospitalist Progress Note      Patient:  Mata Diaz    Unit/Bed:6A-18/018-A  YOB: 1935  MRN: [id number]   Acct: [de-identified]   PCP: Reyes Meckel, MD  Date of Admission: 12/12/2021    Assessment/Plan:  1. COVID-19:    Tested positive 12/12. CXR (12/16)  is relatively unchanged. Patient's procal 0.08 and remains afebrile and non-toxic appearing, without an acute leukocytosis.  CTA 12/15 revealed no pulmonary emboli and bilateral multifocal infiltrates and interstitial edema. No clinical indication for antibiotics or treatment for COVID infection at this time. 2. Orthostatic hypotension:    Improved. Baseline is RA. Continue Midodrine 10 mg TID (increased on 12/19) and Lopressor per cardiology recommendations given hx of valvular disease. Orthostatic blood pressures today: 165/97 Lying ; 132/76 sitting ; 112/60 standing . Continue Orthostatic blood pressure daily. PT/OT. 3.  Normocytic Anemia:    Stable. Was noted to be 10.5 earlier in hospital stay. Hgb 11.7 today. No signs of bleeding. CBC ordered for AM.      4. HFrEF, Chronic:    Echo 12/14 showed EF 45% (previously 55% 5/2021). Cardiology was consulted - no CP, trop negative, no acute ekg changes. No further inpatient workup at this time, pt to follow up with primary cardiologist OP. Continue strict I/Os, daily weights. 5. Closed head injury:    PTA. Stable. CT of head and neck showed no acute injury or abnormality. Pt was cleared by trauma. 6. NIDDM2:    On Metformin at home, currently held. Continue SSI, Accu-Cheks. Hypoglycemia protocol ordered. Glucose remains elevated. Based on 24-hour insulin requirements, will start 5 units Lantus nightly (started 12/17)- tolerating well. Continue to monitor and adjust accordingly. 7. Hx CAD:    Hx of PCI of LAD 1/2020. Continue Plavix, aspirin, statin.      8. Mild aortic stenosis; mild to moderate mitral stenosis:    Noted on ECHO 12/14. Continue beta-blocker as stated above. 9. CKD stage 3:    Cr stable at baseline    DISPO: Discharge tomorrow to home, assisted living, once out of isolation. Chief Complaint: Fall    Initial H and P:-    History obtained from patient and chart review. \"70 y.o. female who we are asked to see/evaluate by Norman Bermudez MD for medical management of R ight upper lobe pneumonia and medical management     Per initial evaluation by trauma \"Patient is a 80year-old female presents to [address] [place] [address] as activation of level 2 trauma following a fall.  Per report patient was walking at nursing home with walker when she fell and hit the left side of her head. No loss of consciousness reported. Patient on Plavix. History of recurrent falls. Chart reviewed, recent admission last month for ARTIE on CKD and fall. Upon assessment patient is ABCs intact, GCS 14 ([id number]), in no acute distress. Patient with confusion but reported baseline confusion with history of dementia. Patient was perseverating, repetitively asking same questions.  Patient denied any acute pain or complaints.  She denied any headaches, lightheadedness, dizziness, neck pain, back pain, chest pain, shortness of breath, abdominal pain, nausea/vomiting, pain extremities, and paresthesias. On exam patient with estimated 3 cm linear laceration noted over the posterior aspect of left auricle and 1.5 cm linear laceration noted over left tragus. No auricular hematoma noted. Patient also noted to have small amount of ecchymosis noted over left cheek and over the angle of the left manubrium. No reported TMJ pain or difficultly opening mouth. No other signs of trauma noted on exam. No midline cervical, thoracic, or lumbar tenderness palpation. Chest and abdomen nontender. Abdomen soft. No extremity tenderness palpation. PMS intact in all 4 extremities. Range of motion intact. Vital signs stable.  Patient taken radiology for CT and plain film imaging. CT head negative for any acute intracranial abnormality. CT cervical spine negative for any acute osseous injuries. Chest x-ray reveals right upper lobe pneumonia. Pelvic x-ray negative for acute osseous injuries. Labs reviewed. Troponin negative. Mild hyponatremia at 132. Creat 1.3. Glucose 264. No leukocytosis, WBCs 8.3. Hemoglobin 13.4. UA negative for UTI. Plan for patient admitted under trauma surgery for SLP cog eval following closed head injury. Monitor for postconcussive symptoms. Ear laceration closed emergency department, see procedure note below. Updated on Boost[employer]. Consult to hospitalist for right upper quadrant pneumonia and assistance with medical management. Case discussed and care coordinated with trauma surgeon, Dr. Perico Riojas.     Signed copy of DNR CCA by PCP noted in advance care planning documents.  DNR-CCA on admission. Discussed with daughter at bedside. \"     12/15: \"Patient is resting in bed on RA. She states that did not know she had COVID-19 when she fell and was admitted to the hospital. She denies chest pain, SOB, abdominal pain. Per nursing, patient has not been as confused this morning. \"     12/16: \"Patient was resting comfortably in bed on RA. She states she has no new symptoms, concerns, or questions. Asking about discharge. States that she wasn't sure why she needed the O2 early this morning- denies feeling SOB. Reviewed patient's labs and discussed plan for today- get orthostatic blood pressures, Evaluate chest xray, and check with cardiology to restart IVF and possibly midodrine. \"     12/17: \"Patient has not been up today, unknown if she feels dizzy when standing. Awaiting repeat orthostats for further management recs. Orthos positive. Increased midodrine to 5mg TID. \"     12/18: \"Patient states that she feels well and wants to go back to assisted living. She denies any lightheadedness or dizziness with standing.  However, after discussing with the nurse, when the patient was standing to check orthos this AM, the patient became very lightheaded and began to fall backwards. This was before she received AM midodrine. Orthos repeated after midodrine and still significant. Increased midodrine to 7.5mg tid. \"     12/19: \"Patient is wanting to go home. She denies fever/chills, sob, cp, abd pain, nvd. Continues to have lightheadedness with standing. Orthostats positive. Increased midodrine to 10mg TID. \"     12/20: \"Patient is resting in chair on 2L NC. States she has no CP, SOB, Abdominal pain, N/V, change in bowel or urinary habits. Pt states she is ready to go home, but also noted that she has noticed that she is more uneasy on her feet than normal. PT/OT ordered to evaluated and treat. \"     12/21: Patient very sleepy, resting in bed with 1L NC. States she wants to go home. Discussed in-depth barriers to discharge. Assured patient of progress. Denies CP, SOB, Abdominal pain. Subjective (past 24 hours):   12/22: Patient sleepy, resting in bed with 1L NC with telesitter in room. States she feels well. Discussed likely discharge tomorrow morning to her home as she will be out of isolation. Reviewed orthostatic blood pressures today. Assured patient of progress. She denies any new symptoms, questions, or concerns at this time. Past medical history, family history, social history and allergies reviewed again and is unchanged since admission. ROS (All review of systems completed. Pertinent positives noted.  Otherwise All other systems reviewed and negative.)     Medications:  Reviewed    Infusion Medications    [Held by provider] sodium chloride 75 mL/hr at 12/20/21 0629    dextrose      sodium chloride       Scheduled Medications    midodrine  10 mg Oral TID WC    insulin glargine  5 Units SubCUTAneous Nightly    aspirin  81 mg Oral Daily    insulin lispro  0-18 Units SubCUTAneous TID WC    insulin lispro  0-9 Units SubCUTAneous Nightly    metoprolol tartrate  25 mg Oral BID    melatonin  3 mg Oral Nightly    DULoxetine  120 mg Oral Daily    clopidogrel  75 mg Oral Daily    atorvastatin  40 mg Oral Daily    Tdap-Dtap  0.5 mL IntraMUSCular Once    sodium chloride flush  10 mL IntraVENous 2 times per day    docusate sodium  100 mg Oral Daily     PRN Meds: haloperidol lactate, glucose, dextrose, glucagon (rDNA), dextrose, acetaminophen, sodium chloride flush, sodium chloride, ondansetron **OR** ondansetron, polyethylene glycol      Intake/Output Summary (Last 24 hours) at 12/22/2021 0809  Last data filed at 12/22/2021 0259  Gross per 24 hour   Intake 50 ml   Output 600 ml   Net -550 ml       Diet:  ADULT DIET; Regular; 4 carb choices (60 gm/meal)    Exam:  BP (!) 143/84   Pulse 101   Temp 98 °F (36.7 °C) (Axillary)   Resp 20   Ht 5' 4\" (1.626 m)   Wt 161 lb (73 kg)   SpO2 94%   BMI 27.64 kg/m²   General appearance: No apparent distress, appears stated age and cooperative. HEENT: Pupils equal, round, and reactive to light. Conjunctivae/corneas clear. Neck: Supple, with full range of motion. No jugular venous distention. Trachea midline. Respiratory:  1 L NC. Light expiratory rales in lung bases bilaterally. Normal respiratory effort. Cardiovascular: Regular rate and rhythm with normal S1/S2 without murmurs, rubs or gallops. Abdomen: Soft, non-tender, non-distended with normal bowel sounds. Musculoskeletal: passive and active ROM x 4 extremities. Skin: Skin color, texture, turgor normal.  No rashes or lesions. Neurologic:  Neurovascularly intact without any focal sensory/motor deficits.  Cranial nerves: II-XII intact, grossly non-focal.  Psychiatric: Alert and oriented, thought content appropriate, normal insight  Capillary Refill: Brisk,< 3 seconds   Peripheral Pulses: +2 palpable, equal bilaterally     Labs:   Recent Labs     12/20/21  0649   WBC 6.3   HGB 10.5*   HCT 32.8*        Recent Labs 12/20/21  0649      K 4.3      CO2 23   BUN 17   CREATININE 0.9   CALCIUM 8.3*     No results for input(s): AST, ALT, BILIDIR, BILITOT, ALKPHOS in the last 72 hours. No results for input(s): INR in the last 72 hours. No results for input(s): Dayana Height in the last 72 hours. Microbiology:    Blood culture #1: No results found for: [facility]    Blood culture #2:No results found for: Aneta Bird    Organism:  Lab Results   Component Value Date    ORG Growth of Contaminants 11/05/2021         Lab Results   Component Value Date    LABGRAM  10/12/2021     No segmented neutrophils observed. Rare epithelial cells observed. No bacteria seen. MRSA culture only:No results found for: [facility]    Urine culture:   Lab Results   Component Value Date    LABURIN [place] count: >100,000 CFU/mL  10/29/2021       Respiratory culture: No results found for: CULTRESP    Aerobic and Anaerobic :  Lab Results   Component Value Date    LABAERO  10/12/2021     No growth-preliminary Culture also yielded light growth of Staphylococcus species (coagulase negative). LABAERO light growth  10/12/2021    LABAERO  10/12/2021     very light growth Pantoea species which may be initially susceptible to third generation cephalosporins may develop resistance within three to four days of initiation of this antimicrobial therapy. No results found for: LABANAE    Urinalysis:      Lab Results   Component Value Date    NITRU NEGATIVE 12/12/2021    WBCUA 15-25 11/05/2021    BACTERIA NONE SEEN 11/05/2021    RBCUA NONE 11/05/2021    BLOODU NEGATIVE 12/12/2021    SPECGRAV 1.007 12/12/2021    GLUCOSEU >= 1000 11/05/2021       Radiology:  XR CHEST PORTABLE   Final Result   1. Abnormal density in the right upper lobe consistent with inflammatory process presumably Covid 19 infection. 2.. Thoracic spondylosis. 3. Postoperative changes along the right lateral chest wall.                **This report has been created using voice recognition software. It may contain minor errors which are inherent in voice recognition technology. **      Final report electronically signed by DR Carlee Isaac on 12/16/2021 3:18 PM      CTA CHEST [address]   Final Result    There is no pulmonary emboli. There are bilateral multifocal infiltrates and interstitial edema. **This report has been created using voice recognition software. It may contain minor errors which are inherent in voice recognition technology. **      Final report electronically signed by Dr. Dakota Pascal on 12/15/2021 4:38 PM      XR PELVIS (1-2 VIEWS)   Final Result    No evidence of acute osseous injury on this single image of the pelvis. **This report has been created using voice recognition software. It may contain minor errors which are inherent in voice recognition technology. **      Final report electronically signed by Dr. Krishan Hart MD on 12/12/2021 2:52 PM      XR CHEST PORTABLE   Final Result   Right upper lobe pneumonia. **This report has been created using voice recognition software. It may contain minor errors which are inherent in voice recognition technology. **      Final report electronically signed by Dr. Krishan Hart MD on 12/12/2021 2:51 PM      CT Head WO Contrast   Final Result    No evidence of acute intracranial abnormality. **This report has been created using voice recognition software. It may contain minor errors which are inherent in voice recognition technology. **      Final report electronically signed by Dr. Krishan Hart MD on 12/12/2021 2:09 PM      CT Cervical Spine WO Contrast   Final Result    No evidence of acute osseous injury of the cervical spine. **This report has been created using voice recognition software. It may contain minor errors which are inherent in voice recognition technology. **      Final report electronically signed by Dr. Krishan Hart MD on 12/12/2021 2:20 PM      US Ed Fast Abdomen Limited    (Results Pending)     XR PELVIS (1-2 VIEWS)    Result Date: 12/12/2021  PROCEDURE: XR PELVIS (1-2 VIEWS) CLINICAL INFORMATION: trauma. Fall with altered mental status. COMPARISON: 11/20/2020. TECHNIQUE: AP view of the pelvis. FINDINGS:  There is a right hip arthroplasty. Stable alignment compared to prior exam. No acute fracture is evident on this single view. No evidence of acute osseous injury on this single image of the pelvis. **This report has been created using voice recognition software. It may contain minor errors which are inherent in voice recognition technology. ** Final report electronically signed by Dr. Tyron Ramirez MD on 12/12/2021 2:52 PM    CT Head WO Contrast    Result Date: 12/12/2021  PROCEDURE: CT HEAD WO CONTRAST CLINICAL INFORMATION: Head trauma, anticoagulation. COMPARISON: CT head dated 11/5/2021. TECHNIQUE: Helical CT of the head. Axial, sagittal and coronal reconstructions were created. All CT scans at this facility use dose modulation, iterative reconstruction, and/or weight-based dosing when appropriate to reduce radiation dose to as low as reasonably achievable. FINDINGS: There is stable moderate ventriculomegaly, mildly disproportionate to peripheral volume loss. There are mild confluent and patchy areas of hypodensity in the periventricular, subcortical deep white matter, stable compared to prior exam. Gray-white matter  differentiation otherwise appears grossly preserved. The calvarium appears intact. Orbits are unremarkable. Paranasal sinuses and mastoid air cells are clear. No evidence of acute intracranial abnormality. **This report has been created using voice recognition software. It may contain minor errors which are inherent in voice recognition technology. ** Final report electronically signed by Dr. Tyron Ramirez MD on 12/12/2021 2:09 PM    CT Cervical Spine WO Contrast    Result Date: 12/12/2021  PROCEDURE: CT CERVICAL SPINE WO CONTRAST CLINICAL INFORMATION: spine trauma. COMPARISON: 11/5/2021. TECHNIQUE: 3 mm noncontrast axial images were obtained through the cervical spine with sagittal and coronal reconstructions. All CT scans at this facility use dose modulation, iterative reconstruction, and/or weight-based dosing when appropriate to reduce radiation dose to as low as reasonably achievable. FINDINGS: There is grade 1 anterolisthesis of C4 relative to C5 on the basis of degenerative change, stable compared to prior exam. There is otherwise anatomic vertebral body height and alignment. No fracture of the cervical vertebral column is identified. The craniocervical junction appears intact. No paraspinal or epidural fluid collection is identified. There are stable degenerative changes of the cervical spine most pronounced at C6-7 with complete loss of intervertebral disc space and rim osteophytes at this level. Paraspinal soft tissues are grossly unremarkable. No evidence of acute osseous injury of the cervical spine. **This report has been created using voice recognition software. It may contain minor errors which are inherent in voice recognition technology. ** Final report electronically signed by Dr. Chelo Jain MD on 12/12/2021 2:20 PM    XR CHEST PORTABLE    Result Date: 12/12/2021  PROCEDURE: XR CHEST PORTABLE CLINICAL INFORMATION: trauma. Fall with altered mental status. COMPARISON: Chest radiograph dated 10/17/2021. TECHNIQUE: AP upright view of the chest. FINDINGS: There is a patchy opacity in the right upper lobe which has appeared in the interval. Lungs otherwise appear clear. Pulmonary vasculature and cardiomediastinal silhouette are within normal limits. Visualized osseous structures appear grossly intact. Right upper lobe pneumonia. **This report has been created using voice recognition software.  It may contain minor errors which are inherent in voice recognition technology. ** Final report electronically signed by Dr. Linda Mcallister MD on 12/12/2021 2:51 PM      Electronically signed by Hoda Herrera PA-C on 12/22/2021 at 8:09 AM

## 2021-12-23 NOTE — CARE COORDINATION
Discharge Planning Update: Following for Covid. From [facility] but cannot return until out of isolation. O2 at 1L/NC sat 94%. PT/OT following. Isolation review is tomorrow. Then plan for return to [facility]VIERTEL Con. SW following. Anticipate discharge in next 24-48 hrs.

## 2021-12-23 NOTE — PROGRESS NOTES
Hospitalist Progress Note      Patient:  Walter Mccauley    Unit/Bed:6A-18/018-A  YOB: 1935  MRN: [id number]   Acct: [de-identified]   PCP: Manuel Acosta MD  Date of Admission: 12/12/2021    Assessment/Plan:  1. COVID-19:    Tested positive 12/12. CXR (12/16)  is relatively unchanged. Patient's procal 0.08 and remains afebrile and non-toxic appearing, without an acute leukocytosis. CTA 12/15 revealed no pulmonary emboli and bilateral multifocal infiltrates and interstitial edema. No clinical indication for antibiotics or treatment for COVID infection at this time. Patient will be out of isolation tomorrow morning. Plan discharge home tomorrow morning. 2. Orthostatic hypotension:    Improved. Baseline is RA. Continue Midodrine 10 mg TID (increased on 12/19) and Lopressor per cardiology recommendations given hx of valvular disease. Continue Orthostatic blood pressure daily. PT/OT. 3.  Normocytic Anemia:    Stable. Was noted to be 10.5 earlier in hospital stay. Hgb 11.7 today. No signs of bleeding. CBC ordered for AM.      4. HFrEF, Chronic:    Echo 12/14 showed EF 45% (previously 55% 5/2021). Cardiology was consulted - no CP, trop negative, no acute ekg changes. No further inpatient workup at this time, pt to follow up with primary cardiologist OP. Continue strict I/Os, daily weights. Pt continues to require 1L O2 NC with SPO2 98%. Wean O2 as tolerated. 5. Closed head injury:    PTA. Stable. CT of head and neck showed no acute injury or abnormality. Pt was cleared by trauma. 6. NIDDM2:    On Metformin at home, currently held. Continue SSI, Accu-Cheks. Hypoglycemia protocol ordered. Glucose remains elevated. Based on 24-hour insulin requirements, will start 5 units Lantus nightly (started 12/17)- tolerating well. Continue to monitor and adjust accordingly. 7. Hx CAD:    Hx of PCI of LAD 1/2020.   Continue Plavix, aspirin, statin. 8. Mild aortic stenosis; mild to moderate mitral stenosis:    Noted on ECHO 12/14. Continue beta-blocker as stated above. 9. CKD stage 3:    Cr stable at baseline    DISPO: Isolation review tomorrow. Discharge tomorrow to home, assisted living, once out of isolation. Chief Complaint: Fall    Initial H and P:-    History obtained from patient and chart review. \"80 y.o. female who we are asked to see/evaluate by Linsey Zuniga MD for medical management of R ight upper lobe pneumonia and medical management     Per initial evaluation by trauma \"Patient is a 80-year-old female presents to [facility] as activation of level 2 trauma following a fall. Per report patient was walking at nursing home with walker when she fell and hit the left side of her head. No loss of consciousness reported. Patient on Plavix. History of recurrent falls. Chart reviewed, recent admission last month for ARTIE on CKD and fall. Upon assessment patient is ABCs intact, GCS 14 (E4V4M6), in no acute distress. Patient with confusion but reported baseline confusion with history of dementia. Patient was perseverating, repetitively asking same questions. Patient denied any acute pain or complaints. She denied any headaches, lightheadedness, dizziness, neck pain, back pain, chest pain, shortness of breath, abdominal pain, nausea/vomiting, pain extremities, and paresthesias. On exam patient with estimated 3 cm linear laceration noted over the posterior aspect of left auricle and 1.5 cm linear laceration noted over left tragus. No auricular hematoma noted. Patient also noted to have small amount of ecchymosis noted over left cheek and over the angle of the left manubrium. No reported TMJ pain or difficultly opening mouth. No other signs of trauma noted on exam. No midline cervical, thoracic, or lumbar tenderness palpation. Chest and abdomen nontender. Abdomen soft. No extremity tenderness palpation.  PMS intact in all 4 extremities. Range of motion intact. Vital signs stable. Patient taken radiology for CT and plain film imaging. CT head negative for any acute intracranial abnormality. CT cervical spine negative for any acute osseous injuries. Chest x-ray reveals right upper lobe pneumonia. Pelvic x-ray negative for acute osseous injuries. Labs reviewed. Troponin negative. Mild hyponatremia at 132. Creat 1.3. Glucose 264. No leukocytosis, WBCs 8.3. Hemoglobin 13.4. UA negative for UTI. Plan for patient admitted under trauma surgery for SLP cog eval following closed head injury. Monitor for postconcussive symptoms. Ear laceration closed emergency department, see procedure note below. Updated on Boostrix. Consult to hospitalist for right upper quadrant pneumonia and assistance with medical management. Case discussed and care coordinated with trauma surgeon, Dr. Sadie Brito. Signed copy of DNR CCA by PCP noted in advance care planning documents. DNR-CCA on admission. Discussed with daughter at bedside. \"     12/15: \"Patient is resting in bed on RA. She states that did not know she had COVID-19 when she fell and was admitted to the hospital. She denies chest pain, SOB, abdominal pain. Per nursing, patient has not been as confused this morning. \"     12/16: \"Patient was resting comfortably in bed on RA. She states she has no new symptoms, concerns, or questions. Asking about discharge. States that she wasn't sure why she needed the O2 early this morning- denies feeling SOB. Reviewed patient's labs and discussed plan for today- get orthostatic blood pressures, Evaluate chest xray, and check with cardiology to restart IVF and possibly midodrine. \"     12/17: \"Patient has not been up today, unknown if she feels dizzy when standing. Awaiting repeat orthostats for further management recs. Orthos positive. Increased midodrine to 5mg TID. \"     12/18: \"Patient states that she feels well and wants to go back to assisted living. She denies any lightheadedness or dizziness with standing. However, after discussing with the nurse, when the patient was standing to check orthos this AM, the patient became very lightheaded and began to fall backwards. This was before she received AM midodrine. Orthos repeated after midodrine and still significant. Increased midodrine to 7.5mg tid. \"     12/19: \"Patient is wanting to go home. She denies fever/chills, sob, cp, abd pain, nvd. Continues to have lightheadedness with standing. Orthostats positive. Increased midodrine to 10mg TID. \"     12/20: \"Patient is resting in chair on 2L NC. States she has no CP, SOB, Abdominal pain, N/V, change in bowel or urinary habits. Pt states she is ready to go home, but also noted that she has noticed that she is more uneasy on her feet than normal. PT/OT ordered to evaluated and treat. \"     12/21: Patient very sleepy, resting in bed with 1L NC. States she wants to go home. Discussed in-depth barriers to discharge. Assured patient of progress. Denies CP, SOB, Abdominal pain. 12/22: Patient sleepy, resting in bed with 1L NC with telesitter in room. States she feels well. Discussed likely discharge tomorrow morning to her home as she will be out of isolation. Reviewed orthostatic blood pressures today. Assured patient of progress. She denies any new symptoms, questions, or concerns at this time. Subjective (past 24 hours):   12/23: Patient resting in chair on 1 L NC. Appears very tired and only opens eyes when asked. States she wants to go home and was expecting today based off what was told her yesterday, but planned discharged was moved to tomorrow due to miscommunication of when isolation ends for her. She says she has no new questions, symptoms, or concerns at this time. Past medical history, family history, social history and allergies reviewed again and is unchanged since admission. ROS (All review of systems completed.   Pertinent positives noted. Otherwise All other systems reviewed and negative.)     Medications:  Reviewed    Infusion Medications    [Held by provider] sodium chloride 75 mL/hr at 12/20/21 4418    dextrose      sodium chloride       Scheduled Medications    midodrine  10 mg Oral TID WC    insulin glargine  5 Units SubCUTAneous Nightly    aspirin  81 mg Oral Daily    insulin lispro  0-18 Units SubCUTAneous TID WC    insulin lispro  0-9 Units SubCUTAneous Nightly    metoprolol tartrate  25 mg Oral BID    melatonin  3 mg Oral Nightly    DULoxetine  120 mg Oral Daily    clopidogrel  75 mg Oral Daily    atorvastatin  40 mg Oral Daily    Tdap-Dtap  0.5 mL IntraMUSCular Once    sodium chloride flush  10 mL IntraVENous 2 times per day    docusate sodium  100 mg Oral Daily     PRN Meds: haloperidol lactate, glucose, dextrose, glucagon (rDNA), dextrose, acetaminophen, sodium chloride flush, sodium chloride, ondansetron **OR** ondansetron, polyethylene glycol      Intake/Output Summary (Last 24 hours) at 12/23/2021 1300  Last data filed at 12/23/2021 0644  Gross per 24 hour   Intake    Output 1000 ml   Net -1000 ml       Diet:  ADULT DIET; Regular; 4 carb choices (60 gm/meal)    Exam:  /68   Pulse 87   Temp 97.8 °F (36.6 °C) (Oral)   Resp 16   Ht 5' 4\" (1.626 m)   Wt 152 lb 11.2 oz (69.3 kg)   SpO2 98%   BMI 26.21 kg/m²   General appearance: No apparent distress, appears stated age and cooperative. HEENT: Pupils equal, round, and reactive to light. Conjunctivae/corneas clear. Neck: Supple, with full range of motion. No jugular venous distention. Trachea midline. Respiratory:  1 L NC. Light expiratory rales in lung bases bilaterally. Normal respiratory effort. Cardiovascular: Regular rate and rhythm with normal S1/S2 without murmurs, rubs or gallops. Abdomen: Soft, non-tender, non-distended with normal bowel sounds. Musculoskeletal: passive and active ROM x 4 extremities.   Skin: Skin color, texture, turgor normal.  No rashes or lesions. Neurologic:  Neurovascularly intact without any focal sensory/motor deficits. Cranial nerves: II-XII intact, grossly non-focal.  Psychiatric: Alert and oriented, thought content appropriate, normal insight  Capillary Refill: Brisk,< 3 seconds   Peripheral Pulses: +2 palpable, equal bilaterally     Labs:   Recent Labs     12/22/21  0847 12/23/21  0828   WBC 7.5 7.6   HGB 11.7* 11.7*   HCT 35.7* 35.9*    257     Recent Labs     12/22/21  0847 12/23/21  1015   * 134*   K 3.9 3.9   CL 96* 96*   CO2 24 26   BUN 12 14   CREATININE 0.8 1.0   CALCIUM 8.4* 8.6     No results for input(s): AST, ALT, BILIDIR, BILITOT, ALKPHOS in the last 72 hours. No results for input(s): INR in the last 72 hours. No results for input(s): Patrisha Meigs in the last 72 hours. Microbiology:    Blood culture #1: No results found for: [facility]    Blood culture #2:No results found for: Quentin Juárez    Organism:  Lab Results   Component Value Date    ORG Growth of Contaminants 11/05/2021         Lab Results   Component Value Date    LABGRAM  10/12/2021     No segmented neutrophils observed. Rare epithelial cells observed. No bacteria seen. MRSA culture only:No results found for: [facility]    Urine culture:   Lab Results   Component Value Date    LABURIN [place] count: >100,000 CFU/mL  10/29/2021       Respiratory culture: No results found for: CULTRESP    Aerobic and Anaerobic :  Lab Results   Component Value Date    LABAERO  10/12/2021     No growth-preliminary Culture also yielded light growth of Staphylococcus species (coagulase negative). LABAERO light growth  10/12/2021    LABAERO  10/12/2021     very light growth Pantoea species which may be initially susceptible to third generation cephalosporins may develop resistance within three to four days of initiation of this antimicrobial therapy.       No results found for: LABANAE    Urinalysis:      Lab Results   Component Value Date    NITRU NEGATIVE 12/12/2021    WBCUA 15-25 11/05/2021    BACTERIA NONE SEEN 11/05/2021    RBCUA NONE 11/05/2021    BLOODU NEGATIVE 12/12/2021    SPECGRAV 1.007 12/12/2021    GLUCOSEU >= 1000 11/05/2021       Radiology:  XR CHEST PORTABLE   Final Result   1. Abnormal density in the right upper lobe consistent with inflammatory process presumably Covid 19 infection. 2.. Thoracic spondylosis. 3. Postoperative changes along the right lateral chest wall. **This report has been created using voice recognition software. It may contain minor errors which are inherent in voice recognition technology. **      Final report electronically signed by DR Lindsay Jose on 12/16/2021 3:18 PM      CTA CHEST [address]   Final Result    There is no pulmonary emboli. There are bilateral multifocal infiltrates and interstitial edema. **This report has been created using voice recognition software. It may contain minor errors which are inherent in voice recognition technology. **      Final report electronically signed by Dr. Natalie Angulo on 12/15/2021 4:38 PM      XR PELVIS (1-2 VIEWS)   Final Result    No evidence of acute osseous injury on this single image of the pelvis. **This report has been created using voice recognition software. It may contain minor errors which are inherent in voice recognition technology. **      Final report electronically signed by Dr. Joy Connor MD on 12/12/2021 2:52 PM      XR CHEST PORTABLE   Final Result   Right upper lobe pneumonia. **This report has been created using voice recognition software. It may contain minor errors which are inherent in voice recognition technology. **      Final report electronically signed by Dr. Joy Connor MD on 12/12/2021 2:51 PM      CT Head WO Contrast   Final Result    No evidence of acute intracranial abnormality. **This report has been created using voice recognition software.  It may contain minor errors which are inherent in voice recognition technology. **      Final report electronically signed by Dr. Rebel Enriquez MD on 12/12/2021 2:09 PM      CT Cervical Spine WO Contrast   Final Result    No evidence of acute osseous injury of the cervical spine. **This report has been created using voice recognition software. It may contain minor errors which are inherent in voice recognition technology. **      Final report electronically signed by Dr. Rebel Enriquez MD on 12/12/2021 2:20 PM      US Ed Fast Abdomen Limited    (Results Pending)     XR PELVIS (1-2 VIEWS)    Result Date: 12/12/2021  PROCEDURE: XR PELVIS (1-2 VIEWS) CLINICAL INFORMATION: trauma. Fall with altered mental status. COMPARISON: 11/20/2020. TECHNIQUE: AP view of the pelvis. FINDINGS:  There is a right hip arthroplasty. Stable alignment compared to prior exam. No acute fracture is evident on this single view. No evidence of acute osseous injury on this single image of the pelvis. **This report has been created using voice recognition software. It may contain minor errors which are inherent in voice recognition technology. ** Final report electronically signed by Dr. Rebel Enriquez MD on 12/12/2021 2:52 PM    CT Head WO Contrast    Result Date: 12/12/2021  PROCEDURE: CT HEAD WO CONTRAST CLINICAL INFORMATION: Head trauma, anticoagulation. COMPARISON: CT head dated 11/5/2021. TECHNIQUE: Helical CT of the head. Axial, sagittal and coronal reconstructions were created. All CT scans at this facility use dose modulation, iterative reconstruction, and/or weight-based dosing when appropriate to reduce radiation dose to as low as reasonably achievable. FINDINGS: There is stable moderate ventriculomegaly, mildly disproportionate to peripheral volume loss.  There are mild confluent and patchy areas of hypodensity in the periventricular, subcortical deep white matter, stable compared to prior exam. Gray-white matter  differentiation otherwise appears grossly preserved. The calvarium appears intact. Orbits are unremarkable. Paranasal sinuses and mastoid air cells are clear. No evidence of acute intracranial abnormality. **This report has been created using voice recognition software. It may contain minor errors which are inherent in voice recognition technology. ** Final report electronically signed by Dr. Linda Mcallister MD on 12/12/2021 2:09 PM    CT Cervical Spine WO Contrast    Result Date: 12/12/2021  PROCEDURE: CT CERVICAL SPINE WO CONTRAST CLINICAL INFORMATION: spine trauma. COMPARISON: 11/5/2021. TECHNIQUE: 3 mm noncontrast axial images were obtained through the cervical spine with sagittal and coronal reconstructions. All CT scans at this facility use dose modulation, iterative reconstruction, and/or weight-based dosing when appropriate to reduce radiation dose to as low as reasonably achievable. FINDINGS: There is grade 1 anterolisthesis of C4 relative to C5 on the basis of degenerative change, stable compared to prior exam. There is otherwise anatomic vertebral body height and alignment. No fracture of the cervical vertebral column is identified. The craniocervical junction appears intact. No paraspinal or epidural fluid collection is identified. There are stable degenerative changes of the cervical spine most pronounced at C6-7 with complete loss of intervertebral disc space and rim osteophytes at this level. Paraspinal soft tissues are grossly unremarkable. No evidence of acute osseous injury of the cervical spine. **This report has been created using voice recognition software. It may contain minor errors which are inherent in voice recognition technology. ** Final report electronically signed by Dr. Linda Mcallister MD on 12/12/2021 2:20 PM    XR CHEST PORTABLE    Result Date: 12/12/2021  PROCEDURE: XR CHEST PORTABLE CLINICAL INFORMATION: trauma. Fall with altered mental status. COMPARISON: Chest radiograph dated 10/17/2021. TECHNIQUE: AP upright view of the chest. FINDINGS: There is a patchy opacity in the right upper lobe which has appeared in the interval. Lungs otherwise appear clear. Pulmonary vasculature and cardiomediastinal silhouette are within normal limits. Visualized osseous structures appear grossly intact. Right upper lobe pneumonia. **This report has been created using voice recognition software. It may contain minor errors which are inherent in voice recognition technology. ** Final report electronically signed by Dr. Tiffanie Thompson MD on 12/12/2021 2:51 PM      Electronically signed by Pete Becerra PA-C on 12/23/2021 at 1:00 PM

## 2021-12-23 NOTE — PROGRESS NOTES
[facility]  INPATIENT PHYSICAL THERAPY  DAILY NOTE  STRZ 6A CAPACITY EBOLA - 6A-18/018-A    Time In: 6361  Time Out: 0831  Timed Code Treatment Minutes: 23 Minutes  Minutes: 23          Date: 2021  Patient Name: Zena Downey,  Gender:  female        MRN: [id number]  : 1935  (80 y.o.)     Referring Practitioner: SHELBIE Trejo  Diagnosis: syncope and collapse  Additional Pertinent Hx: admit with above diagnosis, right upper lobe pneumonia, s/p fall at [address] and hit left side of head with laceration to left ear, CHI     Prior Level of Function:  Lives With: Alone  Type of Home: Facility ([facility])  Home Layout: One level  Home Access: Level entry  Home Equipment: 4 wheeled walker   Bathroom Shower/Tub: Walk-in shower,Shower chair with back  Bathroom Toilet: [address]: Grab bars in [address] Accessibility: [address] Help From: Other (comment) (staff as available)  ADL Assistance: Needs assistance  [address]: Needs assistance  Homemaking Responsibilities: No  Ambulation Assistance: Needs assistance  Transfer Assistance: Needs assistance  Active : No  Additional Comments: per pt was using rollator PTA and working with PT    Restrictions/Precautions:  Restrictions/Precautions: General Precautions,Fall Risk,Isolation  Position Activity Restriction  Other position/activity restrictions: +orthostatic hypotension, COVID-19, droplet + precautions     SUBJECTIVE: per pt has no SOB and wants to go home, RN aware,  Pt cooperative for amb in hallway but declined therex.     PAIN: no pain per pt    Vitals: Oxygen: on 1L O2 with O2 sats at 89-96% throughout session    OBJECTIVE:  Bed Mobility:  Sup to sit with SBA  Rolling to left with SBA  Scooting fwd to EOB in sitting with SBA  HOB up 30 degrees and use BR    Transfers:  Sit to Stand: Stand By Assistance  Stand to Sit:Stand By Assistance  Cues for hand placement and to turn fully to chair before sitting down for safety  Ambulation:  Contact Guard Assistance  Distance: 110'x1  Surface: Level Tile  Device:Rolling Walker  Gait Deviations:  Slow Brittny, Mild Path Deviations and multiple cues for direction for safety    Balance:  Static Sitting Balance:  Stand By Assistance  Static Standing Balance: Contact Guard Assistance, with RW        Functional Outcome Measures: Completed  AM-PAC Inpatient Mobility Raw Score : 17  AM-PAC Inpatient T-Scale Score : 42.13    ASSESSMENT:  Assessment: Patient progressing toward established goals. Activity Tolerance:  Patient tolerance of  treatment: good. -     Equipment Recommendations: Other: cont to assess needs  Discharge Recommendations: Continue to assess pending progress, ECF with PT and Patient would benefit from continued PT at discharge  Plan: Times per week: 3-5x C  Times per day: Daily  Specific instructions for Next Treatment: therex and mobility, monitor BP    Patient Education  Patient Education: Bed Mobility, Transfers, Gait    Goals:  Patient goals : go home  Short term goals  Time Frame for Short term goals: by discharge  Short term goal 1: bed mobility with SBA to get in/out of bed  Short term goal 2: transfer with SBA to get in/out of chairs  Short term goal 3: amb >25'x1 with walker and CGA to walk safely in home  Long term goals  Time Frame for Long term goals : no LTGs set secondary to short ELOS    Following session, patient left in safe position with all fall risk precautions in place.

## 2021-12-24 NOTE — PROGRESS NOTES
Pt removed from [facility] isolation per [facility] guidelines as approved by Alok MORFIN.

## 2021-12-24 NOTE — PROGRESS NOTES
Hospitalist Progress Note      Patient:  Zena Downey    Unit/Bed:6A-18/018-A  YOB: 1935  MRN: [id number]   Acct: [de-identified]   PCP: Emeka Parham MD  Date of Admission: 12/12/2021    Assessment/Plan:  1. COVID-19:    Tested positive 12/12. CXR (12/16)  is relatively unchanged. Patient's procal 0.08 and remains afebrile and non-toxic appearing, without an acute leukocytosis. CTA 12/15 revealed no pulmonary emboli and bilateral multifocal infiltrates and interstitial edema. No clinical indication for antibiotics or treatment for COVID infection at this time. Patient is out of isolation today. 2. Orthostatic hypotension:    Continue Midodrine 10 mg TID (increased on 12/19) and Lopressor per cardiology recommendations given hx of valvular disease. Continue Orthostatic blood pressure daily. PT/OT. 3.  Normocytic Anemia:    Stable. Was noted to be 10.5 earlier in hospital stay. Hgb 11.7 today. No signs of bleeding. CBC ordered for AM.      4. HFrEF, Chronic:    Echo 12/14 showed EF 45% (previously 55% 5/2021). Cardiology was consulted - no CP, trop negative, no acute ekg changes. No further inpatient workup at this time, pt to follow up with primary cardiologist OP. Continue strict I/Os, daily weights. Pt continues to require 1L O2 NC with SPO2 98%. Wean O2 as tolerated. 5. Closed head injury:    PTA. Stable. CT of head and neck showed no acute injury or abnormality. Pt was cleared by trauma. 6. NIDDM2:    On Metformin at home, currently held. Continue SSI, Accu-Cheks. Hypoglycemia protocol ordered. Glucose remains elevated. Based on 24-hour insulin requirements, will start 5 units Lantus nightly (started 12/17)- tolerating well. Continue to monitor and adjust accordingly. 7. Hx CAD:    Hx of PCI of LAD 1/2020. Continue Plavix, aspirin, statin.      8. Mild aortic stenosis; mild to moderate mitral stenosis:    Noted on film imaging. CT head negative for any acute intracranial abnormality. CT cervical spine negative for any acute osseous injuries. Chest x-ray reveals right upper lobe pneumonia. Pelvic x-ray negative for acute osseous injuries. Labs reviewed. Troponin negative. Mild hyponatremia at 132. Creat 1.3. Glucose 264. No leukocytosis, WBCs 8.3. Hemoglobin 13.4. UA negative for UTI. Plan for patient admitted under trauma surgery for SLP cog eval following closed head injury. Monitor for postconcussive symptoms. Ear laceration closed emergency department, see procedure note below. Updated on Boostrix. Consult to hospitalist for right upper quadrant pneumonia and assistance with medical management. Case discussed and care coordinated with trauma surgeon, Dr. FELIZ [facility]. Signed copy of DNR CCA by PCP noted in advance care planning documents. DNR-CCA on admission. Discussed with daughter at bedside. \"     12/15: \"Patient is resting in bed on RA. She states that did not know she had COVID-19 when she fell and was admitted to the hospital. She denies chest pain, SOB, abdominal pain. Per nursing, patient has not been as confused this morning. \"     12/16: \"Patient was resting comfortably in bed on RA. She states she has no new symptoms, concerns, or questions. Asking about discharge. States that she wasn't sure why she needed the O2 early this morning- denies feeling SOB. Reviewed patient's labs and discussed plan for today- get orthostatic blood pressures, Evaluate chest xray, and check with cardiology to restart IVF and possibly midodrine. \"     12/17: \"Patient has not been up today, unknown if she feels dizzy when standing. Awaiting repeat orthostats for further management recs. Orthos positive. Increased midodrine to 5mg TID. \"     12/18: \"Patient states that she feels well and wants to go back to assisted living. She denies any lightheadedness or dizziness with standing.  However, after discussing with the nurse, when the patient was standing to check orthos this AM, the patient became very lightheaded and began to fall backwards. This was before she received AM midodrine. Orthos repeated after midodrine and still significant. Increased midodrine to 7.5mg tid. \"     12/19: \"Patient is wanting to go home. She denies fever/chills, sob, cp, abd pain, nvd. Continues to have lightheadedness with standing. Orthostats positive. Increased midodrine to 10mg TID. \"     12/20: \"Patient is resting in chair on 2L NC. States she has no CP, SOB, Abdominal pain, N/V, change in bowel or urinary habits. Pt states she is ready to go home, but also noted that she has noticed that she is more uneasy on her feet than normal. PT/OT ordered to evaluated and treat. \"     12/21: Patient very sleepy, resting in bed with 1L NC. States she wants to go home. Discussed in-depth barriers to discharge. Assured patient of progress. Denies CP, SOB, Abdominal pain. 12/22: Patient sleepy, resting in bed with 1L NC with telesitter in room. States she feels well. Discussed likely discharge tomorrow morning to her home as she will be out of isolation. Reviewed orthostatic blood pressures today. Assured patient of progress. She denies any new symptoms, questions, or concerns at this time. 12/23: Patient resting in chair on 1 L NC. Appears very tired and only opens eyes when asked. States she wants to go home and was expecting today based off what was told her yesterday, but planned discharged was moved to tomorrow due to miscommunication of when isolation ends for her. Subjective (past 24 hours):   No acute events overnight. Pt is out of isolation. Pt is asymptomatic with her orthostatic hypotension. Pt is ready to leave the hospital.     Past medical history, family history, social history and allergies reviewed again and is unchanged since admission. ROS (All review of systems completed. Pertinent positives noted.  Otherwise All other systems reviewed and negative.)     Medications:  Reviewed    Infusion Medications    [Held by provider] sodium chloride 75 mL/hr at 12/20/21 4383    dextrose      sodium chloride       Scheduled Medications    midodrine  10 mg Oral TID WC    insulin glargine  5 Units SubCUTAneous Nightly    aspirin  81 mg Oral Daily    insulin lispro  0-18 Units SubCUTAneous TID WC    insulin lispro  0-9 Units SubCUTAneous Nightly    metoprolol tartrate  25 mg Oral BID    melatonin  3 mg Oral Nightly    DULoxetine  120 mg Oral Daily    clopidogrel  75 mg Oral Daily    atorvastatin  40 mg Oral Daily    Tdap-Dtap  0.5 mL IntraMUSCular Once    sodium chloride flush  10 mL IntraVENous 2 times per day    docusate sodium  100 mg Oral Daily     PRN Meds: haloperidol lactate, glucose, dextrose, glucagon (rDNA), dextrose, acetaminophen, sodium chloride flush, sodium chloride, ondansetron **OR** ondansetron, polyethylene glycol      Intake/Output Summary (Last 24 hours) at 12/24/2021 0809  Last data filed at 12/23/2021 1719  Gross per 24 hour   Intake    Output 200 ml   Net -200 ml       Diet:  ADULT DIET; Regular; 4 carb choices (60 gm/meal)    Exam:  BP (!) 109/93   Pulse 99   Temp 97.8 °F (36.6 °C) (Oral)   Resp 18   Ht 5' 4\" (1.626 m)   Wt 150 lb 14.4 oz (68.4 kg)   SpO2 96%   BMI 25.90 kg/m²   General appearance: No apparent distress, appears stated age and cooperative. HEENT: Pupils equal, round, and reactive to light. Conjunctivae/corneas clear. Neck: Supple, with full range of motion. No jugular venous distention. Trachea midline. Respiratory:  1 L NC. Light expiratory rales in lung bases bilaterally. Normal respiratory effort. Cardiovascular: Regular rate and rhythm with normal S1/S2 without murmurs, rubs or gallops. Abdomen: Soft, non-tender, non-distended with normal bowel sounds. Musculoskeletal: passive and active ROM x 4 extremities.   Skin: Skin color, texture, turgor normal.  No rashes or lesions. Neurologic:  Neurovascularly intact without any focal sensory/motor deficits. Cranial nerves: II-XII intact, grossly non-focal.  Psychiatric: Alert and oriented, thought content appropriate, normal insight  Capillary Refill: Brisk,< 3 seconds   Peripheral Pulses: +2 palpable, equal bilaterally     Labs:   Recent Labs     12/22/21  0847 12/23/21  0828   WBC 7.5 7.6   HGB 11.7* 11.7*   HCT 35.7* 35.9*    257     Recent Labs     12/22/21  0847 12/23/21  1015   * 134*   K 3.9 3.9   CL 96* 96*   CO2 24 26   BUN 12 14   CREATININE 0.8 1.0   CALCIUM 8.4* 8.6     No results for input(s): AST, ALT, BILIDIR, BILITOT, ALKPHOS in the last 72 hours. No results for input(s): INR in the last 72 hours. No results for input(s): Curlie Lat in the last 72 hours. Microbiology:    Blood culture #1: No results found for: [facility]    Blood culture #2:No results found for: Gina Martha    Organism:  Lab Results   Component Value Date    ORG Growth of Contaminants 11/05/2021         Lab Results   Component Value Date    LABGRAM  10/12/2021     No segmented neutrophils observed. Rare epithelial cells observed. No bacteria seen. MRSA culture only:No results found for: [facility]    Urine culture:   Lab Results   Component Value Date    LABURIN [place] count: >100,000 CFU/mL  10/29/2021       Respiratory culture: No results found for: CULTRESP    Aerobic and Anaerobic :  Lab Results   Component Value Date    LABAERO  10/12/2021     No growth-preliminary Culture also yielded light growth of Staphylococcus species (coagulase negative). LABAERO light growth  10/12/2021    LABAERO  10/12/2021     very light growth Pantoea species which may be initially susceptible to third generation cephalosporins may develop resistance within three to four days of initiation of this antimicrobial therapy.       No results found for: LABANAE    Urinalysis:      Lab Results   Component Value Date    NITRU NEGATIVE 12/12/2021 WBCUA 15-25 11/05/2021    BACTERIA NONE SEEN 11/05/2021    RBCUA NONE 11/05/2021    BLOODU NEGATIVE 12/12/2021    SPECGRAV 1.007 12/12/2021    GLUCOSEU >= 1000 11/05/2021       Radiology:  XR CHEST PORTABLE   Final Result   1. Abnormal density in the right upper lobe consistent with inflammatory process presumably Covid 19 infection. 2.. Thoracic spondylosis. 3. Postoperative changes along the right lateral chest wall. **This report has been created using voice recognition software. It may contain minor errors which are inherent in voice recognition technology. **      Final report electronically signed by DR Donell Boudreaux on 12/16/2021 3:18 PM      CTA CHEST [address]   Final Result    There is no pulmonary emboli. There are bilateral multifocal infiltrates and interstitial edema. **This report has been created using voice recognition software. It may contain minor errors which are inherent in voice recognition technology. **      Final report electronically signed by Dr. Ijeoma Blake on 12/15/2021 4:38 PM      XR PELVIS (1-2 VIEWS)   Final Result    No evidence of acute osseous injury on this single image of the pelvis. **This report has been created using voice recognition software. It may contain minor errors which are inherent in voice recognition technology. **      Final report electronically signed by Dr. Ryan Yanes MD on 12/12/2021 2:52 PM      XR CHEST PORTABLE   Final Result   Right upper lobe pneumonia. **This report has been created using voice recognition software. It may contain minor errors which are inherent in voice recognition technology. **      Final report electronically signed by Dr. yRan Yanes MD on 12/12/2021 2:51 PM      CT Head WO Contrast   Final Result    No evidence of acute intracranial abnormality. **This report has been created using voice recognition software.  It may contain minor errors which are inherent in voice recognition technology. **      Final report electronically signed by Dr. Corey Thomson MD on 12/12/2021 2:09 PM      CT Cervical Spine WO Contrast   Final Result    No evidence of acute osseous injury of the cervical spine. **This report has been created using voice recognition software. It may contain minor errors which are inherent in voice recognition technology. **      Final report electronically signed by Dr. Corey Thomson MD on 12/12/2021 2:20 PM       Ed Fast Abdomen Limited    (Results Pending)     Electronically signed by SHELBIE Harrington on 12/24/2021 at 8:09 AM

## 2021-12-25 NOTE — DISCHARGE SUMMARY
Hospitalist Discharge Summary        Patient: Wolf Kwon  YOB: 1935  MRN: [id number]   Acct: [de-identified]    Primary Care Physician: Raissa Gonzalez MD    Admit date  12/12/2021    Discharge date: 12/25/2021    Chief Complaint on presentation :-  Fall     Discharge Assessment and Plan:-   1. COVID-19:              Tested positive 12/12. CXR (12/16)  is relatively unchanged. Patient's procal 0.08 and remains afebrile and non-toxic appearing, without an acute leukocytosis.  CTA 12/15 revealed no pulmonary emboli and bilateral multifocal infiltrates and interstitial edema. No clinical indication for antibiotics or treatment for COVID infection at this time. Patient is out of isolation on 12/24.     2. Orthostatic hypotension:               Continue Midodrine 10 mg TID (increased on 12/19) and Lopressor per cardiology recommendations given hx of valvular disease. Continue Orthostatic blood pressure daily. PT/OT.       3. Normocytic Anemia:               Stable. Hgb 11.7         4. HFrEF, Chronic:              Echo 12/14 showed EF 45% (previously 55% 5/2021). Cardiology was consulted - no CP, trop negative, no acute ekg changes. No further inpatient workup at this time, pt to follow up with primary cardiologist OP.      5. Closed head injury:              PTA. Stable. CT of head and neck showed no acute injury or abnormality. Pt was cleared by trauma.      6. NIDDM2:              Continue home medications at [place].      7. Hx CAD:               Hx of PCI of LAD 1/2020.  Continue Plavix, aspirin, statin. 8. Mild aortic stenosis; mild to moderate mitral stenosis:              Noted on ECHO 12/14. Continue beta-blocker as stated above.     9. CKD stage 3:              Cr stable at baseline    Initial H and P and Hospital course:-  History obtained from patient and chart review.      [age] y.o. female who we are asked to see/evaluate by Norman Enriquez MD for medical management of R for patient admitted under trauma surgery for SLP cog eval following closed head injury. Monitor for postconcussive symptoms. Ear laceration closed emergency department, see procedure note below. Updated on [employer]. Consult to hospitalist for right upper quadrant pneumonia and assistance with medical management. Case discussed and care coordinated with trauma surgeon, Dr. Mike Self.     Signed copy of DNR CCA by PCP noted in advance care planning documents.  DNR-CCA on admission. Discussed with daughter at bedside. \"     12/15: \"Patient is resting in bed on RA. She states that did not know she had COVID-19 when she fell and was admitted to the hospital. She denies chest pain, SOB, abdominal pain. Per nursing, patient has not been as confused this morning. \"     12/16: \"Patient was resting comfortably in bed on RA. She states she has no new symptoms, concerns, or questions. Asking about discharge. States that she wasn't sure why she needed the O2 early this morning- denies feeling SOB. Reviewed patient's labs and discussed plan for today- get orthostatic blood pressures, Evaluate chest xray, and check with cardiology to restart IVF and possibly midodrine. \"     12/17: \"Patient has not been up today, unknown if she feels dizzy when standing. Awaiting repeat orthostats for further management recs.                Orthos positive. Increased midodrine to 5mg TID. \"     12/18: \"Patient states that she feels well and wants to go back to assisted living. She denies any lightheadedness or dizziness with standing. However, after discussing with the nurse, when the patient was standing to check orthos this AM, the patient became very lightheaded and began to fall backwards. This was before she received AM midodrine. Orthos repeated after midodrine and still significant. Increased midodrine to 7.5mg tid. \"      12/19: \"Patient is wanting to go home. She denies fever/chills, sob, cp, abd pain, nvd.  Continues to have lightheadedness cooperative. 2. HEENT: Normal cephalic, atraumatic without obvious deformity. Pupils equal, round, and reactive to light. Extra ocular muscles intact. Conjunctivae/corneas clear. 3. Neck: Supple, with full range of motion. No jugular venous distention. Trachea midline. 4. Respiratory:  Normal respiratory effort. Clear to auscultation, bilaterally without Rales/Wheezes/Rhonchi. 5. Cardiovascular: Regular rate and rhythm with normal S1/S2 without murmurs, rubs or gallops. 6. Abdomen: Soft, non-tender, non-distended with normal bowel sounds. 7. Musculoskeletal:  No clubbing, cyanosis or edema bilaterally. 8. Skin: Skin color, texture, turgor normal.  No rashes or lesions. 9. Neurologic:  Neurovascularly intact without any focal sensory/motor deficits. Cranial nerves: II-XII intact, grossly non-focal.  10. Psychiatric: Alert and oriented, thought content appropriate, normal insight  11. Capillary Refill: Brisk,< 3 seconds   12. Peripheral Pulses: +2 palpable, equal bilaterally       Discharge Medications:-      Medication List      START taking these medications    aspirin 81 MG chewable tablet  Take 1 tablet by mouth daily     midodrine 10 MG tablet  Commonly known as: PROAMATINE  Take 1 tablet by mouth 3 times daily        CONTINUE taking these medications    acetaminophen 325 MG tablet  Commonly known as: TYLENOL     atorvastatin 40 MG tablet  Commonly known as: LIPITOR  Take 1 tablet by mouth daily     blood glucose test strips strip  Commonly known as: ONE TOUCH ULTRA TEST  Test daily or AD.   Dx. 250.00     clopidogrel 75 MG tablet  Commonly known as: PLAVIX  Pt takes 300 mg tonight then 75 mg every day starting on 2/4/2020     D-Mannose 500 MG Caps     DULoxetine 60 MG extended release capsule  Commonly known as: Cymbalta  Take 2 capsules by mouth daily     lactobacillus capsule  Take 1 capsule by mouth 2 times daily (with meals)     magnesium hydroxide 400 MG/5ML suspension  Commonly known as: MILK OF MAGNESIA     melatonin 3 MG Tabs tablet     metFORMIN 750 MG extended release tablet  Commonly known as: GLUCOPHAGE-XR  Take 1 tablet by mouth Daily with supper Restart this medication Friday am     metoprolol tartrate 25 MG tablet  Commonly known as: LOPRESSOR  Take 1 tablet by mouth 2 times daily     nitroGLYCERIN 0.4 MG SL tablet  Commonly known as: NITROSTAT  up to max of 3 total doses. If no relief after 1 dose, call 911. polyethyl glycol-propyl glycol 0.4-0.3 % 0.4-0.3 % ophthalmic solution  Commonly known as: SYSTANE     Vitamin C 500 MG Caps     vitamin D 50 MCG (2000 UT) Caps capsule     zinc sulfate 220 (50 Zn) MG capsule  Commonly known as: ZINCATE        ASK your doctor about these medications    cefdinir 300 MG capsule  Commonly known as: OMNICEF  Take 1 capsule by mouth 2 times daily for 7 days  Ask about: Should I take this medication?            Where to Get Your Medications      [facility]   [address] [facility] [address], [address] [place] [address]    Phone: [phone]   · aspirin 81 MG chewable tablet  · midodrine 10 MG tablet     Information about where to get these medications is not yet available    Ask your nurse or doctor about these medications  · cefdinir 300 MG capsule          Labs :-  Recent Results (from the past 72 hour(s))   POCT glucose    Collection Time: 12/22/21 11:12 AM   Result Value Ref Range    POC Glucose 192 (H) 70 - 108 mg/dl   POCT glucose    Collection Time: 12/22/21  4:30 PM   Result Value Ref Range    POC Glucose 167 (H) 70 - 108 mg/dl   POCT glucose    Collection Time: 12/22/21  7:55 PM   Result Value Ref Range    POC Glucose 215 (H) 70 - 108 mg/dl   POCT glucose    Collection Time: 12/23/21  5:10 AM   Result Value Ref Range    POC Glucose 184 (H) 70 - 108 mg/dl   CBC    Collection Time: 12/23/21  8:28 AM   Result Value Ref Range    WBC 7.6 4.8 - 10.8 thou/mm3 RBC 3.65 (L) 4.20 - 5.40 mill/mm3    Hemoglobin 11.7 (L) 12.0 - 16.0 gm/dl    Hematocrit 35.9 (L) 37.0 - 47.0 %    MCV 98.4 81.0 - 99.0 fL    MCH 32.1 26.0 - 33.0 pg    MCHC 32.6 32.2 - 35.5 gm/dl    RDW-CV 14.1 11.5 - 14.5 %    RDW-SD 50.9 (H) 35.0 - 45.0 fL    Platelets 079 587 - 042 thou/mm3    MPV 8.5 (L) 9.4 - 12.4 fL   SPECIMEN REJECTION    Collection Time: 12/23/21  9:28 AM   Result Value Ref Range    Rejected Test bmp     Reason for Rejection see below    Basic Metabolic Panel    Collection Time: 12/23/21 10:15 AM   Result Value Ref Range    Sodium 134 (L) 135 - 145 meq/L    Potassium 3.9 3.5 - 5.2 meq/L    Chloride 96 (L) 98 - 111 meq/L    CO2 26 23 - 33 meq/L    Glucose 326 (H) 70 - 108 mg/dL    BUN 14 7 - 22 mg/dL    CREATININE 1.0 0.4 - 1.2 mg/dL    Calcium 8.6 8.5 - 10.5 mg/dL   Anion Gap    Collection Time: 12/23/21 10:15 AM   Result Value Ref Range    Anion Gap 12.0 8.0 - 16.0 meq/L   Glomerular Filtration Rate, Estimated    Collection Time: 12/23/21 10:15 AM   Result Value Ref Range    Est, Glom Filt Rate 53 (A) ml/min/1.73m2   POCT glucose    Collection Time: 12/23/21 11:13 AM   Result Value Ref Range    POC Glucose 231 (H) 70 - 108 mg/dl   POCT glucose    Collection Time: 12/23/21  4:08 PM   Result Value Ref Range    POC Glucose 76 70 - 108 mg/dl   POCT glucose    Collection Time: 12/23/21  7:22 PM   Result Value Ref Range    POC Glucose 202 (H) 70 - 108 mg/dl   POCT glucose    Collection Time: 12/23/21 11:03 PM   Result Value Ref Range    POC Glucose 132 (H) 70 - 108 mg/dl   POCT glucose    Collection Time: 12/24/21  6:09 AM   Result Value Ref Range    POC Glucose 127 (H) 70 - 108 mg/dl   POCT glucose    Collection Time: 12/24/21 11:08 AM   Result Value Ref Range    POC Glucose 184 (H) 70 - 108 mg/dl   POCT Glucose    Collection Time: 12/24/21  4:33 PM   Result Value Ref Range    POC Glucose 148 (H) 70 - 108 mg/dl   POCT glucose    Collection Time: 12/24/21  7:32 PM   Result Value Ref Range POC Glucose 217 (H) 70 - 108 mg/dl   POCT glucose    Collection Time: 12/25/21  6:13 AM   Result Value Ref Range    POC Glucose 138 (H) 70 - 108 mg/dl        Microbiology:    Blood culture #1: No results found for: BC    Blood culture #2:No results found for: BLOODCULT2    Organism:    Lab Results   Component Value Date    LABGRAM  10/12/2021     No segmented neutrophils observed. Rare epithelial cells observed. No bacteria seen. MRSA culture only:No results found for: [facility]    Urine culture:   Lab Results   Component Value Date    LABURIN Morgan count: >100,000 CFU/mL  10/29/2021     Lab Results   Component Value Date    ORG Growth of Contaminants 11/05/2021        Respiratory culture: No results found for: CULTRESP    Aerobic and Anaerobic :  Lab Results   Component Value Date    LABAERO  10/12/2021     No growth-preliminary Culture also yielded light growth of Staphylococcus species (coagulase negative). LABAERO light growth  10/12/2021    LABAERO  10/12/2021     very light growth Pantoea species which may be initially susceptible to third generation cephalosporins may develop resistance within three to four days of initiation of this antimicrobial therapy. No results found for: LABANAE    Urinalysis:      Lab Results   Component Value Date    NITRU NEGATIVE 12/12/2021    WBCUA 15-25 11/05/2021    BACTERIA NONE SEEN 11/05/2021    RBCUA NONE 11/05/2021    BLOODU NEGATIVE 12/12/2021    SPECGRAV 1.007 12/12/2021    GLUCOSEU >= 1000 11/05/2021       Radiology:-  XR PELVIS (1-2 VIEWS)    Result Date: 12/12/2021  PROCEDURE: XR PELVIS (1-2 VIEWS) CLINICAL INFORMATION: trauma. Fall with altered mental status. COMPARISON: 11/20/2020. TECHNIQUE: AP view of the pelvis. FINDINGS:  There is a right hip arthroplasty. Stable alignment compared to prior exam. No acute fracture is evident on this single view. No evidence of acute osseous injury on this single image of the pelvis.  **This report has been created using voice recognition software. It may contain minor errors which are inherent in voice recognition technology. ** Final report electronically signed by Dr. Uma Blanca MD on 12/12/2021 2:52 PM    CT Head WO Contrast    Result Date: 12/12/2021  PROCEDURE: CT HEAD WO CONTRAST CLINICAL INFORMATION: Head trauma, anticoagulation. COMPARISON: CT head dated 11/5/2021. TECHNIQUE: Helical CT of the head. Axial, sagittal and coronal reconstructions were created. All CT scans at this facility use dose modulation, iterative reconstruction, and/or weight-based dosing when appropriate to reduce radiation dose to as low as reasonably achievable. FINDINGS: There is stable moderate ventriculomegaly, mildly disproportionate to peripheral volume loss. There are mild confluent and patchy areas of hypodensity in the periventricular, subcortical deep white matter, stable compared to prior exam. Gray-white matter  differentiation otherwise appears grossly preserved. The calvarium appears intact. Orbits are unremarkable. Paranasal sinuses and mastoid air cells are clear. No evidence of acute intracranial abnormality. **This report has been created using voice recognition software. It may contain minor errors which are inherent in voice recognition technology. ** Final report electronically signed by Dr. Uma Blanca MD on 12/12/2021 2:09 PM    CT Cervical Spine WO Contrast    Result Date: 12/12/2021  PROCEDURE: CT CERVICAL SPINE WO CONTRAST CLINICAL INFORMATION: spine trauma. COMPARISON: 11/5/2021. TECHNIQUE: 3 mm noncontrast axial images were obtained through the cervical spine with sagittal and coronal reconstructions. All CT scans at this facility use dose modulation, iterative reconstruction, and/or weight-based dosing when appropriate to reduce radiation dose to as low as reasonably achievable.  FINDINGS: There is grade 1 anterolisthesis of C4 relative to C5 on the basis of degenerative change, stable compared to prior exam. There is otherwise anatomic vertebral body height and alignment. No fracture of the cervical vertebral column is identified. The craniocervical junction appears intact. No paraspinal or epidural fluid collection is identified. There are stable degenerative changes of the cervical spine most pronounced at C6-7 with complete loss of intervertebral disc space and rim osteophytes at this level. Paraspinal soft tissues are grossly unremarkable. No evidence of acute osseous injury of the cervical spine. **This report has been created using voice recognition software. It may contain minor errors which are inherent in voice recognition technology. ** Final report electronically signed by Dr. Jayden Lee MD on 12/12/2021 2:20 PM    XR CHEST PORTABLE    Result Date: 12/12/2021  PROCEDURE: XR CHEST PORTABLE CLINICAL INFORMATION: trauma. Fall with altered mental status. COMPARISON: Chest radiograph dated 10/17/2021. TECHNIQUE: AP upright view of the chest. FINDINGS: There is a patchy opacity in the right upper lobe which has appeared in the interval. Lungs otherwise appear clear. Pulmonary vasculature and cardiomediastinal silhouette are within normal limits. Visualized osseous structures appear grossly intact. Right upper lobe pneumonia. **This report has been created using voice recognition software. It may contain minor errors which are inherent in voice recognition technology. ** Final report electronically signed by Dr. Jayden Lee MD on 12/12/2021 2:51 PM       Follow-up scheduled after discharge :-    in the next few days with Dotty Mejia MD    Disposition: SNF  Condition at Discharge: Stable    Time Spent:- 40 minutes    Electronically signed by SHELBIE Hicks on 12/25/21 at 8:55 AM EST   Discharging Hospitalist

## 2022-01-01 ENCOUNTER — OFFICE VISIT (OUTPATIENT)
Dept: CARDIOLOGY CLINIC | Age: 87
End: 2022-01-01
Payer: MEDICARE

## 2022-01-01 ENCOUNTER — FOLLOWUP TELEPHONE ENCOUNTER (OUTPATIENT)
Dept: ONCOLOGY | Age: 87
End: 2022-01-01

## 2022-01-01 ENCOUNTER — TELEPHONE (OUTPATIENT)
Dept: CARDIOLOGY CLINIC | Age: 87
End: 2022-01-01

## 2022-01-01 VITALS — SYSTOLIC BLOOD PRESSURE: 91 MMHG | DIASTOLIC BLOOD PRESSURE: 56 MMHG | HEART RATE: 102 BPM

## 2022-01-01 DIAGNOSIS — E78.01 FAMILIAL HYPERCHOLESTEROLEMIA: ICD-10-CM

## 2022-01-01 DIAGNOSIS — I73.9 PVD (PERIPHERAL VASCULAR DISEASE) (HCC): ICD-10-CM

## 2022-01-01 DIAGNOSIS — I50.32 CHRONIC DIASTOLIC (CONGESTIVE) HEART FAILURE (HCC): ICD-10-CM

## 2022-01-01 DIAGNOSIS — I95.0 IDIOPATHIC HYPOTENSION: Primary | ICD-10-CM

## 2022-01-01 PROCEDURE — 99214 OFFICE O/P EST MOD 30 MIN: CPT | Performed by: NUCLEAR MEDICINE

## 2022-01-06 NOTE — PROGRESS NOTES
Ayanna [address] [place] [address].  [address] [place] [id number]  Dept: [phone]  Dept Fax: [phone]  Loc: [phone]    Visit Date: 1/6/2022    Gurwinder Sorenson is a 80 y.o. female who presents todayfor:  Chief Complaint   Patient presents with    Check-Up    Coronary Artery Disease    Hypotension    Hyperlipidemia   know LAd stent 2020   continues to have severe limitation   Continues to have dyspnea  Dyspnea on exertion   Runs lower BP  Some dizziness  No syncope  Runs higher HR  Couldn't tolerate more beta blockers  Some falls  On statins for hyperlipidemia  No chest pain reported  Did have COVID   Sloe recovery from that       HPI:  HPI  Past Medical History:   Diagnosis Date    Anxiety     nervous breakdown 9/2016    Breast cancer ([address]     CAD (coronary artery disease)     cardiomegaly    COPD (chronic obstructive pulmonary disease) ([address]     Diverticulitis     DVT (deep venous thrombosis) (HCC)     GERD (gastroesophageal reflux disease)     irritable bowel disease    Hyperlipidemia     Hypertension     Osteoarthritis     Pancreatic cancer ([address]     Family    Psychiatric problem     alzheimers start    Pulmonary embolism ([address]     S/P knee replacement     Type 2 diabetes mellitus ([address] 2009      Past Surgical History:   Procedure Laterality Date    BREAST SURGERY      right lumpectomy    CHOLECYSTECTOMY  4-15-16    cholangiogram    COLONOSCOPY      EYE SURGERY      macular hole left eye    JOINT REPLACEMENT      left knee & right hip    SKIN BIOPSY      right arm     Family History   Problem Relation Age of Onset    Cancer Mother         pancreatic    Heart Disease Father 80    Other Sister         infant death    High Cholesterol Brother      Social History     Tobacco Use    Smoking status: Never Smoker    Smokeless tobacco: Never Used   Substance Use Topics    Alcohol use: Not Currently     Comment: rare Current Outpatient Medications   Medication Sig Dispense Refill    aspirin 81 MG chewable tablet Take 1 tablet by mouth daily 30 tablet 3    midodrine (PROAMATINE) 10 MG tablet Take 1 tablet by mouth 3 times daily 90 tablet 0    metoprolol tartrate (LOPRESSOR) 25 MG tablet Take 1 tablet by mouth 2 times daily 60 tablet 3    nitroGLYCERIN (NITROSTAT) 0.4 MG SL tablet up to max of 3 total doses. If no relief after 1 dose, call 911. 25 tablet 3    lactobacillus (CULTURELLE) capsule Take 1 capsule by mouth 2 times daily (with meals) 30 capsule 0    polyethyl glycol-propyl glycol 0.4-0.3 % (SYSTANE) 0.4-0.3 % ophthalmic solution 1 drop as needed for Dry Eyes      melatonin 3 MG TABS tablet Take 3 mg by mouth nightly      magnesium hydroxide (MILK OF MAGNESIA) 400 MG/5ML suspension Take by mouth daily as needed for Constipation      clopidogrel (PLAVIX) 75 MG tablet Pt takes 300 mg tonight then 75 mg every day starting on 2/4/2020 34 tablet 0    metFORMIN (GLUCOPHAGE-XR) 750 MG extended release tablet Take 1 tablet by mouth Daily with supper Restart this medication Friday am 30 tablet 3    atorvastatin (LIPITOR) 40 MG tablet Take 1 tablet by mouth daily 30 tablet 3    D-Mannose 500 MG CAPS Take 1 capsule by mouth daily       DULoxetine (CYMBALTA) 60 MG extended release capsule Take 2 capsules by mouth daily 60 capsule 0    glucose blood VI test strips (ONE TOUCH ULTRA TEST) strip Test daily or AD. Dx. 250.00 50 each 11    acetaminophen (TYLENOL) 325 MG tablet Take 650 mg by mouth every 6 hours as needed for Pain      Cholecalciferol (VITAMIN D) 50 MCG (2000 UT) CAPS capsule Take by mouth daily      zinc sulfate (ZINCATE) 220 (50 Zn) MG capsule Take 50 mg by mouth daily      Ascorbic Acid (VITAMIN C) 500 MG CAPS Take by mouth 2 times daily       No current facility-administered medications for this visit.      Allergies   Allergen Reactions    Ciprofloxacin Hcl     Food Rash     strawberries    Penicillins Swelling and Rash     Health Maintenance   Topic Date Due    COVID-19 Vaccine (1) Never done    Depression Monitoring  Never done    DTaP/Tdap/Td vaccine (1 - Tdap) Never done    Shingles Vaccine (2 of 3) 12/16/2012    Annual Wellness Visit (AWV)  Never done    Lipid screen  01/13/2021    Flu vaccine (1) 09/01/2021    Potassium monitoring  12/23/2022    Creatinine monitoring  12/23/2022    Pneumococcal 65+ years Vaccine  Completed    Hepatitis A vaccine  Aged Out    Hib vaccine  Aged Out    Meningococcal (ACWY) vaccine  Aged Out       Subjective:  Review of Systems  General:   No fever, no chills, sone fatigue or weight loss  Pulmonary:    some baseline dyspnea, no wheezing  Cardiac:    Denies recent chest pain,   GI:     No nausea or vomiting, no abdominal pain  Neuro:     No dizziness or light headedness,   Musculoskeletal:  No recent active issues  Extremities:   No edema, no obvious claudication       Objective:  Physical Exam  BP (!) 91/56   Pulse 102   General:   Well developed, well nourished  Lungs:    Clear to auscultation  Heart:    Normal S1 S2, Slight murmur. no rubs, no gallops  Abdomen:   Soft, non tender, no organomegalies, positive bowel sounds  Extremities:   No edema, no cyanosis, good peripheral pulses  Neurological:   Awake, alert, oriented. No obvious focal deficits  Musculoskelatal:  No obvious deformities    Assessment:      Diagnosis Orders   1. Idiopathic hypotension     2. Chronic diastolic (congestive) heart failure ([address]     3. PVD (peripheral vascular disease) ([address]     4. Familial hypercholesterolemia     as above  Complicated patient  Limited   Cant do much for her tachycardia  Seems fair for now     Plan:  No follow-ups on file. As above  ?/ JOSÉ MIGUEL vs conservative management   S/p COVID   Continue risk factor modification and medical management  Thank you for allowing me to participate in the care of your patient.  Please don't hesitate to contact me regarding any further issues related to the patient care    Orders Placed:  No orders of the defined types were placed in this encounter. Medications Prescribed:  No orders of the defined types were placed in this encounter. Discussed use, benefit, and side effects of prescribed medications. All patient questions answered. Pt voicedunderstanding. Instructed to continue current medications, diet and exercise. Continue risk factor modification and medical management. Patient agreed with treatment plan. Follow up as directed.     Electronically signedby Rosita Kirby MD on 1/6/2022 at 12:45 PM

## 2022-03-17 NOTE — TELEPHONE ENCOUNTER
Monica Patricia with [facility] pt family asked for all cardiology appts to be cancelled. Family had a care conference and pt health is declining.

## 2022-03-17 NOTE — TELEPHONE ENCOUNTER
Dm Alfonso with [address] called pt family wants all cardiology appt cancelled due to patient health declining.

## 2022-03-28 NOTE — PATIENT INSTRUCTIONS
Continue:  · Continue current medications  · Daily weights and record  · Fluid restriction of 2 Liters per day  · Limit sodium in diet to around 5007-8853 mg/day  · Monitor BP  · Activity as tolerated     Call the [facility] for any of the following symptoms: [phone]  Weight gain of 2-3 pounds in 1 day or 5 pounds in 1 week  Increased shortness of breath  Shortness of breath while laying down  Cough  Chest pain  Swelling in feet, ankles or legs  Tenderness or bloating in the abdomen  Fatigue   Decreased appetite or nausea   Confusion     SEE NURSING HOME ORDERS.
85

## 2022-04-28 NOTE — TELEPHONE ENCOUNTER
Wen Coleman        1935      MRN [id number]    Referral received  22 from [place], [place]. I placed a call to family this date after reviewing chart to set up a time to meet this evening or in the next couple days. I spoke with Anthony Dill. See problem list and plan below. PCP:  Dr. Salty Webster:  HPI  CHF (EF 45.5%), COPD, TIA, Hx Covid, Alzeimer's with behaviors, major depressive disorder with hx hospitalization, CKD, Hx breast Ca, cardiomegaly, anxiety (\"nervous breakdown 2016\"), hypotension, previous HTN, failure of beta blockers,  hyperlipidemia, cardiomegaly     Review of Systems :  Obtained from chart, family and facility nurse  Constitutional:  Positive for fair appetite, fatigue. Patient with decreasing weight, dizziness, recent falls. HEENT:  Able to take meds crushed in pudding. No other issues stated. Respiratory:  Dyspnea with minimal activity and at rest.No use of oxygen since 2021. No cough or apnea reported. Cardiovascular:  Positive for bilateral pedal edema. Recent history of dizziness, tachycardia, hypotension requiring midodrine 3x/day. EF 45.5%  Musculoskeletal:  Positive for gait disturbance, unsteady, very SOB with few steps and requires assistance  Gastrointestinal:  Incontinence of bowel. Urological:  Incontinent of urine. Integumentary:  Nurse denies itching, color changes, rash, and wounds. Psychiatric/Behavioral Positive for confusion and  behavioral issues. Does speak >6 words, can be \"violent toward staff\" and delusional ie \"I'm at the police station again. \"  Hx psych stays. Neurological:  Denies headaches. Negative for tremors, seizures or numbness. Weights      2021  #150     This date reported last weight \"around #140\"  Staff will obtain. ADVANCE CARE PLANNING:  DPOA on file in [address]  and [place] Hernandez were listed.  is .   Current code status: DNR-CCA, family wishes to change to [facility] and not return to the hospital.       PROBLEMS/PLAN:    1. Deconditioning: Patient losing weight and with fair appetite. She is down 10 lbs in 4 months and has edema. Dr. Aminta Lord notes from 1/6/22 comment on her complicated case with hypotension, tachycardia, dyspnea, dizziness. He spoke of patient not recovering well since Covid Dx. Chart review reveals that patient may be at end of life. 2. Symptom management:  Patient with agitation, confusion/delusions, dyspnea with minimal activity, edema and insomnia (up to 72 hours recently). Medication list in Epic reviewed, which does not show prn meds for above symptoms, but they could be ordered at facility , melatonin 3mg. Family states patient \"didn't want to be kept alive on pills like their dad\" and \"wanted to be comfortable. \"  Family would like \"all medications stopped other than for her comfort and her psychiatric condition. \"  I messaged hospice and palliative care medical director to ensure this would be ok to stop all oral meds requested from an ethical standpoint, as patient can take meds crushed in pudding this date. Family is aware that stopping midodrine can cause some quick changes. Dr. Bolanos Marion stated that all meds can be stopped, as family is allowed to stop treatments such as dialysis, medications, blood transfusions, etc.     3. Palliative Care Encounter:  Call initially placed to set up palliative visit, as I offered to have family meet me or stated I could go see patient this evening can call afterward. During discussion, Herman James shared that they \"had requested hospice care\" and requested meds be stopped. Because family can request a hospice referral without a physician order, with her request, I called referral into [place] [facility] and indicated I would speak with Bartolo Ramos in the am to explain family's wishes are for hospice instead. Patient will likely be qualified under CHF diagnosis.   Herman James was grateful for my time and emotional support. I agree that correct level of care is hospice due to symptoms (edema and dyspea in particular), Hodleef score, weight loss, and family not wanting to continue with all treatments. All further specialist physician appointments have been cancelled by family. Plan:  Family requesting hospice care verses palliative care. Hospice will call to set up admit tomorrow. On this date 4/28/2022  I have spent 90  minutes reviewing electronic chart, obtaining additional history from [address] [place] [address], speaking with staff ([place] [id number]), updating hospice  and documenting. An electronic signature was used to authenticate this note.      Joseph Land RN, BSN, [address] Palliative Care Liaison  [facility]

## 2022-04-29 NOTE — CARE COORDINATION
10/28/21, 4:41 PM EDT  DISCHARGE PLANNING EVALUATION:    Renetta Bauer       Admitted: 10/27/2021/ Bobby 1980 day: 0   Location: 8A-09/009-A Reason for admit: ARTIE (acute kidney injury) ([facility] [address] [N17.9]  Urinary tract infection without hematuria, site unspecified [N39.0]  Fall (on) (from) other stairs and steps, initial encounter Fionalarry Calhouns   PMH:  has a past medical history of Anxiety, Breast cancer ([facility] [address] CAD (coronary artery disease), COPD (chronic obstructive pulmonary disease) ([facility] [address] Diverticulitis, DVT (deep venous thrombosis) ([facility] [address] GERD (gastroesophageal reflux disease), Hyperlipidemia, Hypertension, Osteoarthritis, Pancreatic cancer ([facility] [address] Psychiatric problem, Pulmonary embolism ([facility] [address] S/P knee replacement, and Type 2 diabetes mellitus ([facility] [address] Procedure: No.   Barriers to Discharge: To ER after a fall and back pain. From [place]. Imaging reveals no acute changes. PT/OT. PCP: Dotty Mejia MD    Patient Goals/Plan/Treatment Preferences: Met with pt today. She is from home alone, [facility]. She has some nurse services there. She has a cane and a walker. She has a PCP, her daughter provides her transportation and no issues getting her medications. Transportation/Food Security/Housekeeping Addressed:  No issues identified. [IUD Placement] : intrauterine device (IUD) placement [Neg Pregnancy Test] : negative pregnancy test [Mirena IUD] : Mirena IUD [Time out performed] : Pre-procedure time out performed.  Patient's name, date of birth and procedure confirmed. [Prevention of Pregnancy] : prevention of pregnancy [Risks] : risks [Benefits] : benefits [Alternatives] : alternatives [Patient] : patient [Infection] : infection [Bleeding] : bleeding [Expulsion] : expulsion [Cytotec] : Cytotec [Easy Passage] : Easy passage [Sounded to ___ cm] : sounded to [unfilled] ~Ucm [No Complications] : No complications [Tolerated Well] : Patient tolerated the procedure well [None] : None [LMPDate] : 4/27/22 [de-identified] : Exp. 4/2024 [de-identified] : [id number] [de-identified] : 4/2024 [de-identified] : 4/2027